# Patient Record
Sex: MALE | Race: BLACK OR AFRICAN AMERICAN | Employment: UNEMPLOYED | URBAN - METROPOLITAN AREA
[De-identification: names, ages, dates, MRNs, and addresses within clinical notes are randomized per-mention and may not be internally consistent; named-entity substitution may affect disease eponyms.]

---

## 2017-04-28 ENCOUNTER — HOSPITAL ENCOUNTER (EMERGENCY)
Age: 57
Discharge: HOME | End: 2017-04-28
Attending: EMERGENCY MEDICINE
Payer: MEDICARE

## 2017-04-28 VITALS
SYSTOLIC BLOOD PRESSURE: 110 MMHG | HEIGHT: 70 IN | WEIGHT: 162 LBS | BODY MASS INDEX: 23.19 KG/M2 | OXYGEN SATURATION: 99 % | HEART RATE: 90 BPM | DIASTOLIC BLOOD PRESSURE: 82 MMHG | RESPIRATION RATE: 16 BRPM | TEMPERATURE: 98.5 F

## 2017-04-28 DIAGNOSIS — Z87.898 HISTORY OF EXTRAPYRAMIDAL SYMPTOMS: Primary | ICD-10-CM

## 2017-04-28 PROCEDURE — 99285 EMERGENCY DEPT VISIT HI MDM: CPT | Performed by: EMERGENCY MEDICINE

## 2017-04-28 PROCEDURE — 96374 THER/PROPH/DIAG INJ IV PUSH: CPT | Performed by: EMERGENCY MEDICINE

## 2017-04-28 PROCEDURE — 74011250636 HC RX REV CODE- 250/636: Performed by: EMERGENCY MEDICINE

## 2017-04-28 RX ORDER — DIPHENHYDRAMINE HYDROCHLORIDE 50 MG/ML
25 INJECTION, SOLUTION INTRAMUSCULAR; INTRAVENOUS
Status: COMPLETED | OUTPATIENT
Start: 2017-04-28 | End: 2017-04-28

## 2017-04-28 RX ADMIN — DIPHENHYDRAMINE HYDROCHLORIDE 25 MG: 50 INJECTION, SOLUTION INTRAMUSCULAR; INTRAVENOUS at 13:47

## 2017-04-28 NOTE — ED TRIAGE NOTES
Pt arrive via EMS [facility] from [facility] (adult ), staff states pt has been \"twitching more. \" Pt has no complaints.

## 2017-04-28 NOTE — ED PROVIDER NOTES
HPI Comments: The sister Nj Isabel reports that the adult  staff were concerned that the patient was having too many muscle twitches today. He has a history of extraparametal side effects from his clozarilthat he takes for schizophrenia. She reports  to her that he is at baseline. He denies fall, injury, loc, pain. Patient is a 49 y.o. male presenting with other event. The history is provided by a relative. Other   This is a chronic problem. Pertinent negatives include no chest pain, no abdominal pain, no headaches and no shortness of breath. Past Medical History:   Diagnosis Date    Diabetes ([facility] [address]     Heart failure ([facility] [address] [facility]     Hypertension     Psychiatric disorder     paranoid schziphrenia    Systolic CHF, acute on chronic ([facility] [address] 2/4/2016       Past Surgical History:   Procedure Laterality Date    HX APPENDECTOMY           History reviewed. No pertinent family history. Social History     Social History    Marital status: SINGLE     Spouse name: N/A    Number of children: N/A    Years of education: N/A     Occupational History    Not on file. Social History Main Topics    Smoking status: Current Every Day Smoker    Smokeless tobacco: Not on file    Alcohol use No    Drug use: No    Sexual activity: No     Other Topics Concern    Not on file     Social History Narrative         ALLERGIES: Review of patient's allergies indicates no known allergies. Review of Systems   Constitutional: Negative for chills and fever. HENT: Negative for congestion, rhinorrhea and sore throat. Eyes: Negative for photophobia and redness. Respiratory: Negative for cough and shortness of breath. Cardiovascular: Negative for chest pain and leg swelling. Gastrointestinal: Negative for abdominal pain, diarrhea, nausea and vomiting. Endocrine: Negative for polydipsia and polyuria. Musculoskeletal: Negative for back pain and myalgias.    Neurological: Negative for seizures, weakness, numbness and headaches. Vitals:    04/28/17 1106   BP: 117/81   Pulse: 100   Resp: 18   Temp: 98.6 °F (37 °C)   SpO2: 98%   Weight: 73.5 kg (162 lb)   Height: 5' 10\" (1.778 m)            Physical Exam   Constitutional: He is oriented to person, place, and time. He appears well-developed and well-nourished. HENT:   Mouth/Throat: Oropharynx is clear and moist. No oropharyngeal exudate. Eyes: Conjunctivae are normal. Pupils are equal, round, and reactive to light. Neck: Neck supple. Cardiovascular: Normal rate, regular rhythm and normal heart sounds. Pulmonary/Chest: Effort normal and breath sounds normal.   Abdominal: Soft. Bowel sounds are normal. He exhibits no distension. There is no tenderness. There is no rebound and no guarding. Musculoskeletal: Normal range of motion. He exhibits no edema or tenderness. Lymphadenopathy:     He has no cervical adenopathy. Neurological: He is alert and oriented to person, place, and time. No cranial nerve deficit. Coordination normal.   Occasional extraparametal myoclonic twitch or jerk is noted. Lower extremities and upper extremities. No seizure activity. Patient awake and alert. Skin: Skin is warm and dry. Nursing note and vitals reviewed. MDM  Number of Diagnoses or Management Options  Diagnosis management comments: Chronic extrapyramidal medication symptoms. On Cogentin 2 mg at night and 1 mg in the morning. Discussed with his sister increasing to 2 mg in the morning or at least checking with the psychiatrist if the  continues to notice increased daytime symptoms. IV Benadryl ordered emergency department. No need for labs or any injury imaging.     ED Course       Procedures

## 2018-11-13 ENCOUNTER — HOSPITAL ENCOUNTER (INPATIENT)
Age: 58
LOS: 17 days | Discharge: NURSING, REHAB OR LONG-TERM CARE | DRG: 226 | End: 2018-11-30
Attending: EMERGENCY MEDICINE | Admitting: HOSPITALIST
Payer: MEDICARE

## 2018-11-13 ENCOUNTER — APPOINTMENT (OUTPATIENT)
Dept: GENERAL RADIOLOGY | Age: 58
DRG: 226 | End: 2018-11-13
Attending: INTERNAL MEDICINE
Payer: MEDICARE

## 2018-11-13 ENCOUNTER — APPOINTMENT (OUTPATIENT)
Dept: GENERAL RADIOLOGY | Age: 58
DRG: 226 | End: 2018-11-13
Attending: EMERGENCY MEDICINE
Payer: MEDICARE

## 2018-11-13 DIAGNOSIS — J96.01 ACUTE RESPIRATORY FAILURE WITH HYPOXIA (HCC): ICD-10-CM

## 2018-11-13 DIAGNOSIS — I42.0 DILATED CARDIOMYOPATHY (HCC): ICD-10-CM

## 2018-11-13 DIAGNOSIS — I50.22 CHRONIC SYSTOLIC CONGESTIVE HEART FAILURE (HCC): ICD-10-CM

## 2018-11-13 DIAGNOSIS — F20.9 SCHIZOPHRENIA, UNSPECIFIED TYPE (HCC): Chronic | ICD-10-CM

## 2018-11-13 DIAGNOSIS — I46.9 CARDIAC ARREST (HCC): Primary | ICD-10-CM

## 2018-11-13 DIAGNOSIS — I95.9 HYPOTENSION, UNSPECIFIED HYPOTENSION TYPE: ICD-10-CM

## 2018-11-13 DIAGNOSIS — N17.9 ACUTE RENAL FAILURE, UNSPECIFIED ACUTE RENAL FAILURE TYPE (HCC): ICD-10-CM

## 2018-11-13 PROBLEM — I50.20 SYSTOLIC CONGESTIVE HEART FAILURE (HCC): Status: ACTIVE | Noted: 2018-11-13

## 2018-11-13 LAB
ALBUMIN SERPL-MCNC: 3.4 G/DL (ref 3.5–5)
ALBUMIN/GLOB SERPL: 0.8 {RATIO} (ref 1.2–3.5)
ALP SERPL-CCNC: 205 U/L (ref 50–136)
ALT SERPL-CCNC: 422 U/L (ref 12–65)
ANION GAP SERPL CALC-SCNC: 12 MMOL/L (ref 7–16)
ARTERIAL PATENCY WRIST A: YES
AST SERPL-CCNC: 484 U/L (ref 15–37)
ATRIAL RATE: 96 BPM
BASE DEFICIT BLD-SCNC: 2 MMOL/L
BASOPHILS # BLD: 0 K/UL (ref 0–0.2)
BASOPHILS NFR BLD: 0 % (ref 0–2)
BDY SITE: ABNORMAL
BILIRUB SERPL-MCNC: 0.4 MG/DL (ref 0.2–1.1)
BODY TEMPERATURE: 98.6
BUN SERPL-MCNC: 23 MG/DL (ref 6–23)
CALCIUM SERPL-MCNC: 8.7 MG/DL (ref 8.3–10.4)
CALCULATED P AXIS, ECG09: 64 DEGREES
CALCULATED R AXIS, ECG10: -72 DEGREES
CALCULATED T AXIS, ECG11: 99 DEGREES
CHLORIDE SERPL-SCNC: 104 MMOL/L (ref 98–107)
CO2 BLD-SCNC: 25 MMOL/L
CO2 SERPL-SCNC: 24 MMOL/L (ref 21–32)
CREAT SERPL-MCNC: 1.78 MG/DL (ref 0.8–1.5)
DIAGNOSIS, 93000: NORMAL
DIFFERENTIAL METHOD BLD: ABNORMAL
EOSINOPHIL # BLD: 0 K/UL (ref 0–0.8)
EOSINOPHIL NFR BLD: 0 % (ref 0.5–7.8)
ERYTHROCYTE [DISTWIDTH] IN BLOOD BY AUTOMATED COUNT: 15.9 %
GAS FLOW.O2 O2 DELIVERY SYS: ABNORMAL L/MIN
GAS FLOW.O2 SETTING OXYMISER: 16 BPM
GLOBULIN SER CALC-MCNC: 4.1 G/DL (ref 2.3–3.5)
GLUCOSE SERPL-MCNC: 92 MG/DL (ref 65–100)
HCO3 BLD-SCNC: 23.8 MMOL/L (ref 22–26)
HCT VFR BLD AUTO: 40.8 % (ref 41.1–50.3)
HGB BLD-MCNC: 12.6 G/DL (ref 13.6–17.2)
IMM GRANULOCYTES # BLD: 0.2 K/UL (ref 0–0.5)
IMM GRANULOCYTES NFR BLD AUTO: 2 % (ref 0–5)
INSPIRATION.DURATION SETTING TIME VENT: 0.9 SEC
LYMPHOCYTES # BLD: 3.3 K/UL (ref 0.5–4.6)
LYMPHOCYTES NFR BLD: 33 % (ref 13–44)
MCH RBC QN AUTO: 23.7 PG (ref 26.1–32.9)
MCHC RBC AUTO-ENTMCNC: 30.9 G/DL (ref 31.4–35)
MCV RBC AUTO: 76.7 FL (ref 79.6–97.8)
MONOCYTES # BLD: 0.7 K/UL (ref 0.1–1.3)
MONOCYTES NFR BLD: 7 % (ref 4–12)
NEUTS SEG # BLD: 5.7 K/UL (ref 1.7–8.2)
NEUTS SEG NFR BLD: 58 % (ref 43–78)
NRBC # BLD: 0 K/UL (ref 0–0.2)
O2/TOTAL GAS SETTING VFR VENT: 100 %
P-R INTERVAL, ECG05: 186 MS
PCO2 BLD: 45.7 MMHG (ref 35–45)
PEEP RESPIRATORY: 8 CMH2O
PH BLD: 7.33 [PH] (ref 7.35–7.45)
PLATELET # BLD AUTO: 191 K/UL (ref 150–450)
PMV BLD AUTO: 11.9 FL (ref 9.4–12.3)
PO2 BLD: 337 MMHG (ref 75–100)
POTASSIUM SERPL-SCNC: 4.4 MMOL/L (ref 3.5–5.1)
PROT SERPL-MCNC: 7.5 G/DL (ref 6.3–8.2)
Q-T INTERVAL, ECG07: 408 MS
QRS DURATION, ECG06: 162 MS
QTC CALCULATION (BEZET), ECG08: 515 MS
RBC # BLD AUTO: 5.32 M/UL (ref 4.23–5.6)
SAO2 % BLD: 100 % (ref 95–98)
SERVICE CMNT-IMP: ABNORMAL
SODIUM SERPL-SCNC: 140 MMOL/L (ref 136–145)
SPECIMEN TYPE: ABNORMAL
TROPONIN I BLD-MCNC: 0 NG/ML (ref 0.02–0.05)
VENTILATION MODE VENT: ABNORMAL
VENTRICULAR RATE, ECG03: 96 BPM
VT SETTING VENT: 500 ML
WBC # BLD AUTO: 9.9 K/UL (ref 4.3–11.1)

## 2018-11-13 PROCEDURE — C1751 CATH, INF, PER/CENT/MIDLINE: HCPCS

## 2018-11-13 PROCEDURE — 74011250636 HC RX REV CODE- 250/636: Performed by: INTERNAL MEDICINE

## 2018-11-13 PROCEDURE — B544ZZA ULTRASONOGRAPHY OF LEFT JUGULAR VEINS, GUIDANCE: ICD-10-PCS | Performed by: INTERNAL MEDICINE

## 2018-11-13 PROCEDURE — 36556 INSERT NON-TUNNEL CV CATH: CPT

## 2018-11-13 PROCEDURE — 02HV33Z INSERTION OF INFUSION DEVICE INTO SUPERIOR VENA CAVA, PERCUTANEOUS APPROACH: ICD-10-PCS | Performed by: INTERNAL MEDICINE

## 2018-11-13 PROCEDURE — 99223 1ST HOSP IP/OBS HIGH 75: CPT | Performed by: INTERNAL MEDICINE

## 2018-11-13 PROCEDURE — 71045 X-RAY EXAM CHEST 1 VIEW: CPT

## 2018-11-13 PROCEDURE — 0D9670Z DRAINAGE OF STOMACH WITH DRAINAGE DEVICE, VIA NATURAL OR ARTIFICIAL OPENING: ICD-10-PCS | Performed by: INTERNAL MEDICINE

## 2018-11-13 PROCEDURE — 36600 WITHDRAWAL OF ARTERIAL BLOOD: CPT

## 2018-11-13 PROCEDURE — 0BH17EZ INSERTION OF ENDOTRACHEAL AIRWAY INTO TRACHEA, VIA NATURAL OR ARTIFICIAL OPENING: ICD-10-PCS | Performed by: INTERNAL MEDICINE

## 2018-11-13 PROCEDURE — 84484 ASSAY OF TROPONIN QUANT: CPT

## 2018-11-13 PROCEDURE — 74011000258 HC RX REV CODE- 258: Performed by: INTERNAL MEDICINE

## 2018-11-13 PROCEDURE — 77030018846 HC SOL IRR STRL H20 ICUM -A

## 2018-11-13 PROCEDURE — 74011000250 HC RX REV CODE- 250: Performed by: INTERNAL MEDICINE

## 2018-11-13 PROCEDURE — 96375 TX/PRO/DX INJ NEW DRUG ADDON: CPT | Performed by: EMERGENCY MEDICINE

## 2018-11-13 PROCEDURE — 99285 EMERGENCY DEPT VISIT HI MDM: CPT | Performed by: EMERGENCY MEDICINE

## 2018-11-13 PROCEDURE — 82803 BLOOD GASES ANY COMBINATION: CPT

## 2018-11-13 PROCEDURE — 77030032490 HC SLV COMPR SCD KNE COVD -B

## 2018-11-13 PROCEDURE — 36556 INSERT NON-TUNNEL CV CATH: CPT | Performed by: INTERNAL MEDICINE

## 2018-11-13 PROCEDURE — 74011000258 HC RX REV CODE- 258: Performed by: EMERGENCY MEDICINE

## 2018-11-13 PROCEDURE — 94002 VENT MGMT INPAT INIT DAY: CPT

## 2018-11-13 PROCEDURE — 96365 THER/PROPH/DIAG IV INF INIT: CPT | Performed by: EMERGENCY MEDICINE

## 2018-11-13 PROCEDURE — 93005 ELECTROCARDIOGRAM TRACING: CPT | Performed by: EMERGENCY MEDICINE

## 2018-11-13 PROCEDURE — 65620000000 HC RM CCU GENERAL

## 2018-11-13 PROCEDURE — 5A1945Z RESPIRATORY VENTILATION, 24-96 CONSECUTIVE HOURS: ICD-10-PCS | Performed by: INTERNAL MEDICINE

## 2018-11-13 PROCEDURE — 74018 RADEX ABDOMEN 1 VIEW: CPT

## 2018-11-13 PROCEDURE — 74011250636 HC RX REV CODE- 250/636: Performed by: EMERGENCY MEDICINE

## 2018-11-13 PROCEDURE — 85025 COMPLETE CBC W/AUTO DIFF WBC: CPT

## 2018-11-13 PROCEDURE — 74011250636 HC RX REV CODE- 250/636

## 2018-11-13 PROCEDURE — 80053 COMPREHEN METABOLIC PANEL: CPT

## 2018-11-13 PROCEDURE — 31500 INSERT EMERGENCY AIRWAY: CPT | Performed by: INTERNAL MEDICINE

## 2018-11-13 RX ORDER — MIDAZOLAM HYDROCHLORIDE 1 MG/ML
2 INJECTION, SOLUTION INTRAMUSCULAR; INTRAVENOUS
Status: DISCONTINUED | OUTPATIENT
Start: 2018-11-13 | End: 2018-11-30 | Stop reason: HOSPADM

## 2018-11-13 RX ORDER — POTASSIUM CHLORIDE 20 MEQ/1
20 TABLET, EXTENDED RELEASE ORAL DAILY
COMMUNITY

## 2018-11-13 RX ORDER — ENOXAPARIN SODIUM 100 MG/ML
40 INJECTION SUBCUTANEOUS EVERY 24 HOURS
Status: DISCONTINUED | OUTPATIENT
Start: 2018-11-13 | End: 2018-11-20

## 2018-11-13 RX ORDER — LISINOPRIL 10 MG/1
10 TABLET ORAL DAILY
COMMUNITY

## 2018-11-13 RX ORDER — POLYETHYLENE GLYCOL 3350 17 G/17G
17 POWDER, FOR SOLUTION ORAL AS NEEDED
COMMUNITY

## 2018-11-13 RX ORDER — POLYVINYL ALCOHOL 14 MG/ML
1 SOLUTION/ DROPS OPHTHALMIC EVERY 4 HOURS
Status: DISCONTINUED | OUTPATIENT
Start: 2018-11-13 | End: 2018-11-15

## 2018-11-13 RX ORDER — MIDAZOLAM HYDROCHLORIDE 1 MG/ML
INJECTION, SOLUTION INTRAMUSCULAR; INTRAVENOUS
Status: ACTIVE
Start: 2018-11-13 | End: 2018-11-14

## 2018-11-13 RX ORDER — SCOLOPAMINE TRANSDERMAL SYSTEM 1 MG/1
1 PATCH, EXTENDED RELEASE TRANSDERMAL
COMMUNITY
End: 2019-03-04

## 2018-11-13 RX ORDER — ENOXAPARIN SODIUM 100 MG/ML
40 INJECTION SUBCUTANEOUS EVERY 24 HOURS
Status: DISCONTINUED | OUTPATIENT
Start: 2018-11-13 | End: 2018-11-13 | Stop reason: SDUPTHER

## 2018-11-13 RX ORDER — SODIUM CHLORIDE 0.9 % (FLUSH) 0.9 %
5-10 SYRINGE (ML) INJECTION AS NEEDED
Status: DISCONTINUED | OUTPATIENT
Start: 2018-11-13 | End: 2018-11-30 | Stop reason: HOSPADM

## 2018-11-13 RX ORDER — GUAIFENESIN 100 MG/5ML
81 LIQUID (ML) ORAL DAILY
Status: DISCONTINUED | OUTPATIENT
Start: 2018-11-14 | End: 2018-11-30 | Stop reason: HOSPADM

## 2018-11-13 RX ORDER — FUROSEMIDE 40 MG/1
40 TABLET ORAL DAILY
COMMUNITY
End: 2018-12-14

## 2018-11-13 RX ORDER — EPINEPHRINE 0.1 MG/ML
INJECTION INTRACARDIAC; INTRAVENOUS
Status: COMPLETED | OUTPATIENT
Start: 2018-11-13 | End: 2018-11-13

## 2018-11-13 RX ORDER — SODIUM CHLORIDE 9 MG/ML
100 INJECTION, SOLUTION INTRAVENOUS CONTINUOUS
Status: DISCONTINUED | OUTPATIENT
Start: 2018-11-13 | End: 2018-11-17

## 2018-11-13 RX ORDER — MIDAZOLAM HYDROCHLORIDE 1 MG/ML
5 INJECTION, SOLUTION INTRAMUSCULAR; INTRAVENOUS ONCE
Status: COMPLETED | OUTPATIENT
Start: 2018-11-13 | End: 2018-11-13

## 2018-11-13 RX ORDER — NALOXONE HYDROCHLORIDE 1 MG/ML
1 INJECTION INTRAMUSCULAR; INTRAVENOUS; SUBCUTANEOUS
Status: COMPLETED | OUTPATIENT
Start: 2018-11-13 | End: 2018-11-13

## 2018-11-13 RX ORDER — ATRACURIUM BESYLATE 10 MG/ML
0.5 INJECTION, SOLUTION INTRAVENOUS ONCE
Status: COMPLETED | OUTPATIENT
Start: 2018-11-13 | End: 2018-11-13

## 2018-11-13 RX ORDER — SODIUM CHLORIDE 9 MG/ML
2000 INJECTION, SOLUTION INTRAVENOUS CONTINUOUS
Status: DISCONTINUED | OUTPATIENT
Start: 2018-11-13 | End: 2018-11-13

## 2018-11-13 RX ORDER — MIDAZOLAM HYDROCHLORIDE 1 MG/ML
2 INJECTION, SOLUTION INTRAMUSCULAR; INTRAVENOUS ONCE
Status: COMPLETED | OUTPATIENT
Start: 2018-11-13 | End: 2018-11-13

## 2018-11-13 RX ORDER — SODIUM CHLORIDE 0.9 % (FLUSH) 0.9 %
5-10 SYRINGE (ML) INJECTION EVERY 8 HOURS
Status: DISCONTINUED | OUTPATIENT
Start: 2018-11-13 | End: 2018-11-30 | Stop reason: HOSPADM

## 2018-11-13 RX ORDER — MIDAZOLAM HYDROCHLORIDE 1 MG/ML
INJECTION, SOLUTION INTRAMUSCULAR; INTRAVENOUS
Status: COMPLETED
Start: 2018-11-13 | End: 2018-11-13

## 2018-11-13 RX ADMIN — Medication 10 ML: at 18:19

## 2018-11-13 RX ADMIN — POLYVINYL ALCOHOL 1 DROP: 14 SOLUTION/ DROPS OPHTHALMIC at 21:06

## 2018-11-13 RX ADMIN — MIDAZOLAM HYDROCHLORIDE 2 MG: 1 INJECTION, SOLUTION INTRAMUSCULAR; INTRAVENOUS at 15:59

## 2018-11-13 RX ADMIN — AMIODARONE HYDROCHLORIDE 1 MG/MIN: 50 INJECTION, SOLUTION INTRAVENOUS at 15:04

## 2018-11-13 RX ADMIN — NALOXONE HYDROCHLORIDE 1 MG: 1 INJECTION PARENTERAL at 14:45

## 2018-11-13 RX ADMIN — Medication 10 ML: at 22:53

## 2018-11-13 RX ADMIN — SODIUM CHLORIDE 5 MCG/KG/MIN: 900 INJECTION, SOLUTION INTRAVENOUS at 16:46

## 2018-11-13 RX ADMIN — ATRACURIUM BESYLATE 36.3 MG: 10 INJECTION, SOLUTION INTRAVENOUS at 16:46

## 2018-11-13 RX ADMIN — SODIUM CHLORIDE 2000 ML: 9 INJECTION, SOLUTION INTRAVENOUS at 14:19

## 2018-11-13 RX ADMIN — MIDAZOLAM 2 MG/HR: 5 INJECTION INTRAMUSCULAR; INTRAVENOUS at 16:52

## 2018-11-13 RX ADMIN — MIDAZOLAM HYDROCHLORIDE 2 MG: 1 INJECTION, SOLUTION INTRAMUSCULAR; INTRAVENOUS at 23:23

## 2018-11-13 RX ADMIN — AMIODARONE HYDROCHLORIDE 1 MG/MIN: 50 INJECTION, SOLUTION INTRAVENOUS at 16:10

## 2018-11-13 RX ADMIN — POLYVINYL ALCOHOL 1 DROP: 14 SOLUTION/ DROPS OPHTHALMIC at 18:19

## 2018-11-13 RX ADMIN — ENOXAPARIN SODIUM 40 MG: 40 INJECTION, SOLUTION INTRAVENOUS; SUBCUTANEOUS at 18:21

## 2018-11-13 RX ADMIN — AMIODARONE HYDROCHLORIDE 0.5 MG/MIN: 50 INJECTION, SOLUTION INTRAVENOUS at 23:30

## 2018-11-13 RX ADMIN — EPINEPHRINE 1 MG: 0.1 INJECTION, SOLUTION ENDOTRACHEAL; INTRACARDIAC; INTRAVENOUS at 14:11

## 2018-11-13 RX ADMIN — SODIUM CHLORIDE 100 ML/HR: 900 INJECTION, SOLUTION INTRAVENOUS at 16:05

## 2018-11-13 RX ADMIN — FENTANYL CITRATE 50 MCG/HR: 50 INJECTION, SOLUTION INTRAMUSCULAR; INTRAVENOUS at 16:52

## 2018-11-13 RX ADMIN — MIDAZOLAM HYDROCHLORIDE 5 MG: 1 INJECTION, SOLUTION INTRAMUSCULAR; INTRAVENOUS at 16:46

## 2018-11-13 RX ADMIN — EPINEPHRINE 0.1 MCG/KG/MIN: 1 INJECTION INTRAMUSCULAR; INTRAVENOUS; SUBCUTANEOUS at 14:37

## 2018-11-13 NOTE — ED TRIAGE NOTES
PT TO ED VIA EMS/STRETCHER FROM [facility]  - WITNESSED ARREST AT FACILITY WITH IMMEDIATE CHEST COMPRESSIONS - PT SHOCKED ONCE WITH AED - EMS ARRIVAL PEA FIRST CHECK THEN SECOND PULSE OBTAINED - ROSC OBTAINED -  - 98/40 - AMIO RUNNING 150MG IN 50ML BAG BY EMS - 7.5 ET TUBE -GOOD LUNG SOUNDS ON ASCULTATION - POSITIVE COLOR CHANGE - IO RIGHT SHOULDER

## 2018-11-13 NOTE — PROCEDURES
Procedure: /emergent intubation    Indication: respiratory insufficiency      After adequate sedation and paralisys emergent intubation was performed. Previous et tube  Had large cuff leak so  #7 tube placed over a tube changer. .    Water vapor was seen within the ET tube, and auscultation of the abdomen revealed no bubbling souds. Auscultation  and inspection of the chest after intubation showed symmetric chest excursion and symmetric air entry bilaterally. Chest X-ray has been ordered and is pending. The patient has been placed on a mechanical ventilator. There were no complications.     Anailsa Lucas MD

## 2018-11-13 NOTE — PROGRESS NOTES
responded to a crisis in the ED. ED  called  to assist with family of a pt that was post-cardiac arrest.   Team continued to work with pt and transferred pt to CCU.  worked with family in providing care and being a liaison between staff and family.  met with family and had prayer.  provided spiritual care through presence, pastoral conversation, and assurance of prayer.

## 2018-11-13 NOTE — ROUTINE PROCESS
TRANSFER - OUT REPORT: 
 
Verbal report given to David(name) on [employer]  being transferred to CCU(unit) for routine progression of care Report consisted of patients Situation, Background, Assessment and  
Recommendations(SBAR). Information from the following report(s) ED Summary was reviewed with the receiving nurse. Lines:  
Peripheral IV 11/13/18 Left Hand (Active) Site Assessment Clean, dry, & intact 11/13/2018  2:06 PM  
Phlebitis Assessment 0 11/13/2018  2:06 PM  
Infiltration Assessment 0 11/13/2018  2:06 PM  
Dressing Status Clean, dry, & intact 11/13/2018  2:06 PM  
   
Peripheral IV 11/13/18 Left Antecubital (Active) Site Assessment Clean, dry, & intact 11/13/2018  2:12 PM  
Phlebitis Assessment 0 11/13/2018  2:12 PM  
Infiltration Assessment 0 11/13/2018  2:12 PM  
Dressing Status Clean, dry, & intact 11/13/2018  2:12 PM  
  
 
Opportunity for questions and clarification was provided. Patient transported with: 
 Registered Nurse ET tube, oxygen, IV infusion, [facility] Respiratory therapist.

## 2018-11-13 NOTE — PROGRESS NOTES
TRANSFER - IN REPORT: 
 
Verbal report received from Zuri, [facility] (name) on Rika Cerda  being received from ED (unit) for ordered procedure Report consisted of patients Situation, Background, Assessment and  
Recommendations(SBAR). Information from the following report(s) ED Summary, Procedure Summary, Intake/Output, MAR and Med Rec Status was reviewed with the receiving nurse. Opportunity for questions and clarification was provided. Assessment completed upon patients arrival to unit and care assumed. Dual skin assessment performed with Esa Miner RN. No deficits noted. Arctic sun pads in place. Pt reintubated after leak formed in ET tube cuff. Pt tolerating vent well. All gtt verified. Dr Nesha Barba at bedside to place central line. Pt bathed, allevyn applied to sacrum.

## 2018-11-13 NOTE — H&P
HISTORY AND PHYSICAL Alonzo Fairchild 11/13/2018 Date of Admission:  11/13/2018 The patient's chart is reviewed and the patient is discussed with the staff. Subjective:  
 
Patient is a 66 y.o.  male presents following a witnessed cardiac arrest. He has paranoid schizophrenia and goes to a day care center where he was today when he collapsed. The staff could not find a pulse so CPR was started and an AED placed. Rhythm was reportedly shockable so this was performed with return of circulation. He was intubated by EMS and transported to the ER. Upon admission here, his pulse was again lost so he underwent another course of CPR and received 1 amp of epi and again ROSC was achieved. He is hypotensive and receiving his second liter of IV fluids. He has been started on Levophed. He has known cardiomyopathy with an echo from 2016 reviewed - EF 20 - 25%. He is followed by Dr. David Lim and his maintained on an ACE, beta blocker, statin, baby asa and daily lasix. Reportedly he had a cardiac cath in the past that revealed no CAD. He also has noninsulin dependent diabetes and he takes daily metformin. Otherwise, he is followed by the [facility] for schizophrenia that was diagnosed when he was a teenager. He lives with his sister who helps with his medical therapy which he has been compliant with. She was interviewed today and she did not notice that he seemed in any way ill before she took him to  today. Review of Systems Review of systems not obtained due to patient factors. Patient Active Problem List  
Diagnosis Code  Type II diabetes mellitus (HCC) E11.9  
 CHF (congestive heart failure) ([facility]) I50.9  
 HTN (hypertension) I10  
 Cardiac arrest ([facility]) I46.9  Acute respiratory failure with hypoxia ([facility]) J96.01  
 Hypotension I95.9  Systolic congestive heart failure (HCC) I50.20  Schizophrenia ([facility] F20.9 Prior to Admission Medications Prescriptions Last Dose Informant Patient Reported? Taking?  
aspirin 81 mg chewable tablet   Yes No  
Sig: Take 81 mg by mouth daily. benztropine (COGENTIN) 2 mg tablet   Yes No  
Sig: Take 2 mg by mouth nightly. Indications: EXTRAPYRAMIDAL DISEASE  
carvedilol (COREG) 6.25 mg tablet   Yes No  
Sig: Take 3.125 mg by mouth two (2) times daily (with meals). cloZAPine (CLOZARIL) 100 mg disintegrating tablet   Yes No  
Sig: Take 200 mg by mouth daily. Indications: SUICIDAL BEHAVIOR IN SCHIZOPHRENIA  
cloZAPine (CLOZARIL) 100 mg tablet   Yes No  
Sig: Take 500 mg by mouth nightly. Indications: SUICIDAL BEHAVIOR IN SCHIZOPHRENIA  
furosemide (LASIX) 40 mg tablet   Yes Yes Sig: Take 40 mg by mouth daily. lisinopril (PRINIVIL, ZESTRIL) 10 mg tablet   Yes Yes Sig: Take 10 mg by mouth daily. loratadine (CLARITIN) 10 mg tablet   Yes No  
Sig: Take 10 mg by mouth daily. metFORMIN (GLUCOPHAGE) 500 mg tablet   Yes No  
Sig: Take 500 mg by mouth nightly. polyethylene glycol (MIRALAX) 17 gram/dose powder   Yes Yes Sig: Take 17 g by mouth as needed. potassium chloride (K-DUR, KLOR-CON) 20 mEq tablet   Yes Yes Sig: Take 20 mEq by mouth daily. scopolamine (TRANSDERM-SCOP) 1 mg over 3 days pt3d   Yes Yes Si Patch by TransDERmal route every seventy-two (72) hours. simvastatin (ZOCOR) 40 mg tablet   Yes No  
Sig: Take 40 mg by mouth nightly. Facility-Administered Medications: None Past Medical History:  
Diagnosis Date  Diabetes ([facility]  Heart failure ([facility]  Hypertension  Psychiatric disorder   
 paranoid schziphrenia  Systolic CHF, acute on chronic ([facility] 2016 Past Surgical History:  
Procedure Laterality Date  HX APPENDECTOMY Social History Socioeconomic History  Marital status: SINGLE Spouse name: Not on file  Number of children: Not on file  Years of education: Not on file  Highest education level: Not on file Social Needs  Financial resource strain: Not on file  Food insecurity - worry: Not on file  Food insecurity - inability: Not on file  Transportation needs - medical: Not on file  Transportation needs - non-medical: Not on file Occupational History  Occupation: disabled Tobacco Use  Smoking status: Former Smoker Packs/day: 1.00 Years: 35.00 Pack years: 35.00  Tobacco comment: quit around 2015 Substance and Sexual Activity  Alcohol use: No  
 Drug use: No  
 Sexual activity: No  
Other Topics Concern  Not on file Social History Narrative Lives with sister Family History Problem Relation Age of Onset  Heart Failure Mother  Heart Attack Father  Diabetes Sister  Hypertension Sister  Heart Disease Sister   
     valvular  No Known Problems Brother  No Known Problems Brother  Diabetes Sister  Diabetes Sister  Diabetes Sister  No Known Problems Sister  No Known Problems Sister  Lung Cancer Brother  Heart Attack Brother No Known Allergies Current Facility-Administered Medications Medication Dose Route Frequency  EPINEPHrine (ADRENALIN) 4 mg in 0.9% sodium chloride 250 mL infusion  0.1 mcg/kg/min IntraVENous TITRATE  amiodarone (CORDARONE) 450 mg in dextrose 5% 250 mL infusion  0.5-1 mg/min IntraVENous CONTINUOUS Current Outpatient Medications Medication Sig  
 lisinopril (PRINIVIL, ZESTRIL) 10 mg tablet Take 10 mg by mouth daily.  potassium chloride (K-DUR, KLOR-CON) 20 mEq tablet Take 20 mEq by mouth daily.  furosemide (LASIX) 40 mg tablet Take 40 mg by mouth daily.  scopolamine (TRANSDERM-SCOP) 1 mg over 3 days pt3d 1 Patch by TransDERmal route every seventy-two (72) hours.  polyethylene glycol (MIRALAX) 17 gram/dose powder Take 17 g by mouth as needed.  metFORMIN (GLUCOPHAGE) 500 mg tablet Take 500 mg by mouth nightly.  simvastatin (ZOCOR) 40 mg tablet Take 40 mg by mouth nightly.  cloZAPine (CLOZARIL) 100 mg tablet Take 500 mg by mouth nightly. Indications: SUICIDAL BEHAVIOR IN SCHIZOPHRENIA  
 benztropine (COGENTIN) 2 mg tablet Take 2 mg by mouth nightly. Indications: EXTRAPYRAMIDAL DISEASE  cloZAPine (CLOZARIL) 100 mg disintegrating tablet Take 200 mg by mouth daily. Indications: SUICIDAL BEHAVIOR IN SCHIZOPHRENIA  
 loratadine (CLARITIN) 10 mg tablet Take 10 mg by mouth daily.  carvedilol (COREG) 6.25 mg tablet Take 3.125 mg by mouth two (2) times daily (with meals).  aspirin 81 mg chewable tablet Take 81 mg by mouth daily. Objective:  
 
Vitals:  
 11/13/18 1513 11/13/18 1516 11/13/18 1517 11/13/18 1519 BP: (!) 89/62 95/62 91/57 91/57 Pulse: 88 88 89 89 Resp: 14 15 15 15 SpO2: 100% 100% 100% 100% Weight: PHYSICAL EXAM  
 
Constitutional:  the patient is well developed and in no acute distress HEENT:  Sclera clear, pupils pinpoint and equal, orally intubated Respiratory: clear and equal breath sounds. Respirations not labored. On mechanical ventilator Cardiovascular:  RRR without M,G,R. Sinus per telemetry Abd/GI: soft; with positive bowel sounds. Ext: cool. There is no lower leg edema. Skin:  no jaundice or rashes, no wounds Neuro: moving head, left arm and both legs some following nail bed pressure. No movement noted in right arm. Musculoskeletal:  No deformities Chest xray Recent Labs 11/13/18 [id number] 68 71 79 WBC 9.9 HGB 12.6* HCT 40.8*  Recent Labs 11/13/18 92975 68 71 79   
K 4.4  
 GLU 92  
CO2 24 BUN 23  
CREA 1.78* CA 8.7 ALB 3.4* SGOT 484* Assessment:  (Medical Decision Making) Hospital Problems  Date Reviewed: 2/9/2016 Codes Class Noted POA Cardiac arrest [facility]) ICD-10-CM: I46.9 ICD-9-CM: 427.5  11/13/2018 Yes  Acute respiratory failure with hypoxia (HCC) ICD-10-CM: J96.01 
 ICD-9-CM: 518.81  11/13/2018 Yes Hypotension ICD-10-CM: I95.9 ICD-9-CM: 458.9  11/13/2018 Yes Systolic congestive heart failure (HCC) ICD-10-CM: I50.20 ICD-9-CM: 428.20, 428.0  11/13/2018 Yes Schizophrenia ([address] (Chronic) ICD-10-CM: F20.9 ICD-9-CM: 295.90  11/13/2018 Yes Type II diabetes mellitus (HCC) (Chronic) ICD-10-CM: E11.9 ICD-9-CM: 250.00  2/4/2016 Yes Plan:  (Medical Decision Making) 1. Transfer to ICU - initiate artic sun protocol 2. Cardiology consult 3. IV fluid resuscitation - levophed added for support 4. Follow up labs in AM - noted renal failure 5. Obtain list of home psych meds from his sister (mail order from [email or web address] so they don't have a copy) 6. SSI for now - metformin on hold Kateryna Serrano NP More than 50% of time documented was spent face-to-face contact with the patient and in the care of the patient on the floor/unit where the patient is located Lungs:  clear Heart:  RRR with no Murmur/Rubs/Gallops Additional Comments:  Admit to icu, [facility] protocol I have spoken with and examined the patient. I agree with the above assessment and plan as documented.  
 
Thiago Perez MD

## 2018-11-13 NOTE — PROCEDURES
The pt was placed in the supine position  The left upper chest and neck were prepped and drapped  Using ultrasound the left ij was ided but was small and we were unable to access  The left subclavian was then accessed with an 18 gauge needle-   A quad lumen catheter was placed using Seldinger technique.   Good blood return

## 2018-11-13 NOTE — ED PROVIDER NOTES
37-year-old [facility], [facility] American male with history of cardiomyopathy and schizophrenia presents after a witnessed cardiac arrest at a  center. CPR was provided and patient was defibrillated with an automatic external defibrillator. Upon EMS arrival they felt like he was in [place] and began CPR. He was intubated. Amiodarone drip was started. No epinephrine was needed. There is second pulse check he had return of spontaneous circulation. Blood pressure in the low 90s prior to arrival.  Blood sugar above 100 prior to arrival. 
 
 
The history is provided by the EMS personnel. Cardiac arrest   
This is a new problem. The current episode started less than 1 hour ago. The problem has been resolved. Past Medical History:  
Diagnosis Date  Diabetes ([facility]  Heart failure ([facility]  Hypertension  Psychiatric disorder   
 paranoid schziphrenia  Systolic CHF, acute on chronic ([facility] 2/4/2016 Past Surgical History:  
Procedure Laterality Date  HX APPENDECTOMY History reviewed. No pertinent family history. Social History Socioeconomic History  Marital status: SINGLE Spouse name: Not on file  Number of children: Not on file  Years of education: Not on file  Highest education level: Not on file Social Needs  Financial resource strain: Not on file  Food insecurity - worry: Not on file  Food insecurity - inability: Not on file  Transportation needs - medical: Not on file  Transportation needs - non-medical: Not on file Occupational History  Not on file Tobacco Use  Smoking status: Current Every Day Smoker Substance and Sexual Activity  Alcohol use: No  
 Drug use: No  
 Sexual activity: No  
Other Topics Concern  Not on file Social History Narrative  Not on file ALLERGIES: Patient has no known allergies. Review of Systems Unable to perform ROS: Intubated Vitals: 11/13/18 1421 11/13/18 1422 11/13/18 1424 11/13/18 1425 BP: (!) 87/58 (!) 72/52 (!) 76/53 (!) 76/53 Pulse: 97 96 92 92 Resp:  16 15 SpO2:  100% 100% Weight:   72.6 kg (160 lb) Physical Exam  
Constitutional: He appears well-developed and well-nourished. HENT:  
Intubated with a 7.5 ET tube. Eyes:  
Pupils are pinpoint and minimally reactive Neck:  
2+ bilateral carotid pulses initially Cardiovascular: Normal rate, regular rhythm and normal heart sounds. Pulmonary/Chest: Breath sounds normal.  
ventilated Abdominal: Soft. He exhibits no distension and no mass. Musculoskeletal: No trauma noted. No edema noted. Neurological: GCS eye subscore is 1. GCS verbal subscore is 1. GCS motor subscore is 1. Nursing note and vitals reviewed. MDM Number of Diagnoses or Management Options Diagnosis management comments: Not long after arrival we were concerned there was a loss of pulse. CPR was initiated. Patient was taken off the ventilator and Ambu bag was applied. 1 mg of epinephrine was given. After 2 minutes allowing this to circulate the patient had return of spontaneous circulation. Blood pressure was improved transiently but then declined. Amiodarone drip was continued. Epinephrine drip was ordered for blood pressure support. Heart rate remained in the 90s. EKG showed a right bundle branch block. No STEMI was noted. Cardiology did not feel patient was appropriate for the catheter lab. Turns out he was a patient of Dr. Doug Malik whom I spoke with him and he had a normal coronaries on a heart catheter one year ago. He has a severe dilated cardiomyopathy with an EF of 15-20%. Patient and patient's family had refused a defibrillator about one year ago. Code chill activated. Admit to pulmonary service. Amount and/or Complexity of Data Reviewed Clinical lab tests: ordered and reviewed (Results for orders placed or performed during the hospital encounter of 11/13/18 
-CBC WITH AUTOMATED DIFF Result                      Value             Ref Range WBC                         9.9               4.3 - 11.1 K* 
     RBC                         5.32              4.23 - 5.6 M* HGB                         12.6 (L)          13.6 - 17.2 * HCT                         40.8 (L)          41.1 - 50.3 % MCV                         76.7 (L)          79.6 - 97.8 * MCH                         23.7 (L)          26.1 - 32.9 * MCHC                        30.9 (L)          31.4 - 35.0 * RDW                         15.9              % PLATELET                    191               150 - 450 K/* MPV                         11.9              9.4 - 12.3 FL ABSOLUTE NRBC               0.00              0.0 - 0.2 K/* DF                          AUTOMATED NEUTROPHILS                 58                43 - 78 % LYMPHOCYTES                 33                13 - 44 % MONOCYTES                   7                 4.0 - 12.0 % EOSINOPHILS                 0 (L)             0.5 - 7.8 % BASOPHILS                   0                 0.0 - 2.0 % IMMATURE GRANULOCYTES       2                 0.0 - 5.0 %   
     ABS. NEUTROPHILS            5.7               1.7 - 8.2 K/* ABS. LYMPHOCYTES            3.3               0.5 - 4.6 K/* ABS. MONOCYTES              0.7               0.1 - 1.3 K/* ABS. EOSINOPHILS            0.0               0.0 - 0.8 K/* ABS. BASOPHILS              0.0               0.0 - 0.2 K/* ABS. IMM. GRANS.            0.2               0.0 - 0.5 K/* 
-POC TROPONIN-I Result                      Value             Ref Range      Troponin-I (POC)            0 (L)             0.02 - 0.05 * 
-POC G3 
 Result                      Value             Ref Range Device:                     VENT                            
     FIO2 (POC)                  100               %             
     pH (POC)                    7.325 (L)         7.35 - 7.45   
     pCO2 (POC)                  45.7 (H)          35 - 45 MMHG  
     pO2 (POC)                   337 (H)           75 - 100 MMHG 
     HCO3 (POC)                  23.8              22 - 26 MMOL* 
     sO2 (POC)                   100 (H)           95 - 98 % Base deficit (POC)          2                 mmol/L Mode Pressure regulated volume control Tidal volume                500               ml            
     Set Rate                    16                bpm           
     PEEP/CPAP (POC)             8                 cmH2O Allens test (POC)           YES Inspiratory Time            0.9               sec Site RIGHT BRACHIAL Patient temp. 98.6 Specimen type (POC)         ARTERIAL Performed by                                                
 Syeda 
     CO2, POC                    25                MMOL/L        
-EKG, 12 LEAD, INITIAL Result                      Value             Ref Range Ventricular Rate            96                BPM           
     Atrial Rate                 96                BPM           
     P-R Interval                186               ms            
     QRS Duration                162               ms Q-T Interval                408               ms            
     QTC Calculation (Bezet)     515               ms            
     Calculated P Axis           64                degrees Calculated R Axis           -72               degrees Calculated T Axis           99                degrees Diagnosis                                                   
 !! AGE AND GENDER SPECIFIC ECG ANALYSIS !! Normal sinus rhythm Left axis deviation Left ventricular hypertrophy with QRS widening and repolarization abnormality Inferior infarct (cited on or before 03-FEB-2016) Anterolateral infarct (cited on or before 03-FEB-2016) Abnormal ECG When compared with ECG of 19-MAY-2016 14:08, No significant change was found ) Tests in the radiology section of CPT®: ordered and reviewed (Xr Chest [facility] Result Date: 11/13/2018 History: Post cardiac arrest Exam: portable chest Comparison: 5/22/2016 Findings: An endotracheal tube is well-positioned. There is no focal alveolar infiltrate or pleural effusion. No change in the appearance of the mediastinal contour or osseous structures. Impressions: Well-positioned endotracheal tube. No focal airspace consolidation. ) Decide to obtain previous medical records or to obtain history from someone other than the patient: yes Obtain history from someone other than the patient: yes Review and summarize past medical records: yes Discuss the patient with other providers: yes Independent visualization of images, tracings, or specimens: yes (EKG and chest x-ray both evaluated by the ED, MD.) Risk of Complications, Morbidity, and/or Mortality Presenting problems: high Critical Care Total time providing critical care: (=================================================================== This patient is critically ill and there is a high probability of of imminent or life threatening deterioration in the patient's condition without immediate management.  
 
The nature of the patient's clinical problem is: post cardiac arrest 
 
I have spent 35 minutes in direct patient care, documentation, review of labs/xrays/old records, discussion with Staff, family, patient's cardiologist . The time involved in the performance of separately reportable procedures was not counted toward critical care time. Jose Florence MD; 11/13/2018 @2:52 PM 
=================================================================== 
 
) Patient Progress Patient progress: improved Procedures

## 2018-11-14 ENCOUNTER — APPOINTMENT (OUTPATIENT)
Dept: GENERAL RADIOLOGY | Age: 58
DRG: 226 | End: 2018-11-14
Attending: INTERNAL MEDICINE
Payer: MEDICARE

## 2018-11-14 LAB
ALBUMIN SERPL-MCNC: 2.7 G/DL (ref 3.5–5)
ALBUMIN/GLOB SERPL: 0.9 {RATIO} (ref 1.2–3.5)
ALP SERPL-CCNC: 162 U/L (ref 50–136)
ALT SERPL-CCNC: 296 U/L (ref 12–65)
ANION GAP SERPL CALC-SCNC: 5 MMOL/L (ref 7–16)
ANION GAP SERPL CALC-SCNC: 7 MMOL/L (ref 7–16)
ANION GAP SERPL CALC-SCNC: 7 MMOL/L (ref 7–16)
APTT PPP: 41.3 SEC (ref 23.2–35.3)
ARTERIAL PATENCY WRIST A: YES
AST SERPL-CCNC: 239 U/L (ref 15–37)
ATRIAL RATE: 62 BPM
ATRIAL RATE: 69 BPM
BASE DEFICIT BLD-SCNC: 2 MMOL/L
BASOPHILS # BLD: 0 K/UL (ref 0–0.2)
BASOPHILS NFR BLD: 0 % (ref 0–2)
BDY SITE: NORMAL
BILIRUB DIRECT SERPL-MCNC: 0.1 MG/DL
BILIRUB SERPL-MCNC: 0.4 MG/DL (ref 0.2–1.1)
BODY TEMPERATURE: 98.6
BUN SERPL-MCNC: 21 MG/DL (ref 6–23)
BUN SERPL-MCNC: 23 MG/DL (ref 6–23)
BUN SERPL-MCNC: 23 MG/DL (ref 6–23)
CALCIUM SERPL-MCNC: 7.3 MG/DL (ref 8.3–10.4)
CALCIUM SERPL-MCNC: 7.5 MG/DL (ref 8.3–10.4)
CALCIUM SERPL-MCNC: 7.6 MG/DL (ref 8.3–10.4)
CALCULATED P AXIS, ECG09: 43 DEGREES
CALCULATED P AXIS, ECG09: 80 DEGREES
CALCULATED R AXIS, ECG10: -60 DEGREES
CALCULATED R AXIS, ECG10: -60 DEGREES
CALCULATED T AXIS, ECG11: -29 DEGREES
CALCULATED T AXIS, ECG11: -44 DEGREES
CHLORIDE SERPL-SCNC: 110 MMOL/L (ref 98–107)
CHLORIDE SERPL-SCNC: 111 MMOL/L (ref 98–107)
CHLORIDE SERPL-SCNC: 113 MMOL/L (ref 98–107)
CK SERPL-CCNC: 1462 U/L (ref 21–215)
CK SERPL-CCNC: 1996 U/L (ref 21–215)
CO2 BLD-SCNC: 23 MMOL/L
CO2 SERPL-SCNC: 24 MMOL/L (ref 21–32)
CO2 SERPL-SCNC: 25 MMOL/L (ref 21–32)
CO2 SERPL-SCNC: 25 MMOL/L (ref 21–32)
CREAT SERPL-MCNC: 0.72 MG/DL (ref 0.8–1.5)
CREAT SERPL-MCNC: 0.8 MG/DL (ref 0.8–1.5)
CREAT SERPL-MCNC: 0.92 MG/DL (ref 0.8–1.5)
DIAGNOSIS, 93000: NORMAL
DIAGNOSIS, 93000: NORMAL
DIFFERENTIAL METHOD BLD: ABNORMAL
EOSINOPHIL # BLD: 0 K/UL (ref 0–0.8)
EOSINOPHIL NFR BLD: 0 % (ref 0.5–7.8)
ERYTHROCYTE [DISTWIDTH] IN BLOOD BY AUTOMATED COUNT: 15.9 %
ERYTHROCYTE [DISTWIDTH] IN BLOOD BY AUTOMATED COUNT: 16 %
ERYTHROCYTE [DISTWIDTH] IN BLOOD BY AUTOMATED COUNT: 16.1 %
GAS FLOW.O2 O2 DELIVERY SYS: NORMAL L/MIN
GAS FLOW.O2 SETTING OXYMISER: 16 BPM
GLOBULIN SER CALC-MCNC: 3.1 G/DL (ref 2.3–3.5)
GLUCOSE BLD STRIP.AUTO-MCNC: 105 MG/DL (ref 65–100)
GLUCOSE BLD STRIP.AUTO-MCNC: 125 MG/DL (ref 65–100)
GLUCOSE BLD STRIP.AUTO-MCNC: 92 MG/DL (ref 65–100)
GLUCOSE BLD STRIP.AUTO-MCNC: 95 MG/DL (ref 65–100)
GLUCOSE SERPL-MCNC: 102 MG/DL (ref 65–100)
GLUCOSE SERPL-MCNC: 123 MG/DL (ref 65–100)
GLUCOSE SERPL-MCNC: 123 MG/DL (ref 65–100)
HCO3 BLD-SCNC: 22.3 MMOL/L (ref 22–26)
HCT VFR BLD AUTO: 37.3 % (ref 41.1–50.3)
HCT VFR BLD AUTO: 37.6 % (ref 41.1–50.3)
HCT VFR BLD AUTO: 38 % (ref 41.1–50.3)
HGB BLD-MCNC: 11.8 G/DL (ref 13.6–17.2)
HGB BLD-MCNC: 11.8 G/DL (ref 13.6–17.2)
HGB BLD-MCNC: 11.9 G/DL (ref 13.6–17.2)
IMM GRANULOCYTES # BLD: 0.1 K/UL (ref 0–0.5)
IMM GRANULOCYTES NFR BLD AUTO: 1 % (ref 0–5)
INR PPP: 1.1
INSPIRATION.DURATION SETTING TIME VENT: 1.25 SEC
LYMPHOCYTES # BLD: 1 K/UL (ref 0.5–4.6)
LYMPHOCYTES NFR BLD: 9 % (ref 13–44)
MAGNESIUM SERPL-MCNC: 1.7 MG/DL (ref 1.8–2.4)
MAGNESIUM SERPL-MCNC: 1.8 MG/DL (ref 1.8–2.4)
MAGNESIUM SERPL-MCNC: 1.9 MG/DL (ref 1.8–2.4)
MAGNESIUM SERPL-MCNC: 1.9 MG/DL (ref 1.8–2.4)
MCH RBC QN AUTO: 23.5 PG (ref 26.1–32.9)
MCH RBC QN AUTO: 23.6 PG (ref 26.1–32.9)
MCH RBC QN AUTO: 23.8 PG (ref 26.1–32.9)
MCHC RBC AUTO-ENTMCNC: 31.3 G/DL (ref 31.4–35)
MCHC RBC AUTO-ENTMCNC: 31.4 G/DL (ref 31.4–35)
MCHC RBC AUTO-ENTMCNC: 31.6 G/DL (ref 31.4–35)
MCV RBC AUTO: 75 FL (ref 79.6–97.8)
MCV RBC AUTO: 75 FL (ref 79.6–97.8)
MCV RBC AUTO: 75.2 FL (ref 79.6–97.8)
MONOCYTES # BLD: 0.5 K/UL (ref 0.1–1.3)
MONOCYTES NFR BLD: 5 % (ref 4–12)
NEUTS SEG # BLD: 9.1 K/UL (ref 1.7–8.2)
NEUTS SEG NFR BLD: 86 % (ref 43–78)
NRBC # BLD: 0 K/UL (ref 0–0.2)
O2/TOTAL GAS SETTING VFR VENT: 30 %
P-R INTERVAL, ECG05: 178 MS
P-R INTERVAL, ECG05: 214 MS
PCO2 BLD: 37.3 MMHG (ref 35–45)
PEEP RESPIRATORY: 8 CMH2O
PH BLD: 7.38 [PH] (ref 7.35–7.45)
PLATELET # BLD AUTO: 119 K/UL (ref 150–450)
PLATELET # BLD AUTO: 133 K/UL (ref 150–450)
PLATELET # BLD AUTO: 154 K/UL (ref 150–450)
PMV BLD AUTO: 10.8 FL (ref 9.4–12.3)
PMV BLD AUTO: 11.6 FL (ref 9.4–12.3)
PMV BLD AUTO: 11.8 FL (ref 9.4–12.3)
PO2 BLD: 78 MMHG (ref 75–100)
POTASSIUM SERPL-SCNC: 3.5 MMOL/L (ref 3.5–5.1)
POTASSIUM SERPL-SCNC: 3.6 MMOL/L (ref 3.5–5.1)
POTASSIUM SERPL-SCNC: 3.6 MMOL/L (ref 3.5–5.1)
POTASSIUM SERPL-SCNC: 3.7 MMOL/L (ref 3.5–5.1)
PROT SERPL-MCNC: 5.8 G/DL (ref 6.3–8.2)
PROTHROMBIN TIME: 13.9 SEC (ref 11.5–14.5)
Q-T INTERVAL, ECG07: 516 MS
Q-T INTERVAL, ECG07: 550 MS
QRS DURATION, ECG06: 152 MS
QRS DURATION, ECG06: 158 MS
QTC CALCULATION (BEZET), ECG08: 552 MS
QTC CALCULATION (BEZET), ECG08: 558 MS
RBC # BLD AUTO: 4.96 M/UL (ref 4.23–5.6)
RBC # BLD AUTO: 5.01 M/UL (ref 4.23–5.6)
RBC # BLD AUTO: 5.07 M/UL (ref 4.23–5.6)
SAO2 % BLD: 95 % (ref 95–98)
SERVICE CMNT-IMP: NORMAL
SERVICE CMNT-IMP: NORMAL
SODIUM SERPL-SCNC: 140 MMOL/L (ref 136–145)
SODIUM SERPL-SCNC: 143 MMOL/L (ref 136–145)
SODIUM SERPL-SCNC: 144 MMOL/L (ref 136–145)
SPECIMEN TYPE: NORMAL
TROPONIN I SERPL-MCNC: 0.05 NG/ML (ref 0.02–0.05)
TROPONIN I SERPL-MCNC: 0.08 NG/ML (ref 0.02–0.05)
VENTILATION MODE VENT: NORMAL
VENTRICULAR RATE, ECG03: 62 BPM
VENTRICULAR RATE, ECG03: 69 BPM
VT SETTING VENT: 500 ML
WBC # BLD AUTO: 10.6 K/UL (ref 4.3–11.1)
WBC # BLD AUTO: 6.5 K/UL (ref 4.3–11.1)
WBC # BLD AUTO: 6.6 K/UL (ref 4.3–11.1)

## 2018-11-14 PROCEDURE — 84132 ASSAY OF SERUM POTASSIUM: CPT

## 2018-11-14 PROCEDURE — 65620000000 HC RM CCU GENERAL

## 2018-11-14 PROCEDURE — 80048 BASIC METABOLIC PNL TOTAL CA: CPT

## 2018-11-14 PROCEDURE — 85027 COMPLETE CBC AUTOMATED: CPT

## 2018-11-14 PROCEDURE — 77030020263 HC SOL INJ SOD CL0.9% LFCR 1000ML

## 2018-11-14 PROCEDURE — 74011000250 HC RX REV CODE- 250: Performed by: INTERNAL MEDICINE

## 2018-11-14 PROCEDURE — 83735 ASSAY OF MAGNESIUM: CPT

## 2018-11-14 PROCEDURE — 85730 THROMBOPLASTIN TIME PARTIAL: CPT

## 2018-11-14 PROCEDURE — 85025 COMPLETE CBC W/AUTO DIFF WBC: CPT

## 2018-11-14 PROCEDURE — 74011250637 HC RX REV CODE- 250/637: Performed by: INTERNAL MEDICINE

## 2018-11-14 PROCEDURE — 82550 ASSAY OF CK (CPK): CPT

## 2018-11-14 PROCEDURE — C9113 INJ PANTOPRAZOLE SODIUM, VIA: HCPCS | Performed by: INTERNAL MEDICINE

## 2018-11-14 PROCEDURE — 74011000258 HC RX REV CODE- 258: Performed by: INTERNAL MEDICINE

## 2018-11-14 PROCEDURE — 74011250636 HC RX REV CODE- 250/636: Performed by: INTERNAL MEDICINE

## 2018-11-14 PROCEDURE — 36600 WITHDRAWAL OF ARTERIAL BLOOD: CPT

## 2018-11-14 PROCEDURE — 84484 ASSAY OF TROPONIN QUANT: CPT

## 2018-11-14 PROCEDURE — 36592 COLLECT BLOOD FROM PICC: CPT

## 2018-11-14 PROCEDURE — 99233 SBSQ HOSP IP/OBS HIGH 50: CPT | Performed by: INTERNAL MEDICINE

## 2018-11-14 PROCEDURE — 77030020186 HC BOOT HL PROTCT SAGE -B

## 2018-11-14 PROCEDURE — 93005 ELECTROCARDIOGRAM TRACING: CPT | Performed by: INTERNAL MEDICINE

## 2018-11-14 PROCEDURE — 85610 PROTHROMBIN TIME: CPT

## 2018-11-14 PROCEDURE — 71045 X-RAY EXAM CHEST 1 VIEW: CPT

## 2018-11-14 PROCEDURE — 82962 GLUCOSE BLOOD TEST: CPT

## 2018-11-14 PROCEDURE — 94003 VENT MGMT INPAT SUBQ DAY: CPT

## 2018-11-14 PROCEDURE — 77030031476 HC EXCH HEAT MOISTW FLTR HALY -A

## 2018-11-14 PROCEDURE — 82803 BLOOD GASES ANY COMBINATION: CPT

## 2018-11-14 PROCEDURE — 80076 HEPATIC FUNCTION PANEL: CPT

## 2018-11-14 RX ORDER — MAGNESIUM SULFATE 1 G/100ML
1 INJECTION INTRAVENOUS ONCE
Status: COMPLETED | OUTPATIENT
Start: 2018-11-14 | End: 2018-11-14

## 2018-11-14 RX ADMIN — SODIUM CHLORIDE 100 ML/HR: 900 INJECTION, SOLUTION INTRAVENOUS at 10:09

## 2018-11-14 RX ADMIN — MIDAZOLAM 6 MG/HR: 5 INJECTION INTRAMUSCULAR; INTRAVENOUS at 12:53

## 2018-11-14 RX ADMIN — POLYVINYL ALCOHOL 1 DROP: 14 SOLUTION/ DROPS OPHTHALMIC at 18:13

## 2018-11-14 RX ADMIN — SODIUM CHLORIDE 100 ML/HR: 900 INJECTION, SOLUTION INTRAVENOUS at 21:15

## 2018-11-14 RX ADMIN — MIDAZOLAM HYDROCHLORIDE 2 MG: 1 INJECTION, SOLUTION INTRAMUSCULAR; INTRAVENOUS at 05:55

## 2018-11-14 RX ADMIN — Medication 10 ML: at 15:26

## 2018-11-14 RX ADMIN — SODIUM CHLORIDE 100 ML/HR: 900 INJECTION, SOLUTION INTRAVENOUS at 01:29

## 2018-11-14 RX ADMIN — AMIODARONE HYDROCHLORIDE 0.5 MG/MIN: 50 INJECTION, SOLUTION INTRAVENOUS at 14:56

## 2018-11-14 RX ADMIN — ENOXAPARIN SODIUM 40 MG: 40 INJECTION, SOLUTION INTRAVENOUS; SUBCUTANEOUS at 15:33

## 2018-11-14 RX ADMIN — POLYVINYL ALCOHOL 1 DROP: 14 SOLUTION/ DROPS OPHTHALMIC at 04:52

## 2018-11-14 RX ADMIN — Medication 10 ML: at 21:08

## 2018-11-14 RX ADMIN — POLYVINYL ALCOHOL 1 DROP: 14 SOLUTION/ DROPS OPHTHALMIC at 21:08

## 2018-11-14 RX ADMIN — ASPIRIN 81 MG 81 MG: 81 TABLET ORAL at 09:37

## 2018-11-14 RX ADMIN — SODIUM CHLORIDE 40 MG: 9 INJECTION, SOLUTION INTRAMUSCULAR; INTRAVENOUS; SUBCUTANEOUS at 15:26

## 2018-11-14 RX ADMIN — POLYVINYL ALCOHOL 1 DROP: 14 SOLUTION/ DROPS OPHTHALMIC at 09:38

## 2018-11-14 RX ADMIN — POLYVINYL ALCOHOL 1 DROP: 14 SOLUTION/ DROPS OPHTHALMIC at 14:20

## 2018-11-14 RX ADMIN — MAGNESIUM SULFATE HEPTAHYDRATE 1 G: 1 INJECTION, SOLUTION INTRAVENOUS at 21:35

## 2018-11-14 RX ADMIN — POLYVINYL ALCOHOL 1 DROP: 14 SOLUTION/ DROPS OPHTHALMIC at 00:08

## 2018-11-14 RX ADMIN — Medication 10 ML: at 05:13

## 2018-11-14 NOTE — PROGRESS NOTES
Pt seen in CCU currently intubated and vent after discussed in IDR and with primary RN. No family at bedside currently. Pt was cardiac [facility] at [facility] yesterday. Pt's sister, Julia Robertson is main caregiver. CM to follow for any assist and d/c POC. Care Management Interventions Transition of Care Consult (CM Consult): Discharge Planning Discharge Location Discharge Placement: Unable to determine at this time

## 2018-11-14 NOTE — PROGRESS NOTES
Ventilator check complete; patient has a #7. 0 ET tube secured at the 24 at the lip. Patient is not able to follow commands. Breath sounds are clear. Trachea is midline, Negative for subcutaneous air, and chest excursion is symmetrical. Patient is also Negative for cyanosis and is Negative for pitting edema. All alarms are set and audible. Resuscitation bag is at the head of the bed. Ventilator Settings Mode FIO2 Rate Tidal Volume Pressure PEEP I:E Ratio PRVC  30 %  16  500 ml     8 cm H20  1:2.0 Peak airway pressure: 20 cm H2O Minute ventilation: 7.9 l/min ABG: No results for input(s): PH, PCO2, PO2, HCO3 in the last 72 hours. [facility]

## 2018-11-14 NOTE — PROGRESS NOTES
Therapy stopped via [employer] due to a sensor error. Unit supervisor and myself placed patient on a new Arctic Sun machine and initiated therapy a second time. Bladder temp 34.4 deg C at this time. Will continue to monitor closely

## 2018-11-14 NOTE — PROGRESS NOTES
Ventilator check complete; patient has a #7. 0 ET tube secured at the 25 at the lip. Patient is not sedated. Patient is not able to follow commands. Breath sounds are diminished. Trachea is midline, Negative for subcutaneous air, and chest excursion is symmetric. Patient is also Negative for cyanosis and is Negative for pitting edema. All alarms are set and audible. Resuscitation bag is at the head of the bed. Ventilator Settings Mode FIO2 Rate Tidal Volume Pressure PEEP I:E Ratio PRVC  30 %    500 ml     8 cm H20  1:2.0 Peak airway pressure: 20 cm H2O Minute ventilation: 8 l/min ABG: No results for input(s): PH, PCO2, PO2, HCO3 in the last 72 hours.  
 
 
[place], RT

## 2018-11-14 NOTE — PROGRESS NOTES
Bedside, Verbal and Written shift change report given to Kellee Smith RN (oncoming nurse) by Kirsten Ly RN (offgoing nurse). Report included the following information SBAR, Kardex, Procedure Summary, Intake/Output, MAR, Recent Results, Cardiac Rhythm SB/NSR and Alarm Parameters .   
 
 
gtts dual verified in [facility]

## 2018-11-14 NOTE — PROGRESS NOTES
Cardiology consult called with information given to [address] [place] [address] who states Dr. Conrado Barros will be seeing pt.

## 2018-11-14 NOTE — PROGRESS NOTES
Patient noted to be spontaneously breathing over the vent. Versed and tracrium gtts increased. Dr. Jued Jeffries updated on patient condition and inability to adequately paralyze by titrating tracrium by TOF. New orders received at this time.

## 2018-11-14 NOTE — PROGRESS NOTES
Care Daily Progress Note: 11/14/2018 Admission Date: 11/13/2018 The patient's chart is reviewed and the patient is discussed with the staff. 
 
66 y.o. CHARITO presents following a witnessed cardiac arrest. He has paranoid schizophrenia and goes to a day care center where he was today when he collapsed. Staff could not find a pulse, CPR was started and an AED placed. Rhythm was reportedly shockable so this was performed with return of circulation. He was intubated by EMS and transported to the ER. Upon admission here, his pulse was again lost so he underwent another course of CPR and received 1 amp of epi and again ROSC was achieved. Was hypotensive, receiving IV fluids and started on Levophed. Has known cardiomyopathy with ECHO 2016 - EF 20 - 25%. He is followed by Dr. Stephen Guy, is maintained on an ACE, beta blocker, statin, baby asa and daily Lasix. Reportedly he had a cardiac cath in the past that revealed no CAD. He is NIDDM and takes daily metformin and is followed by the [facility] for schizophrenia diagnosed as a teenager. He lives with his sister who helps with his medical therapy which he has been compliant with. She was interviewed today and she did not notice that he seemed in any way ill before she took him to  today. Admitted to ICU, continued on Arctic Sun protocol and cardiology consulted. Subjective:  
 
Sedated, receiving paralytic, on vent support and Arctic Sun protocol after witnessed cardiac arrest.   
 
Current Facility-Administered Medications Medication Dose Route Frequency  pantoprazole (PROTONIX) 40 mg in sodium chloride 0.9% 10 mL injection  40 mg IntraVENous DAILY  EPINEPHrine (ADRENALIN) 4 mg in 0.9% sodium chloride 250 mL infusion  0.1 mcg/kg/min IntraVENous TITRATE  aspirin chewable tablet 81 mg  81 mg Oral DAILY  sodium chloride (NS) flush 5-10 mL  5-10 mL IntraVENous Q8H  
  sodium chloride (NS) flush 5-10 mL  5-10 mL IntraVENous PRN  
 0.9% sodium chloride infusion  100 mL/hr IntraVENous CONTINUOUS  
 fentaNYL citrate (PF) 2,500 mcg in 0.9% sodium chloride 100 mL infusion  0-200 mcg/hr IntraVENous TITRATE  enoxaparin (LOVENOX) injection 40 mg  40 mg SubCUTAneous Q24H  
 amiodarone (CORDARONE) 450 mg in dextrose 5% 250 mL infusion  0.5-1 mg/min IntraVENous CONTINUOUS  
 polyvinyl alcohol (LIQUIFILM TEARS) 1.4 % ophthalmic solution 1 Drop  1 Drop Both Eyes Q4H  
 midazolam (VERSED) injection 2 mg  2 mg IntraVENous Q2H PRN  
 midazolam (VERSED) 100 mg in 0.9% sodium chloride 100 mL infusion  0-10 mg/hr IntraVENous TITRATE  atracurium (TRACRIUM) 1,000 mg in 0.9% sodium chloride 250 mL infusion  0-15 mcg/kg/min IntraVENous TITRATE Review of Systems Unobtainable due to patient status. Objective:  
 
Vitals:  
 11/14/18 0934 11/14/18 1057 11/14/18 1200 11/14/18 1202 BP:    110/78 Pulse: 69 67 65 64 Resp: 16 16 16 16 Temp: (!) 90.5 °F (32.5 °C) (!) 91.4 °F (33 °C)  (!) 91.2 °F (32.9 °C) SpO2:   98% 98% Weight:      
 
 
Intake and Output:  
11/12 1901 - 11/14 0700 In: 1828.6 [I.V.:1828.6] Out: 705 [XDZVN:814] 11/14 0701 - 11/14 1900 In: 185 [I.V.:912] Out: 125 [Urine:125] Physical Exam:         
Constitutional:  intubated and mechanically ventilated. EENMT:  Sclera clear, pupils pinpoint, oral mucosa moist 
Respiratory: scattered anterior crackles Cardiovascular:  RRR with no M,G,R; 
Gastrointestinal:  soft with hypoactive bowel sounds, NG to suction Musculoskeletal:  cool with no cyanosis, no lower leg edema Skin:  no jaundice or ecchymosis Neurologic: sedated Psychiatric:  sedated LINES:   
ETT 11/13/18 PIV sites Quad lumen 11/13/18 [place] 11/13/18 NG 11/13/18 DRIPS:   
 ml/hr Amiodarone 0.5 mg/min Tracrium 10 mcg/kg/min Fentanyl 50 mcg/hr Versed 6 mg/hr CXR 11/14/18:   
 
 
Ventilator Settings Mode FIO2 Rate Tidal Volume Pressure PEEP PRVC  30 %    500 ml     8 cm H20 Peak airway pressure: 20 cm H2O Minute ventilation: 7.4 l/min ABG:  
Recent Labs 11/14/18 
0355 11/13/18 [place] PHI 7.385 7.325* PCO2I 37.3 45.7* PO2I 78 337* HCO3I 22.3 23.8 LAB Recent Labs 11/14/18 
1007 GLUCPOC 105* Recent Labs 11/14/18 
2243 11/14/18 
0344 11/13/18 [address] WBC 6.6 10.6 9.9 HGB 11.8* 11.9* 12.6* HCT 37.3* 38.0* 40.8* * 154 191 INR 1.1  --   --   
 
Recent Labs 11/14/18 
4511 11/14/18 
0344 11/13/18 [address]  143 140  
K 3.5 3.6 4.4  
* 111* 104 CO2 25 25 24 * 123* 92 BUN 23 23 23 CREA 0.80 0.92 1.78* MG 1.9 1.9  --   
CA 7.5* 7.6* 8.7 ALB 2.7*  --  3.4* SGOT 239*  --  484* No results for input(s): LCAD, LAC in the last 72 hours. Assessment:  (Medical Decision Making) Hospital Problems  Date Reviewed: 11/14/2018 Codes Class Noted POA * (Principal) Cardiac arrest [facility]) ICD-10-CM: I46.9 ICD-9-CM: 427.5  11/13/2018 Yes [facility] protocol Acute respiratory failure with hypoxia [facility] ICD-10-CM: J96.01 
ICD-9-CM: 518.81  11/13/2018 Yes On vent support Hypotension ICD-10-CM: I95.9 ICD-9-CM: 458.9  11/13/2018 Yes Resolved--not on pressor support Systolic congestive heart failure (HCC) ICD-10-CM: I50.20 ICD-9-CM: 428.20, 428.0  11/13/2018 Yes  
 chronic [facility] ([facility] (Chronic) ICD-10-CM: F20.9 ICD-9-CM: 295.90  11/13/2018 Yes  
 chronic Acute renal failure (HCC) ICD-10-CM: N17.9 ICD-9-CM: 584.9  11/13/2018 Yes Resolved creatinine 0.80 Type II diabetes mellitus (HCC) (Chronic) ICD-10-CM: E11.9 ICD-9-CM: 250.00  2/4/2016 Yes Chronic--ranges  Plan:  (Medical Decision Making) --Continue [employer] with re-warming per protocol to start at 2:30PM today 
--Continue current management 
--Cardiology, Dr. Elif Reid, consulted and reports will come later today. --Spoke with sister at the bedside with up date of current proceedings and questions answered. More than 50% of the time documented was spent in face-to-face contact with the patient and in the care of the patient on the floor/unit where the patient is located. Jack Rushing NP The patient has been seen and examined by me personally, the chart,labs, and radiographic studies have been reviewed. Chest: CTA Extremities: trace edema I agree with the above assessment and plan. Rewarming in progress Discussed with sister.  
 
Micheline Diaz MD.

## 2018-11-14 NOTE — PROGRESS NOTES
Bedside shift report received from Dalila Padron RN with dual gtt verification and neuro/ peripheral vascular assessment.

## 2018-11-14 NOTE — PROGRESS NOTES
Interdisciplinary team rounds were held 11/14/2018 with the following team members:Care Management, Nursing, Nurse Practitioner, Nutrition, Palliative Care, Pastoral Care, Pharmacy, Physical Therapy, Physician, Respiratory Therapy and Clinical Coordinator and the patient and sibling(s). Plan of care discussed. See clinical pathway and/or care plan for interventions and desired outcomes.

## 2018-11-14 NOTE — PROGRESS NOTES
Spoke with unit supervisor regarding TOF continuing to be 0/4. Unit supervisor advised to stop tracrium for 30 minutes and to reassess twitches at that time. If pt begins to breathe over the vent, unit supervisor advised to restart tracrium at that time.

## 2018-11-15 LAB
ANION GAP SERPL CALC-SCNC: 6 MMOL/L (ref 7–16)
ANION GAP SERPL CALC-SCNC: 7 MMOL/L (ref 7–16)
ANION GAP SERPL CALC-SCNC: 7 MMOL/L (ref 7–16)
ANION GAP SERPL CALC-SCNC: 8 MMOL/L (ref 7–16)
APPEARANCE UR: ABNORMAL
ARTERIAL PATENCY WRIST A: YES
BACTERIA URNS QL MICRO: 0 /HPF
BASE DEFICIT BLD-SCNC: 6 MMOL/L
BDY SITE: ABNORMAL
BILIRUB UR QL: NEGATIVE
BODY TEMPERATURE: 36.5
BUN SERPL-MCNC: 18 MG/DL (ref 6–23)
BUN SERPL-MCNC: 19 MG/DL (ref 6–23)
CALCIUM SERPL-MCNC: 7.4 MG/DL (ref 8.3–10.4)
CALCIUM SERPL-MCNC: 7.7 MG/DL (ref 8.3–10.4)
CALCIUM SERPL-MCNC: 7.9 MG/DL (ref 8.3–10.4)
CALCIUM SERPL-MCNC: 8.1 MG/DL (ref 8.3–10.4)
CASTS URNS QL MICRO: ABNORMAL /LPF
CHLORIDE SERPL-SCNC: 112 MMOL/L (ref 98–107)
CHLORIDE SERPL-SCNC: 113 MMOL/L (ref 98–107)
CK SERPL-CCNC: 1060 U/L (ref 21–215)
CO2 BLD-SCNC: 22 MMOL/L
CO2 SERPL-SCNC: 22 MMOL/L (ref 21–32)
CO2 SERPL-SCNC: 23 MMOL/L (ref 21–32)
CO2 SERPL-SCNC: 23 MMOL/L (ref 21–32)
CO2 SERPL-SCNC: 24 MMOL/L (ref 21–32)
COLOR UR: YELLOW
CREAT SERPL-MCNC: 0.75 MG/DL (ref 0.8–1.5)
CREAT SERPL-MCNC: 0.87 MG/DL (ref 0.8–1.5)
CREAT SERPL-MCNC: 1 MG/DL (ref 0.8–1.5)
CREAT SERPL-MCNC: 1.04 MG/DL (ref 0.8–1.5)
EPI CELLS #/AREA URNS HPF: ABNORMAL /HPF
ERYTHROCYTE [DISTWIDTH] IN BLOOD BY AUTOMATED COUNT: 16.4 %
ERYTHROCYTE [DISTWIDTH] IN BLOOD BY AUTOMATED COUNT: 16.6 %
GAS FLOW.O2 O2 DELIVERY SYS: ABNORMAL L/MIN
GAS FLOW.O2 SETTING OXYMISER: 16 BPM
GLUCOSE BLD STRIP.AUTO-MCNC: 104 MG/DL (ref 65–100)
GLUCOSE BLD STRIP.AUTO-MCNC: 84 MG/DL (ref 65–100)
GLUCOSE BLD STRIP.AUTO-MCNC: 84 MG/DL (ref 65–100)
GLUCOSE BLD STRIP.AUTO-MCNC: 92 MG/DL (ref 65–100)
GLUCOSE BLD STRIP.AUTO-MCNC: 93 MG/DL (ref 65–100)
GLUCOSE SERPL-MCNC: 102 MG/DL (ref 65–100)
GLUCOSE SERPL-MCNC: 114 MG/DL (ref 65–100)
GLUCOSE SERPL-MCNC: 85 MG/DL (ref 65–100)
GLUCOSE SERPL-MCNC: 90 MG/DL (ref 65–100)
GLUCOSE UR STRIP.AUTO-MCNC: NEGATIVE MG/DL
HCO3 BLD-SCNC: 21 MMOL/L (ref 22–26)
HCT VFR BLD AUTO: 35.9 % (ref 41.1–50.3)
HCT VFR BLD AUTO: 36.8 % (ref 41.1–50.3)
HGB BLD-MCNC: 11.4 G/DL (ref 13.6–17.2)
HGB BLD-MCNC: 11.7 G/DL (ref 13.6–17.2)
HGB UR QL STRIP: ABNORMAL
KETONES UR QL STRIP.AUTO: NEGATIVE MG/DL
LEUKOCYTE ESTERASE UR QL STRIP.AUTO: NEGATIVE
MAGNESIUM SERPL-MCNC: 2 MG/DL (ref 1.8–2.4)
MAGNESIUM SERPL-MCNC: 2.1 MG/DL (ref 1.8–2.4)
MAGNESIUM SERPL-MCNC: 2.1 MG/DL (ref 1.8–2.4)
MCH RBC QN AUTO: 23.7 PG (ref 26.1–32.9)
MCH RBC QN AUTO: 23.9 PG (ref 26.1–32.9)
MCHC RBC AUTO-ENTMCNC: 31.8 G/DL (ref 31.4–35)
MCHC RBC AUTO-ENTMCNC: 31.8 G/DL (ref 31.4–35)
MCV RBC AUTO: 74.5 FL (ref 79.6–97.8)
MCV RBC AUTO: 75.3 FL (ref 79.6–97.8)
NITRITE UR QL STRIP.AUTO: NEGATIVE
NRBC # BLD: 0 K/UL (ref 0–0.2)
NRBC # BLD: 0 K/UL (ref 0–0.2)
O2/TOTAL GAS SETTING VFR VENT: 30 %
PCO2 BLD: 44.2 MMHG (ref 35–45)
PEEP RESPIRATORY: 8 CMH2O
PH BLD: 7.28 [PH] (ref 7.35–7.45)
PH UR STRIP: 5.5 [PH] (ref 5–9)
PLATELET # BLD AUTO: 129 K/UL (ref 150–450)
PLATELET # BLD AUTO: 137 K/UL (ref 150–450)
PMV BLD AUTO: 11.4 FL (ref 9.4–12.3)
PMV BLD AUTO: 11.8 FL (ref 9.4–12.3)
PO2 BLD: 68 MMHG (ref 75–100)
POTASSIUM SERPL-SCNC: 3.8 MMOL/L (ref 3.5–5.1)
POTASSIUM SERPL-SCNC: 4 MMOL/L (ref 3.5–5.1)
POTASSIUM SERPL-SCNC: 4 MMOL/L (ref 3.5–5.1)
POTASSIUM SERPL-SCNC: 4.1 MMOL/L (ref 3.5–5.1)
PROT UR STRIP-MCNC: 100 MG/DL
RBC # BLD AUTO: 4.77 M/UL (ref 4.23–5.6)
RBC # BLD AUTO: 4.94 M/UL (ref 4.23–5.6)
RBC #/AREA URNS HPF: ABNORMAL /HPF
SAO2 % BLD: 91 % (ref 95–98)
SERVICE CMNT-IMP: 1
SERVICE CMNT-IMP: ABNORMAL
SODIUM SERPL-SCNC: 142 MMOL/L (ref 136–145)
SODIUM SERPL-SCNC: 143 MMOL/L (ref 136–145)
SP GR UR REFRACTOMETRY: 1.03 (ref 1–1.02)
SPECIMEN TYPE: ABNORMAL
TROPONIN I SERPL-MCNC: 0.02 NG/ML (ref 0.02–0.05)
UROBILINOGEN UR QL STRIP.AUTO: 1 EU/DL (ref 0.2–1)
VENTILATION MODE VENT: ABNORMAL
VT SETTING VENT: 500 ML
WBC # BLD AUTO: 11.5 K/UL (ref 4.3–11.1)
WBC # BLD AUTO: 9.3 K/UL (ref 4.3–11.1)
WBC URNS QL MICRO: ABNORMAL /HPF

## 2018-11-15 PROCEDURE — 74011000250 HC RX REV CODE- 250: Performed by: INTERNAL MEDICINE

## 2018-11-15 PROCEDURE — 74011250636 HC RX REV CODE- 250/636: Performed by: INTERNAL MEDICINE

## 2018-11-15 PROCEDURE — 84484 ASSAY OF TROPONIN QUANT: CPT

## 2018-11-15 PROCEDURE — 81001 URINALYSIS AUTO W/SCOPE: CPT

## 2018-11-15 PROCEDURE — 36591 DRAW BLOOD OFF VENOUS DEVICE: CPT

## 2018-11-15 PROCEDURE — 65620000000 HC RM CCU GENERAL

## 2018-11-15 PROCEDURE — 85027 COMPLETE CBC AUTOMATED: CPT

## 2018-11-15 PROCEDURE — 77030020263 HC SOL INJ SOD CL0.9% LFCR 1000ML

## 2018-11-15 PROCEDURE — 82803 BLOOD GASES ANY COMBINATION: CPT

## 2018-11-15 PROCEDURE — 94003 VENT MGMT INPAT SUBQ DAY: CPT

## 2018-11-15 PROCEDURE — 80048 BASIC METABOLIC PNL TOTAL CA: CPT

## 2018-11-15 PROCEDURE — 74011000258 HC RX REV CODE- 258: Performed by: INTERNAL MEDICINE

## 2018-11-15 PROCEDURE — 74011250637 HC RX REV CODE- 250/637: Performed by: INTERNAL MEDICINE

## 2018-11-15 PROCEDURE — 87077 CULTURE AEROBIC IDENTIFY: CPT

## 2018-11-15 PROCEDURE — 87040 BLOOD CULTURE FOR BACTERIA: CPT

## 2018-11-15 PROCEDURE — 99233 SBSQ HOSP IP/OBS HIGH 50: CPT | Performed by: INTERNAL MEDICINE

## 2018-11-15 PROCEDURE — 83735 ASSAY OF MAGNESIUM: CPT

## 2018-11-15 PROCEDURE — 82962 GLUCOSE BLOOD TEST: CPT

## 2018-11-15 PROCEDURE — 36415 COLL VENOUS BLD VENIPUNCTURE: CPT

## 2018-11-15 PROCEDURE — 36592 COLLECT BLOOD FROM PICC: CPT

## 2018-11-15 PROCEDURE — 87186 SC STD MICRODIL/AGAR DIL: CPT

## 2018-11-15 PROCEDURE — 36600 WITHDRAWAL OF ARTERIAL BLOOD: CPT

## 2018-11-15 PROCEDURE — 82550 ASSAY OF CK (CPK): CPT

## 2018-11-15 PROCEDURE — 87070 CULTURE OTHR SPECIMN AEROBIC: CPT

## 2018-11-15 PROCEDURE — C9113 INJ PANTOPRAZOLE SODIUM, VIA: HCPCS | Performed by: INTERNAL MEDICINE

## 2018-11-15 RX ORDER — LEVOFLOXACIN 5 MG/ML
750 INJECTION, SOLUTION INTRAVENOUS EVERY 24 HOURS
Status: DISCONTINUED | OUTPATIENT
Start: 2018-11-15 | End: 2018-11-17

## 2018-11-15 RX ORDER — POLYVINYL ALCOHOL 14 MG/ML
1 SOLUTION/ DROPS OPHTHALMIC AS NEEDED
Status: DISCONTINUED | OUTPATIENT
Start: 2018-11-15 | End: 2018-11-30 | Stop reason: HOSPADM

## 2018-11-15 RX ADMIN — AMIODARONE HYDROCHLORIDE 0.5 MG/MIN: 50 INJECTION, SOLUTION INTRAVENOUS at 09:21

## 2018-11-15 RX ADMIN — FENTANYL CITRATE 75 MCG/HR: 50 INJECTION, SOLUTION INTRAMUSCULAR; INTRAVENOUS at 06:11

## 2018-11-15 RX ADMIN — SODIUM CHLORIDE 11 MCG/KG/MIN: 900 INJECTION, SOLUTION INTRAVENOUS at 01:05

## 2018-11-15 RX ADMIN — MIDAZOLAM HYDROCHLORIDE 2 MG: 1 INJECTION, SOLUTION INTRAMUSCULAR; INTRAVENOUS at 19:40

## 2018-11-15 RX ADMIN — Medication 10 ML: at 05:22

## 2018-11-15 RX ADMIN — POLYVINYL ALCOHOL 1 DROP: 14 SOLUTION/ DROPS OPHTHALMIC at 00:07

## 2018-11-15 RX ADMIN — LEVOFLOXACIN 750 MG: 5 INJECTION, SOLUTION INTRAVENOUS at 12:53

## 2018-11-15 RX ADMIN — SODIUM CHLORIDE 100 ML/HR: 900 INJECTION, SOLUTION INTRAVENOUS at 06:10

## 2018-11-15 RX ADMIN — SODIUM CHLORIDE 40 MG: 9 INJECTION, SOLUTION INTRAMUSCULAR; INTRAVENOUS; SUBCUTANEOUS at 08:39

## 2018-11-15 RX ADMIN — ACETAMINOPHEN 650 MG: 325 SOLUTION ORAL at 19:23

## 2018-11-15 RX ADMIN — SODIUM CHLORIDE 100 ML/HR: 900 INJECTION, SOLUTION INTRAVENOUS at 16:38

## 2018-11-15 RX ADMIN — POLYVINYL ALCOHOL 1 DROP: 14 SOLUTION/ DROPS OPHTHALMIC at 05:10

## 2018-11-15 RX ADMIN — ASPIRIN 81 MG 81 MG: 81 TABLET ORAL at 08:33

## 2018-11-15 RX ADMIN — Medication 10 ML: at 15:41

## 2018-11-15 RX ADMIN — Medication 10 ML: at 21:07

## 2018-11-15 RX ADMIN — ENOXAPARIN SODIUM 40 MG: 40 INJECTION, SOLUTION INTRAVENOUS; SUBCUTANEOUS at 16:21

## 2018-11-15 RX ADMIN — MIDAZOLAM 5 MG/HR: 5 INJECTION INTRAMUSCULAR; INTRAVENOUS at 08:30

## 2018-11-15 RX ADMIN — ACETAMINOPHEN 650 MG: 325 SOLUTION ORAL at 12:57

## 2018-11-15 NOTE — PROGRESS NOTES
Bedside and Verbal shift change report given to [address] Deng (oncoming nurse) by Hilda Parham (offgoing nurse). Report included the following information SBAR, Kardex, Intake/Output, MAR, Recent Results, Med Rec Status, Cardiac Rhythm SR with BBB and Alarm Parameters . Drips dual verified with Clarke Conrad RN.

## 2018-11-15 NOTE — CONSULTS
Consult Date: 11/14/2018    Consults    Subjective pt examined full consult dictated.     Past Medical History:   Diagnosis Date    [facility]     Heart failure ([facility]     Hypertension     Psychiatric disorder     paranoid schziphrenia    Systolic CHF, acute on chronic ([facility] 2/4/2016      Past Surgical History:   Procedure Laterality Date    HX APPENDECTOMY       Family History   Problem Relation Age of Onset    Heart Failure Mother     Heart Attack Father     Diabetes Sister     Hypertension Sister     Heart Disease Sister         valvular    No Known Problems Brother     No Known Problems Brother     Diabetes Sister     Diabetes Sister     Diabetes Sister     No Known Problems Sister     No Known Problems Sister     Lung Cancer Brother     Heart Attack Brother       Social History     Tobacco Use    Smoking status: Former Smoker     Packs/day: 1.00     Years: 35.00     Pack years: 35.00    Tobacco comment: quit around 2015    Substance Use Topics    Alcohol use: No       Current Facility-Administered Medications   Medication Dose Route Frequency Provider Last Rate Last Dose    pantoprazole (PROTONIX) 40 mg in sodium chloride 0.9% 10 mL injection  40 mg IntraVENous DAILY Tanner Polanco MD   40 mg at 11/14/18 1526    EPINEPHrine (ADRENALIN) 4 mg in 0.9% sodium chloride 250 mL infusion  0.1 mcg/kg/min IntraVENous TITRATE Aniceto Arita MD   Stopped at 11/13/18 1837    aspirin chewable tablet 81 mg  81 mg Oral DAILY Tanner Polanco MD   81 mg at 11/14/18 6125    sodium chloride (NS) flush 5-10 mL  5-10 mL IntraVENous Q8H Tanner Polanco MD   10 mL at 11/14/18 1526    sodium chloride (NS) flush 5-10 mL  5-10 mL IntraVENous PRN Tanner Polanco MD        0.9% sodium chloride infusion  100 mL/hr IntraVENous CONTINUOUS Tanner Polanco  mL/hr at 11/14/18 1009 100 mL/hr at 11/14/18 1009    fentaNYL citrate (PF) 2,500 mcg in 0.9% sodium chloride 100 mL infusion  0-200 mcg/hr IntraVENous TITRATE Lupillo Wooten MD 2 mL/hr at 11/14/18 1851 50 mcg/hr at 11/14/18 1851    enoxaparin (LOVENOX) injection 40 mg  40 mg SubCUTAneous Q24H Lupillo Wooten MD   40 mg at 11/14/18 1533    amiodarone (CORDARONE) 450 mg in dextrose 5% 250 mL infusion  0.5-1 mg/min IntraVENous CONTINUOUS Lupillo Wooten MD 16.7 mL/hr at 11/14/18 1852 0.5 mg/min at 11/14/18 1852    polyvinyl alcohol (LIQUIFILM TEARS) 1.4 % ophthalmic solution 1 Drop  1 Drop Both Eyes Q4H Lupillo Wooten MD   1 Drop at 11/14/18 1813    midazolam (VERSED) injection 2 mg  2 mg IntraVENous Q2H PRN Scot Lopez MD   2 mg at 11/14/18 0555    midazolam (VERSED) 100 mg in 0.9% sodium chloride 100 mL infusion  0-10 mg/hr IntraVENous TITRATE Scot Lopez MD 5 mL/hr at 11/14/18 1851 5 mg/hr at 11/14/18 1851    atracurium (TRACRIUM) 1,000 mg in 0.9% sodium chloride 250 mL infusion  0-15 mcg/kg/min IntraVENous TITRATE Scot Lopez MD 10.9 mL/hr at 11/14/18 1851 10 mcg/kg/min at 11/14/18 1851        Review of Systems    Objective     Vital signs for last 24 hours:  Visit Vitals  /70   Pulse 69   Temp (!) 93.4 °F (34.1 °C)   Resp 16   Wt 78.2 kg (172 lb 6.4 oz)   SpO2 96%   BMI 24.74 kg/m²       Intake/Output this shift:  Current Shift: No intake/output data recorded.   Last 3 Shifts: 11/13 0701 - 11/14 1900  In: 3559.9 [I.V.:3539.9]  Out: 5450 [Urine:590]    Data Review:   Recent Results (from the past 24 hour(s))   METABOLIC PANEL, BASIC    Collection Time: 11/14/18  3:44 AM   Result Value Ref Range    Sodium 143 136 - 145 mmol/L    Potassium 3.6 3.5 - 5.1 mmol/L    Chloride 111 (H) 98 - 107 mmol/L    CO2 25 21 - 32 mmol/L    Anion gap 7 7 - 16 mmol/L    Glucose 123 (H) 65 - 100 mg/dL    BUN 23 6 - 23 MG/DL    Creatinine 0.92 0.8 - 1.5 MG/DL    GFR est AA >60 >60 ml/min/1.73m2    GFR est non-AA >60 >60 ml/min/1.73m2    Calcium 7.6 (L) 8.3 - 10.4 MG/DL   MAGNESIUM    Collection Time: 11/14/18  3:44 AM Result Value Ref Range    Magnesium 1.9 1.8 - 2.4 mg/dL   CBC WITH AUTOMATED DIFF    Collection Time: 11/14/18  3:44 AM   Result Value Ref Range    WBC 10.6 4.3 - 11.1 K/uL    RBC 5.07 4.23 - 5.6 M/uL    HGB 11.9 (L) 13.6 - 17.2 g/dL    HCT 38.0 (L) 41.1 - 50.3 %    MCV 75.0 (L) 79.6 - 97.8 FL    MCH 23.5 (L) 26.1 - 32.9 PG    MCHC 31.3 (L) 31.4 - 35.0 g/dL    RDW 15.9 %    PLATELET 485 655 - 848 K/uL    MPV 11.6 9.4 - 12.3 FL    ABSOLUTE NRBC 0.00 0.0 - 0.2 K/uL    DF AUTOMATED      NEUTROPHILS 86 (H) 43 - 78 %    LYMPHOCYTES 9 (L) 13 - 44 %    MONOCYTES 5 4.0 - 12.0 %    EOSINOPHILS 0 (L) 0.5 - 7.8 %    BASOPHILS 0 0.0 - 2.0 %    IMMATURE GRANULOCYTES 1 0.0 - 5.0 %    ABS. NEUTROPHILS 9.1 (H) 1.7 - 8.2 K/UL    ABS. LYMPHOCYTES 1.0 0.5 - 4.6 K/UL    ABS. MONOCYTES 0.5 0.1 - 1.3 K/UL    ABS. EOSINOPHILS 0.0 0.0 - 0.8 K/UL    ABS. BASOPHILS 0.0 0.0 - 0.2 K/UL    ABS. IMM. GRANS. 0.1 0.0 - 0.5 K/UL   POC G3    Collection Time: 11/14/18  3:55 AM   Result Value Ref Range    Device: VENT      FIO2 (POC) 30 %    pH (POC) 7.385 7.35 - 7.45      pCO2 (POC) 37.3 35 - 45 MMHG    pO2 (POC) 78 75 - 100 MMHG    HCO3 (POC) 22.3 22 - 26 MMOL/L    sO2 (POC) 95 95 - 98 %    Base deficit (POC) 2 mmol/L    Mode Pressure regulated volume control      Tidal volume 500 ml    Set Rate 16 bpm    PEEP/CPAP (POC) 8 cmH2O    Allens test (POC) YES      Inspiratory Time 1.25 sec    Site RIGHT RADIAL      Patient temp.  98.6      Specimen type (POC) ARTERIAL      Performed by Betty     CO2, POC 23 MMOL/L    Respiratory comment: NurseNotified    EKG, 12 LEAD, SUBSEQUENT    Collection Time: 11/14/18  6:49 AM   Result Value Ref Range    Ventricular Rate 62 BPM    Atrial Rate 62 BPM    P-R Interval 178 ms    QRS Duration 158 ms    Q-T Interval 550 ms    QTC Calculation (Bezet) 558 ms    Calculated P Axis 43 degrees    Calculated R Axis -60 degrees    Calculated T Axis -44 degrees    Diagnosis       Normal sinus rhythm  Left axis deviation  Right bundle branch block  Inferior infarct (cited on or before 14-NOV-2018)  Abnormal ECG  When compared with ECG of 13-NOV-2018 14:04,  Vent. rate has decreased BY  34 BPM  Criteria for Anterior infarct are no longer Present  Criteria for Anterolateral infarct are no longer Present  Serial changes of Inferior infarct Present  Confirmed by [facility]  MD ()KWAN ([id number]) on 11/14/2018 7:37:25 AM     TROPONIN I    Collection Time: 11/14/18  8:42 AM   Result Value Ref Range    Troponin-I, Qt. 0.08 (H) 0.02 - 0.05 NG/ML   PROTHROMBIN TIME + INR    Collection Time: 11/14/18  8:42 AM   Result Value Ref Range    Prothrombin time 13.9 11.5 - 14.5 sec    INR 1.1     METABOLIC PANEL, BASIC    Collection Time: 11/14/18  8:42 AM   Result Value Ref Range    Sodium 140 136 - 145 mmol/L    Potassium 3.5 3.5 - 5.1 mmol/L    Chloride 110 (H) 98 - 107 mmol/L    CO2 25 21 - 32 mmol/L    Anion gap 5 (L) 7 - 16 mmol/L    Glucose 123 (H) 65 - 100 mg/dL    BUN 23 6 - 23 MG/DL    Creatinine 0.80 0.8 - 1.5 MG/DL    GFR est AA >60 >60 ml/min/1.73m2    GFR est non-AA >60 >60 ml/min/1.73m2    Calcium 7.5 (L) 8.3 - 10.4 MG/DL   HEPATIC FUNCTION PANEL    Collection Time: 11/14/18  8:42 AM   Result Value Ref Range    Protein, total 5.8 (L) 6.3 - 8.2 g/dL    Albumin 2.7 (L) 3.5 - 5.0 g/dL    Globulin 3.1 2.3 - 3.5 g/dL    A-G Ratio 0.9 (L) 1.2 - 3.5      Bilirubin, total 0.4 0.2 - 1.1 MG/DL    Bilirubin, direct 0.1 <0.4 MG/DL    Alk.  phosphatase 162 (H) 50 - 136 U/L    AST (SGOT) 239 (H) 15 - 37 U/L    ALT (SGPT) 296 (H) 12 - 65 U/L   MAGNESIUM    Collection Time: 11/14/18  8:42 AM   Result Value Ref Range    Magnesium 1.9 1.8 - 2.4 mg/dL   CBC W/O DIFF    Collection Time: 11/14/18  8:42 AM   Result Value Ref Range    WBC 6.6 4.3 - 11.1 K/uL    RBC 4.96 4.23 - 5.6 M/uL    HGB 11.8 (L) 13.6 - 17.2 g/dL    HCT 37.3 (L) 41.1 - 50.3 %    MCV 75.2 (L) 79.6 - 97.8 FL    MCH 23.8 (L) 26.1 - 32.9 PG    MCHC 31.6 31.4 - 35.0 g/dL RDW 16.0 %    PLATELET 266 (L) 482 - 450 K/uL    MPV 10.8 9.4 - 12.3 FL    ABSOLUTE NRBC 0.00 0.0 - 0.2 K/uL   CK    Collection Time: 11/14/18  8:42 AM   Result Value Ref Range    CK 1,996 (H) 21 - 215 U/L   PTT    Collection Time: 11/14/18  8:42 AM   Result Value Ref Range    aPTT 41.3 (H) 23.2 - 35.3 SEC   EKG, 12 LEAD, INITIAL    Collection Time: 11/14/18  9:26 AM   Result Value Ref Range    Ventricular Rate 69 BPM    Atrial Rate 69 BPM    P-R Interval 214 ms    QRS Duration 152 ms    Q-T Interval 516 ms    QTC Calculation (Bezet) 552 ms    Calculated P Axis 80 degrees    Calculated R Axis -60 degrees    Calculated T Axis -29 degrees    Diagnosis       Sinus rhythm with 1st degree A-V block  Left axis deviation  Right bundle branch block  Inferior infarct (cited on or before 14-NOV-2018)  Abnormal ECG  When compared with ECG of 14-NOV-2018 06:49,  CT interval has increased  Confirmed by ST NISHANT LYON MD (), BETHEL DE LA ROSA ([id number]) on 11/14/2018 12:09:52 PM     GLUCOSE, POC    Collection Time: 11/14/18 10:07 AM   Result Value Ref Range    Glucose (POC) 105 (H) 65 - 100 mg/dL   GLUCOSE, POC    Collection Time: 11/14/18  2:53 PM   Result Value Ref Range    Glucose (POC) 125 (H) 65 - 638 mg/dL   METABOLIC PANEL, BASIC    Collection Time: 11/14/18  3:36 PM   Result Value Ref Range    Sodium 144 136 - 145 mmol/L    Potassium 3.7 3.5 - 5.1 mmol/L    Chloride 113 (H) 98 - 107 mmol/L    CO2 24 21 - 32 mmol/L    Anion gap 7 7 - 16 mmol/L    Glucose 102 (H) 65 - 100 mg/dL    BUN 21 6 - 23 MG/DL    Creatinine 0.72 (L) 0.8 - 1.5 MG/DL    GFR est AA >60 >60 ml/min/1.73m2    GFR est non-AA >60 >60 ml/min/1.73m2    Calcium 7.3 (L) 8.3 - 10.4 MG/DL   MAGNESIUM    Collection Time: 11/14/18  3:36 PM   Result Value Ref Range    Magnesium 1.8 1.8 - 2.4 mg/dL   CK    Collection Time: 11/14/18  3:36 PM   Result Value Ref Range    CK 1,462 (H) 21 - 215 U/L   TROPONIN I    Collection Time: 11/14/18  3:36 PM   Result Value Ref Range Troponin-I, Qt. 0.05 0.02 - 0.05 NG/ML   GLUCOSE, POC    Collection Time: 11/14/18  5:59 PM   Result Value Ref Range    Glucose (POC) 92 65 - 100 mg/dL       Physical Exam

## 2018-11-15 NOTE — PROGRESS NOTES
Pt not responding to voice or stimuli. Stopped versed and fentanyl gtts. Pt now opens eyes to command but does not make eye contact, falls back to sleep. Hr 98 bbb, sats 100%, bp 102/72. Will continue to keep versed and fentanyl on hold, amio continues at 0.5.

## 2018-11-15 NOTE — PROGRESS NOTES
Rewarming therapy paused due to temperature sensor error in arctic sun machine, machine had to be turned off and turned back on. Rewarming therapy resumed.

## 2018-11-15 NOTE — CONSULTS
[address]    Duane Sines  MR#: [id number]  : 1960  ACCOUNT #: [de-identified]   DATE OF SERVICE: 2018    HISTORY OF PRESENT ILLNESS:  The patient is a 57-year-old -American male who has known history of dilated cardiomyopathy with ejection fraction between 15-20%. He underwent cardiac catheterization, which showed no evidence of any obstructive coronary artery disease. His diagnosis is dilated cardiomyopathy. He was being treated with lisinopril, Coreg for the last 2 years. At that time, family did not want to have any AICD placement, was discussed and he has been doing okay until he had cardiac arrest yesterday at home and he was brought in in full arrest and is intubated. He is on amiodarone drip. He is medically paralyzed and being ventilated. He is currently on aspirin, enoxaparin 40 mg subcutaneous q. 24h, pantoprazole and also amiodarone drip. He is paralyzed. His mental status is difficult to assess at this point, we do not know what his mental status at this point. So patient is being  since 2:00 this afternoon and once his medication effect of paralysis weaned off then we will assess his mental status and then make a decision, what will be the future course of action. If his mental status clears up everything then he should undergo AICD placement in view of cardiac arrest.  I have reviewed all the labs and the chart and the medications. ASSESSMENT:  Status post cardiac arrest, dilated cardiomyopathy, ejection fraction 50%. Patient is on supportive therapy on ventilator and he is also getting his amiodarone drip. Prognosis is poor. It depends on his mental status  how much his brain recovery he has after he is weaned off all the medications for medical paralysis.     PLAN:  I will agree with the current line of management once his mental status is clear when he is weaned off the ventilator and all that he will be a candidate for AICD placement. The family agrees. In the past patient's family was reluctant go for AICD. I am certain they will agree at this point since already had a cardiac arrest.  I would recommend IV magnesium a gram if he gets frequent PVCs. I will continue to follow with you all. Thank you for consultation. If any questions, you can call me on my cell [phone].       MD MATTHEW King / [place]  D: 11/14/2018 20:05     T: 11/14/2018 20:55  JOB #: [id number]

## 2018-11-15 NOTE — PROGRESS NOTES
Ventilator check complete; patient has a #7. 0 ET tube secured at the 24 at the lip. Patient is not able to follow commands. Breath sounds are coarse. Trachea is midline, Negative for subcutaneous air, and chest excursion is symmetric. Patient is also Negative for cyanosis and is Negative for pitting edema. All alarms are set and audible. Resuscitation bag is at the head of the bed. Ventilator Settings Mode FIO2 Rate Tidal Volume Pressure PEEP I:E Ratio PRVC  30 %   20 500 ml     8 cm H20  1:2   
 
Peak airway pressure: 20 cm H2O Minute ventilation: 9.8 l/min ABG: No results for input(s): PH, PCO2, PO2, HCO3 in the last 72 hours.  
 
 
Heidi Huynh, RT

## 2018-11-15 NOTE — PROGRESS NOTES
Neuro assessment completed, pain only responds to sternal rub with a facial grimace. Pt does have a cough reflex when suctioning.

## 2018-11-15 NOTE — INTERDISCIPLINARY ROUNDS
Interdisciplinary team rounds were held 11/15/2018 with the following team members:Care Management, Nursing, Nurse Practitioner, Nutrition, Pharmacy, Physician, Respiratory Therapy and Clinical Coordinator. Plan of care discussed. See clinical pathway and/or care plan for interventions and desired outcomes.

## 2018-11-15 NOTE — PROGRESS NOTES
Pt off paralytics, status post cardiac arrest, dilated cardiomyopathy EF15%, if neurological status improves, will proceed with AICD.

## 2018-11-15 NOTE — PROGRESS NOTES
Ventilator check complete; patient has a #7. 0 ET tube secured at the 24 at the teeth. Patient is sedated. Patient is not able to follow commands. Breath sounds are coarse. Trachea is midline, Negative for subcutaneous air, and chest excursion is symmetrical. Patient is also Negative for cyanosis and is Negative for pitting edema. All alarms are set and audible. Resuscitation bag is at the head of the bed. Ventilator Settings Mode FIO2 Rate Tidal Volume Pressure PEEP I:E Ratio PRVC  30 %    500 ml     8 cm H20  !:2.0 Peak airway pressure: 20 cm H2O Minute ventilation: 7.9 l/min ABG: No results for input(s): PH, PCO2, PO2, HCO3 in the last 72 hours.  
 
 
Sanya Lockwood, RT

## 2018-11-15 NOTE — PROGRESS NOTES
Care Daily Progress Note: 11/15/2018 Admission Date: 11/13/2018 The patient's chart is reviewed and the patient is discussed with the staff. 
 
66 y.o. CHARITO presents following a witnessed cardiac arrest. He has paranoid schizophrenia and goes to a day care center where he was today when he collapsed. Staff could not find a pulse, CPR was started and an AED placed. Rhythm was reportedly shockable so this was performed with return of circulation. He was intubated by EMS and transported to the ER. Upon admission here, his pulse was again lost so he underwent another course of CPR and received 1 amp of epi and again ROSC was achieved. Was hypotensive, receiving IV fluids and started on Levophed. Has known cardiomyopathy with ECHO 2016 - EF 20 - 25%. He is followed by Dr. Rosy Law, is maintained on an ACE, beta blocker, statin, baby asa and daily Lasix. Reportedly he had a cardiac cath in the past that revealed no CAD. He is NIDDM and takes daily metformin and is followed by the [facility] for schizophrenia diagnosed as a teenager. He lives with his sister who helps with his medical therapy which he has been compliant with. She was interviewed today and she did not notice that he seemed in any way ill before she took him to  today. Admitted to ICU, continued on [place] protocol and cardiology consulted. Subjective:  
 
Sedated, receiving paralytic, on vent support and Arctic Sun protocol after witnessed cardiac arrest.   
 
Current Facility-Administered Medications Medication Dose Route Frequency  polyvinyl alcohol (LIQUIFILM TEARS) 1.4 % ophthalmic solution 1 Drop  1 Drop Both Eyes PRN  pantoprazole (PROTONIX) 40 mg in sodium chloride 0.9% 10 mL injection  40 mg IntraVENous DAILY  EPINEPHrine (ADRENALIN) 4 mg in 0.9% sodium chloride 250 mL infusion  0.1 mcg/kg/min IntraVENous TITRATE  aspirin chewable tablet 81 mg  81 mg Oral DAILY  sodium chloride (NS) flush 5-10 mL  5-10 mL IntraVENous Q8H  
 sodium chloride (NS) flush 5-10 mL  5-10 mL IntraVENous PRN  
 0.9% sodium chloride infusion  100 mL/hr IntraVENous CONTINUOUS  
 fentaNYL citrate (PF) 2,500 mcg in 0.9% sodium chloride 100 mL infusion  0-200 mcg/hr IntraVENous TITRATE  enoxaparin (LOVENOX) injection 40 mg  40 mg SubCUTAneous Q24H  
 amiodarone (CORDARONE) 450 mg in dextrose 5% 250 mL infusion  0.5-1 mg/min IntraVENous CONTINUOUS  
 midazolam (VERSED) injection 2 mg  2 mg IntraVENous Q2H PRN  
 midazolam (VERSED) 100 mg in 0.9% sodium chloride 100 mL infusion  0-10 mg/hr IntraVENous TITRATE  atracurium (TRACRIUM) 1,000 mg in 0.9% sodium chloride 250 mL infusion  0-15 mcg/kg/min IntraVENous TITRATE Review of Systems Unobtainable due to patient status. Objective:  
 
Vitals:  
 11/15/18 0931 11/15/18 0947 11/15/18 1001 11/15/18 1016 BP: 102/70 102/70 103/74 102/72 Pulse: 93 94 94 94 Resp: 20 16 20 20 Temp:      
SpO2: 100% 100% 100% 100% Weight:      
 
 
Intake and Output:  
11/13 1901 - 11/15 0700 In: 5084.3 [I.V.:5004.3] Out: 1700 [Urine:900] 11/15 0701 - 11/15 1900 In: 21 Out: 180 [Urine:180] Physical Exam:         
Constitutional:  intubated and mechanically ventilated. EENMT:  Sclera clear, pupils pinpoint, oral mucosa moist 
Respiratory: scattered anterior crackles Cardiovascular:  RRR with no M,G,R; 
Gastrointestinal:  soft with hypoactive bowel sounds, NG to suction Musculoskeletal:  cool with no cyanosis, no lower leg edema Skin:  no jaundice or ecchymosis Neurologic: sedated Psychiatric:  sedated LINES:   
ETT 11/13/18 PIV sites Quad lumen 11/13/18 [place] 11/13/18 NG 11/13/18 DRIPS:   
 ml/hr Amiodarone 0.5 mg/min Tracrium 10 mcg/kg/min Fentanyl 50 mcg/hr Versed 6 mg/hr CXR 11/14/18:   
 
 
Ventilator Settings Mode FIO2 Rate Tidal Volume Pressure PEEP PRVC  30 %    500 ml     8 cm H20   
 
 Peak airway pressure: 20 cm H2O Minute ventilation: 9.8 l/min ABG:  
Recent Labs 11/15/18 
0348 11/14/18 
0355 11/13/18 [place] PHI 7.283* 7.385 7.325* PCO2I 44.2 37.3 45.7* PO2I 68* 78 337* HCO3I 21.0* 22.3 23.8 LAB Recent Labs 11/15/18 
0425 11/15/18 
0213 11/15/18 
0020 11/14/18 2014 11/14/18 
1759 GLUCPOC 93 92 104* 95 92 Recent Labs 11/15/18 
0617 11/14/18 2011 11/14/18 
7365 WBC 9.3 6.5 6.6 HGB 11.7* 11.8* 11.8* HCT 36.8* 37.6* 37.3*  
* 133* 119* INR  --   --  1.1 Recent Labs 11/15/18 
0421 11/15/18 
0016 11/14/18 2011 11/14/18 
1536 11/14/18 
0842  11/13/18 [address]  142  --  144 140   < > 140  
K 4.0 3.8 3.6 3.7 3.5   < > 4.4 * 112*  --  113* 110*   < > 104 CO2 23 24  --  24 25   < > 24 * 114*  --  102* 123*   < > 92 BUN 19 18  --  21 23   < > 23 CREA 0.87 0.75*  --  0.72* 0.80   < > 1.78* MG  --  2.1 1.7* 1.8 1.9   < >  --   
CA 7.7* 7.4*  --  7.3* 7.5*   < > 8.7 ALB  --   --   --   --  2.7*  --  3.4* SGOT  --   --   --   --  239*  --  484*  
 < > = values in this interval not displayed. No results for input(s): LCAD, LAC in the last 72 hours. Assessment:  (Medical Decision Making) Patient Active Problem List  
Diagnosis Code  Type II diabetes mellitus (HCC) E11.9  
 CHF (congestive heart failure) (HCC) I50.9  
 HTN (hypertension) I10  
 Cardiac arrest (HCC) I46.9  Acute respiratory failure with hypoxia ([facility]) J96.01  
 Hypotension I95.9  Systolic congestive heart failure (HCC) I50.20  Schizophrenia ([facility] F20.9  Acute renal failure (HCC) N17.9 Plan:  (Medical Decision Making) Hospital Problems  Date Reviewed: 11/14/2018 Codes Class Noted POA * (Principal) Cardiac arrest [facility]) ICD-10-CM: I46.9 ICD-9-CM: 427.5  11/13/2018 Yes S/P [employer] protocol- now rewarmed- opens eyes to tactile and verbal stimulus but non purposful at this point. Acute respiratory failure with hypoxia ([facility] ICD-10-CM: J96.01 
ICD-9-CM: 518.81  11/13/2018 Yes On vent support Hypotension ICD-10-CM: I95.9 ICD-9-CM: 458.9  11/13/2018 Yes Resolved--not on pressor support Systolic congestive heart failure (HCC) ICD-10-CM: I50.20 ICD-9-CM: 428.20, 428.0  11/13/2018 Yes  
 chronic Schizophrenia ([facility] (Chronic) ICD-10-CM: F20.9 ICD-9-CM: 295.90  11/13/2018 Yes  
 chronic Acute renal failure (HCC) ICD-10-CM: N17.9 ICD-9-CM: 584.9  11/13/2018 Yes Resolved creatinine 0.80 Type II diabetes mellitus (HCC) (Chronic) ICD-10-CM: E11.9 ICD-9-CM: 250.00  2/4/2016 Yes Chronic--ranges  Seen by Dr Kayla Delaney- will need AICD if he improves mentally. Repeat CXR Sylwia Parekh MD. 
 
Patient spiked a fever, cultures obtained and patient was started on levaquin IV

## 2018-11-15 NOTE — PROGRESS NOTES
Temp 100.5 bladder with artic sun in place. Dr. Lizbeth Adan notified with orders received for urine, sputum and blood cx. Start on levaquin and tylenol prn.

## 2018-11-15 NOTE — PROGRESS NOTES
Bedside report with Dr. Keiry Vasquez, updated on current gtts, vss. Dr. Keiry Vasquez states if pt improves neurologically he will plan repeat echo and AICD. No verbal orders received.

## 2018-11-16 ENCOUNTER — APPOINTMENT (OUTPATIENT)
Dept: GENERAL RADIOLOGY | Age: 58
DRG: 226 | End: 2018-11-16
Attending: INTERNAL MEDICINE
Payer: MEDICARE

## 2018-11-16 LAB
ANION GAP SERPL CALC-SCNC: 7 MMOL/L (ref 7–16)
ARTERIAL PATENCY WRIST A: YES
ARTERIAL PATENCY WRIST A: YES
BASE DEFICIT BLD-SCNC: 5 MMOL/L
BASE DEFICIT BLD-SCNC: 6 MMOL/L
BDY SITE: ABNORMAL
BDY SITE: ABNORMAL
BODY TEMPERATURE: 98.6
BODY TEMPERATURE: 99.4
BUN SERPL-MCNC: 19 MG/DL (ref 6–23)
CALCIUM SERPL-MCNC: 8 MG/DL (ref 8.3–10.4)
CHLORIDE SERPL-SCNC: 113 MMOL/L (ref 98–107)
CO2 BLD-SCNC: 19 MMOL/L
CO2 BLD-SCNC: 20 MMOL/L
CO2 SERPL-SCNC: 22 MMOL/L (ref 21–32)
CREAT SERPL-MCNC: 0.98 MG/DL (ref 0.8–1.5)
ERYTHROCYTE [DISTWIDTH] IN BLOOD BY AUTOMATED COUNT: 16.5 %
GAS FLOW.O2 O2 DELIVERY SYS: ABNORMAL L/MIN
GAS FLOW.O2 O2 DELIVERY SYS: ABNORMAL L/MIN
GAS FLOW.O2 SETTING OXYMISER: 16 BPM
GLUCOSE BLD STRIP.AUTO-MCNC: 81 MG/DL (ref 65–100)
GLUCOSE BLD STRIP.AUTO-MCNC: 84 MG/DL (ref 65–100)
GLUCOSE SERPL-MCNC: 87 MG/DL (ref 65–100)
HCO3 BLD-SCNC: 18.2 MMOL/L (ref 22–26)
HCO3 BLD-SCNC: 18.8 MMOL/L (ref 22–26)
HCT VFR BLD AUTO: 32.6 % (ref 41.1–50.3)
HGB BLD-MCNC: 10.3 G/DL (ref 13.6–17.2)
INSPIRATION.DURATION SETTING TIME VENT: 0.9 SEC
MCH RBC QN AUTO: 23.4 PG (ref 26.1–32.9)
MCHC RBC AUTO-ENTMCNC: 31.6 G/DL (ref 31.4–35)
MCV RBC AUTO: 73.9 FL (ref 79.6–97.8)
NRBC # BLD: 0 K/UL (ref 0–0.2)
O2/TOTAL GAS SETTING VFR VENT: 30 %
O2/TOTAL GAS SETTING VFR VENT: 30 %
PCO2 BLD: 29.4 MMHG (ref 35–45)
PCO2 BLD: 31.2 MMHG (ref 35–45)
PEEP RESPIRATORY: 8 CMH2O
PEEP RESPIRATORY: 8 CMH2O
PH BLD: 7.39 [PH] (ref 7.35–7.45)
PH BLD: 7.4 [PH] (ref 7.35–7.45)
PLATELET # BLD AUTO: 127 K/UL (ref 150–450)
PMV BLD AUTO: 12.2 FL (ref 9.4–12.3)
PO2 BLD: 72 MMHG (ref 75–100)
PO2 BLD: 83 MMHG (ref 75–100)
POTASSIUM SERPL-SCNC: 3.8 MMOL/L (ref 3.5–5.1)
PRESSURE SUPPORT SETTING VENT: 5 CMH2O
RBC # BLD AUTO: 4.41 M/UL (ref 4.23–5.6)
SAO2 % BLD: 94 % (ref 95–98)
SAO2 % BLD: 96 % (ref 95–98)
SERVICE CMNT-IMP: ABNORMAL
SODIUM SERPL-SCNC: 142 MMOL/L (ref 136–145)
SPECIMEN TYPE: ABNORMAL
SPECIMEN TYPE: ABNORMAL
TOTAL RESP. RATE, ITRR: 15
VENTILATION MODE VENT: ABNORMAL
VENTILATION MODE VENT: ABNORMAL
VT SETTING VENT: 500 ML
WBC # BLD AUTO: 9.3 K/UL (ref 4.3–11.1)

## 2018-11-16 PROCEDURE — 74011000250 HC RX REV CODE- 250: Performed by: INTERNAL MEDICINE

## 2018-11-16 PROCEDURE — 82803 BLOOD GASES ANY COMBINATION: CPT

## 2018-11-16 PROCEDURE — 99233 SBSQ HOSP IP/OBS HIGH 50: CPT | Performed by: INTERNAL MEDICINE

## 2018-11-16 PROCEDURE — C9113 INJ PANTOPRAZOLE SODIUM, VIA: HCPCS | Performed by: INTERNAL MEDICINE

## 2018-11-16 PROCEDURE — 71045 X-RAY EXAM CHEST 1 VIEW: CPT

## 2018-11-16 PROCEDURE — 65620000000 HC RM CCU GENERAL

## 2018-11-16 PROCEDURE — 80048 BASIC METABOLIC PNL TOTAL CA: CPT

## 2018-11-16 PROCEDURE — 74011250637 HC RX REV CODE- 250/637: Performed by: INTERNAL MEDICINE

## 2018-11-16 PROCEDURE — 74011250636 HC RX REV CODE- 250/636: Performed by: INTERNAL MEDICINE

## 2018-11-16 PROCEDURE — 94003 VENT MGMT INPAT SUBQ DAY: CPT

## 2018-11-16 PROCEDURE — C8929 TTE W OR WO FOL WCON,DOPPLER: HCPCS

## 2018-11-16 PROCEDURE — 36600 WITHDRAWAL OF ARTERIAL BLOOD: CPT

## 2018-11-16 PROCEDURE — 85027 COMPLETE CBC AUTOMATED: CPT

## 2018-11-16 PROCEDURE — 74011000258 HC RX REV CODE- 258: Performed by: INTERNAL MEDICINE

## 2018-11-16 PROCEDURE — 82962 GLUCOSE BLOOD TEST: CPT

## 2018-11-16 PROCEDURE — 77030020263 HC SOL INJ SOD CL0.9% LFCR 1000ML

## 2018-11-16 RX ADMIN — ACETAMINOPHEN 650 MG: 325 SOLUTION ORAL at 01:09

## 2018-11-16 RX ADMIN — LEVOFLOXACIN 750 MG: 5 INJECTION, SOLUTION INTRAVENOUS at 11:46

## 2018-11-16 RX ADMIN — SODIUM CHLORIDE 100 ML/HR: 900 INJECTION, SOLUTION INTRAVENOUS at 23:00

## 2018-11-16 RX ADMIN — AMIODARONE HYDROCHLORIDE 0.5 MG/MIN: 50 INJECTION, SOLUTION INTRAVENOUS at 00:11

## 2018-11-16 RX ADMIN — MIDAZOLAM HYDROCHLORIDE 2 MG: 1 INJECTION, SOLUTION INTRAMUSCULAR; INTRAVENOUS at 11:04

## 2018-11-16 RX ADMIN — Medication 10 ML: at 05:17

## 2018-11-16 RX ADMIN — Medication 10 ML: at 23:40

## 2018-11-16 RX ADMIN — Medication 5 ML: at 14:00

## 2018-11-16 RX ADMIN — SODIUM CHLORIDE 40 MG: 9 INJECTION, SOLUTION INTRAMUSCULAR; INTRAVENOUS; SUBCUTANEOUS at 09:05

## 2018-11-16 RX ADMIN — ACETAMINOPHEN 650 MG: 325 SOLUTION ORAL at 11:09

## 2018-11-16 RX ADMIN — ASPIRIN 81 MG 81 MG: 81 TABLET ORAL at 09:05

## 2018-11-16 RX ADMIN — SODIUM CHLORIDE 100 ML/HR: 900 INJECTION, SOLUTION INTRAVENOUS at 01:07

## 2018-11-16 RX ADMIN — PERFLUTREN 1 ML: 6.52 INJECTION, SUSPENSION INTRAVENOUS at 11:00

## 2018-11-16 RX ADMIN — ENOXAPARIN SODIUM 40 MG: 40 INJECTION, SOLUTION INTRAVENOUS; SUBCUTANEOUS at 16:33

## 2018-11-16 RX ADMIN — AMIODARONE HYDROCHLORIDE 0.5 MG/MIN: 50 INJECTION, SOLUTION INTRAVENOUS at 16:53

## 2018-11-16 RX ADMIN — MIDAZOLAM HYDROCHLORIDE 2 MG: 1 INJECTION, SOLUTION INTRAMUSCULAR; INTRAVENOUS at 01:37

## 2018-11-16 RX ADMIN — SODIUM CHLORIDE 100 ML/HR: 900 INJECTION, SOLUTION INTRAVENOUS at 11:09

## 2018-11-16 NOTE — PROGRESS NOTES
Bedside and Verbal shift change report given to Andres Mckeon (oncoming nurse) by Stella Ibanez (offgoing nurse). Report included the following information SBAR, Kardex, Procedure Summary, Intake/Output, MAR, Recent Results, Med Rec Status and Cardiac Rhythm SR with BBB.

## 2018-11-16 NOTE — PROGRESS NOTES
Problem: Falls - Risk of 
Goal: *Absence of Falls Document [facility] Fall Risk and appropriate interventions in the flowsheet. Outcome: Progressing Towards Goal 
Fall Risk Interventions: 
  
 
Mentation Interventions: Adequate sleep, hydration, pain control, Bed/chair exit alarm, Door open when patient unattended, Evaluate medications/consider consulting pharmacy, Increase mobility, More frequent rounding, Reorient patient, Toileting rounds, Update white board Medication Interventions: Assess postural VS orthostatic hypotension, Bed/chair exit alarm, Evaluate medications/consider consulting pharmacy Elimination Interventions: Bed/chair exit alarm, Patient to call for help with toileting needs, Toileting schedule/hourly rounds History of Falls Interventions: Bed/chair exit alarm, Utilize gait belt for transfer/ambulation, Evaluate medications/consider consulting pharmacy, Door open when patient unattended Problem: Pressure Injury - Risk of 
Goal: *Prevention of pressure injury Document Laurent Scale and appropriate interventions in the flowsheet. Outcome: Progressing Towards Goal 
Pressure Injury Interventions: 
Sensory Interventions: Assess changes in LOC, Assess need for specialty bed, Avoid rigorous massage over bony prominences, Float heels, Check visual cues for pain, Monitor skin under medical devices, Minimize linen layers, Keep linens dry and wrinkle-free, Maintain/enhance activity level, Turn and reposition approx. every two hours (pillows and wedges if needed) Activity Interventions: Assess need for specialty bed, Increase time out of bed, Pressure redistribution bed/mattress(bed type) Mobility Interventions: Turn and reposition approx. every two hours(pillow and wedges), Pressure redistribution bed/mattress (bed type), HOB 30 degrees or less, Float heels Nutrition Interventions: Document food/fluid/supplement intake, Discuss nutritional consult with provider Friction and Shear Interventions: Foam dressings/transparent film/skin sealants, Lift team/patient mobility team, Minimize layers, HOB 30 degrees or less, Transferring/repositioning devices

## 2018-11-16 NOTE — PROGRESS NOTES
Problem: Nutrition Deficit Goal: *Optimize nutritional status Nutrition: 
Assessment based on BPA - NPO x 3 days. Assessment: Anthropometrics: Ht - 5'10\", wgt - 82.2 kg (CCU bed 11/16/18), BMI - 26 c/w overweight, edema - none. Macronutrient Needs: 
Estimated calorie needs - 8333-1249 bonny/day (20-25 bonny/kg/day) Estimated protein needs - 60-90 gm pro/day (0.8-1.2 gm pro/kgIBW/day) (GFR >60 ml/min) Max CHO/day - 256 gm CHO/day (50% bonny/day) Fluid/day - 1.6-2 liters/day (1 ml/bonny/day) Intake/Comparative Standards: This patient has been NPO since admission with minimal caloric support via IV drips. Pertinent Labs: BMP unremarkable Pertinent Medications: 
 Cordarone drip. Diet: 
 NPO. Food/Nutrition History: 
 66year old gentleman with a h/o diabetes and schizphrenia admitted with a witnessed arrest. He is s/p hypothermic protocol, remains intubated and is now alert and following commands. He is being evaluated for an AICD. Diagnosis (Nutrition): Inadequate oral intake related to decreased ability to consume sufficient oral intake as evidenced by remaining NPO on mechanical ventilation. Intervention: 
Meals and Snacks: NPO. Enteral Nutrition: If the patient remains intubated, his needs could be met with 60 ml/hr Glucerna 1.2 with a 25 ml/hr water flush - 1728 calories/day (100% calorie goal), 86 grams protein/day (100% protein goal), 166 grams CHO/day (does not exceed max CHO limit) and 1766 ml water/day (100% fluid goal). Coordination of Nutrition Care by a Nutrition Professional: AM CCU rounds. Nutrition Discharge Plan: Too soon to determine. Nidhi Gore. Criselda Rajan 
[phone]

## 2018-11-16 NOTE — CONSULTS
[facility] Cardiology Consult                Date of  Admission: 11/13/2018  2:01 PM     Primary Care Physician: IAN  Primary Cardiologist: Dr. Zach Garrison  Referring Physician: Maxim Velasquez Physician: Dr. Andrew Bartholomew    CC/Reason for consult:consideration for ICD      Iram Mireles is a 66 y.o. male with prior h/o LV dysfunction/dilated CM with EF 20-25%, prior LHC showed no obstructive CAD per Dr. Pedro Combs note, DM, HTN, and schizophrenia. Patient presented to ED at [facility] s/p cardiac arrest at local adult day care. He collapsed at day care and staff unable to find pulse. CPR was started and AED was placed. Rhythm was reportedly shockable so this was performed with return of circulation. He was intubated by EMS and transported to the ER. Upon admission here, his pulse was again lost so he underwent another course of CPR and received 1 amp of epi and again ROSC was achieved. He is hypotensive and receiving his second liter of IV fluids. He has been started on Levophed. Patient was admitted by critical service/[facility] to ICU and Dr. Zach Garrison was consulted (his primary cardiologist) for S/P arrest. Today, EP was consulted for consideration for ICD.  Currently intubated and following commands         Diagnosis    Type II diabetes mellitus ([address]    CHF (congestive heart failure) (HCC)    HTN (hypertension)    Cardiac arrest ([address]    Acute respiratory failure with hypoxia (HCC)    Hypotension    Systolic congestive heart failure (HCC)    Schizophrenia (HCC)    Acute renal failure (HCC)       Past Medical History:   Diagnosis Date    Diabetes ([address]     Heart failure ([address]     Hypertension     Psychiatric disorder     paranoid schziphrenia    Systolic CHF, acute on chronic ([address] 2/4/2016      Past Surgical History:   Procedure Laterality Date    HX APPENDECTOMY       No Known Allergies   Family History   Problem Relation Age of Onset    Heart Failure Mother     Heart Attack Father     Diabetes Sister    [facility] Hypertension Sister     Heart Disease Sister         valvular    No Known Problems Brother     No Known Problems Brother     Diabetes Sister     Diabetes Sister     Diabetes Sister     No Known Problems Sister     No Known Problems Sister     Lung Cancer Brother     Heart Attack Brother         Current Facility-Administered Medications   Medication Dose Route Frequency    perflutren lipid microspheres (DEFINITY) in NS bolus IV  1 mL IntraVENous PRN    polyvinyl alcohol (LIQUIFILM TEARS) 1.4 % ophthalmic solution 1 Drop  1 Drop Both Eyes PRN    levoFLOXacin (LEVAQUIN) 750 mg in D5W IVPB  750 mg IntraVENous Q24H    acetaminophen (TYLENOL) solution 650 mg  650 mg Per NG tube Q4H PRN    pantoprazole (PROTONIX) 40 mg in sodium chloride 0.9% 10 mL injection  40 mg IntraVENous DAILY    aspirin chewable tablet 81 mg  81 mg Oral DAILY    sodium chloride (NS) flush 5-10 mL  5-10 mL IntraVENous Q8H    sodium chloride (NS) flush 5-10 mL  5-10 mL IntraVENous PRN    0.9% sodium chloride infusion  100 mL/hr IntraVENous CONTINUOUS    fentaNYL citrate (PF) 2,500 mcg in 0.9% sodium chloride 100 mL infusion  0-200 mcg/hr IntraVENous TITRATE    enoxaparin (LOVENOX) injection 40 mg  40 mg SubCUTAneous Q24H    amiodarone (CORDARONE) 450 mg in dextrose 5% 250 mL infusion  0.5-1 mg/min IntraVENous CONTINUOUS    midazolam (VERSED) injection 2 mg  2 mg IntraVENous Q2H PRN    midazolam (VERSED) 100 mg in 0.9% sodium chloride 100 mL infusion  0-10 mg/hr IntraVENous TITRATE       ROS  Unable to obtain.  Intubated but will follow simple commands   Physical Exam  Vitals:    11/16/18 1231 11/16/18 1302 11/16/18 1331 11/16/18 1402   BP: 117/75 112/76 113/73 116/78   Pulse: 95 89 92 86   Resp: 15 13 16 (!) 37   Temp:       SpO2: 100% 100% 100% 100%   Weight:           Physical Exam:  General: Well Developed, Well Nourished, No Acute Distress  HEENT: pupils equal and round, no abnormalities noted  Neck: supple, no JVD, no carotid bruits  Heart: S1S2 with RRR without murmurs or gallops  Lungs: Clear throughout auscultation bilaterally without adventitious sounds  Abd: soft, nontender, nondistended, with good bowel sounds  Ext: warm, no edema, calves supple/nontender, pulses 2+ bilaterally  Skin: warm and dry  Neurologic: Alert and opens eyes to name, intubated but follows simple commands    Cardiographics    Telemetry: SR  ECG: SR  Echocardiogram: ordered    Labs:   Recent Labs     11/16/18  0609 11/16/18  0413 11/15/18  1922 11/15/18  1614 11/15/18  1144  11/14/18  0842   NA  --  142  --  143 142   < > 140   K  --  3.8  --  4.0 4.1   < > 3.5   MG  --   --   --  2.1 2.0   < > 1.9   BUN  --  19  --  19 19   < > 23   CREA  --  0.98  --  1.00 1.04   < > 0.80   GLU  --  87  --  85 90   < > 123*   WBC 9.3  --  11.5*  --   --    < > 6.6   HGB 10.3*  --  11.4*  --   --    < > 11.8*   HCT 32.6*  --  35.9*  --   --    < > 37.3*   *  --  137*  --   --    < > 119*   INR  --   --   --   --   --   --  1.1    < > = values in this interval not displayed. Assessment/Plan:     Assessment:      Principal Problem:    Cardiac arrest ([facility] (11/13/2018)- pulmonary weaning from vent. Will assess neuro status post extubation and consider if patient is candidate for ICD prior to d/c. Request [facility] report from Dr. Mendez office. Echo pending today. Active Problems:    Type II diabetes mellitus ([facility] (2/4/2016)      Acute respiratory failure with hypoxia (HCC) (11/13/2018)      Hypotension (05/62/5231)      Systolic congestive heart failure ([facility] (11/13/2018)- see above. Schizophrenia ([facility] (11/13/2018)- per primary team       Acute renal failure [facility]) (11/13/2018)      Thank you very much for this referral. We appreciate the opportunity to participate in this patient's care. We will follow along with above stated plan.     Fausto Alvarez NP  Consulting MD: Dr. Gavin Basurto

## 2018-11-16 NOTE — PROGRESS NOTES
Touched base with staff about patient Mews score 5 Patient is ok Elkin Elam, staff [address]ira [address], [phone] [facility] Roger  /   [email or web address]"

## 2018-11-16 NOTE — PROGRESS NOTES
Ventilator check complete; patient has a #7. 0 ET tube secured at the 24 at the teeth. Patient is not sedated. Patient is able to follow commands. Breath sounds are coarse. Trachea is midline, Negative for subcutaneous air, and chest excursion is symmetric. Patient is also Negative for cyanosis and is Negative for pitting edema. All alarms are set and audible. Resuscitation bag is at the head of the bed. Pt is following commands. Pressure support trial initiated. Ventilator Settings Mode FIO2 Rate Tidal Volume Pressure PEEP I:E Ratio Pressure support  30 %    500 ml  5 cm H2O  8 cm H20  1:3.3 Peak airway pressure: 14 cm H2O Minute ventilation: 12.8 l/min ABG: No results for input(s): PH, PCO2, PO2, HCO3 in the last 72 hours.  
 
 
Zuri Prado, RT

## 2018-11-16 NOTE — PROGRESS NOTES
Ventilator check complete; patient has a #7. 0 ET tube secured at the 24 at the teeth. Patient is sedated. Patient is able to follow some commands. Breath sounds are coarse. Trachea is midline, Negative for subcutaneous air, and chest excursion is symmetrical. Patient is also Negative for cyanosis. All alarms are set and audible. Resuscitation bag is at the head of the bed. Ventilator Settings Mode FIO2 Rate Tidal Volume Pressure PEEP I:E Ratio PRVC  30 %  16  500 ml     8 cm H20  1:3.3 Peak airway pressure: 24 cm H2O Minute ventilation: 8.6 l/min ABG: No results for input(s): PH, PCO2, PO2, HCO3 in the last 72 hours. [facility]

## 2018-11-16 NOTE — PROGRESS NOTES
Respiratory Mechanics completed and are as follows: 
Weaning Parameters Spontaneous Breathing Trial Complete: Yes Resp Rate Observed: 16 Ve: 8.4 VT: 682 RSBI: 24 NIF: -35 VC: 1101 Alberts Agitation Sedation Scale (RASS): Alert and calm Patient extubated to a 3L NC. Patient is able to communicate and is negative for stridor. Breath sounds are clear and diminished. No complications with extubation.   
 
Sophia Campos, RT

## 2018-11-16 NOTE — PROGRESS NOTES
Care Daily Progress Note: 11/16/2018 Admission Date: 11/13/2018 The patient's chart is reviewed and the patient is discussed with the staff. 
 
66 y.o. CHARITO presents following a witnessed cardiac arrest. He has paranoid schizophrenia and goes to a day care center where he was today when he collapsed. Staff could not find a pulse, CPR was started and an AED placed. Rhythm was reportedly shockable so this was performed with return of circulation. He was intubated by EMS and transported to the ER. Upon admission here, his pulse was again lost so he underwent another course of CPR and received 1 amp of epi and again ROSC was achieved. Was hypotensive, receiving IV fluids and started on Levophed. Has known cardiomyopathy with ECHO 2016 - EF 20 - 25%. He is followed by Dr. Kayla Delaney, is maintained on an ACE, beta blocker, statin, baby asa and daily Lasix. Reportedly he had a cardiac cath in the past that revealed no CAD. He is NIDDM and takes daily metformin and is followed by the [facility] for schizophrenia diagnosed as a teenager. He lives with his sister who helps with his medical therapy which he has been compliant with. She was interviewed today and she did not notice that he seemed in any way ill before she took him to  today. Admitted to ICU, continued on [place] protocol and cardiology consulted. Rewarmed and following commands. Subjective:  
 
Lying in bed, remains on vent support but weaned to PS. Arouses, following commands and per staff writing notes. Current Facility-Administered Medications Medication Dose Route Frequency  polyvinyl alcohol (LIQUIFILM TEARS) 1.4 % ophthalmic solution 1 Drop  1 Drop Both Eyes PRN  
 levoFLOXacin (LEVAQUIN) 750 mg in D5W IVPB  750 mg IntraVENous Q24H  
 acetaminophen (TYLENOL) solution 650 mg  650 mg Per NG tube Q4H PRN  pantoprazole (PROTONIX) 40 mg in sodium chloride 0.9% 10 mL injection 40 mg IntraVENous DAILY  EPINEPHrine (ADRENALIN) 4 mg in 0.9% sodium chloride 250 mL infusion  0.1 mcg/kg/min IntraVENous TITRATE  aspirin chewable tablet 81 mg  81 mg Oral DAILY  sodium chloride (NS) flush 5-10 mL  5-10 mL IntraVENous Q8H  
 sodium chloride (NS) flush 5-10 mL  5-10 mL IntraVENous PRN  
 0.9% sodium chloride infusion  100 mL/hr IntraVENous CONTINUOUS  
 fentaNYL citrate (PF) 2,500 mcg in 0.9% sodium chloride 100 mL infusion  0-200 mcg/hr IntraVENous TITRATE  enoxaparin (LOVENOX) injection 40 mg  40 mg SubCUTAneous Q24H  
 amiodarone (CORDARONE) 450 mg in dextrose 5% 250 mL infusion  0.5-1 mg/min IntraVENous CONTINUOUS  
 midazolam (VERSED) injection 2 mg  2 mg IntraVENous Q2H PRN  
 midazolam (VERSED) 100 mg in 0.9% sodium chloride 100 mL infusion  0-10 mg/hr IntraVENous TITRATE Review of Systems Constitutional: negative for fever, chills, sweats Cardiovascular: negative for chest pain, palpitations, syncope, edema Gastrointestinal: NPO, intubated, NG to LIS, negative for vomiting, diarrhea, abdominal pain, or melena Neurologic: negative for focal weakness, numbness, headache Objective:  
 
Vitals:  
 11/16/18 0902 11/16/18 8065 11/16/18 0931 11/16/18 1003 BP: 116/74  126/69 117/66 Pulse: 94  (!) 101 (!) 101 Resp: 15  27 23 Temp:  99.7 °F (37.6 °C) SpO2: 100%  100% 100% Weight:      
 
 
Intake and Output:  
11/14 1901 - 11/16 0700 In: 4570.8 [I.V.:4490.8] Out: 1950 [RSZTO:[id number]] 11/16 0701 - 11/16 1900 In: -  
Out: 150 Physical Exam:         
Constitutional:  intubated and mechanically ventilated. EENMT:  Sclera clear, pupils pinpoint, oral mucosa moist 
Respiratory:few anterior crackles Cardiovascular:  RRR with no M,G,R; 
Gastrointestinal:  soft with active bowel sounds, NG to LIS with gold colored gastric contents Musculoskeletal:  warm with no cyanosis, no lower leg edema, SCDs Skin:  no jaundice or ecchymosis Neurologic: no gross neuro deficits Psychiatric:  Arouses, following commands and nodding responses LINES:   
ETT 11/13/18 PIV sites Quad lumen 11/13/18 [place] 11/13/18 NG 11/13/18 DRIPS:   
 ml/hr Amiodarone 0.5 mg/min CXR 11/16/18: Unchanged left lung base atelectasis or infiltrate. CXR 11/14/18:   
 
 
Ventilator Settings Mode FIO2 Rate Tidal Volume Pressure PEEP Pressure support  30 %    500 ml  5 cm H2O  8 cm H20 Peak airway pressure: 14 cm H2O Minute ventilation: 12.8 l/min ABG:  
Recent Labs 11/16/18 
2652 11/15/18 
0348 11/14/18 
0355 PHI 7.388 7.283* 7.385 PCO2I 31.2* 44.2 37.3 PO2I 72* 68* 78 HCO3I 18.8* 21.0* 22.3 LAB Recent Labs 11/16/18 
0415 11/16/18 
0009 11/15/18 
1927 11/15/18 
1149 11/15/18 
0425 GLUCPOC 84 81 84 84 93 Recent Labs 11/16/18 
2577 11/15/18 
1922 11/15/18 
0617  11/14/18 
4418 WBC 9.3 11.5* 9.3   < > 6.6 HGB 10.3* 11.4* 11.7*   < > 11.8* HCT 32.6* 35.9* 36.8*   < > 37.3*  
* 137* 129*   < > 119* INR  --   --   --   --  1.1  
 < > = values in this interval not displayed. Recent Labs 11/16/18 
0413 11/15/18 
1614 11/15/18 
1144  11/15/18 
0016  11/14/18 
6146  11/13/18 [address]  143 142   < > 142   < > 140   < > 140  
K 3.8 4.0 4.1   < > 3.8   < > 3.5   < > 4.4 * 113* 112*   < > 112*   < > 110*   < > 104 CO2 22 23 22   < > 24   < > 25   < > 24 GLU 87 85 90   < > 114*   < > 123*   < > 92 BUN 19 19 19   < > 18   < > 23   < > 23 CREA 0.98 1.00 1.04   < > 0.75*   < > 0.80   < > 1.78* MG  --  2.1 2.0  --  2.1   < > 1.9   < >  --   
CA 8.0* 7.9* 8.1*   < > 7.4*   < > 7.5*   < > 8.7 ALB  --   --   --   --   --   --  2.7*  --  3.4* SGOT  --   --   --   --   --   --  239*  --  484*  
 < > = values in this interval not displayed. No results for input(s): LCAD, LAC in the last 72 hours. Assessment:  (Medical Decision Making) Patient Active Problem List  
 Diagnosis Code  Type II diabetes mellitus (HCC) E11.9  
 CHF (congestive heart failure) (HCC) I50.9  
 HTN (hypertension) I10  
 Cardiac arrest (HCC) I46.9  Acute respiratory failure with hypoxia (HCC) J96.01  
 Hypotension I95.9  Systolic congestive heart failure (HCC) I50.20  Schizophrenia ([facility] F20.9  Acute renal failure (HCC) N17.9 Plan:  (Medical Decision Making) Hospital Problems  Date Reviewed: 11/16/2018 Codes Class Noted POA * (Principal) Cardiac arrest [facility]) ICD-10-CM: I46.9 ICD-9-CM: 427.5  11/13/2018 Yes S/P [employer] protocol- now rewarmed- awakens, nodding and following commands. Acute respiratory failure with hypoxia ([facility] ICD-10-CM: J96.01 
ICD-9-CM: 518.81  11/13/2018 Yes On vent support--tolerating PS Hypotension ICD-10-CM: I95.9 ICD-9-CM: 458.9  11/13/2018 Yes Resolved--not on pressor support Systolic congestive heart failure (HCC) ICD-10-CM: I50.20 ICD-9-CM: 428.20, 428.0  11/13/2018 Yes Chronic--ECHO pending Schizophrenia ([facility] (Chronic) ICD-10-CM: F20.9 ICD-9-CM: 295.90  11/13/2018 Yes Chronic--resuming chronic meds Acute renal failure (HCC) ICD-10-CM: N17.9 ICD-9-CM: 584.9  11/13/2018 Yes Resolved creatinine 0.98 Type II diabetes mellitus (HCC) (Chronic) ICD-10-CM: E11.9 ICD-9-CM: 250.00  2/4/2016 Yes Chronic--ranges 81-84  
  
 
--ECHO pending today --Blood cultures:  Pending --Respiratory culture:  Pending  
--Cardiology consulted for possible ICD placement 
--Tolerating PS--may be able to extubate today if not going for ICD today. More than 50% of the time documented was spent in face-to-face contact with the patient and in the care of the patient on the floor/unit where the patient is located. Jaime Prather NP The patient has been seen and examined by me personally, the chart,labs, and radiographic studies have been reviewed. Chest: CTA Extremities: 1+ edema I agree with the above assessment and plan. Would like to leave him intubated for deep sedation if AICD can be laced in a timely manner- otherwise he seems ready for extubation- await EP assessment.  
 
Apolinar Mirza MD.

## 2018-11-16 NOTE — INTERDISCIPLINARY ROUNDS
Interdisciplinary team rounds were held 11/16/2018 with the following team members:Care Management, Nursing, Nurse Practitioner, Nutrition, Palliative Care, Pastoral Care, Pharmacy, Physical Therapy, Physician, Respiratory Therapy and Clinical Coordinator and the patient. Plan of care discussed. See clinical pathway and/or care plan for interventions and desired outcomes.

## 2018-11-16 NOTE — PROGRESS NOTES
Bedside and Verbal shift change report given to Myesha Junior RN (oncoming nurse) by Mery Gray (offgoing nurse). Report included the following information SBAR, Kardex, Intake/Output, MAR, Recent Results, Med Rec Status, Cardiac Rhythm sinus rhythm with BBB and Alarm Parameters .

## 2018-11-17 ENCOUNTER — APPOINTMENT (OUTPATIENT)
Dept: GENERAL RADIOLOGY | Age: 58
DRG: 226 | End: 2018-11-17
Attending: INTERNAL MEDICINE
Payer: MEDICARE

## 2018-11-17 LAB
ERYTHROCYTE [DISTWIDTH] IN BLOOD BY AUTOMATED COUNT: 16.7 %
HCT VFR BLD AUTO: 35.9 % (ref 41.1–50.3)
HGB BLD-MCNC: 11.8 G/DL (ref 13.6–17.2)
MCH RBC QN AUTO: 23.4 PG (ref 26.1–32.9)
MCHC RBC AUTO-ENTMCNC: 32.9 G/DL (ref 31.4–35)
MCV RBC AUTO: 71.1 FL (ref 79.6–97.8)
NRBC # BLD: 0.03 K/UL (ref 0–0.2)
PLATELET # BLD AUTO: 136 K/UL (ref 150–450)
PMV BLD AUTO: 12.2 FL (ref 9.4–12.3)
RBC # BLD AUTO: 5.05 M/UL (ref 4.23–5.6)
TROPONIN I SERPL-MCNC: 0.04 NG/ML (ref 0.02–0.05)
WBC # BLD AUTO: 11 K/UL (ref 4.3–11.1)

## 2018-11-17 PROCEDURE — 71045 X-RAY EXAM CHEST 1 VIEW: CPT

## 2018-11-17 PROCEDURE — 93005 ELECTROCARDIOGRAM TRACING: CPT | Performed by: INTERNAL MEDICINE

## 2018-11-17 PROCEDURE — 92610 EVALUATE SWALLOWING FUNCTION: CPT

## 2018-11-17 PROCEDURE — 77030020263 HC SOL INJ SOD CL0.9% LFCR 1000ML

## 2018-11-17 PROCEDURE — 65660000000 HC RM CCU STEPDOWN

## 2018-11-17 PROCEDURE — 74011250636 HC RX REV CODE- 250/636: Performed by: INTERNAL MEDICINE

## 2018-11-17 PROCEDURE — 77010033678 HC OXYGEN DAILY

## 2018-11-17 PROCEDURE — C9113 INJ PANTOPRAZOLE SODIUM, VIA: HCPCS | Performed by: INTERNAL MEDICINE

## 2018-11-17 PROCEDURE — 74011000250 HC RX REV CODE- 250: Performed by: INTERNAL MEDICINE

## 2018-11-17 PROCEDURE — 94660 CPAP INITIATION&MGMT: CPT

## 2018-11-17 PROCEDURE — 84484 ASSAY OF TROPONIN QUANT: CPT

## 2018-11-17 PROCEDURE — 99233 SBSQ HOSP IP/OBS HIGH 50: CPT | Performed by: INTERNAL MEDICINE

## 2018-11-17 PROCEDURE — 74011250637 HC RX REV CODE- 250/637: Performed by: INTERNAL MEDICINE

## 2018-11-17 PROCEDURE — 74011000258 HC RX REV CODE- 258: Performed by: INTERNAL MEDICINE

## 2018-11-17 RX ORDER — FUROSEMIDE 40 MG/1
40 TABLET ORAL DAILY
Status: DISCONTINUED | OUTPATIENT
Start: 2018-11-17 | End: 2018-11-18

## 2018-11-17 RX ORDER — CLOZAPINE 100 MG/1
500 TABLET ORAL EVERY EVENING
Status: DISCONTINUED | OUTPATIENT
Start: 2018-11-17 | End: 2018-11-24

## 2018-11-17 RX ORDER — CLOZAPINE 100 MG/1
200 TABLET ORAL DAILY
Status: DISCONTINUED | OUTPATIENT
Start: 2018-11-18 | End: 2018-11-30 | Stop reason: HOSPADM

## 2018-11-17 RX ORDER — AMIODARONE HYDROCHLORIDE 200 MG/1
400 TABLET ORAL 2 TIMES DAILY
Status: DISCONTINUED | OUTPATIENT
Start: 2018-11-17 | End: 2018-11-30

## 2018-11-17 RX ORDER — FUROSEMIDE 10 MG/ML
40 INJECTION INTRAMUSCULAR; INTRAVENOUS ONCE
Status: COMPLETED | OUTPATIENT
Start: 2018-11-17 | End: 2018-11-17

## 2018-11-17 RX ORDER — LISINOPRIL 5 MG/1
2.5 TABLET ORAL DAILY
Status: DISCONTINUED | OUTPATIENT
Start: 2018-11-17 | End: 2018-11-25

## 2018-11-17 RX ORDER — CARVEDILOL 3.12 MG/1
3.12 TABLET ORAL 2 TIMES DAILY WITH MEALS
Status: DISCONTINUED | OUTPATIENT
Start: 2018-11-17 | End: 2018-11-18

## 2018-11-17 RX ADMIN — ENOXAPARIN SODIUM 40 MG: 40 INJECTION, SOLUTION INTRAVENOUS; SUBCUTANEOUS at 16:59

## 2018-11-17 RX ADMIN — FUROSEMIDE 40 MG: 40 TABLET ORAL at 14:13

## 2018-11-17 RX ADMIN — CLOZAPINE 500 MG: 100 TABLET ORAL at 21:11

## 2018-11-17 RX ADMIN — LISINOPRIL 2.5 MG: 5 TABLET ORAL at 11:00

## 2018-11-17 RX ADMIN — Medication 10 ML: at 05:05

## 2018-11-17 RX ADMIN — SODIUM CHLORIDE 40 MG: 9 INJECTION, SOLUTION INTRAMUSCULAR; INTRAVENOUS; SUBCUTANEOUS at 09:20

## 2018-11-17 RX ADMIN — CLOZAPINE 200 MG: 100 TABLET ORAL at 14:16

## 2018-11-17 RX ADMIN — LEVOFLOXACIN 750 MG: 5 INJECTION, SOLUTION INTRAVENOUS at 13:19

## 2018-11-17 RX ADMIN — Medication 10 ML: at 14:00

## 2018-11-17 RX ADMIN — Medication 10 ML: at 21:14

## 2018-11-17 RX ADMIN — FUROSEMIDE 40 MG: 10 INJECTION, SOLUTION INTRAMUSCULAR; INTRAVENOUS at 17:28

## 2018-11-17 RX ADMIN — SODIUM CHLORIDE 100 ML/HR: 900 INJECTION, SOLUTION INTRAVENOUS at 07:20

## 2018-11-17 RX ADMIN — AMIODARONE HYDROCHLORIDE 400 MG: 200 TABLET ORAL at 21:11

## 2018-11-17 RX ADMIN — AMIODARONE HYDROCHLORIDE 0.5 MG/MIN: 50 INJECTION, SOLUTION INTRAVENOUS at 09:21

## 2018-11-17 RX ADMIN — AMIODARONE HYDROCHLORIDE 400 MG: 200 TABLET ORAL at 14:13

## 2018-11-17 NOTE — PROGRESS NOTES
TRANSFER - OUT REPORT: 
 
Verbal report given to Kacey Miles RN (name) on Shar Other  being transferred to [facility] (unit) for routine progression of care Report consisted of patients Situation, Background, Assessment and  
Recommendations(SBAR). Information from the following report(s) SBAR, Kardex, ED Summary, Procedure Summary, Intake/Output, Med Rec Status and Cardiac Rhythm RBBB was reviewed with the receiving nurse. Lines:  
Quad Lumen 11/13/18 Left Subclavian (Active) Central Line Being Utilized Yes 11/17/2018  7:32 AM  
Criteria for Appropriate Use Hemodynamically unstable, requiring monitoring lines, vasopressors, or volume resuscitation 11/17/2018  7:32 AM  
Site Assessment Clean, dry, & intact 11/17/2018  7:32 AM  
Infiltration Assessment 0 11/17/2018  7:32 AM  
Affected Extremity/Extremities Color distal to insertion site pink (or appropriate for race); Pulses palpable;Range of motion performed 11/17/2018  7:32 AM  
Date of Last Dressing Change 11/13/18 11/17/2018  7:32 AM  
Dressing Status Clean, dry, & intact 11/17/2018  7:32 AM  
Dressing Type Disk with Chlorhexadine gluconate (CHG); Transparent;Tape 11/17/2018  7:32 AM  
Proximal Hub Color/Line Status Flushed;Patent 11/17/2018  7:32 AM  
Positive Blood Return (Medial Site) Yes 11/17/2018  7:32 AM  
Medial 1 Hub Color/Line Status Flushed;Patent;Gray 11/17/2018  7:32 AM  
Positive Blood Return (Lateral Site) Yes 11/17/2018  7:32 AM  
Medial 2 Hub Color/Line Status Blue;Flushed;Patent 11/17/2018  7:32 AM  
Positive Blood Return (Site #3) Yes 11/17/2018  7:32 AM  
Distal Hub Color/Line Status Infusing;Patent;Brown 11/17/2018  7:32 AM  
Positive Blood Return (Site #4) Yes 11/17/2018  7:32 AM  
Alcohol Cap Used No 11/15/2018  7:02 AM  
  
 
Opportunity for questions and clarification was provided. Patient transported with: 
 Monitor Registered Nurse Tech

## 2018-11-17 NOTE — PROGRESS NOTES
Bedside and Verbal shift change report given to Helen Fonseca RN (oncoming nurse) by Katie Swain RN (offgoing nurse). Report included the following information SBAR, Kardex, Procedure Summary, Intake/Output and MAR. Duel skin assessment performed and patient turned. Sacrum blanchable and in tact- allyvn applied. Skin WDL.

## 2018-11-17 NOTE — PROGRESS NOTES
Assisting patient with urinating, Patient restless and agitated stating he \"needs to pee\". Patient began to urinate in bed soaking 2 bed pads. Patient began to complain he could not breathe, Patient repositioned sitting up in bed. Patient O2 sat checked, 70 on 3LNC. O2 titrated to 8L NC and patient instructed to take slow deep breaths through nose. Patient placed on a non reberather at 15L. CC Barbar Nissen called to bedside. RT called to bedside for suspected pulmonary edema. IVF stopped. Patient upper and middle lobes coarse, breath sounds very diminished in bases. ICU RN Jordan Wooten and ICU charge nurse Rosa Dockery called to assess patient. ICU charge Rosa Dockery spoke with MD Britton Saenz pulmonary on call MD narayanan. Received orders to get portable stat Chest x ray. Patient placed on Bipap by RT. Pt sats 97 on Bipap.  
 
1705 MD Cortez at bedside, will see what chest x ray shows. No other orders received, will continue to monitor. 1718 Portable chest xray performed, continuous o2 monitoring started on eagle, vitals signs cycling q15min.

## 2018-11-17 NOTE — PROGRESS NOTES
Called to pt room for respiratory distress. Found pt on NRB with sats of 88%. Crackles heard on ausculation.  Placed on BiPap of 14/10 at 50%, sats 100%, rr still elevated >35bpm

## 2018-11-17 NOTE — PROGRESS NOTES
[facility] OUTREACH NURSE PROGRESS REPORT SUBJECTIVE: Called to assess patient secondary to transfer from CCU. MEWS Score: 1 (11/17/18 1514) Vitals:  
 11/17/18 1425 11/17/18 1514 11/17/18 1615 11/17/18 1658 BP: 106/71 117/79 Pulse: (!) 101 92 Resp: 21 20 Temp: 98.7 °F (37.1 °C) 97.6 °F (36.4 °C) SpO2: 97% 99% 96% 100% Weight:      
Height:      
  
EKG: unchanged from previous tracings, RBBB. LAB DATA: 
 
Recent Labs 11/16/18 
0413 11/15/18 
1614 11/15/18 
1144  11/15/18 
0016  143 142   < > 142 K 3.8 4.0 4.1   < > 3.8 * 113* 112*   < > 112* CO2 22 23 22   < > 24 AGAP 7 7 8   < > 6*  
GLU 87 85 90   < > 114* BUN 19 19 19   < > 18 CREA 0.98 1.00 1.04   < > 0.75* GFRAA >60 >60 >60   < > >60 GFRNA >60 >60 >60   < > >60  
CA 8.0* 7.9* 8.1*   < > 7.4* MG  --  2.1 2.0  --  2.1  
 < > = values in this interval not displayed. Recent Labs 11/16/18 
9786 11/15/18 
1922 11/15/18 
0617 WBC 9.3 11.5* 9.3 HGB 10.3* 11.4* 11.7* HCT 32.6* 35.9* 36.8*  
* 137* 129* OBJECTIVE: On arrival to room, I found patient to be tachypnic. Pt with bilateral crackles, diminished in lower lobes. Pt alert, in slight distress. Pt tolerating Bipap well. PLAN:  Spoke with Dr. Felix Wright. Chest Xray ordered. Will keep pt on Bipap for a few hours then reevaluate. Will continue to follow per outreach protocol.

## 2018-11-17 NOTE — PROGRESS NOTES
Pt alert, responds to command and oriented to place and person. Pt currently on arctic sun-normothermia setting. VSS, pt denies pain. Lung sounds course bilaterally. Pedal and radial pulses palpable bilaterally. Skin is clear-sacrum in tact with no redness noted- allyvn applied. Pt in bed watching tv.

## 2018-11-17 NOTE — PROGRESS NOTES
[facility] CARDIOLOGY PROGRESS NOTE 
      
 
11/17/2018 10:19 AM 
 
Admit Date: 11/13/2018 Admit Diagnosis: Cardiac arrest ([facility] [address] Assessment:  
Principal Problem: 
  Cardiac arrest ([address] (11/13/2018) Active Problems: 
  Type II diabetes mellitus ([address] (2/4/2016) Acute respiratory failure with hypoxia ([address] (11/13/2018) Hypotension (11/13/2018) Systolic congestive heart failure ([address] (11/13/2018) Schizophrenia ([address] (11/13/2018) Acute renal failure ([facility] [address] (11/13/2018) Plan:  
66year old with cardiac arrest. There does appear to be any notable reversible causes to his VF arrest. As such, he will be an ICD candidate if it is perceived he has at least 1 year of lifespan. He does have a RBBB with a QRS > 150 msec which is a class IIb indication for a CRT. His EF is 10-20%. -Add back low dose GDMT. -Baseline diuretics. -Transition to oral amiodarone.  
-Consider ICD implant next week as patient improves. ICD therapy is not indicated in patients with significant psychiatric 
illnesses that may be aggravated by device implantation or 
that may preclude systematic follow-up. (Level of Evidence: C) Thank you for allowing me to participate in the electrophysiologic care of this most pleasant patient. Please feel free to contact me if there are any questions or concerns. Jordan Sandy. Geovany Segura MD, MS Clinical Cardiac Electrophysiology [facility] Cardiology Subjective: No complaints this AM, no chest pain or shortness of breath. Echo, EF 10-20%. Extubated 11/16. Interval History: (History of pertinent interval events obtained from nursing staff) ROS: 
GEN:  No fever or chills Cardiovascular:  As noted above Pulmonary:  As noted above Neuro:  No new focal motor or sensory loss Objective:  
 
Vitals:  
 11/17/18 4156 11/17/18 0732 11/17/18 0801 11/17/18 0901 BP:  111/75 108/74 117/73 Pulse:  99 (!) 101 100 Resp:    26 Temp:      
 SpO2: 93% 93% 91% 93% Weight:      
Height:      
 
 
Physical Exam: 
Renny Nails, Well Nourished, No Acute Distress, Alert & Oriented x 3, appropriate mood. Neck- supple, no JVD 
CV- regular rate and rhythm no MRG Lung- clear bilaterally Abd- soft, nontender, nondistended Ext- no edema bilaterally. Skin- warm and dry Current Facility-Administered Medications Medication Dose Route Frequency  carvedilol (COREG) tablet 3.125 mg  3.125 mg Oral BID WITH MEALS  lisinopril (PRINIVIL, ZESTRIL) tablet 2.5 mg  2.5 mg Oral DAILY  furosemide (LASIX) tablet 40 mg  40 mg Oral DAILY  amiodarone (CORDARONE) tablet 400 mg  400 mg Oral BID  polyvinyl alcohol (LIQUIFILM TEARS) 1.4 % ophthalmic solution 1 Drop  1 Drop Both Eyes PRN  
 levoFLOXacin (LEVAQUIN) 750 mg in D5W IVPB  750 mg IntraVENous Q24H  
 acetaminophen (TYLENOL) solution 650 mg  650 mg Per NG tube Q4H PRN  pantoprazole (PROTONIX) 40 mg in sodium chloride 0.9% 10 mL injection  40 mg IntraVENous DAILY  aspirin chewable tablet 81 mg  81 mg Oral DAILY  sodium chloride (NS) flush 5-10 mL  5-10 mL IntraVENous Q8H  
 sodium chloride (NS) flush 5-10 mL  5-10 mL IntraVENous PRN  
 0.9% sodium chloride infusion  100 mL/hr IntraVENous CONTINUOUS  
 fentaNYL citrate (PF) 2,500 mcg in 0.9% sodium chloride 100 mL infusion  0-200 mcg/hr IntraVENous TITRATE  enoxaparin (LOVENOX) injection 40 mg  40 mg SubCUTAneous Q24H  
 amiodarone (CORDARONE) 450 mg in dextrose 5% 250 mL infusion  0.5-1 mg/min IntraVENous CONTINUOUS  
 midazolam (VERSED) injection 2 mg  2 mg IntraVENous Q2H PRN  
 midazolam (VERSED) 100 mg in 0.9% sodium chloride 100 mL infusion  0-10 mg/hr IntraVENous TITRATE Data Review:  
Recent Results (from the past 24 hour(s)) POC G3 Collection Time: 11/16/18  3:13 PM  
Result Value Ref Range Device: VENT    
 FIO2 (POC) 30 % pH (POC) 7.399 7.35 - 7.45    
 pCO2 (POC) 29.4 (L) 35 - 45 MMHG pO2 (POC) 83 75 - 100 MMHG  
 HCO3 (POC) 18.2 (L) 22 - 26 MMOL/L  
 sO2 (POC) 96 95 - 98 % Base deficit (POC) 6 mmol/L Mode VENTILATOR/SPONTANEOUS/PRESSURE SUPPORT    
 PEEP/CPAP (POC) 8 cmH2O Pressure support 5 cmH2O Allens test (POC) YES Total resp. rate 15 Site RIGHT RADIAL Patient temp. 98.6 Specimen type (POC) ARTERIAL Performed by Glenys   
 CO2, POC 19 MMOL/L  
 
 
EKG:  (EKG has been independently visualized by me with interpretation below): NSR with RBBB.

## 2018-11-17 NOTE — PROGRESS NOTES
Melisa Cortez Admission Date: 11/13/2018 Daily Progress Note: 11/17/2018 The patient's chart is reviewed and the patient is discussed with the staff. Subjective:  
 
Called to reassess patient who moved to 3rd floor. Per nursing report desat to 60's. Placed on NRB and then on bipap. Currently on bipap, 40% FiO2 with sats %. Minimal crackles on exam. Pt without complaints. No spike in BP, no vomiting or other changes. Current Facility-Administered Medications Medication Dose Route Frequency  carvedilol (COREG) tablet 3.125 mg  3.125 mg Oral BID WITH MEALS  lisinopril (PRINIVIL, ZESTRIL) tablet 2.5 mg  2.5 mg Oral DAILY  furosemide (LASIX) tablet 40 mg  40 mg Oral DAILY  amiodarone (CORDARONE) tablet 400 mg  400 mg Oral BID  [START ON 11/18/2018] cloZAPine (CLOZARIL) tablet 200 mg (Patient Supplied)  200 mg Oral DAILY  cloZAPine (CLOZARIL) tablet 500 mg (Patient Supplied)  500 mg Oral QPM  
 polyvinyl alcohol (LIQUIFILM TEARS) 1.4 % ophthalmic solution 1 Drop  1 Drop Both Eyes PRN  
 acetaminophen (TYLENOL) solution 650 mg  650 mg Per NG tube Q4H PRN  pantoprazole (PROTONIX) 40 mg in sodium chloride 0.9% 10 mL injection  40 mg IntraVENous DAILY  aspirin chewable tablet 81 mg  81 mg Oral DAILY  sodium chloride (NS) flush 5-10 mL  5-10 mL IntraVENous Q8H  
 sodium chloride (NS) flush 5-10 mL  5-10 mL IntraVENous PRN  
 0.9% sodium chloride infusion  100 mL/hr IntraVENous CONTINUOUS  
 enoxaparin (LOVENOX) injection 40 mg  40 mg SubCUTAneous Q24H  
 amiodarone (CORDARONE) 450 mg in dextrose 5% 250 mL infusion  0.5-1 mg/min IntraVENous CONTINUOUS  
 midazolam (VERSED) injection 2 mg  2 mg IntraVENous Q2H PRN Review of Systems Constitutional: negative for fever, chills, sweats Cardiovascular: negative for chest pain, palpitations, syncope, edema Gastrointestinal: negative for dysphagia, reflux, vomiting, diarrhea, abdominal pain, or melena Neurologic: negative for focal weakness, numbness, headache Objective:  
 
Vitals:  
 11/17/18 1425 11/17/18 1514 11/17/18 1615 11/17/18 1658 BP: 106/71 117/79 Pulse: (!) 101 92 Resp: 21 20 Temp: 98.7 °F (37.1 °C) 97.6 °F (36.4 °C) SpO2: 97% 99% 96% 100% Weight:      
Height:      
 
Intake and Output:  
11/15 1901 - 11/17 0700 In: 4416.7 [I.V.:4416.7] Out: 2210 [[id number]] No intake/output data recorded. Physical Exam:  
Constitution:  the patient is well developed and in no acute distress EENMT:  Sclera clear, pupils equal, oral mucosa moist 
Respiratory: Minimal bibasilar crackles Cardiovascular:  RRR without M,G,R 
Gastrointestinal: soft and non-tender; with positive bowel sounds. Musculoskeletal: warm without cyanosis. There is no lower leg edema. Skin:  no jaundice or rashes, no wounds Neurologic: no gross neuro deficits Psychiatric:  alert and oriented x person CHEST XRAY:  
11/17/18 LAB No lab exists for component: [place] Recent Labs 11/16/18 
1249 11/15/18 
1922 11/15/18 
0617 11/14/18 2011 WBC 9.3 11.5* 9.3 6.5 HGB 10.3* 11.4* 11.7* 11.8* HCT 32.6* 35.9* 36.8* 37.6*  
* 137* 129* 133* Recent Labs 11/16/18 
0413 11/15/18 
1614 11/15/18 
1144  11/15/18 
0016  143 142   < > 142 K 3.8 4.0 4.1   < > 3.8 * 113* 112*   < > 112* CO2 22 23 22   < > 24 GLU 87 85 90   < > 114* BUN 19 19 19   < > 18 CREA 0.98 1.00 1.04   < > 0.75* MG  --  2.1 2.0  --  2.1 CA 8.0* 7.9* 8.1*   < > 7.4*  
 < > = values in this interval not displayed. No results for input(s): PH, PCO2, PO2, HCO3 in the last 72 hours. Assessment:  (Medical Decision Making) Hospital Problems  Date Reviewed: 11/16/2018 Codes Class Noted POA * (Principal) Cardiac arrest [facility]) ICD-10-CM: I46.9 ICD-9-CM: 427.5  11/13/2018 Yes  Acute respiratory failure with hypoxia (HCC) ICD-10-CM: J96.01 
 ICD-9-CM: 518.81  11/13/2018 Yes Hypotension ICD-10-CM: I95.9 ICD-9-CM: 458.9  11/13/2018 Yes Systolic congestive heart failure (HCC) ICD-10-CM: I50.20 ICD-9-CM: 428.20, 428.0  11/13/2018 Yes Schizophrenia ([facility] [address] (Chronic) ICD-10-CM: F20.9 ICD-9-CM: 295.90  11/13/2018 Yes Acute renal failure (HCC) ICD-10-CM: N17.9 ICD-9-CM: 584.9  11/13/2018 Yes Type II diabetes mellitus (HCC) (Chronic) ICD-10-CM: E11.9 ICD-9-CM: 250.00  2/4/2016 Yes Patient with reported sudden hypoxia. Had been satting well before on 3L. Satting well now on 40% bipap. Minimal crackles on exam. Possibility of pulmonary edema raised. With his EF of 20-86% certainly possible but would have expected a spike in BP to precede this or something to have caused it. Plan:  (Medical Decision Making)  
-awaiting stat CXR. If worsening pulmonary edema will give dose of IV lasix.  
-after CXR will take back off bipap and assess sats.   
 
 
Cherise Mar MD

## 2018-11-17 NOTE — PROGRESS NOTES
Problem: Dysphagia (Adult) Goal: *Speech Goal: (INSERT TEXT) STG: Patient will swallow regular textures and thin liquids using cued slow rate/single sips without overt signs or symptoms of respiratory compromise 100% of the time. LTG: Patient will consume least restrictive diet consistency without respiratory compromise by discharge. Speech language pathology: bedside swallow note: Initial Assessment NAME/AGE/GENDER: Vicenta Landry is a 66 y.o. male DATE: 11/17/2018 PRIMARY DIAGNOSIS: Cardiac arrest ([facility] [address] ICD-10: Treatment Diagnosis: Oropharyngeal dysphagia (R13.12) INTERDISCIPLINARY COLLABORATION: Registered Nurse PRECAUTIONS/ALLERGIES: Patient has no known allergies. ASSESSMENT:Based on the objective data described below, Mr. Eddi Randhawa presents with no overt signs or symptoms of aspiration with single cup or straw sips of thin liquids, puree, mixed consistency or cracker. Intermittently when patient takes serial swallows of thin liquids, his O2 sats drop into the 80s with occasional delayed cough. Patient requires mod cues to slow rate of intake. Recommend initiate regular diet and thin liquids by single bite/sip. Patient will require 1:1 supervision with all PO to ensure adherence to slow rate of intake. Give meds whole in puree. Patient should be fully upright for all PO and be fully awake/alert. Patient may benefit from further objective assessment of swallow via Modified Barium Swallow study. ???? ? ? This section established at most recent assessment???????? 
PROBLEM LIST (Impairments causing functional limitations): 1. dysphagia REHABILITATION POTENTIAL FOR STATED GOALS: Good PLAN OF CARE:  
Patient will benefit from skilled intervention to address the following impairments. RECOMMENDATIONS AND PLANNED INTERVENTIONS (Benefits and precautions of therapy have been discussed with the patient.): 
· PO:  Regular · Liquids:  regular thin MEDICATIONS: 
· Whole in puree COMPENSATORY STRATEGIES/MODIFICATIONS INCLUDING: 
· Small sips and bites · Upright for all PO 
· 1:1 supervision for all PO 
OTHER RECOMMENDATIONS (including follow up treatment recommendations): · Family training/education · Patient education RECOMMENDED DIET MODIFICATIONS DISCUSSED WITH: 
· Nursing · Family · Patient FREQUENCY/DURATION: Continue to follow patient 1 time for diet tolerance unless MBS warranted. RECOMMENDED REHABILITATION/EQUIPMENT: (at time of discharge pending progress): Due to the probability of continued deficits (see above) this patient will not likely need continued skilled speech therapy after discharge. SUBJECTIVE:  
Patient pleasant and but impulsive. Multiple family members in room. History of Present Injury/Illness: Mr. Hill Dickerson  has a past medical history of Diabetes ([facility] [address] Heart failure ([facility] [address] Hypertension, Psychiatric disorder, and Systolic CHF, acute on chronic ([facility] [address] . He also  has a past surgical history that includes hx appendectomy. Present Symptoms: recent extubation Pain Intensity 1: 0 Current Medications: No current facility-administered medications on file prior to encounter. Current Outpatient Medications on File Prior to Encounter Medication Sig Dispense Refill  lisinopril (PRINIVIL, ZESTRIL) 10 mg tablet Take 10 mg by mouth daily.  potassium chloride (K-DUR, KLOR-CON) 20 mEq tablet Take 20 mEq by mouth daily.  furosemide (LASIX) 40 mg tablet Take 40 mg by mouth daily.  scopolamine (TRANSDERM-SCOP) 1 mg over 3 days pt3d 1 Patch by TransDERmal route every seventy-two (72) hours.  polyethylene glycol (MIRALAX) 17 gram/dose powder Take 17 g by mouth as needed.  simvastatin (ZOCOR) 40 mg tablet Take 40 mg by mouth nightly.  cloZAPine (CLOZARIL) 100 mg tablet Take 500 mg by mouth nightly. Indications: SUICIDAL BEHAVIOR IN SCHIZOPHRENIA  cloZAPine (CLOZARIL) 100 mg disintegrating tablet Take 200 mg by mouth daily. Indications: SUICIDAL BEHAVIOR IN SCHIZOPHRENIA  carvedilol (COREG) 6.25 mg tablet Take 3.125 mg by mouth two (2) times daily (with meals).  aspirin 81 mg chewable tablet Take 81 mg by mouth daily.  metFORMIN (GLUCOPHAGE) 500 mg tablet Take 500 mg by mouth nightly.  benztropine (COGENTIN) 2 mg tablet Take 2 mg by mouth nightly. Indications: EXTRAPYRAMIDAL DISEASE  loratadine (CLARITIN) 10 mg tablet Take 10 mg by mouth daily. Current Dietary Status:  NPO Social History/Home Situation:  
Home Environment: Private residence One/Two Story Residence: One story Living Alone: No 
Support Systems: /, Family member(s) Patient Expects to be Discharged to[de-identified] Private residence Current DME Used/Available at Home: None OBJECTIVE:  
Respiratory Status:  Nasal cannula  3 l/min CXR Results: IMPRESSION: 
1. Extubation. 2. Bilateral perihilar densities likely due to vascular congestion. Findings similar to prior exam. CT Results: none MRI Results: none Cognitive and Communication Status: 
Neurologic State: Alert Orientation Level: Oriented to person;Oriented to place; Disoriented to situation Cognition: Follows commands; Impulsive Perception: Appears intact Perseveration: No perseveration noted Safety/Judgement: Awareness of environment BEDSIDE SWALLOW EVALUATIONOral Assessment: 
Oral Assessment Labial: No impairment Dentition: Natural;Poor Lingual: No impairment P.O. Trials: 
Patient Position: upright in bed The patient was given the following:  
Consistency Presented: Thin liquid; Solid;Puree;Mechanical soft How Presented: Self-fed/presented;SLP-fed/presented;Cup/sip;Spoon;Straw;Successive swallows ORAL PHASE: 
Bolus Acceptance: No impairment Bolus Formation/Control: No impairment Propulsion: No impairment Oral Residue: None PHARYNGEAL PHASE: 
Initiation of Swallow: No impairment Laryngeal Elevation: Functional 
 Aspiration Signs/Symptoms: Decrease in O2 saturations;Delayed cough/throat clear Vocal Quality: No impairment Pharyngeal Phase Characteristics: No impairment, issues, or problems OTHER OBSERVATIONS: 
Rate/bite size: Impaired - impulsive Endurance: WNL Tool Used: Dysphagia Outcome and Severity Scale (PRETTY) Score Comments Normal Diet  [] 7 With no strategies or extra time needed Functional Swallow  [x] 6 May have mild oral or pharyngeal delay Mild Dysphagia 
  [] 5 Which may require one diet consistency restricted (those who demonstrate penetration which is entirely cleared on MBS would be included) Mild-Moderate Dysphagia  [] 4 With 1-2 diet consistencies restricted Moderate Dysphagia  [] 3 With 2 or more diet consistencies restricted Moderately Severe Dysphagia  [] 2 With partial PO strategies (trials with ST only) Severe Dysphagia  [] 1 With inability to tolerate any PO safely Score:  Initial: 6 Most Recent: X (Date: -- ) Interpretation of Tool: The Dysphagia Outcome and Severity Scale (PRETTY) is a simple, easy-to-use, 7-point scale developed to systematically rate the functional severity of dysphagia based on objective assessment and make recommendations for diet level, independence level, and type of nutrition. Score 7 6 5 4 3 2 1 Modifier CH CI CJ CK CL CM CN ? Swallowing:  
  - CURRENT STATUS: CI - 1%-19% impaired, limited or restricted  - GOAL STATUS:  CI - 1%-19% impaired, limited or restricted  - D/C STATUS:  ---------------To be determined--------------- Payor: CARE IMPROVEMENT PLUS / Plan[place] [employer] / Product Type: [employer] Medicare /  
 
TREATMENT:  
 (In addition to Assessment/Re-Assessment sessions the following treatments were rendered) Assessment/Reassessment only, no treatment provided today MODALITIES:  
  
  
  
  
  
  
  
  
  
  
    
  
  
  
  
  
  
  
  
   
 
 ORAL MOTOR  EXERCISES: 
  
  
  
  
  
  
  
  
  
  
  
  
  
  
  
  
  
  
  
  
  
  
  
  
  
  
    
  
  
  
  
  
  
  
  
  
  
  
  
  
  
  
  
  
  
  
  
  
  
  
  
  
   
 
LARYNGEAL / PHARYNGEAL EXERCISES: 
  
  
  
  
  
  
  
  
  
  
  
  
  
  
  
  
  
  
  
  
  
    
  
  
  
  
  
  
  
  
  
  
  
  
  
  
  
  
  
  
  
   
 
Safety: After treatment position/precautions: 
· RN notified · Family at bedside · Upright in Bed Treatment Assessment:  See initial assessment above. Patients response to todays treatment session was tolerated well with no medical complications. Progression/Medical Necessity:  
· Patient is expected to demonstrate progress in swallow strength, swallow timeliness, swallow function, diet tolerance and swallow safety to improve swallow safety and decrease aspiration risk. Compliance with Program/Exercises: Will assess as treatment progresses. Reason for Continuation of Services/Other Comments: 
· Patient continues to require skilled intervention due to medical complications. Recommendations/Intent for next treatment session: \"Treatment next visit will focus on diet tolerance\". Total Treatment Duration: 
Time In: 1300 Time Out: 1325 Deb Duron MA, CCC-SLP

## 2018-11-17 NOTE — PROGRESS NOTES
Carly Shown Admission Date: 11/13/2018 Daily Progress Note: 11/17/2018 The patient's chart is reviewed and the patient is discussed with the staff. 
 
66 y.o. CHARITO presents following a witnessed cardiac arrest. He has paranoid schizophrenia and goes to a day care center where he was today when he collapsed. Staff could not find a pulse, CPR was started and an AED placed. Rhythm was reportedly shockable so this was performed with return of circulation.  He was intubated by EMS and transported to the ER. Upon admission here, his pulse was again lost so he underwent another course of CPR and received 1 amp of epi and again ROSC was achieved. Was hypotensive, receiving IV fluids and started on Levophed.   
   Has known cardiomyopathy with ECHO 2016 - EF 20 - 25%.  He is followed by Dr. Maria Guadalupe Valderrama, is maintained on an ACE, beta blocker, statin, baby asa and daily Lasix. Reportedly he had a cardiac cath in the past that revealed no CAD. He is NIDDM and takes daily metformin and is followed by the [facility] for schizophrenia diagnosed as a teenager. He lives with his sister who helps with his medical therapy which he has been compliant with. She was interviewed today and she did not notice that he seemed in any way ill before she took him to  today. Admitted to ICU, continued on [place] protocol and cardiology consulted. Rewarmed and following commands. Subjective:  
 
Patient extubated yesterday. On NC O2. Laying in bed. Seems a bit anxious. Does not verbalize any complaints. Current Facility-Administered Medications Medication Dose Route Frequency  carvedilol (COREG) tablet 3.125 mg  3.125 mg Oral BID WITH MEALS  lisinopril (PRINIVIL, ZESTRIL) tablet 2.5 mg  2.5 mg Oral DAILY  furosemide (LASIX) tablet 40 mg  40 mg Oral DAILY  amiodarone (CORDARONE) tablet 400 mg  400 mg Oral BID  
  polyvinyl alcohol (LIQUIFILM TEARS) 1.4 % ophthalmic solution 1 Drop  1 Drop Both Eyes PRN  
 levoFLOXacin (LEVAQUIN) 750 mg in D5W IVPB  750 mg IntraVENous Q24H  
 acetaminophen (TYLENOL) solution 650 mg  650 mg Per NG tube Q4H PRN  pantoprazole (PROTONIX) 40 mg in sodium chloride 0.9% 10 mL injection  40 mg IntraVENous DAILY  aspirin chewable tablet 81 mg  81 mg Oral DAILY  sodium chloride (NS) flush 5-10 mL  5-10 mL IntraVENous Q8H  
 sodium chloride (NS) flush 5-10 mL  5-10 mL IntraVENous PRN  
 0.9% sodium chloride infusion  100 mL/hr IntraVENous CONTINUOUS  
 fentaNYL citrate (PF) 2,500 mcg in 0.9% sodium chloride 100 mL infusion  0-200 mcg/hr IntraVENous TITRATE  enoxaparin (LOVENOX) injection 40 mg  40 mg SubCUTAneous Q24H  
 amiodarone (CORDARONE) 450 mg in dextrose 5% 250 mL infusion  0.5-1 mg/min IntraVENous CONTINUOUS  
 midazolam (VERSED) injection 2 mg  2 mg IntraVENous Q2H PRN  
 midazolam (VERSED) 100 mg in 0.9% sodium chloride 100 mL infusion  0-10 mg/hr IntraVENous TITRATE Review of Systems Constitutional: negative for fever, chills, sweats Cardiovascular: negative for chest pain, palpitations, syncope, edema Gastrointestinal: negative for dysphagia, reflux, vomiting, diarrhea, abdominal pain, or melena Neurologic: negative for focal weakness, numbness, headache Objective:  
 
Vitals:  
 11/17/18 1201 11/17/18 1232 11/17/18 1302 11/17/18 1342 BP: 114/73 113/76 111/79 113/62 Pulse: 100 99 100 97 Resp: 25 Temp:      
SpO2: 92% (!) 85% 96% 95% Weight:      
Height:      
 
Intake and Output:  
11/15 1901 - 11/17 0700 In: 4416.7 [I.V.:4416.7] Out: 2210 [PVLWK:0722] No intake/output data recorded. Physical Exam:  
Constitution:  the patient is well developed and in no acute distress EENMT:  Sclera clear, pupils equal, oral mucosa moist 
Respiratory: CTA B, no w/r/r 
Cardiovascular:  RRR without M,G,R 
 Gastrointestinal: soft and non-tender; with positive bowel sounds. Musculoskeletal: warm without cyanosis. There is no lower leg edema. Skin:  no jaundice or rashes, no wounds Neurologic: no gross neuro deficits Psychiatric:  alert and oriented x person CHEST XRAY:  
11/17/18 1. Extubation. 
  
2. Bilateral perihilar densities likely due to vascular congestion. Findings 
similar to prior exam. 
 
 
LAB No lab exists for component: [place] Recent Labs 11/16/18 
0119 11/15/18 
1922 11/15/18 
0617 11/14/18 2011 WBC 9.3 11.5* 9.3 6.5 HGB 10.3* 11.4* 11.7* 11.8* HCT 32.6* 35.9* 36.8* 37.6*  
* 137* 129* 133* Recent Labs 11/16/18 
0413 11/15/18 
1614 11/15/18 
1144  11/15/18 
0016  143 142   < > 142 K 3.8 4.0 4.1   < > 3.8 * 113* 112*   < > 112* CO2 22 23 22   < > 24 GLU 87 85 90   < > 114* BUN 19 19 19   < > 18 CREA 0.98 1.00 1.04   < > 0.75* MG  --  2.1 2.0  --  2.1 CA 8.0* 7.9* 8.1*   < > 7.4*  
 < > = values in this interval not displayed. No results for input(s): PH, PCO2, PO2, HCO3 in the last 72 hours. Assessment:  (Medical Decision Making) Hospital Problems  Date Reviewed: 11/16/2018 Codes Class Noted POA * (Principal) Cardiac arrest [facility]) ICD-10-CM: I46.9 ICD-9-CM: 427.5  11/13/2018 Yes Acute respiratory failure with hypoxia ([facility] [address] ICD-10-CM: J96.01 
ICD-9-CM: 518.81  11/13/2018 Yes Hypotension ICD-10-CM: I95.9 ICD-9-CM: 458.9  11/13/2018 Yes Systolic congestive heart failure (HCC) ICD-10-CM: I50.20 ICD-9-CM: 428.20, 428.0  11/13/2018 Yes Schizophrenia ([facility] [address] (Chronic) ICD-10-CM: F20.9 ICD-9-CM: 295.90  11/13/2018 Yes Acute renal failure (HCC) ICD-10-CM: N17.9 ICD-9-CM: 584.9  11/13/2018 Yes Type II diabetes mellitus (HCC) (Chronic) ICD-10-CM: E11.9 ICD-9-CM: 250.00  2/4/2016 Yes Patient with cardiac arrest. Seen by cardiology and EP. Plan is for ICD. Schizophrenic, appears anxious. Plan:  (Medical Decision Making)  
-will cautious start back home clozapine. From literature review has a low risk of prolonging Qt. Will monitor on tele. -ok to transfer to tele. -on levaquin presumably for possible PNA coverage. WBC normal. Afebrile. Will d/c to minimize QTc prolongation.  
-consulting hospitalists to assume primary tomorrow.   
 
 
Gregg Valentino MD

## 2018-11-18 ENCOUNTER — APPOINTMENT (OUTPATIENT)
Dept: GENERAL RADIOLOGY | Age: 58
DRG: 226 | End: 2018-11-18
Attending: INTERNAL MEDICINE
Payer: MEDICARE

## 2018-11-18 LAB
ANION GAP SERPL CALC-SCNC: 10 MMOL/L (ref 7–16)
ATRIAL RATE: 101 BPM
ATRIAL RATE: 104 BPM
BNP SERPL-MCNC: 350 PG/ML
BUN SERPL-MCNC: 20 MG/DL (ref 6–23)
CALCIUM SERPL-MCNC: 8 MG/DL (ref 8.3–10.4)
CALCULATED P AXIS, ECG09: 16 DEGREES
CALCULATED P AXIS, ECG09: 19 DEGREES
CALCULATED R AXIS, ECG10: -59 DEGREES
CALCULATED R AXIS, ECG10: -67 DEGREES
CALCULATED T AXIS, ECG11: 100 DEGREES
CALCULATED T AXIS, ECG11: 102 DEGREES
CHLORIDE SERPL-SCNC: 110 MMOL/L (ref 98–107)
CO2 SERPL-SCNC: 24 MMOL/L (ref 21–32)
CREAT SERPL-MCNC: 1.17 MG/DL (ref 0.8–1.5)
DIAGNOSIS, 93000: NORMAL
DIAGNOSIS, 93000: NORMAL
ERYTHROCYTE [DISTWIDTH] IN BLOOD BY AUTOMATED COUNT: 15.8 %
GLUCOSE SERPL-MCNC: 104 MG/DL (ref 65–100)
HCT VFR BLD AUTO: 32.8 % (ref 41.1–50.3)
HGB BLD-MCNC: 11.2 G/DL (ref 13.6–17.2)
MAGNESIUM SERPL-MCNC: 1.8 MG/DL (ref 1.8–2.4)
MCH RBC QN AUTO: 24 PG (ref 26.1–32.9)
MCHC RBC AUTO-ENTMCNC: 34.1 G/DL (ref 31.4–35)
MCV RBC AUTO: 70.4 FL (ref 79.6–97.8)
NRBC # BLD: 0.04 K/UL (ref 0–0.2)
P-R INTERVAL, ECG05: 174 MS
P-R INTERVAL, ECG05: 190 MS
PLATELET # BLD AUTO: 153 K/UL (ref 150–450)
PMV BLD AUTO: 11.3 FL (ref 9.4–12.3)
POTASSIUM SERPL-SCNC: 2.8 MMOL/L (ref 3.5–5.1)
Q-T INTERVAL, ECG07: 398 MS
Q-T INTERVAL, ECG07: 426 MS
QRS DURATION, ECG06: 160 MS
QRS DURATION, ECG06: 178 MS
QTC CALCULATION (BEZET), ECG08: 516 MS
QTC CALCULATION (BEZET), ECG08: 560 MS
RBC # BLD AUTO: 4.66 M/UL (ref 4.23–5.6)
SODIUM SERPL-SCNC: 144 MMOL/L (ref 136–145)
VENTRICULAR RATE, ECG03: 101 BPM
VENTRICULAR RATE, ECG03: 104 BPM
WBC # BLD AUTO: 8.9 K/UL (ref 4.3–11.1)

## 2018-11-18 PROCEDURE — 74011250637 HC RX REV CODE- 250/637: Performed by: HOSPITALIST

## 2018-11-18 PROCEDURE — 83880 ASSAY OF NATRIURETIC PEPTIDE: CPT

## 2018-11-18 PROCEDURE — 74011250636 HC RX REV CODE- 250/636: Performed by: INTERNAL MEDICINE

## 2018-11-18 PROCEDURE — 74011250637 HC RX REV CODE- 250/637: Performed by: INTERNAL MEDICINE

## 2018-11-18 PROCEDURE — C9113 INJ PANTOPRAZOLE SODIUM, VIA: HCPCS | Performed by: INTERNAL MEDICINE

## 2018-11-18 PROCEDURE — 65660000000 HC RM CCU STEPDOWN

## 2018-11-18 PROCEDURE — 77010033678 HC OXYGEN DAILY

## 2018-11-18 PROCEDURE — 74011000302 HC RX REV CODE- 302: Performed by: NURSE PRACTITIONER

## 2018-11-18 PROCEDURE — 80048 BASIC METABOLIC PNL TOTAL CA: CPT

## 2018-11-18 PROCEDURE — 74011000250 HC RX REV CODE- 250: Performed by: INTERNAL MEDICINE

## 2018-11-18 PROCEDURE — 93005 ELECTROCARDIOGRAM TRACING: CPT | Performed by: INTERNAL MEDICINE

## 2018-11-18 PROCEDURE — 85027 COMPLETE CBC AUTOMATED: CPT

## 2018-11-18 PROCEDURE — 71045 X-RAY EXAM CHEST 1 VIEW: CPT

## 2018-11-18 PROCEDURE — 36592 COLLECT BLOOD FROM PICC: CPT

## 2018-11-18 PROCEDURE — 83735 ASSAY OF MAGNESIUM: CPT

## 2018-11-18 PROCEDURE — 86580 TB INTRADERMAL TEST: CPT | Performed by: NURSE PRACTITIONER

## 2018-11-18 PROCEDURE — 99232 SBSQ HOSP IP/OBS MODERATE 35: CPT | Performed by: INTERNAL MEDICINE

## 2018-11-18 RX ORDER — PANTOPRAZOLE SODIUM 40 MG/1
40 TABLET, DELAYED RELEASE ORAL
Status: DISCONTINUED | OUTPATIENT
Start: 2018-11-19 | End: 2018-11-30 | Stop reason: HOSPADM

## 2018-11-18 RX ORDER — POTASSIUM CHLORIDE 20 MEQ/1
40 TABLET, EXTENDED RELEASE ORAL
Status: COMPLETED | OUTPATIENT
Start: 2018-11-18 | End: 2018-11-18

## 2018-11-18 RX ORDER — POTASSIUM CHLORIDE 20 MEQ/1
40 TABLET, EXTENDED RELEASE ORAL DAILY
Status: DISCONTINUED | OUTPATIENT
Start: 2018-11-19 | End: 2018-11-30 | Stop reason: HOSPADM

## 2018-11-18 RX ORDER — POTASSIUM CHLORIDE 20 MEQ/1
40 TABLET, EXTENDED RELEASE ORAL
Status: DISCONTINUED | OUTPATIENT
Start: 2018-11-18 | End: 2018-11-18

## 2018-11-18 RX ORDER — POTASSIUM CHLORIDE 20 MEQ/1
40 TABLET, EXTENDED RELEASE ORAL EVERY 4 HOURS
Status: COMPLETED | OUTPATIENT
Start: 2018-11-18 | End: 2018-11-18

## 2018-11-18 RX ORDER — CARVEDILOL 6.25 MG/1
6.25 TABLET ORAL 2 TIMES DAILY WITH MEALS
Status: DISCONTINUED | OUTPATIENT
Start: 2018-11-18 | End: 2018-11-23

## 2018-11-18 RX ORDER — FUROSEMIDE 40 MG/1
40 TABLET ORAL 2 TIMES DAILY
Status: DISCONTINUED | OUTPATIENT
Start: 2018-11-18 | End: 2018-11-24

## 2018-11-18 RX ADMIN — Medication 10 ML: at 16:03

## 2018-11-18 RX ADMIN — CARVEDILOL 6.25 MG: 6.25 TABLET, FILM COATED ORAL at 17:34

## 2018-11-18 RX ADMIN — ENOXAPARIN SODIUM 40 MG: 40 INJECTION, SOLUTION INTRAVENOUS; SUBCUTANEOUS at 16:03

## 2018-11-18 RX ADMIN — LISINOPRIL 2.5 MG: 5 TABLET ORAL at 08:15

## 2018-11-18 RX ADMIN — POTASSIUM CHLORIDE 40 MEQ: 20 TABLET, EXTENDED RELEASE ORAL at 11:34

## 2018-11-18 RX ADMIN — ACETAMINOPHEN 650 MG: 325 SOLUTION ORAL at 17:34

## 2018-11-18 RX ADMIN — FUROSEMIDE 40 MG: 40 TABLET ORAL at 15:58

## 2018-11-18 RX ADMIN — CARVEDILOL 3.12 MG: 3.12 TABLET, FILM COATED ORAL at 08:15

## 2018-11-18 RX ADMIN — FUROSEMIDE 40 MG: 40 TABLET ORAL at 08:15

## 2018-11-18 RX ADMIN — CLOZAPINE 500 MG: 100 TABLET ORAL at 17:38

## 2018-11-18 RX ADMIN — SODIUM CHLORIDE 40 MG: 9 INJECTION, SOLUTION INTRAMUSCULAR; INTRAVENOUS; SUBCUTANEOUS at 08:14

## 2018-11-18 RX ADMIN — CLOZAPINE 200 MG: 100 TABLET ORAL at 08:15

## 2018-11-18 RX ADMIN — TUBERCULIN PURIFIED PROTEIN DERIVATIVE 5 UNITS: 5 INJECTION, SOLUTION INTRADERMAL at 22:05

## 2018-11-18 RX ADMIN — AMIODARONE HYDROCHLORIDE 400 MG: 200 TABLET ORAL at 22:01

## 2018-11-18 RX ADMIN — AMIODARONE HYDROCHLORIDE 400 MG: 200 TABLET ORAL at 08:15

## 2018-11-18 RX ADMIN — ASPIRIN 81 MG 81 MG: 81 TABLET ORAL at 08:14

## 2018-11-18 RX ADMIN — Medication 10 ML: at 22:12

## 2018-11-18 RX ADMIN — POTASSIUM CHLORIDE 40 MEQ: 20 TABLET, EXTENDED RELEASE ORAL at 08:14

## 2018-11-18 RX ADMIN — POTASSIUM CHLORIDE 40 MEQ: 20 TABLET, EXTENDED RELEASE ORAL at 15:58

## 2018-11-18 NOTE — PROGRESS NOTES
PM labs drawn from patient central line and sent to lab, Lab called 2 times to verify they had orders to run a CBC and troponin,  verified blood received and results pending.

## 2018-11-18 NOTE — PROGRESS NOTES
Bedside and Verbal shift change report given to Jhoana Silva. Report included the following information SBAR, Kardex, MAR, Accordion and Recent Results.

## 2018-11-18 NOTE — CONSULTS
Hospitalist Consult Note      Patient: Melisa Cortez               Sex: male             MRN: [id number]      YOB: 1960      Age:  66 y.o. Reason for Consult: Medical Management and Care transfer to Hospitalist service    HPI     This is a 56-year-old male with a past medical history of CHF with EF of 20-25%, non-insulin diabetes mellitus, schizophrenia admitted to the ICU on 11/13/2018 after he had a cardiac arrest at the [facility]. Patient had a witnessed collapse in the staff initiated CPR, right ear was placed which showed shockable rhythm which was performed and there was a spontaneous return of circulation, patient had another cardiac arrest in the ER with ROS C. Patient was admitted to the ICU he was placed on hypothermia protocol, patient is currently awake and following commands. An echocardiogram showed EF of 10-20%, patient was briefly sent over to cardiac telemetry floor where he went into acute flash pulmonary edema and he was placed on a BiPAP and currently weaned back to nasal cannula. Hospitalist service was consulted for medical management and taking over the care from ICU service. Patient was seen this morning, he is awake, not in any acute distress, he is off amiodarone drip on 3 L of oxygen    Review of Systems  Comprehensive 10 point ROS is done, and pertinent positives & negatives per HPI, rest of them are negative.     Past Medical History:   Diagnosis Date    Diabetes ([address]     Heart failure ([address]     Hypertension     Psychiatric disorder     paranoid schziphrenia    Systolic CHF, acute on chronic ([address] 2/4/2016       Past Surgical History:   Procedure Laterality Date    HX APPENDECTOMY         Family History   Problem Relation Age of Onset    Heart Failure Mother     Heart Attack Father     Diabetes Sister     Hypertension Sister     Heart Disease Sister         valvular    No Known Problems Brother     No Known Problems Brother     Diabetes Sister     Diabetes Sister     Diabetes Sister     No Known Problems Sister     No Known Problems Sister     Lung Cancer Brother     Heart Attack Brother        Social History     Socioeconomic History    Marital status: SINGLE     Spouse name: Not on file    Number of children: Not on file    Years of education: Not on file    Highest education level: Not on file   Social Needs    Financial resource strain: Not on file    Food insecurity - worry: Not on file    Food insecurity - inability: Not on file   [employer] needs - medical: Not on file   [employer] needs - non-medical: Not on file   Occupational History    Occupation: disabled   Tobacco Use    Smoking status: Former Smoker     Packs/day: 1.00     Years: 35.00     Pack years: 35.00    Tobacco comment: quit around 2015    Substance and Sexual Activity    Alcohol use: No    Drug use: No    Sexual activity: No   Other Topics Concern    Not on file   Social History Narrative    Lives with sister       No Known Allergies    Prior to Admission medications    Medication Sig Start Date End Date Taking? Authorizing Provider   lisinopril (PRINIVIL, ZESTRIL) 10 mg tablet Take 10 mg by mouth daily. Yes Provider, Historical   potassium chloride (K-DUR, KLOR-CON) 20 mEq tablet Take 20 mEq by mouth daily. Yes Provider, Historical   furosemide (LASIX) 40 mg tablet Take 40 mg by mouth daily. Yes Provider, Historical   scopolamine (TRANSDERM-SCOP) 1 mg over 3 days pt3d 1 Patch by TransDERmal route every seventy-two (72) hours. Yes Provider, Historical   polyethylene glycol (MIRALAX) 17 gram/dose powder Take 17 g by mouth as needed. Yes Provider, Historical   simvastatin (ZOCOR) 40 mg tablet Take 40 mg by mouth nightly. Yes Provider, Historical   cloZAPine (CLOZARIL) 100 mg tablet Take 500 mg by mouth nightly.  Indications: SUICIDAL BEHAVIOR IN SCHIZOPHRENIA   Yes Provider, Historical   cloZAPine (CLOZARIL) 100 mg disintegrating tablet Take 200 mg by mouth daily. Indications: SUICIDAL BEHAVIOR IN SCHIZOPHRENIA   Yes Leroy Aparicio MD   carvedilol (COREG) 6.25 mg tablet Take 3.125 mg by mouth two (2) times daily (with meals). Yes Leroy Aparicio MD   aspirin 81 mg chewable tablet Take 81 mg by mouth daily. Yes Leroy Aparicio MD   metFORMIN (GLUCOPHAGE) 500 mg tablet Take 500 mg by mouth nightly. Provider, Kev   benztropine (COGENTIN) 2 mg tablet Take 2 mg by mouth nightly. Indications: EXTRAPYRAMIDAL DISEASE    Leroy Aparicio MD   loratadine (CLARITIN) 10 mg tablet Take 10 mg by mouth daily. Leroy Aparicio MD         Physical Exam     Visit Vitals  /60 (BP 1 Location: Right arm, BP Patient Position: At rest)   Pulse 96   Temp 98.3 °F (36.8 °C)   Resp 20   Ht 5' 10\" (1.778 m)   Wt 82.7 kg (182 lb 4.8 oz)   SpO2 100%   BMI 26.16 kg/m²      Temp (24hrs), Av.2 °F (36.8 °C), Min:97.3 °F (36.3 °C), Max:99.4 °F (37.4 °C)    Oxygen Therapy  O2 Sat (%): 100 % (18 1341)  Pulse via Oximetry: 99 beats per minute (18 0731)  O2 Device: Nasal cannula (18 1140)  O2 Flow Rate (L/min): 3 l/min (18 1140)  FIO2 (%): 30 % (18)    Intake/Output Summary (Last 24 hours) at 2018 1507  Last data filed at 2018 1357  Gross per 24 hour   Intake 750 ml   Output 2425 ml   Net -1675 ml          General:- Conscious, No acute distress   Eyes:- No pallor/icterus    HENT- Oral Mucosa is Moist,   Neck:- Supple, No JVD  Lungs- CTA Bilaterally, No significant wheezing  Heart:- S1 S2 regular  Abdomen:- Soft, Positive bowel sounds, NTND, No guarding/rigidity/rebound tend.   Extremities:-No pedal edema  Neurologic: - AOX3, No acute FND,  Skin: - No acute rashes  Musculoskeletal: No Acute findings  Psych: - Appropriate mood    LAB  Recent Results (from the past 24 hour(s))   TROPONIN I    Collection Time: 18  5:45 PM   Result Value Ref Range    Troponin-I, Qt. 0.04 0.02 - 0.05 NG/ML   CBC W/O DIFF Collection Time: 11/18/18  4:57 AM   Result Value Ref Range    WBC 8.9 4.3 - 11.1 K/uL    RBC 4.66 4.23 - 5.6 M/uL    HGB 11.2 (L) 13.6 - 17.2 g/dL    HCT 32.8 (L) 41.1 - 50.3 %    MCV 70.4 (L) 79.6 - 97.8 FL    MCH 24.0 (L) 26.1 - 32.9 PG    MCHC 34.1 31.4 - 35.0 g/dL    RDW 15.8 %    PLATELET 714 666 - 737 K/uL    MPV 11.3 9.4 - 12.3 FL    ABSOLUTE NRBC 0.04 0.0 - 0.2 K/uL   METABOLIC PANEL, BASIC    Collection Time: 11/18/18  4:57 AM   Result Value Ref Range    Sodium 144 136 - 145 mmol/L    Potassium 2.8 (LL) 3.5 - 5.1 mmol/L    Chloride 110 (H) 98 - 107 mmol/L    CO2 24 21 - 32 mmol/L    Anion gap 10 7 - 16 mmol/L    Glucose 104 (H) 65 - 100 mg/dL    BUN 20 6 - 23 MG/DL    Creatinine 1.17 0.8 - 1.5 MG/DL    GFR est AA >60 >60 ml/min/1.73m2    GFR est non-AA >60 >60 ml/min/1.73m2    Calcium 8.0 (L) 8.3 - 10.4 MG/DL   MAGNESIUM    Collection Time: 11/18/18  4:57 AM   Result Value Ref Range    Magnesium 1.8 1.8 - 2.4 mg/dL   BNP    Collection Time: 11/18/18  4:57 AM   Result Value Ref Range     pg/mL   EKG, 12 LEAD, SUBSEQUENT    Collection Time: 11/18/18  7:43 AM   Result Value Ref Range    Ventricular Rate 101 BPM    Atrial Rate 101 BPM    P-R Interval 174 ms    QRS Duration 178 ms    Q-T Interval 398 ms    QTC Calculation (Bezet) 516 ms    Calculated P Axis 19 degrees    Calculated R Axis -67 degrees    Calculated T Axis 100 degrees    Diagnosis       Sinus tachycardia  Left atrial enlargement  Left axis deviation  Non-specific intra-ventricular conduction block  Inferior infarct (cited on or before 14-NOV-2018)  Cannot rule out Anterior infarct , age undetermined  T wave abnormality, consider lateral ischemia  Abnormal ECG  When compared with ECG of 17-NOV-2018 10:26,  Minimal criteria for Anterior infarct are now Present  Serial changes of Inferior infarct Present  Confirmed by Orysia Lesch MD (), KAITLIN SANTOS ([id number]) on 11/18/2018 9:44:52 AM         IMAGING:     Xr Chest Sngl V    Result Date: 11/17/2018  IMPRESSION: Flash pulmonary edema suspected. Xr Chest Sngl V    Result Date: 11/13/2018  IMPRESSION: Endotracheal tube advancement with tip overlying the tracheal air column at the level of the clavicular heads. Unchanged positioning of support lines and tubes. Unchanged bibasilar atelectasis and pulmonary venous vascular congestion. VOICE DICTATED BY: Dr. Waylon Boyer    Xr Chest Sngl V    Result Date: 11/13/2018  IMPRESSION: ET tube tip well above the clavicles. It could be advanced 5 to 6 cm. NG tube tip could also be advanced 5 to 10 cm. There is only mild central pulmonary vascular prominence. No pneumothorax status post left-sided subclavian central venous catheter placement. Xr Abd (kub)    Result Date: 11/13/2018  IMPRESSION: Enteric tube tip overlies the gastric body, advancing from the prior abdominal x-ray. VOICE DICTATED BY: Dr. Waylon Boyer    Xr Abd (kub)    Result Date: 11/13/2018  IMPRESSION: Injury tube tip is in proximal stomach. The sidehole is near the GE junction. Advancing 5 to 10 cm recommended. Moderate amount of intestinal gas. Xr Chest Port    Result Date: 11/18/2018  IMPRESSION: Vascular congestion. No significant change. Xr Chest Port    Result Date: 11/17/2018  IMPRESSION: 1. Extubation. 2. Bilateral perihilar densities likely due to vascular congestion. Findings similar to prior exam.    Xr Chest Port    Result Date: 11/16/2018  IMPRESSION:  Unchanged left lung base atelectasis or infiltrate.        All Micro Results     Procedure Component Value Units Date/Time    CULTURE, BLOOD [[id number]] Collected:  11/15/18 1238    Order Status:  Completed Specimen:  Blood Updated:  11/18/18 0656     Special Requests: --        RIGHT  ARM       Culture result: NO GROWTH 3 DAYS       CULTURE, BLOOD [[id number]] Collected:  11/15/18 1247    Order Status:  Completed Specimen:  Blood Updated:  11/18/18 0656     Special Requests: --        LEFT  HAND       Culture result: NO GROWTH 3 DAYS       CULTURE, RESPIRATORY/SPUTUM/BRONCH Versie Safe [[id number]] Collected:  11/15/18 1207    Order Status:  Completed Specimen:  Sputum,ET Suction Updated:  11/17/18 0908     Special Requests: NO SPECIAL REQUESTS        GRAM STAIN MANY GRAM POSITIVE COCCI         FEW GRAM NEGATIVE RODS         50 TO 79 WBC'S SEEN PER OIF      NO EPITHELIAL CELLS SEEN         3+ MUCUS PRESENT        Culture result:       HEAVY NORMAL RESPIRATORY MARYELLEN                  CULTURE IN PROGRESS,FURTHER UPDATES TO FOLLOW                    Assessment/Plan     Principal Problem:    Cardiac arrest ([facility] [address] (11/13/2018)    Active Problems:    Type II diabetes mellitus ([facility] [address] (2/4/2016)      Acute respiratory failure with hypoxia ([facility]) (11/13/2018)      Hypotension (39/00/4962)      Systolic congestive heart failure ([facility] [address] (11/13/2018)      Schizophrenia ([facility] [address] (11/13/2018)      Acute renal failure ([facility] [address] (11/13/2018)    #Cardiac arrest with V. fib arrest  #Acute hypoxic respiratory failure secondary to flash pulmonary edema  #History of chronic systolic congestive heart failure  #Hypotension  #Acute renal failure  #Schizophrenia  #Type 2 diabetes mellitus    Plan  Patient oxygenation is significantly improved now, on 3 L of oxygen,   currently on Lasix 40 mg once daily, volume status seems to be stable  Continue with the oral amiodarone, on Coreg and lisinopril, dosage is currently adjusted  Hypokalemia-repleted aggressively we will follow-up repeat labs in a.m.   Patient's fingerstick glucose has been stable on metformin at home which is a currently on hold  As per cardiology patient needs an ICD which is planned for next week  Continue with close for his schizophrenia which is his home medication  DVT prophylaxis with Lovenox  GI prophylaxis with the Protonix        High risk patient     Monisha Askew MD  November 18, 2018

## 2018-11-18 NOTE — PROGRESS NOTES
Called and spoke with ICU Charge Nurse, Raysa Nash about pt situation. RT Melvin Galicia at bedside currently.

## 2018-11-18 NOTE — PROGRESS NOTES
Bedside and Verbal shift change report given to Many Major RN (oncoming nurse) by self (offgoing nurse). Report included the following information SBAR, Kardex and MAR. Patient resting in bed, Bipap mask in  Place, continuous O2 being monitored, vital signs cycling q15 minutes on eagle. Bed alarm on. Patient linen changed and repositioned with oncoming RN. No distress. Amio gtt visualized with oncoming RN, filter in place. Oncoming RN assuming care.

## 2018-11-18 NOTE — PROGRESS NOTES
Verbal bedside report given to Rafa Dougherty, oncoming RN. Patient's situation, background, assessment and recommendations provided. Opportunity for questions provided. Oncoming RN assumed care of patient.

## 2018-11-18 NOTE — PROGRESS NOTES
Bedside report received from [facility]. Pt resting in bed. Denies complaints. WIll monitor.

## 2018-11-18 NOTE — PROGRESS NOTES
Elsy Blunt Admission Date: 11/13/2018 Daily Progress Note: 11/18/2018 The patient's chart is reviewed and the patient is discussed with the staff. 
 
66 y.o. CHARITO presents following a witnessed cardiac arrest. He has paranoid schizophrenia and goes to a day care center where he was when he collapsed. Staff could not find a pulse, CPR was started and an AED placed. Rhythm was reportedly shockable so this was performed with return of circulation.  He was intubated by EMS and transported to the ER. Upon admission here, his pulse was again lost so he underwent another course of CPR and received 1 amp of epi and again ROSC was achieved. Was hypotensive, receiving IV fluids and started on Levophed.   
   Has known cardiomyopathy with ECHO 2016 - EF 20 - 25%.  He is followed by Dr. Arnu Bro, is maintained on an ACE, beta blocker, statin, baby asa and daily Lasix. Reportedly he had a cardiac cath in the past that revealed no CAD. He is NIDDM and takes daily metformin and is followed by the [facility] for schizophrenia diagnosed as a teenager. He lives with his sister who helps with his medical therapy which he has been compliant with. She was interviewed today and she did not notice that he seemed in any way ill before she took him to  today. Admitted to ICU, continued on [facility] protocol and cardiology consulted.  Rewarmed and following commands. Follow up TTE showing EF of 10-20%. Transferred to [facility] and developed sudden hypoxia with sats in the 62s. CXR consistent with flash pulmonary edema - no spike in BP to precipitate - was placed on BIPAP. Now weaned back to [place]. Subjective:  
 
Currently on O2 at 3 lpm with o2 sat 100%. minimal cough. Current Facility-Administered Medications Medication Dose Route Frequency  carvedilol (COREG) tablet 3.125 mg  3.125 mg Oral BID WITH MEALS  
  lisinopril (PRINIVIL, ZESTRIL) tablet 2.5 mg  2.5 mg Oral DAILY  furosemide (LASIX) tablet 40 mg  40 mg Oral DAILY  amiodarone (CORDARONE) tablet 400 mg  400 mg Oral BID  cloZAPine (CLOZARIL) tablet 200 mg (Patient Supplied)  200 mg Oral DAILY  cloZAPine (CLOZARIL) tablet 500 mg (Patient Supplied)  500 mg Oral QPM  
 polyvinyl alcohol (LIQUIFILM TEARS) 1.4 % ophthalmic solution 1 Drop  1 Drop Both Eyes PRN  
 acetaminophen (TYLENOL) solution 650 mg  650 mg Per NG tube Q4H PRN  pantoprazole (PROTONIX) 40 mg in sodium chloride 0.9% 10 mL injection  40 mg IntraVENous DAILY  aspirin chewable tablet 81 mg  81 mg Oral DAILY  sodium chloride (NS) flush 5-10 mL  5-10 mL IntraVENous Q8H  
 sodium chloride (NS) flush 5-10 mL  5-10 mL IntraVENous PRN  
 enoxaparin (LOVENOX) injection 40 mg  40 mg SubCUTAneous Q24H  
 amiodarone (CORDARONE) 450 mg in dextrose 5% 250 mL infusion  0.5-1 mg/min IntraVENous CONTINUOUS  
 midazolam (VERSED) injection 2 mg  2 mg IntraVENous Q2H PRN Review of Systems Constitutional: negative for fever, chills, sweats Cardiovascular: negative for chest pain, palpitations, syncope, edema Gastrointestinal:  negative for dysphagia, reflux, vomiting, diarrhea, abdominal pain, or melena Neurologic:  negative for focal weakness, numbness, headache Objective:  
 
Vitals:  
 11/17/18 2218 11/18/18 0100 11/18/18 0426 11/18/18 9502 BP: (!) 89/62 97/65 96/63 Pulse: (!) 106 (!) 105 (!) 102 Resp:  22 20 Temp:  98.3 °F (36.8 °C) 98.3 °F (36.8 °C) SpO2:  100% 100% Weight:    182 lb 4.8 oz (82.7 kg) Height:      
 
Intake and Output:  
11/16 0701 - 11/17 1900 In: 3108.4 [I.V.:3108.4] Out: 2400 [Urine:2050] 11/17 1901 - 11/18 0700 In: -  
Out: [place] Physical Exam:  
Constitution:  the patient is well developed and in no acute distress EENMT:  Sclera clear, pupils equal, oral mucosa moist 
 Respiratory: diminished but CTA bilaterally, o2 at 3 lpm 
Cardiovascular:  RRR without M,G,R 
Gastrointestinal: soft and non-tender; with positive bowel sounds. Musculoskeletal: warm without cyanosis. There is no lower leg edema. Skin:  no jaundice or rashes, no open wounds Neurologic: no gross neuro deficits Psychiatric:  alert and oriented x 3 CXR:  
11/18 LAB Recent Labs 11/16/18 
0415 11/16/18 
0009 11/15/18 
1927 11/15/18 
1149 GLUCPOC 84 81 84 84 Recent Labs 11/18/18 
0457 11/16/18 
1746 11/16/18 
0609 11/15/18 
1922 WBC 8.9 11.0 9.3 11.5* HGB 11.2* 11.8* 10.3* 11.4* HCT 32.8* 35.9* 32.6* 35.9*  
 136* 127* 137* Recent Labs 11/18/18 
0457 11/17/18 
1745 11/16/18 
0413 11/15/18 
1614 11/15/18 
1144   --  142 143 142  
K 2.8*  --  3.8 4.0 4.1 *  --  113* 113* 112* CO2 24  --  22 23 22 *  --  87 85 90 BUN 20  --  19 19 19 CREA 1.17  --  0.98 1.00 1.04  
MG 1.8  --   --  2.1 2.0  
CA 8.0*  --  8.0* 7.9* 8.1*  
TROIQ  --  0.04  --   --   --   
 
Recent Labs 11/16/18 
1513 11/16/18 
8362 PHI 7.399 7.388 PCO2I 29.4* 31.2*  
PO2I 83 72* HCO3I 18.2* 18.8* No results for input(s): LCAD, LAC in the last 72 hours. Assessment:  (Medical Decision Making) Hospital Problems  Date Reviewed: 11/18/2018 Codes Class Noted POA * (Principal) Cardiac arrest [facility]) ICD-10-CM: I46.9 ICD-9-CM: 427.5  11/13/2018 Yes EF 10-20% Acute respiratory failure with hypoxia ([facility] [address] ICD-10-CM: J96.01 
ICD-9-CM: 518.81  11/13/2018 Yes Currently on o2 at 3 lpm with O2 sats 100% Flash pulmonary edema yesterday requiring brief period of BIPAP Hypotension ICD-10-CM: I95.9 ICD-9-CM: 458.9  11/13/2018 Yes Systolic congestive heart failure (HCC) ICD-10-CM: I50.20 ICD-9-CM: 428.20, 428.0  11/13/2018 Yes Schizophrenia [facility] (Chronic) ICD-10-CM: F20.9 ICD-9-CM: 295.90  11/13/2018 Yes Acute renal failure (HCC) ICD-10-CM: N17.9 ICD-9-CM: 584.9  11/13/2018 Yes Creat 1.17 Type II diabetes mellitus (HCC) (Chronic) ICD-10-CM: E11.9 ICD-9-CM: 250.00  2/4/2016 Yes Plan:  (Medical Decision Making) --continue to wean O2 as tolerated 
--lasix 40 mg po daily 1.7 liter diuresis yesterday  
--K+ being supplemented 
--amio / BB for HR 
--hospitalists to assume care today More than 50% of the time documented was spent in face-to-face contact with the patient and in the care of the patient on the floor/unit where the patient is located. Felton Cortes NP Lungs:  Mild rales, otherwise clear on [facility] Heart:  RRR with no Murmur/Rubs/Gallops Additional Comments:  Continue to wean O2, off BIPAP. Will continue diuresis. If continues to diurese, improve O2 requirement and does not require BIPAP will likely sign off tomorrow. I have spoken with and examined the patient. I agree with the above assessment and plan as documented.  
 
Jennifer Velasquez MD

## 2018-11-18 NOTE — PROGRESS NOTES
Dual skin assessment completed with oncoming RN Carlota Montenegro. Sacrum and heels intact. Small scabs noted on patient's neck. No other impairment noted. Prevalon boots in place, allevin intact.

## 2018-11-18 NOTE — PROGRESS NOTES
[facility] CARDIOLOGY PROGRESS NOTE 
      
 
11/18/2018 10:19 AM 
 
Admit Date: 11/13/2018 Admit Diagnosis: Cardiac arrest ([facility] Assessment:  
Principal Problem: 
  Cardiac arrest ([address] (11/13/2018) Active Problems: 
  Type II diabetes mellitus ([address] (2/4/2016) Acute respiratory failure with hypoxia ([facility] [address] (11/13/2018) Hypotension (11/13/2018) Systolic congestive heart failure ([facility] [address] (11/13/2018) Schizophrenia ([address] (11/13/2018) Acute renal failure ([facility] [address] (11/13/2018) Plan:  
66year old with cardiac arrest. There does appear to be any notable reversible causes to his VF arrest. As such, he will be an ICD candidate if it is perceived he has at least 1 year of lifespan. He does have a RBBB with a QRS > 150 msec which is a class IIb indication for a CRT. His EF is 10-20%. -Continue GDMT with coreg and lisinopril. Given mild tachycardia, will titrate coreg to 6.25 mg po BID 
-Baseline diuretics with goal net negative of 1-1.5 L daily.  
-HypoK - replete today. -Transition to oral amiodarone.  
-Consider ICD implant next week as patient improves. ICD therapy is not indicated in patients with significant psychiatric 
illnesses that may be aggravated by device implantation or 
that may preclude systematic follow-up. (Level of Evidence: C) Thank you for allowing me to participate in the electrophysiologic care of this most pleasant patient. Please feel free to contact me if there are any questions or concerns. Abi Del Real. Gisselle Diaz MD, MS Clinical Cardiac Electrophysiology [facility] Cardiology Subjective: No complaints this AM, no chest pain or shortness of breath. Echo, EF 10-20%. Extubated 11/16. Flash pulmonary edema last night s/p BiPAP and diuresis. Improved this AM. Interval History: (History of pertinent interval events obtained from nursing staff) ROS: 
GEN:  No fever or chills Cardiovascular:  As noted above Pulmonary:  As noted above Neuro:  No new focal motor or sensory loss Objective:  
 
Vitals:  
 11/18/18 1807 11/18/18 0731 11/18/18 5587 11/18/18 0920 BP:   105/60 105/60 Pulse:   100 100 Resp:    22 Temp:    97.3 °F (36.3 °C) SpO2: 100% 100%  100% Weight:      
Height:      
 
 
Physical Exam: 
[place], Well Nourished, No Acute Distress, Alert & Oriented x 3, appropriate mood. Neck- supple, JVP to 8-10 cm H20.  
CV- regular rate and rhythm no MRG, mild tachycardia. Lung- Rales bilaterally Abd- soft, nontender, nondistended Ext- trace edema Skin- warm and dry Current Facility-Administered Medications Medication Dose Route Frequency  potassium chloride (K-DUR, KLOR-CON) SR tablet 40 mEq  40 mEq Oral Q4H  
 [START ON 11/19/2018] potassium chloride (K-DUR, KLOR-CON) SR tablet 40 mEq  40 mEq Oral DAILY  furosemide (LASIX) tablet 40 mg  40 mg Oral BID  carvedilol (COREG) tablet 6.25 mg  6.25 mg Oral BID WITH MEALS  lisinopril (PRINIVIL, ZESTRIL) tablet 2.5 mg  2.5 mg Oral DAILY  amiodarone (CORDARONE) tablet 400 mg  400 mg Oral BID  cloZAPine (CLOZARIL) tablet 200 mg (Patient Supplied)  200 mg Oral DAILY  cloZAPine (CLOZARIL) tablet 500 mg (Patient Supplied)  500 mg Oral QPM  
 polyvinyl alcohol (LIQUIFILM TEARS) 1.4 % ophthalmic solution 1 Drop  1 Drop Both Eyes PRN  
 acetaminophen (TYLENOL) solution 650 mg  650 mg Per NG tube Q4H PRN  pantoprazole (PROTONIX) 40 mg in sodium chloride 0.9% 10 mL injection  40 mg IntraVENous DAILY  aspirin chewable tablet 81 mg  81 mg Oral DAILY  sodium chloride (NS) flush 5-10 mL  5-10 mL IntraVENous Q8H  
 sodium chloride (NS) flush 5-10 mL  5-10 mL IntraVENous PRN  
 enoxaparin (LOVENOX) injection 40 mg  40 mg SubCUTAneous Q24H  
 amiodarone (CORDARONE) 450 mg in dextrose 5% 250 mL infusion  0.5-1 mg/min IntraVENous CONTINUOUS  
 midazolam (VERSED) injection 2 mg  2 mg IntraVENous Q2H PRN  
 
 
 Data Review:  
Recent Results (from the past 24 hour(s)) TROPONIN I Collection Time: 11/17/18  5:45 PM  
Result Value Ref Range Troponin-I, Qt. 0.04 0.02 - 0.05 NG/ML  
CBC W/O DIFF Collection Time: 11/18/18  4:57 AM  
Result Value Ref Range WBC 8.9 4.3 - 11.1 K/uL  
 RBC 4.66 4.23 - 5.6 M/uL  
 HGB 11.2 (L) 13.6 - 17.2 g/dL HCT 32.8 (L) 41.1 - 50.3 % MCV 70.4 (L) 79.6 - 97.8 FL  
 MCH 24.0 (L) 26.1 - 32.9 PG  
 MCHC 34.1 31.4 - 35.0 g/dL  
 RDW 15.8 % PLATELET 644 841 - 018 K/uL MPV 11.3 9.4 - 12.3 FL ABSOLUTE NRBC 0.04 0.0 - 0.2 K/uL METABOLIC PANEL, BASIC Collection Time: 11/18/18  4:57 AM  
Result Value Ref Range Sodium 144 136 - 145 mmol/L Potassium 2.8 (LL) 3.5 - 5.1 mmol/L Chloride 110 (H) 98 - 107 mmol/L  
 CO2 24 21 - 32 mmol/L Anion gap 10 7 - 16 mmol/L Glucose 104 (H) 65 - 100 mg/dL BUN 20 6 - 23 MG/DL Creatinine 1.17 0.8 - 1.5 MG/DL  
 GFR est AA >60 >60 ml/min/1.73m2 GFR est non-AA >60 >60 ml/min/1.73m2 Calcium 8.0 (L) 8.3 - 10.4 MG/DL MAGNESIUM Collection Time: 11/18/18  4:57 AM  
Result Value Ref Range Magnesium 1.8 1.8 - 2.4 mg/dL BNP Collection Time: 11/18/18  4:57 AM  
Result Value Ref Range  pg/mL EKG, 12 LEAD, SUBSEQUENT Collection Time: 11/18/18  7:43 AM  
Result Value Ref Range Ventricular Rate 101 BPM  
 Atrial Rate 101 BPM  
 P-R Interval 174 ms QRS Duration 178 ms Q-T Interval 398 ms QTC Calculation (Bezet) 516 ms Calculated P Axis 19 degrees Calculated R Axis -67 degrees Calculated T Axis 100 degrees Diagnosis Sinus tachycardia Left atrial enlargement Left axis deviation Non-specific intra-ventricular conduction block Inferior infarct (cited on or before 14-NOV-2018) Cannot rule out Anterior infarct , age undetermined T wave abnormality, consider lateral ischemia Abnormal ECG When compared with ECG of 17-NOV-2018 10:26, 
 Minimal criteria for Anterior infarct are now Present Serial changes of Inferior infarct Present Confirmed by Jesus Posadas MD (), Priti Uriostegui ([id number]) on 11/18/2018 9:44:52 AM 
  
 
 
EKG:  (EKG has been independently visualized by me with interpretation below): NSR with RBBB.

## 2018-11-19 LAB
ANION GAP SERPL CALC-SCNC: 6 MMOL/L (ref 7–16)
ATRIAL RATE: 96 BPM
BACTERIA SPEC CULT: ABNORMAL
BACTERIA SPEC CULT: ABNORMAL
BASOPHILS # BLD: 0 K/UL (ref 0–0.2)
BASOPHILS NFR BLD: 0 % (ref 0–2)
BUN SERPL-MCNC: 17 MG/DL (ref 6–23)
CALCIUM SERPL-MCNC: 8 MG/DL (ref 8.3–10.4)
CALCULATED P AXIS, ECG09: 26 DEGREES
CALCULATED R AXIS, ECG10: -66 DEGREES
CALCULATED T AXIS, ECG11: 98 DEGREES
CHLORIDE SERPL-SCNC: 109 MMOL/L (ref 98–107)
CO2 SERPL-SCNC: 29 MMOL/L (ref 21–32)
CREAT SERPL-MCNC: 1.14 MG/DL (ref 0.8–1.5)
DIAGNOSIS, 93000: NORMAL
DIFFERENTIAL METHOD BLD: ABNORMAL
EOSINOPHIL # BLD: 0 K/UL (ref 0–0.8)
EOSINOPHIL NFR BLD: 0 % (ref 0.5–7.8)
ERYTHROCYTE [DISTWIDTH] IN BLOOD BY AUTOMATED COUNT: 15.9 %
EST. AVERAGE GLUCOSE BLD GHB EST-MCNC: 117 MG/DL
GLUCOSE BLD STRIP.AUTO-MCNC: 128 MG/DL (ref 65–100)
GLUCOSE SERPL-MCNC: 103 MG/DL (ref 65–100)
GRAM STN SPEC: ABNORMAL
HBA1C MFR BLD: 5.7 % (ref 4.8–6)
HCT VFR BLD AUTO: 29 % (ref 41.1–50.3)
HGB BLD-MCNC: 9.5 G/DL (ref 13.6–17.2)
IMM GRANULOCYTES # BLD: 0.1 K/UL (ref 0–0.5)
IMM GRANULOCYTES NFR BLD AUTO: 1 % (ref 0–5)
LYMPHOCYTES # BLD: 1.9 K/UL (ref 0.5–4.6)
LYMPHOCYTES NFR BLD: 20 % (ref 13–44)
MAGNESIUM SERPL-MCNC: 1.8 MG/DL (ref 1.8–2.4)
MCH RBC QN AUTO: 23.4 PG (ref 26.1–32.9)
MCHC RBC AUTO-ENTMCNC: 32.8 G/DL (ref 31.4–35)
MCV RBC AUTO: 71.4 FL (ref 79.6–97.8)
MM INDURATION POC: 0 MM (ref 0–5)
MONOCYTES # BLD: 1.3 K/UL (ref 0.1–1.3)
MONOCYTES NFR BLD: 14 % (ref 4–12)
NEUTS SEG # BLD: 6.1 K/UL (ref 1.7–8.2)
NEUTS SEG NFR BLD: 65 % (ref 43–78)
NRBC # BLD: 0.02 K/UL (ref 0–0.2)
P-R INTERVAL, ECG05: 240 MS
PLATELET # BLD AUTO: 151 K/UL (ref 150–450)
PMV BLD AUTO: 12 FL (ref 9.4–12.3)
POTASSIUM SERPL-SCNC: 3.2 MMOL/L (ref 3.5–5.1)
PPD POC: NEGATIVE NEGATIVE
Q-T INTERVAL, ECG07: 426 MS
QRS DURATION, ECG06: 152 MS
QTC CALCULATION (BEZET), ECG08: 538 MS
RBC # BLD AUTO: 4.06 M/UL (ref 4.23–5.6)
SERVICE CMNT-IMP: ABNORMAL
SODIUM SERPL-SCNC: 144 MMOL/L (ref 136–145)
VENTRICULAR RATE, ECG03: 96 BPM
WBC # BLD AUTO: 9.5 K/UL (ref 4.3–11.1)

## 2018-11-19 PROCEDURE — 99232 SBSQ HOSP IP/OBS MODERATE 35: CPT | Performed by: INTERNAL MEDICINE

## 2018-11-19 PROCEDURE — 97110 THERAPEUTIC EXERCISES: CPT

## 2018-11-19 PROCEDURE — 74011250637 HC RX REV CODE- 250/637: Performed by: HOSPITALIST

## 2018-11-19 PROCEDURE — 74011250637 HC RX REV CODE- 250/637: Performed by: INTERNAL MEDICINE

## 2018-11-19 PROCEDURE — 85025 COMPLETE CBC W/AUTO DIFF WBC: CPT

## 2018-11-19 PROCEDURE — 36592 COLLECT BLOOD FROM PICC: CPT

## 2018-11-19 PROCEDURE — 82962 GLUCOSE BLOOD TEST: CPT

## 2018-11-19 PROCEDURE — 94760 N-INVAS EAR/PLS OXIMETRY 1: CPT

## 2018-11-19 PROCEDURE — 80048 BASIC METABOLIC PNL TOTAL CA: CPT

## 2018-11-19 PROCEDURE — 83735 ASSAY OF MAGNESIUM: CPT

## 2018-11-19 PROCEDURE — 93005 ELECTROCARDIOGRAM TRACING: CPT | Performed by: INTERNAL MEDICINE

## 2018-11-19 PROCEDURE — 83036 HEMOGLOBIN GLYCOSYLATED A1C: CPT

## 2018-11-19 PROCEDURE — 97166 OT EVAL MOD COMPLEX 45 MIN: CPT

## 2018-11-19 PROCEDURE — 74011250636 HC RX REV CODE- 250/636: Performed by: INTERNAL MEDICINE

## 2018-11-19 PROCEDURE — 97162 PT EVAL MOD COMPLEX 30 MIN: CPT

## 2018-11-19 PROCEDURE — 77010033678 HC OXYGEN DAILY

## 2018-11-19 PROCEDURE — 65660000000 HC RM CCU STEPDOWN

## 2018-11-19 RX ORDER — DEXTROSE 50 % IN WATER (D50W) INTRAVENOUS SYRINGE
25-50 AS NEEDED
Status: DISCONTINUED | OUTPATIENT
Start: 2018-11-19 | End: 2018-11-30 | Stop reason: HOSPADM

## 2018-11-19 RX ORDER — POTASSIUM CHLORIDE 20 MEQ/1
40 TABLET, EXTENDED RELEASE ORAL
Status: COMPLETED | OUTPATIENT
Start: 2018-11-19 | End: 2018-11-19

## 2018-11-19 RX ORDER — INSULIN LISPRO 100 [IU]/ML
INJECTION, SOLUTION INTRAVENOUS; SUBCUTANEOUS
Status: DISCONTINUED | OUTPATIENT
Start: 2018-11-19 | End: 2018-11-30 | Stop reason: HOSPADM

## 2018-11-19 RX ORDER — DEXTROSE 40 %
15 GEL (GRAM) ORAL AS NEEDED
Status: DISCONTINUED | OUTPATIENT
Start: 2018-11-19 | End: 2018-11-30 | Stop reason: HOSPADM

## 2018-11-19 RX ADMIN — ENOXAPARIN SODIUM 40 MG: 40 INJECTION, SOLUTION INTRAVENOUS; SUBCUTANEOUS at 15:42

## 2018-11-19 RX ADMIN — CARVEDILOL 6.25 MG: 6.25 TABLET, FILM COATED ORAL at 08:27

## 2018-11-19 RX ADMIN — LISINOPRIL 2.5 MG: 5 TABLET ORAL at 08:27

## 2018-11-19 RX ADMIN — POTASSIUM CHLORIDE 40 MEQ: 20 TABLET, EXTENDED RELEASE ORAL at 08:27

## 2018-11-19 RX ADMIN — Medication 10 ML: at 05:43

## 2018-11-19 RX ADMIN — AMIODARONE HYDROCHLORIDE 400 MG: 200 TABLET ORAL at 08:27

## 2018-11-19 RX ADMIN — POTASSIUM CHLORIDE 40 MEQ: 20 TABLET, EXTENDED RELEASE ORAL at 09:13

## 2018-11-19 RX ADMIN — AMIODARONE HYDROCHLORIDE 400 MG: 200 TABLET ORAL at 22:25

## 2018-11-19 RX ADMIN — PANTOPRAZOLE SODIUM 40 MG: 40 TABLET, DELAYED RELEASE ORAL at 08:27

## 2018-11-19 RX ADMIN — Medication 5 ML: at 15:43

## 2018-11-19 RX ADMIN — Medication 10 ML: at 22:36

## 2018-11-19 RX ADMIN — CLOZAPINE 500 MG: 100 TABLET ORAL at 17:12

## 2018-11-19 RX ADMIN — CLOZAPINE 200 MG: 100 TABLET ORAL at 08:28

## 2018-11-19 RX ADMIN — ASPIRIN 81 MG 81 MG: 81 TABLET ORAL at 08:26

## 2018-11-19 RX ADMIN — FUROSEMIDE 40 MG: 40 TABLET ORAL at 08:27

## 2018-11-19 NOTE — PROGRESS NOTES
Notified Dr. Kristine Taylor that quad lumen IV access- all lines flush and give blood return except white line. No need for cathflo at this time.

## 2018-11-19 NOTE — PROGRESS NOTES
Progress Note Patient: Roselle Galeazzi               Sex: male          MRN: [id number] YOB: 1960      Age:  66 y.o. PCP:  Leroy Aparicio MD 
Treatment Team: Attending Provider: Deacon Gutierrez MD; Utilization Review: Neville Cameron RN; Consulting Provider: Joycelyn Cummins MD; Consulting Provider: Leila Quiñones MD; Consulting Provider: Ana Mcdonald DO; Consulting Provider: Courtney Ramos MD; Speech Language Pathologist: Otf Diamond, SLP; Care Manager: Prince Kenya RN Admission HPI: 
This is a 53-year-old male with a past medical history of CHF with EF of 20-25%, non-insulin diabetes mellitus, schizophrenia admitted to the ICU on 11/13/2018 after he had a cardiac arrest at the [facility]. Patient had a witnessed collapse in the staff initiated CPR, right ear was placed which showed shockable rhythm which was performed and there was a spontaneous return of circulation, patient had another cardiac arrest in the ER with ROS C. Patient was admitted to the ICU he was placed on hypothermia protocol, patient is currently awake and following commands. An echocardiogram showed EF of 10-20%, patient was briefly sent over to cardiac telemetry floor where he went into acute flash pulmonary edema and he was placed on a BiPAP and currently weaned back to nasal cannula. Hospitalist service was consulted for medical management and taking over the care from ICU service. Patient was seen this morning, he is awake, not in any acute distress, he is off amiodarone drip on 3 L of oxygen Subjective:  
 
Patient was seen this morning, doing well, is on 3 L of oxygen, denies any shortness of breath he does have some cough but no chest pain, no nausea or vomiting, he did have a fever yesterday of 101.3 no blood cultures were done, currently fevers are resolved. Objective:  
Physical Exam:  
Visit Vitals BP (!) 86/56 Pulse 94 Temp 99.2 °F (37.3 °C) Resp 19 Ht 5' 10\" (1.778 m) Wt 81.6 kg (179 lb 14.4 oz) SpO2 98% BMI 25.81 kg/m² Temp (24hrs), Av.7 °F (37.1 °C), Min:97.9 °F (36.6 °C), Max:101.3 °F (38.5 °C) Oxygen Therapy O2 Sat (%): 98 % (18 1018) Pulse via Oximetry: 99 beats per minute (18 0731) O2 Device: Room air (18 1200) O2 Flow Rate (L/min): 3 l/min (18 1018) FIO2 (%): 30 % (18) Intake/Output Summary (Last 24 hours) at 2018 1637 Last data filed at 2018 1537 Gross per 24 hour Intake 120 ml Output 200 ml Net -80 ml  
  
  
General:- Conscious, No acute distress Eyes:-  No pallor/icterus HENT- Oral Mucosa is Moist, Neck:- Supple, No JVD Lungs- CTA Bilaterally, No significant wheezing Heart:- S1 S2 regular Abdomen:- Soft, Positive bowel sounds, NTND, No guarding/rigidity/rebound tend. Extremities:-No pedal edema Neurologic: - AOX3, No acute FND, Skin: - No acute rashes Musculoskeletal: No Acute findings Psych: - Appropriate mood LAB Recent Results (from the past 24 hour(s)) MAGNESIUM Collection Time: 18  5:38 AM  
Result Value Ref Range Magnesium 1.8 1.8 - 2.4 mg/dL METABOLIC PANEL, BASIC Collection Time: 18  5:38 AM  
Result Value Ref Range Sodium 144 136 - 145 mmol/L Potassium 3.2 (L) 3.5 - 5.1 mmol/L Chloride 109 (H) 98 - 107 mmol/L  
 CO2 29 21 - 32 mmol/L Anion gap 6 (L) 7 - 16 mmol/L Glucose 103 (H) 65 - 100 mg/dL BUN 17 6 - 23 MG/DL Creatinine 1.14 0.8 - 1.5 MG/DL  
 GFR est AA >60 >60 ml/min/1.73m2 GFR est non-AA >60 >60 ml/min/1.73m2 Calcium 8.0 (L) 8.3 - 10.4 MG/DL  
CBC WITH AUTOMATED DIFF Collection Time: 18  5:38 AM  
Result Value Ref Range WBC 9.5 4.3 - 11.1 K/uL  
 RBC 4.06 (L) 4.23 - 5.6 M/uL HGB 9.5 (L) 13.6 - 17.2 g/dL HCT 29.0 (L) 41.1 - 50.3 %  MCV 71.4 (L) 79.6 - 97.8 FL  
 MCH 23.4 (L) 26.1 - 32.9 PG  
 MCHC 32.8 31.4 - 35.0 g/dL  
 RDW 15.9 % PLATELET 412 071 - 309 K/uL MPV 12.0 9.4 - 12.3 FL ABSOLUTE NRBC 0.02 0.0 - 0.2 K/uL  
 DF AUTOMATED NEUTROPHILS 65 43 - 78 % LYMPHOCYTES 20 13 - 44 % MONOCYTES 14 (H) 4.0 - 12.0 % EOSINOPHILS 0 (L) 0.5 - 7.8 % BASOPHILS 0 0.0 - 2.0 % IMMATURE GRANULOCYTES 1 0.0 - 5.0 %  
 ABS. NEUTROPHILS 6.1 1.7 - 8.2 K/UL  
 ABS. LYMPHOCYTES 1.9 0.5 - 4.6 K/UL  
 ABS. MONOCYTES 1.3 0.1 - 1.3 K/UL  
 ABS. EOSINOPHILS 0.0 0.0 - 0.8 K/UL  
 ABS. BASOPHILS 0.0 0.0 - 0.2 K/UL  
 ABS. IMM. GRANS. 0.1 0.0 - 0.5 K/UL EKG, 12 LEAD, INITIAL Collection Time: 11/19/18  7:00 AM  
Result Value Ref Range Ventricular Rate 96 BPM  
 Atrial Rate 96 BPM  
 P-R Interval 240 ms QRS Duration 152 ms Q-T Interval 426 ms  
 QTC Calculation (Bezet) 538 ms Calculated P Axis 26 degrees Calculated R Axis -66 degrees Calculated T Axis 98 degrees Diagnosis Sinus rhythm with 1st degree A-V block Possible Left atrial enlargement Left axis deviation Left bundle branch block Inferior infarct (cited on or before 14-NOV-2018) T wave abnormality, consider anterolateral ischemia Abnormal ECG When compared with ECG of 18-NOV-2018 07:43, 
SD interval has increased Minimal criteria for Anterior infarct are no longer Present Confirmed by KAREEM WAGONER (), [place] ([id number]) on 11/19/2018 11:11:22 AM 
  
 
 
No results found. No results found. All Micro Results Procedure Component Value Units Date/Time CULTURE, RESPIRATORY/SPUTUM/BRONCH Lynne Hoit STAIN [[id number]]  (Abnormal)  (Susceptibility) Collected:  11/15/18 1207 Order Status:  Completed Specimen:  Sputum,ET Suction Updated:  11/19/18 5716 Special Requests: NO SPECIAL REQUESTS     
  GRAM STAIN MANY GRAM POSITIVE COCCI     
   FEW GRAM NEGATIVE RODS     
   50 TO 79 WBC'S SEEN PER OIF  
   NO EPITHELIAL CELLS SEEN     
   3+ MUCUS PRESENT Culture result: HEAVY STREPTOCOCCUS PNEUMONIAE  
     
      
  LIGHT NORMAL RESPIRATORY MARYELLEN  
     
 CULTURE, BLOOD [[id number]] Collected:  11/15/18 1238 Order Status:  Completed Specimen:  Blood Updated:  11/19/18 4316 Special Requests: --     
  RIGHT 
ARM Culture result: NO GROWTH 4 DAYS     
 CULTURE, BLOOD [[id number]] Collected:  11/15/18 1247 Order Status:  Completed Specimen:  Blood Updated:  11/19/18 4172 Special Requests: --     
  LEFT 
HAND Culture result: NO GROWTH 4 DAYS Current Medications Reviewed Current Facility-Administered Medications:  
  potassium chloride (K-DUR, KLOR-CON) SR tablet 40 mEq, 40 mEq, Oral, DAILY, Michel Guerin MD, 40 mEq at 11/19/18 1883   furosemide (LASIX) tablet 40 mg, 40 mg, Oral, BID, Zach Wren MD, 40 mg at 11/19/18 6907   carvedilol (COREG) tablet 6.25 mg, 6.25 mg, Oral, BID WITH MEALS, Sonny Flores MD, 6.25 mg at 11/19/18 0827 
  pantoprazole (PROTONIX) tablet 40 mg, 40 mg, Oral, ACB, Neel Bedolla MD, 40 mg at 11/19/18 0827   acetaminophen (TYLENOL) solution 650 mg, 650 mg, Oral, Q4H PRN, Neel Bedolla MD, 650 mg at 11/18/18 1734   tuberculin injection 5 Units, 5 Units, IntraDERMal, ONCE, Michelle Herrera NP, 5 Units at 11/18/18 2205   lisinopril (PRINIVIL, ZESTRIL) tablet 2.5 mg, 2.5 mg, Oral, DAILY, Sonny Flores MD, 2.5 mg at 11/19/18 0827 
  amiodarone (CORDARONE) tablet 400 mg, 400 mg, Oral, BID, Zach Wren MD, 400 mg at 11/19/18 1455   cloZAPine (CLOZARIL) tablet 200 mg (Patient Supplied), 200 mg, Oral, DAILY, Virgie Bull MD, 200 mg at 11/19/18 9604   cloZAPine (CLOZARIL) tablet 500 mg (Patient Supplied), 500 mg, Oral, QPM, Virgie Bull MD, 500 mg at 11/18/18 1738   polyvinyl alcohol (LIQUIFILM TEARS) 1.4 % ophthalmic solution 1 Drop, 1 Drop, Both Eyes, PRN, Bradley Mosqueda MD 
  aspirin chewable tablet 81 mg, 81 mg, Oral, DAILY, Azam Gallagher, MD, 81 mg at 11/19/18 0826 
  sodium chloride (NS) flush 5-10 mL, 5-10 mL, IntraVENous, Q8H, Ran Rockwell MD, 5 mL at 11/19/18 1543   sodium chloride (NS) flush 5-10 mL, 5-10 mL, IntraVENous, PRN, Ran Rockwell MD 
  enoxaparin (LOVENOX) injection 40 mg, 40 mg, SubCUTAneous, Q24H, Ran Rockwell MD, 40 mg at 11/19/18 1542 
  midazolam (VERSED) injection 2 mg, 2 mg, IntraVENous, Q2H PRN, Rian Mendoza MD, 2 mg at 11/16/18 1104 Assessment/Plan Principal Problem: 
  Cardiac arrest ([facility] [address] (11/13/2018) Active Problems: 
  Type II diabetes mellitus ([address] (2/4/2016) Acute respiratory failure with hypoxia ([address] (11/13/2018) Hypotension (11/13/2018) Systolic congestive heart failure ([address] (11/13/2018) Schizophrenia ([address] (11/13/2018) Acute renal failure ([address] (11/13/2018) #Cardiac arrest with V. fib arrest 
#Acute hypoxic respiratory failure secondary to flash pulmonary edema #History of chronic systolic congestive heart failure #Hypotension #Acute renal failure #Schizophrenia #Type 2 diabetes mellitus Plan Patient oxygenation is significantly improved now, on 3 L of oxygen,  
currently on Lasix 40 mg once daily, volume status seems to be stable Continue with the oral amiodarone, on Coreg and lisinopril, Hypokalemia-repleted Patient's fingerstick glucose has been stable on metformin at home which is a currently on hold, Order for low dose sliding scale As per cardiology patient needs an ICD, patient was refusing with the cardiology today but I feel that the he does not have any medical decision making capacity, discussed with the sister who is the power of  and she mostly makes all the decisions for him and she was agreeable for ICD placement Patient had a fever last night, if there is any persistence will repeat blood cultures, central line was removed today Continue with Clozaril for his schizophrenia which is his home medication DVT prophylaxis with Lovenox GI prophylaxis with the Protonix High risk patient Gustavo Alcocer MD 
November 19, 2018

## 2018-11-19 NOTE — PROGRESS NOTES
Bright Sanchez Admission Date: 11/13/2018 Daily Progress Note: 11/19/2018 The patient's chart is reviewed and the patient is discussed with the staff. 
 
 
66 y.o. CHARITO presents following a witnessed cardiac arrest. He has paranoid schizophrenia and goes to a day care center where he was when he collapsed. Staff could not find a pulse, CPR was started and an AED placed. Rhythm was reportedly shockable so this was performed with return of circulation.  He was intubated by EMS and transported to the ER. Upon admission here, his pulse was again lost so he underwent another course of CPR and received 1 amp of epi and again ROSC was achieved. Was hypotensive, receiving IV fluids and started on Levophed.   
   Has known cardiomyopathy with ECHO 2016 - EF 20 - 25%.  He is followed by Dr. Panda Jefferson, is maintained on an ACE, beta blocker, statin, baby asa and daily Lasix. Reportedly he had a cardiac cath in the past that revealed no CAD. He is NIDDM and takes daily metformin and is followed by the [facility] for schizophrenia diagnosed as a teenager. He lives with his sister who helps with his medical therapy which he has been compliant with. She was interviewed today and she did not notice that he seemed in any way ill before she took him to  today. Admitted to ICU, continued on [facility] protocol and cardiology consulted.  Rewarmed and following commands. Follow up TTE showing EF of 10-20%. Transferred to [facility] and developed sudden hypoxia with sats in the 62s. CXR consistent with flash pulmonary edema - no spike in BP to precipitate - was placed on BIPAP. Now weaned back to [place]. Subjective:  
 
Now doing ok on 3 L nc-no c/os Current Facility-Administered Medications Medication Dose Route Frequency  potassium chloride (K-DUR, KLOR-CON) SR tablet 40 mEq  40 mEq Oral DAILY  furosemide (LASIX) tablet 40 mg  40 mg Oral BID  
  carvedilol (COREG) tablet 6.25 mg  6.25 mg Oral BID WITH MEALS  pantoprazole (PROTONIX) tablet 40 mg  40 mg Oral ACB  acetaminophen (TYLENOL) solution 650 mg  650 mg Oral Q4H PRN  
 tuberculin injection 5 Units  5 Units IntraDERMal ONCE  
 lisinopril (PRINIVIL, ZESTRIL) tablet 2.5 mg  2.5 mg Oral DAILY  amiodarone (CORDARONE) tablet 400 mg  400 mg Oral BID  cloZAPine (CLOZARIL) tablet 200 mg (Patient Supplied)  200 mg Oral DAILY  cloZAPine (CLOZARIL) tablet 500 mg (Patient Supplied)  500 mg Oral QPM  
 polyvinyl alcohol (LIQUIFILM TEARS) 1.4 % ophthalmic solution 1 Drop  1 Drop Both Eyes PRN  
 aspirin chewable tablet 81 mg  81 mg Oral DAILY  sodium chloride (NS) flush 5-10 mL  5-10 mL IntraVENous Q8H  
 sodium chloride (NS) flush 5-10 mL  5-10 mL IntraVENous PRN  
 enoxaparin (LOVENOX) injection 40 mg  40 mg SubCUTAneous Q24H  
 midazolam (VERSED) injection 2 mg  2 mg IntraVENous Q2H PRN Review of Systems Constitutional: negative for fever, chills, sweats Cardiovascular: negative for chest pain, palpitations, syncope, edema Gastrointestinal:  negative for dysphagia, reflux, vomiting, diarrhea, abdominal pain, or melena Neurologic:  negative for focal weakness, numbness, headache Objective:  
 
Vitals:  
 11/19/18 0113 11/19/18 0257 11/19/18 0441 11/19/18 6811 BP: (!) 88/59 94/64 99/64 107/66 Pulse: 93 94 94 96 Resp: 18 18 Temp: 98.2 °F (36.8 °C)  98 °F (36.7 °C) SpO2: 99% 100% 98% Weight:   179 lb 14.4 oz (81.6 kg) Height:      
 
Intake and Output:  
11/17 1901 - 11/19 0700 In: 1270 [P.O.:1270] Out: 1725 [RUDBP:6002] No intake/output data recorded. Physical Exam:  
Constitution:  the patient is well developed and in no acute distress EENMT:  Sclera clear, pupils equal, oral mucosa moist 
Respiratory:  clear Cardiovascular:  RRR without M,G,R 
Gastrointestinal: soft and non-tender; with positive bowel sounds. Musculoskeletal: warm without cyanosis. There is no lower leg edema. Skin:  no jaundice or rashes, no wounds Neurologic: no gross neuro deficits Psychiatric:  alert and oriented x 3 CXR:  
None today LAB No results for input(s): GLUCPOC in the last 72 hours. No lab exists for component: [place] Recent Labs 11/19/18 
4330 11/18/18 
0457 11/16/18 
1746 WBC 9.5 8.9 11.0 HGB 9.5* 11.2* 11.8* HCT 29.0* 32.8* 35.9*  
 153 136* Recent Labs 11/19/18 
1323 11/18/18 
0457 11/17/18 
1745  144  --   
K 3.2* 2.8*  --   
* 110*  --   
CO2 29 24  --   
* 104*  --   
BUN 17 20  --   
CREA 1.14 1.17  --   
MG 1.8 1.8  --   
CA 8.0* 8.0*  --   
TROIQ  --   --  0.04 Recent Labs 11/16/18 
1513 PHI 7.399 PCO2I 29.4*  
PO2I 83 HCO3I 18.2* No results for input(s): LCAD, LAC in the last 72 hours. Assessment:  (Medical Decision Making) Hospital Problems  Date Reviewed: 11/18/2018 Codes Class Noted POA * (Principal) Cardiac arrest [facility]) ICD-10-CM: I46.9 ICD-9-CM: 427.5  11/13/2018 Yes Acute respiratory failure with hypoxia ([facility] ICD-10-CM: J96.01 
ICD-9-CM: 518.81  11/13/2018 Yes On nc Hypotension ICD-10-CM: I95.9 ICD-9-CM: 458.9  11/13/2018 Yes Systolic congestive heart failure (HCC) ICD-10-CM: I50.20 ICD-9-CM: 428.20, 428.0  11/13/2018 Yes Schizophrenia ([facility] (Chronic) ICD-10-CM: F20.9 ICD-9-CM: 295.90  11/13/2018 Yes Acute renal failure (HCC) ICD-10-CM: N17.9 ICD-9-CM: 584.9  11/13/2018 Yes  
 stable Type II diabetes mellitus (HCC) (Chronic) ICD-10-CM: E11.9 ICD-9-CM: 250.00  2/4/2016 Yes Plan:  (Medical Decision Making) 1   Currently on hospitalists service and followed by cardiology 2   Will probably need home O2 Will sign off- 
-- 
 
More than 50% of the time documented was spent in face-to-face contact with the patient and in the care of the patient on the floor/unit where the patient is located.  
 
Sina Leone MD

## 2018-11-19 NOTE — PROGRESS NOTES
SPEECH PATHOLOGY NOTE: 
 
Patient currently NPO for possible ICD placement. Will follow up for diet tolerance once cleared to resume po intake.   
 
[address] CCC-SLP

## 2018-11-19 NOTE — PROGRESS NOTES
Date of Outreach Update: 
Fara Savage was seen and assessed. Patient found to be resting quietly in bed. No distress noted. O2 Sat 100%. Lungs sounds coarse. Spoke with primary RN who has no concerns at this time. MEWS Score: 2 (11/19/18 0113) Vitals:  
 11/18/18 2201 11/18/18 2347 11/19/18 0113 11/19/18 3997 BP: (!) 81/54 (!) 87/61 (!) 88/59 94/64 Pulse: 92 91 93 94 Resp:   18 Temp:   98.2 °F (36.8 °C) SpO2:   99% 100% Weight:      
Height:      
  
 
 Pain Assessment Pain Intensity 1: 0 (11/18/18 2040) Patient Stated Pain Goal: 0 Previous Outreach assessment has been reviewed. There have been no significant clinical changes since the completion of the last dated Outreach assessment. Will continue to follow up per outreach protocol. Signed By:   Chip Wells RN   November 19, 2018 2:57 AM

## 2018-11-19 NOTE — PROGRESS NOTES
Verbal bedside report given to Mariel Paredes oncoming RN. Patient's situation, background, assessment and recommendations provided. Opportunity for questions provided. Oncoming RN assumed care of patient.

## 2018-11-19 NOTE — PROGRESS NOTES
Bedside and Verbal shift change report given to Darien Irwin. Report included the following information SBAR, Kardex, MAR, Accordion and Recent Results.

## 2018-11-19 NOTE — PROGRESS NOTES
Problem: Falls - Risk of 
Goal: *Absence of Falls Document Jonathan Simmons Fall Risk and appropriate interventions in the flowsheet. Outcome: Progressing Towards Goal 
Fall Risk Interventions: 
Mobility Interventions: Bed/chair exit alarm, Communicate number of staff needed for ambulation/transfer, OT consult for ADLs, Patient to call before getting OOB, PT Consult for mobility concerns, PT Consult for assist device competence Mentation Interventions: Bed/chair exit alarm, Door open when patient unattended, More frequent rounding, Reorient patient, Room close to nurse's station, Toileting rounds Medication Interventions: Bed/chair exit alarm, Evaluate medications/consider consulting pharmacy, Patient to call before getting OOB, Teach patient to arise slowly Elimination Interventions: Bed/chair exit alarm, Call light in reach, Patient to call for help with toileting needs, Toilet paper/wipes in reach, Toileting schedule/hourly rounds, Urinal in reach History of Falls Interventions: Bed/chair exit alarm, Consult care management for discharge planning, Door open when patient unattended, Investigate reason for fall, Room close to nurse's station Problem: Pressure Injury - Risk of 
Goal: *Prevention of pressure injury Document Laurent Scale and appropriate interventions in the flowsheet. Pressure Injury Interventions: 
Sensory Interventions: Assess need for specialty bed, Avoid rigorous massage over bony prominences, Float heels, Maintain/enhance activity level, Minimize linen layers, Pressure redistribution bed/mattress (bed type), Turn and reposition approx. every two hours (pillows and wedges if needed) Moisture Interventions: Apply protective barrier, creams and emollients, Absorbent underpads, Check for incontinence Q2 hours and as needed, Maintain skin hydration (lotion/cream) Activity Interventions: PT/OT evaluation, Pressure redistribution bed/mattress(bed type), Increase time out of bed Mobility Interventions: PT/OT evaluation, Pressure redistribution bed/mattress (bed type) Nutrition Interventions: Document food/fluid/supplement intake Friction and Shear Interventions: Apply protective barrier, creams and emollients, Minimize layers

## 2018-11-19 NOTE — PROGRESS NOTES
Problem: Self Care Deficits Care Plan (Adult) Goal: *Acute Goals and Plan of Care (Insert Text) 1. Pt will toilet with SBA 2. Pt will complete functional mobility for ADLs with SBA 3. Pt will complete lower body dressing with SBA using AE as needed 4. Pt will complete grooming and hygiene at sink with SBA 5. Pt will demonstrate independence with HEP to promote increased BUE strength and functional use for ADLs 6. Pt will tolerate 23 minutes functional activity with one or fewer rest breaks to promote increased endurance for ADLs 7. Pt will demonstrate improved cognition to complete ADLs with min or fewer cues to promote increased safety and independence Timeframe: 7 days OCCUPATIONAL THERAPY: Initial Assessment 11/19/2018INPATIENT: Hospital Day: 7 Payor: CARE IMPROVEMENT PLUS [place] / Product Type: Generous Deals Care Medicare /  
  
NAME/AGE/GENDER: Juarez Gomez is a 66 y.o. male PRIMARY DIAGNOSIS:  Cardiac arrest [facility]) Cardiac arrest [facility]) Cardiac arrest ([facility] ICD-10: Treatment Diagnosis:  
 · Generalized Muscle Weakness (M62.81) Precautions/Allergies: 
  falls Patient has no known allergies. ASSESSMENT:  
Mr. Shantel Abbasi was admitted with witnessed cardiac arrest and collapse at adult day care. Pt lives with his sister and was independent with ADLs, uses a cane for mobility, sister completes all IADLs. Pt has a hx of paranoid schizophrenia and goes to adult day care. This session, pt presented with deficits in cognition, balance, strength, and mobility impacting ADLs. Pt required min assistance to stand and for xfers for ADLs using RW. Pt was somewhat agitated and very impulsive, pushed RW off to the side and resisted therapist's attempts to assist him. Pt demonstrated decreased safety awareness, problem solving, insight, and command following. UE assessment revealed BUE strength grossly 4- to 4/5.  Pt is below his functional baseline and would benefit from skilled OT services to address deficits. This section established at most recent assessment PROBLEM LIST (Impairments causing functional limitations): 1. Decreased Strength 2. Decreased ADL/Functional Activities 3. Decreased Transfer Abilities 4. Decreased Balance 5. Decreased Activity Tolerance 6. Decreased Cognition INTERVENTIONS PLANNED: (Benefits and precautions of occupational therapy have been discussed with the patient.) 1. Activities of daily living training 2. Adaptive equipment training 3. Balance training 4. Therapeutic activity 5. Therapeutic exercise TREATMENT PLAN: Frequency/Duration: Follow patient 3 times/ week to address above goals. Rehabilitation Potential For Stated Goals: Good RECOMMENDED REHABILITATION/EQUIPMENT: (at time of discharge pending progress): Due to the probability of continued deficits (see above) this patient will likely need continued skilled occupational therapy after discharge. Equipment:  
? None at this time OCCUPATIONAL PROFILE AND HISTORY:  
History of Present Injury/Illness (Reason for Referral): 
See H&P Past Medical History/Comorbidities:  
Mr. Caren Sinha  has a past medical history of Diabetes ([facility] Heart failure ([facility] Hypertension, Psychiatric disorder, and Systolic CHF, acute on chronic ([facility] Mr. Caren Sinha  has a past surgical history that includes hx appendectomy. Social History/Living Environment:  
Home Environment: Private residence # Steps to Enter: 1 One/Two Story Residence: One story Living Alone: No 
Support Systems: Family member(s)(sister) Patient Expects to be Discharged to[de-identified] Unknown Current DME Used/Available at Home: Cane, straight, Shower chair Tub or Shower Type: Tub/Shower combination Prior Level of Function/Work/Activity: 
Independent w/ ADLs, cane for mobility, lives w/ sister, goes to adult day care Number of Personal Factors/Comorbidities that affect the Plan of Care: Expanded review of therapy/medical records (1-2):  MODERATE COMPLEXITY ASSESSMENT OF OCCUPATIONAL PERFORMANCE[de-identified]  
Activities of Daily Living:  
Basic ADLs (From Assessment) Complex ADLs (From Assessment) Feeding: Independent Oral Facial Hygiene/Grooming: Contact guard assistance Bathing: Minimum assistance Upper Body Dressing: Minimum assistance Lower Body Dressing: Minimum assistance Toileting: Minimum assistance Instrumental ADL Meal Preparation: Maximum assistance Homemaking: Maximum assistance Medication Management: Maximum assistance Financial Management: Maximum assistance Grooming/Bathing/Dressing Activities of Daily Living Cognitive Retraining Safety/Judgement: Decreased insight into deficits; Decreased awareness of need for safety Bed/Mat Mobility Supine to Sit: Stand-by assistance Sit to Stand: Minimum assistance Bed to Chair: Minimum assistance Most Recent Physical Functioning:  
Gross Assessment: 
AROM: Within functional limits Strength: Generally decreased, functional 
Coordination: Generally decreased, functional 
Tone: Normal 
         
  
Posture: 
  
Balance: 
Sitting: Intact Standing: Impaired Standing - Static: Fair Standing - Dynamic : Fair Bed Mobility: 
Supine to Sit: Stand-by assistance Wheelchair Mobility: 
  
Transfers: 
Sit to Stand: Minimum assistance Stand to Sit: Contact guard assistance Bed to Chair: Minimum assistance Patient Vitals for the past 6 hrs: 
 BP SpO2 O2 Flow Rate (L/min) Pulse 11/19/18 1018 101/65 98 % 3 l/min 100  
11/19/18 1418 108/68   94  
11/19/18 1542 (!) 86/56    Mental Status Neurologic State: Alert Orientation Level: Oriented to place, Oriented to person Cognition: Decreased attention/concentration, Decreased command following, Impaired decision making, Impulsive Perception: Appears intact Perseveration: No perseveration noted Safety/Judgement: Decreased insight into deficits, Decreased awareness of need for safety Physical Skills Involved: 
1. Balance 2. Strength 3. Activity Tolerance Cognitive Skills Affected (resulting in the inability to perform in a timely and safe manner): 1. Executive Function 2. Short Term Recall 3. Long Term Memory 4. Sustained Attention 5. Divided Attention 6. Comprehension Psychosocial Skills Affected: 1. Habits/Routines 2. Environmental Adaptation 3. Self-Awareness 4. Awareness of Others Number of elements that affect the Plan of Care: 5+:  HIGH COMPLEXITY CLINICAL DECISION MAKIN[address] [place] [address] AM-PAC 6 Clicks Daily Activity Inpatient Short Form How much help from another person does the patient currently need. .. Total A Lot A Little None 1. Putting on and taking off regular lower body clothing? [] 1   [] 2   [x] 3   [] 4  
2. Bathing (including washing, rinsing, drying)? [] 1   [] 2   [x] 3   [] 4  
3. Toileting, which includes using toilet, bedpan or urinal?   [] 1   [] 2   [x] 3   [] 4  
4. Putting on and taking off regular upper body clothing? [] 1   [] 2   [] 3   [x] 4  
5. Taking care of personal grooming such as brushing teeth? [] 1   [] 2   [] 3   [x] 4  
6. Eating meals? [] 1   [] 2   [] 3   [x] 4  
© , Trustees of [address] [place] [address], under license to [employer]. All rights reserved Score:  Initial: 21 Most Recent: X (Date: -- ) Interpretation of Tool:  Represents activities that are increasingly more difficult (i.e. Bed mobility, Transfers, Gait). Score 24 23 22-20 19-15 14-10 9-7 6 Modifier CH CI CJ CK CL CM CN   
 
? Self Care:  
  - CURRENT STATUS: CJ - 20%-39% impaired, limited or restricted  - GOAL STATUS: CI - 1%-19% impaired, limited or restricted   - D/C STATUS:  ---------------To be determined--------------- 
 Payor: CARE IMPROVEMENT PLUS [place]ARE IMPR[place] / Product Type: Managed Care Medicare /   
 
Medical Necessity:    
· Patient is expected to demonstrate progress in strength, balance and functional technique to increase independence with ADLs and improve safety during ADLs. Reason for Services/Other Comments: 
· Patient continues to require skilled intervention due to decreased ADLs, safety, and functional performance from baseline. Use of outcome tool(s) and clinical judgement create a POC that gives a: MODERATE COMPLEXITY  
 
 
 
TREATMENT:  
(In addition to Assessment/Re-Assessment sessions the following treatments were rendered) Pre-treatment Symptoms/Complaints:   
Pain: Initial:  
Pain Intensity 1: 0  Post Session:  0 Assessment/Reassessment only, no treatment provided today Braces/Orthotics/Lines/Etc:  
· IV 
· O2 Device: Room air Treatment/Session Assessment:   
· Response to Treatment:  No adverse reaction · Interdisciplinary Collaboration:  
o Occupational Therapist 
o Registered Nurse · After treatment position/precautions:  
o Up in chair 
o Bed alarm/tab alert on 
o Bed/Chair-wheels locked 
o Call light within reach 
o RN notified · Compliance with Program/Exercises: Will assess as treatment progresses. · Recommendations/Intent for next treatment session: \"Next visit will focus on advancements to more challenging activities and reduction in assistance provided\". Total Treatment Duration: OT Patient Time In/Time Out Time In: 3843 Time Out: 2565 Lulú Kessler OT

## 2018-11-19 NOTE — PROGRESS NOTES
Care Management Interventions Palliative Care Criteria Met (RRAT>21 & CHF Dx)?: No(RRAT 16 Dx Cardiac arrest) Transition of Care Consult (CM Consult): Discharge Planning Physical Therapy Consult: Yes Occupational Therapy Consult: Yes Speech Therapy Consult: Yes Current Support Network: Relative's Home(lives with his sister Angela Gunter [phone]) Confirm Follow Up Transport: Family Plan discussed with Pt/Family/Caregiver: Yes Freedom of Choice Offered: Yes Discharge Location Discharge Placement: Skilled nursing facility Met with patient and his 2 nieces for d/c planning Patient alert and oriented to person. Noted history of schizophrenia. His sister Angela Gunter is his POA and caregiver. He lives with his sister who assist with the ADL's at home and is primary care giver. He will need rehab at d/c and list given to family and they have CM number to decide on which facility they would like to go to for rehab. CM following.

## 2018-11-19 NOTE — PROGRESS NOTES
Problem: Mobility Impaired (Adult and Pediatric) Goal: *Acute Goals and Plan of Care (Insert Text) LTG: 
(1.)Mr. Bird will move from supine to sit and sit to supine , scoot up and down and roll side to side in bed with MODIFIED INDEPENDENCE within 7 treatment day(s). (2.)Mr. Dov Gutierrez will transfer from bed to chair and chair to bed with STAND BY ASSIST using the least restrictive device within 7 treatment day(s). (3.)Mr. Bird will ambulate with CONTACT GUARD ASSIST for 75 feet with the least restrictive device within 7 treatment day(s). (4.)Mr. Dov Gutierrez will participate in therapeutic activity/exercises x 23 minutes for increased strength within 7 days. ________________________________________________________________________________________________ PHYSICAL THERAPY: Initial Assessment, Treatment Day: Day of Assessment, AM 11/19/2018INPATIENT: Hospital Day: 7 Payor: CARE IMPROVEMENT PLUS [place] / Product Type: [employer] Medicare /  
  
NAME/AGE/GENDER: Rika Cerda is a 66 y.o. male PRIMARY DIAGNOSIS: Cardiac arrest [facility]) Cardiac arrest [facility]) Cardiac arrest ([facility] [address] ICD-10: Treatment Diagnosis:  
 · Generalized Muscle Weakness (M62.81) · Difficulty in walking, Not elsewhere classified (R26.2) · History of falling (Z91.81) Precaution/Allergies: 
Patient has no known allergies. ASSESSMENT:  
Mr. Dov Gutierrez is a 66 y.o. male in the hospital for the above who was supine in bed upon arrival.  Pt reports that he lives in a one story house with his sister that has 1 steps to enter. Pt also reported that PTA he was independent with ADLs and ambulated with SPC. Pt admitted to a recent fall in the past year. Mr. Dov Gutierrez presents to PT with [facility] and decreased strength in B LEs. During evaluation pt performed bed mobility with SBA demonstrating some impulsive behavior.   Pt stood with Betzaida and poor standing balance. Pt was able to stand pivot to recliner cahir with RW and min-modA. He appears to have some difficulty with more complex command following as well as decreased attention. Mr. Eddi Randhawa could benefit from skilled PT as he is currently functioning below his baseline. In addition to evaluation pt received treatment consisting of therapeutic exercise. Pt required mod-max verbal/visual cues given decreased attention. He also appeared to fatigue quickly and slight tremor noted in L LE. Pt benefited from treatment given decreased strength and activity tolerance. This section established at most recent assessment PROBLEM LIST (Impairments causing functional limitations): 1. Decreased Strength 2. Decreased ADL/Functional Activities 3. Decreased Transfer Abilities 4. Decreased Ambulation Ability/Technique 5. Decreased Balance 6. Decreased Activity Tolerance INTERVENTIONS PLANNED: (Benefits and precautions of physical therapy have been discussed with the patient.) 1. Balance Exercise 2. Bed Mobility 3. Family Education 4. Gait Training 5. Therapeutic Activites 6. Therapeutic Exercise/Strengthening 7. Transfer Training TREATMENT PLAN: Frequency/Duration: 3 times a week for duration of hospital stay Rehabilitation Potential For Stated Goals: Good RECOMMENDED REHABILITATION/EQUIPMENT: (at time of discharge pending progress): Due to the probability of continued deficits (see above) this patient will likely need continued skilled physical therapy after discharge. Equipment:  
? None at this time HISTORY:  
History of Present Injury/Illness (Reason for Referral): 
See H&P Past Medical History/Comorbidities:  
Mr. Eddi Randhawa  has a past medical history of Diabetes ([address] Heart failure ([address] Hypertension, Psychiatric disorder, and Systolic CHF, acute on chronic ([address] Mr. Eddi Randhawa  has a past surgical history that includes hx appendectomy. Social History/Living Environment:  
Home Environment: Private residence # Steps to Enter: 1 One/Two Story Residence: One story Living Alone: No 
Support Systems: Family member(s) Patient Expects to be Discharged to[de-identified] Unknown Current DME Used/Available at Home: Commode, bedside, Shower chair Tub or Shower Type: Tub/Shower combination Prior Level of Function/Work/Activity: 
Pt reported that he lives with his sister in a one story home and was independent with ADLs PTA. Uses SPC for ambulation with recent fall. \  
Number of Personal Factors/Comorbidities that affect the Plan of Care: 3+: HIGH COMPLEXITY EXAMINATION:  
Most Recent Physical Functioning:  
Gross Assessment: 
AROM: Within functional limits Strength: Generally decreased, functional(B hip flexion and dorsiflexion 4/5) Posture: 
  
Balance: 
Sitting: Intact Standing: Impaired Standing - Static: Poor Standing - Dynamic : Poor Bed Mobility: 
Supine to Sit: Stand-by assistance Wheelchair Mobility: 
  
Transfers: 
Sit to Stand: Minimum assistance Stand to Sit: Minimum assistance Bed to Chair: Minimum assistance; Moderate assistance Gait: 
  
   
  
Body Structures Involved: 1. Heart 2. Muscles Body Functions Affected: 1. Cardio 2. Neuromusculoskeletal 
3. Movement Related Activities and Participation Affected: 1. General Tasks and Demands 2. Mobility 3. Domestic Life 4. Community, Social and [place] Number of elements that affect the Plan of Care: 4+: HIGH COMPLEXITY CLINICAL PRESENTATION:  
Presentation: Evolving clinical presentation with changing clinical characteristics: MODERATE COMPLEXITY CLINICAL DECISION MAKIN [facility] [address] [id number] AM-PAC 6 Clicks Basic Mobility Inpatient Short Form How much difficulty does the patient currently have. .. Unable A Lot A Little None 1. Turning over in bed (including adjusting bedclothes, sheets and blankets)? [] 1   [] 2   [] 3   [x] 4 2.  Sitting down on and standing up from a chair with arms ( e.g., wheelchair, bedside commode, etc.)   [] 1   [] 2   [x] 3   [] 4  
3. Moving from lying on back to sitting on the side of the bed? [] 1   [] 2   [] 3   [x] 4 How much help from another person does the patient currently need. .. Total A Lot A Little None 4. Moving to and from a bed to a chair (including a wheelchair)? [] 1   [] 2   [x] 3   [] 4  
5. Need to walk in hospital room? [] 1   [x] 2   [] 3   [] 4  
6. Climbing 3-5 steps with a railing? [] 1   [x] 2   [] 3   [] 4  
© 2007, Trustees of [facility], under license to [employer]. All rights reserved Score:  Initial: 18 Most Recent: X (Date: -- ) Interpretation of Tool:  Represents activities that are increasingly more difficult (i.e. Bed mobility, Transfers, Gait). Score 24 23 22-20 19-15 14-10 9-7 6 Modifier CH CI CJ CK CL CM CN   
 
? Mobility - Walking and Moving Around:  
  - CURRENT STATUS: CK - 40%-59% impaired, limited or restricted  - GOAL STATUS: CJ - 20%-39% impaired, limited or restricted  - D/C STATUS:  ---------------To be determined--------------- Payor: CARE IMPROVEMENT PLUS [place] / Product Type: Managed Care Medicare /   
 
Medical Necessity:    
· Patient demonstrates good rehab potential due to higher previous functional level. Reason for Services/Other Comments: 
· Patient continues to require skilled intervention due to decreased balance and functional mobility. Use of outcome tool(s) and clinical judgement create a POC that gives a: Questionable prediction of patient's progress: MODERATE COMPLEXITY  
  
 
 
 
TREATMENT:  
(In addition to Assessment/Re-Assessment sessions the following treatments were rendered) Pre-treatment Symptoms/Complaints:  None Pain: Initial:  
Pain Intensity 1: 0  Post Session:  0 Therapeutic Exercise: (8 Minutes):  Exercises per grid below to improve strength and activity tolerance. Required minimal visual and verbal cues to promote proper body alignment and promote proper body mechanics. Progressed repetitions and complexity of movement as indicated. Date: 
11/19/18 Date: 
 Date: 
  
ACTIVITY/EXERCISE AM PM AM PM AM PM  
Seated LAQ 2 x 20 B Seated marching 2 x 15 B Seated heel taps 2 x 20 B Seated hip abd/add 1 x 20 B Seated toe taps 2 x 20 B       
        
        
B = bilateral; AA = active assistive; A = active; P = passive Braces/Orthotics/Lines/Etc:  
· O2 Device: Nasal cannula Treatment/Session Assessment:   
· Response to Treatment:  Fairly given decreased attention. · Interdisciplinary Collaboration:  
o Physical Therapist 
o Registered Nurse · After treatment position/precautions:  
o Up in chair 
o Bed alarm/tab alert on 
o Bed/Chair-wheels locked 
o Call light within reach · Compliance with Program/Exercises: Will assess as treatment progresses · Recommendations/Intent for next treatment session: \"Next visit will focus on advancements to more challenging activities and reduction in assistance provided\". Total Treatment Duration: PT Patient Time In/Time Out Time In: 6512 Time Out: 1026 Patrizia Multani, PT, DPT

## 2018-11-19 NOTE — PROGRESS NOTES
[facility] CARDIOLOGY PROGRESS NOTE 
      
 
11/19/2018 6:42 AM 
 
Admit Date: 11/13/2018 Admit Diagnosis: Cardiac arrest ([facility] [address] Subjective: No complaints this AM, no chest pain or shortness of breath, reports he does not think anything is wrong with him Interval History: (History of pertinent interval events obtained from nursing staff) ROS: 
GEN:  No fever or chills Cardiovascular:  As noted above Pulmonary:  As noted above Neuro:  No new focal motor or sensory loss Objective:  
 
Vitals:  
 11/18/18 7200 11/19/18 0113 11/19/18 0257 11/19/18 7872 BP: (!) 87/61 (!) 88/59 94/64 99/64 Pulse: 91 93 94 94 Resp:  18  18 Temp:  98.2 °F (36.8 °C)  98 °F (36.7 °C) SpO2:  99% 100% 98% Weight:    81.6 kg (179 lb 14.4 oz) Height:      
 
 
Physical Exam: 
Cristobal Cancer, Well Nourished, No Acute Distress, Alert & Oriented x 3, appropriate mood. Neck- supple, no JVD 
CV- regular rate and rhythm, distant S1, S2, no MRG Lung- clear bilaterally Abd- soft, nontender, nondistended Ext- no edema bilaterally. Skin- warm and dry Current Facility-Administered Medications Medication Dose Route Frequency  potassium chloride (K-DUR, KLOR-CON) SR tablet 40 mEq  40 mEq Oral DAILY  furosemide (LASIX) tablet 40 mg  40 mg Oral BID  carvedilol (COREG) tablet 6.25 mg  6.25 mg Oral BID WITH MEALS  pantoprazole (PROTONIX) tablet 40 mg  40 mg Oral ACB  acetaminophen (TYLENOL) solution 650 mg  650 mg Oral Q4H PRN  
 tuberculin injection 5 Units  5 Units IntraDERMal ONCE  
 lisinopril (PRINIVIL, ZESTRIL) tablet 2.5 mg  2.5 mg Oral DAILY  amiodarone (CORDARONE) tablet 400 mg  400 mg Oral BID  cloZAPine (CLOZARIL) tablet 200 mg (Patient Supplied)  200 mg Oral DAILY  cloZAPine (CLOZARIL) tablet 500 mg (Patient Supplied)  500 mg Oral QPM  
 polyvinyl alcohol (LIQUIFILM TEARS) 1.4 % ophthalmic solution 1 Drop  1 Drop Both Eyes PRN  
  aspirin chewable tablet 81 mg  81 mg Oral DAILY  sodium chloride (NS) flush 5-10 mL  5-10 mL IntraVENous Q8H  
 sodium chloride (NS) flush 5-10 mL  5-10 mL IntraVENous PRN  
 enoxaparin (LOVENOX) injection 40 mg  40 mg SubCUTAneous Q24H  
 midazolam (VERSED) injection 2 mg  2 mg IntraVENous Q2H PRN Data Review:  
Recent Results (from the past 24 hour(s)) EKG, 12 LEAD, SUBSEQUENT Collection Time: 11/18/18  7:43 AM  
Result Value Ref Range Ventricular Rate 101 BPM  
 Atrial Rate 101 BPM  
 P-R Interval 174 ms QRS Duration 178 ms Q-T Interval 398 ms QTC Calculation (Bezet) 516 ms Calculated P Axis 19 degrees Calculated R Axis -67 degrees Calculated T Axis 100 degrees Diagnosis Sinus tachycardia Left atrial enlargement Left axis deviation Non-specific intra-ventricular conduction block Inferior infarct (cited on or before 14-NOV-2018) Cannot rule out Anterior infarct , age undetermined T wave abnormality, consider lateral ischemia Abnormal ECG When compared with ECG of 17-NOV-2018 10:26, 
Minimal criteria for Anterior infarct are now Present Serial changes of Inferior infarct Present Confirmed by Hilda Zamora MD (), Clayton Munoz ([id number]) on 11/18/2018 9:44:52 AM 
  
MAGNESIUM Collection Time: 11/19/18  5:38 AM  
Result Value Ref Range Magnesium 1.8 1.8 - 2.4 mg/dL METABOLIC PANEL, BASIC Collection Time: 11/19/18  5:38 AM  
Result Value Ref Range Sodium 144 136 - 145 mmol/L Potassium 3.2 (L) 3.5 - 5.1 mmol/L Chloride 109 (H) 98 - 107 mmol/L  
 CO2 29 21 - 32 mmol/L Anion gap 6 (L) 7 - 16 mmol/L Glucose 103 (H) 65 - 100 mg/dL BUN 17 6 - 23 MG/DL Creatinine 1.14 0.8 - 1.5 MG/DL  
 GFR est AA >60 >60 ml/min/1.73m2 GFR est non-AA >60 >60 ml/min/1.73m2 Calcium 8.0 (L) 8.3 - 10.4 MG/DL  
CBC WITH AUTOMATED DIFF Collection Time: 11/19/18  5:38 AM  
Result Value Ref Range WBC 9.5 4.3 - 11.1 K/uL  
 RBC 4.06 (L) 4.23 - 5.6 M/uL HGB 9.5 (L) 13.6 - 17.2 g/dL HCT 29.0 (L) 41.1 - 50.3 % MCV 71.4 (L) 79.6 - 97.8 FL  
 MCH 23.4 (L) 26.1 - 32.9 PG  
 MCHC 32.8 31.4 - 35.0 g/dL  
 RDW 15.9 % PLATELET 298 299 - 015 K/uL MPV 12.0 9.4 - 12.3 FL ABSOLUTE NRBC 0.02 0.0 - 0.2 K/uL  
 DF AUTOMATED NEUTROPHILS 65 43 - 78 % LYMPHOCYTES 20 13 - 44 % MONOCYTES 14 (H) 4.0 - 12.0 % EOSINOPHILS 0 (L) 0.5 - 7.8 % BASOPHILS 0 0.0 - 2.0 % IMMATURE GRANULOCYTES 1 0.0 - 5.0 %  
 ABS. NEUTROPHILS 6.1 1.7 - 8.2 K/UL  
 ABS. LYMPHOCYTES 1.9 0.5 - 4.6 K/UL  
 ABS. MONOCYTES 1.3 0.1 - 1.3 K/UL  
 ABS. EOSINOPHILS 0.0 0.0 - 0.8 K/UL  
 ABS. BASOPHILS 0.0 0.0 - 0.2 K/UL  
 ABS. IMM. GRANS. 0.1 0.0 - 0.5 K/UL EKG:  (EKG has been independently visualized by me with interpretation below) Assessment:  
 
Principal Problem: 
  Cardiac arrest ([facility] (11/13/2018) Active Problems: 
  Type II diabetes mellitus ([facility] (2/4/2016) Acute respiratory failure with hypoxia ([facility] (11/13/2018) Hypotension (11/13/2018) Systolic congestive heart failure ([facility] (11/13/2018) Schizophrenia ([facility] (11/13/2018) Acute renal failure ([facility] (11/13/2018) 66year old with a history of schizophrenia, NICM, admitted after cardiac arrest/VF arrest. As such, he will be an ICD candidate He does have a RBBB with a QRS > 150 msec which is a class IIb indication for a CRT. His EF is 10-20%. Plan: 1. NICM, EF 10%, RBBB with QRS duration 178 msec, NYHA class III: 57yo with NICM as outlined now s/p VF cardiac arrest and meets criteria for ICD implantation. I had a discussion today with the patient regarding the risks, benefits, and alternatives of an implantable cardiac rhythm device. I discussed in detail the potential benefits of ICD therapy, including improved mortality and prevention of SCD.  Pt reports he does not want an ICD, does not think he needs it as he does not think anything is wrong with him. No family at bedside, difficult to get a sense of his mental status this AM. Will discuss with the patient again and will discuss with hospitalist who is also following. 2. NICM: minimal O2 this AM, volume status improved, cont OMT with coreg and lisinopril 3. PPx: lovenox Shameka Melo MD 
Cardiology/Electrophysiology Addendum: 
Fever overnight and left subclavian central line in place, will need central line removed and follow up cultures prior to device implant

## 2018-11-20 ENCOUNTER — APPOINTMENT (OUTPATIENT)
Dept: GENERAL RADIOLOGY | Age: 58
DRG: 226 | End: 2018-11-20
Attending: INTERNAL MEDICINE
Payer: MEDICARE

## 2018-11-20 LAB
ANION GAP SERPL CALC-SCNC: 6 MMOL/L (ref 7–16)
APPEARANCE UR: CLEAR
ATRIAL RATE: 96 BPM
BACTERIA SPEC CULT: NORMAL
BACTERIA SPEC CULT: NORMAL
BASOPHILS # BLD: 0 K/UL (ref 0–0.2)
BASOPHILS # BLD: 0 K/UL (ref 0–0.2)
BASOPHILS NFR BLD: 0 % (ref 0–2)
BASOPHILS NFR BLD: 0 % (ref 0–2)
BILIRUB UR QL: NEGATIVE
BUN SERPL-MCNC: 17 MG/DL (ref 6–23)
CALCIUM SERPL-MCNC: 8.3 MG/DL (ref 8.3–10.4)
CALCULATED P AXIS, ECG09: 62 DEGREES
CALCULATED R AXIS, ECG10: -59 DEGREES
CALCULATED T AXIS, ECG11: 111 DEGREES
CHLORIDE SERPL-SCNC: 106 MMOL/L (ref 98–107)
CO2 SERPL-SCNC: 30 MMOL/L (ref 21–32)
COLOR UR: YELLOW
CREAT SERPL-MCNC: 1.09 MG/DL (ref 0.8–1.5)
DIAGNOSIS, 93000: NORMAL
DIFFERENTIAL METHOD BLD: ABNORMAL
DIFFERENTIAL METHOD BLD: ABNORMAL
EOSINOPHIL # BLD: 0 K/UL (ref 0–0.8)
EOSINOPHIL # BLD: 0 K/UL (ref 0–0.8)
EOSINOPHIL NFR BLD: 0 % (ref 0.5–7.8)
EOSINOPHIL NFR BLD: 0 % (ref 0.5–7.8)
ERYTHROCYTE [DISTWIDTH] IN BLOOD BY AUTOMATED COUNT: 16 %
ERYTHROCYTE [DISTWIDTH] IN BLOOD BY AUTOMATED COUNT: 16.3 %
GLUCOSE BLD STRIP.AUTO-MCNC: 104 MG/DL (ref 65–100)
GLUCOSE BLD STRIP.AUTO-MCNC: 117 MG/DL (ref 65–100)
GLUCOSE BLD STRIP.AUTO-MCNC: 128 MG/DL (ref 65–100)
GLUCOSE BLD STRIP.AUTO-MCNC: 97 MG/DL (ref 65–100)
GLUCOSE SERPL-MCNC: 100 MG/DL (ref 65–100)
GLUCOSE UR STRIP.AUTO-MCNC: NEGATIVE MG/DL
HCT VFR BLD AUTO: 30 % (ref 41.1–50.3)
HCT VFR BLD AUTO: 31.7 % (ref 41.1–50.3)
HGB BLD-MCNC: 10.2 G/DL (ref 13.6–17.2)
HGB BLD-MCNC: 9.8 G/DL (ref 13.6–17.2)
HGB UR QL STRIP: NEGATIVE
IMM GRANULOCYTES # BLD: 0.1 K/UL (ref 0–0.5)
IMM GRANULOCYTES # BLD: 0.1 K/UL (ref 0–0.5)
IMM GRANULOCYTES NFR BLD AUTO: 1 % (ref 0–5)
IMM GRANULOCYTES NFR BLD AUTO: 1 % (ref 0–5)
KETONES UR QL STRIP.AUTO: NEGATIVE MG/DL
LEUKOCYTE ESTERASE UR QL STRIP.AUTO: NEGATIVE
LYMPHOCYTES # BLD: 2 K/UL (ref 0.5–4.6)
LYMPHOCYTES # BLD: 2 K/UL (ref 0.5–4.6)
LYMPHOCYTES NFR BLD: 18 % (ref 13–44)
LYMPHOCYTES NFR BLD: 19 % (ref 13–44)
MAGNESIUM SERPL-MCNC: 1.9 MG/DL (ref 1.8–2.4)
MCH RBC QN AUTO: 23.5 PG (ref 26.1–32.9)
MCH RBC QN AUTO: 23.6 PG (ref 26.1–32.9)
MCHC RBC AUTO-ENTMCNC: 32.2 G/DL (ref 31.4–35)
MCHC RBC AUTO-ENTMCNC: 32.7 G/DL (ref 31.4–35)
MCV RBC AUTO: 72.3 FL (ref 79.6–97.8)
MCV RBC AUTO: 73 FL (ref 79.6–97.8)
MM INDURATION POC: 0 MM (ref 0–5)
MONOCYTES # BLD: 1.1 K/UL (ref 0.1–1.3)
MONOCYTES # BLD: 1.5 K/UL (ref 0.1–1.3)
MONOCYTES NFR BLD: 10 % (ref 4–12)
MONOCYTES NFR BLD: 14 % (ref 4–12)
NEUTS SEG # BLD: 7.3 K/UL (ref 1.7–8.2)
NEUTS SEG # BLD: 7.5 K/UL (ref 1.7–8.2)
NEUTS SEG NFR BLD: 67 % (ref 43–78)
NEUTS SEG NFR BLD: 71 % (ref 43–78)
NITRITE UR QL STRIP.AUTO: NEGATIVE
NRBC # BLD: 0 K/UL (ref 0–0.2)
NRBC # BLD: 0 K/UL (ref 0–0.2)
P-R INTERVAL, ECG05: 192 MS
PH UR STRIP: 5.5 [PH] (ref 5–9)
PLATELET # BLD AUTO: 141 K/UL (ref 150–450)
PLATELET # BLD AUTO: 177 K/UL (ref 150–450)
PMV BLD AUTO: 12 FL (ref 9.4–12.3)
PMV BLD AUTO: 12.3 FL (ref 9.4–12.3)
POTASSIUM SERPL-SCNC: 3.5 MMOL/L (ref 3.5–5.1)
PPD POC: NEGATIVE NEGATIVE
PROT UR STRIP-MCNC: NEGATIVE MG/DL
Q-T INTERVAL, ECG07: 426 MS
QRS DURATION, ECG06: 170 MS
QTC CALCULATION (BEZET), ECG08: 538 MS
RBC # BLD AUTO: 4.15 M/UL (ref 4.23–5.6)
RBC # BLD AUTO: 4.34 M/UL (ref 4.23–5.6)
SERVICE CMNT-IMP: NORMAL
SERVICE CMNT-IMP: NORMAL
SODIUM SERPL-SCNC: 142 MMOL/L (ref 136–145)
SP GR UR REFRACTOMETRY: 1.01 (ref 1–1.02)
UROBILINOGEN UR QL STRIP.AUTO: 1 EU/DL (ref 0.2–1)
VENTRICULAR RATE, ECG03: 96 BPM
WBC # BLD AUTO: 10.7 K/UL (ref 4.3–11.1)
WBC # BLD AUTO: 10.9 K/UL (ref 4.3–11.1)

## 2018-11-20 PROCEDURE — 74011250637 HC RX REV CODE- 250/637: Performed by: INTERNAL MEDICINE

## 2018-11-20 PROCEDURE — 74011250637 HC RX REV CODE- 250/637: Performed by: HOSPITALIST

## 2018-11-20 PROCEDURE — 85025 COMPLETE CBC W/AUTO DIFF WBC: CPT

## 2018-11-20 PROCEDURE — 71046 X-RAY EXAM CHEST 2 VIEWS: CPT

## 2018-11-20 PROCEDURE — 93005 ELECTROCARDIOGRAM TRACING: CPT | Performed by: INTERNAL MEDICINE

## 2018-11-20 PROCEDURE — 81003 URINALYSIS AUTO W/O SCOPE: CPT

## 2018-11-20 PROCEDURE — 82962 GLUCOSE BLOOD TEST: CPT

## 2018-11-20 PROCEDURE — 80048 BASIC METABOLIC PNL TOTAL CA: CPT

## 2018-11-20 PROCEDURE — 83735 ASSAY OF MAGNESIUM: CPT

## 2018-11-20 PROCEDURE — 36415 COLL VENOUS BLD VENIPUNCTURE: CPT

## 2018-11-20 PROCEDURE — 65660000000 HC RM CCU STEPDOWN

## 2018-11-20 PROCEDURE — 87040 BLOOD CULTURE FOR BACTERIA: CPT

## 2018-11-20 RX ORDER — AMOXICILLIN AND CLAVULANATE POTASSIUM 875; 125 MG/1; MG/1
1 TABLET, FILM COATED ORAL 2 TIMES DAILY WITH MEALS
Status: DISPENSED | OUTPATIENT
Start: 2018-11-20 | End: 2018-11-29

## 2018-11-20 RX ORDER — HEPARIN SODIUM 5000 [USP'U]/ML
5000 INJECTION, SOLUTION INTRAVENOUS; SUBCUTANEOUS EVERY 12 HOURS
Status: DISCONTINUED | OUTPATIENT
Start: 2018-11-21 | End: 2018-11-27

## 2018-11-20 RX ADMIN — CLOZAPINE 500 MG: 100 TABLET ORAL at 17:24

## 2018-11-20 RX ADMIN — FUROSEMIDE 40 MG: 40 TABLET ORAL at 09:04

## 2018-11-20 RX ADMIN — CARVEDILOL 6.25 MG: 6.25 TABLET, FILM COATED ORAL at 10:50

## 2018-11-20 RX ADMIN — AMIODARONE HYDROCHLORIDE 400 MG: 200 TABLET ORAL at 20:43

## 2018-11-20 RX ADMIN — Medication 10 ML: at 22:00

## 2018-11-20 RX ADMIN — AMOXICILLIN AND CLAVULANATE POTASSIUM 1 TABLET: 875; 125 TABLET, FILM COATED ORAL at 20:43

## 2018-11-20 RX ADMIN — LISINOPRIL 2.5 MG: 5 TABLET ORAL at 12:00

## 2018-11-20 RX ADMIN — AMIODARONE HYDROCHLORIDE 400 MG: 200 TABLET ORAL at 08:48

## 2018-11-20 RX ADMIN — PANTOPRAZOLE SODIUM 40 MG: 40 TABLET, DELAYED RELEASE ORAL at 08:50

## 2018-11-20 RX ADMIN — POTASSIUM CHLORIDE 40 MEQ: 20 TABLET, EXTENDED RELEASE ORAL at 08:50

## 2018-11-20 RX ADMIN — CLOZAPINE 200 MG: 100 TABLET ORAL at 09:03

## 2018-11-20 RX ADMIN — ASPIRIN 81 MG 81 MG: 81 TABLET ORAL at 08:50

## 2018-11-20 RX ADMIN — Medication 10 ML: at 12:01

## 2018-11-20 RX ADMIN — CARVEDILOL 6.25 MG: 6.25 TABLET, FILM COATED ORAL at 17:23

## 2018-11-20 RX ADMIN — ACETAMINOPHEN 650 MG: 325 SOLUTION ORAL at 17:23

## 2018-11-20 RX ADMIN — Medication 5 ML: at 05:45

## 2018-11-20 RX ADMIN — FUROSEMIDE 40 MG: 40 TABLET ORAL at 17:23

## 2018-11-20 NOTE — PROGRESS NOTES
SPEECH PATHOLOGY NOTE: 
 
Speech attempt this AM. Patient remains NPO. Will follow up at later time/date once cleared for po intake.   
 
[address] CCC-SLP

## 2018-11-20 NOTE — PROGRESS NOTES
Verbal bedside report received from Sruthi Proctor, [facility]. Assumed care of patient.

## 2018-11-20 NOTE — PROGRESS NOTES
Bedside and Verbal shift change report given to self (oncoming nurse) by Panda Canela RN (offgoing nurse). Report included the following information SBAR, Kardex, MAR and Recent Results.

## 2018-11-20 NOTE — PROGRESS NOTES
PROGRESS NOTE I was paged in regards to the patient having a fever. He was asymptomatic. Since this is his second fever in 3 days and it is part of what he was admitted with, I will work it up. Check blood cultures Check CXR. Check UA. Previous work up totally negative except sputum culture. Got Levaquin for 3 days. Sputum culture on 11/15 while he was intubated is growing heavy Strep Pneumoniae. I will start empiric Augmentin based on these results after blood cultures are taken. Will attempt to contact EP so they are aware.  
 
Stephanie Jacobson, DO

## 2018-11-20 NOTE — PROGRESS NOTES
Problem: Nutrition Deficit Goal: *Optimize nutritional status Nutrition - F/U Assessment: 
Diet: 11/13 NPO, 11/18 regular then NPO, 11/19 regular then NPO, 11/20 cardiac Pt does not voice any barriers to oral intake (n/v/d, difficulty chewing/swallowing). Per PCT, pt ate 100% of lunch. Note decision regarding ICD placement ongoing. Anthropometrics: Ht - 5'10\", wgt - 82.5 kg (3rd floor bed 11/20/18) Macronutrient Needs: 
Estimated calorie needs - 4305-5051 bonny/day (20-25 bonny/kg/day) Estimated protein needs - 60-90 gm pro/day (0.8-1.2 gm pro/kgIBW/day) (GFR >60 ml/min) Intake/Comparative Standards: Two recorded meal intakes while pt has not been NPO, averaging 88%, potentially meeting 100% of EEN and est. PRO needs. Intervention: 
Meals and Snacks: Cardiac Coordination of Care: Carley, PCT Nutrition Discharge Plan: Too soon to determine. Daniele Manuel [address], [address] [place] [address], [address] [place] [address], [phone]

## 2018-11-20 NOTE — PROGRESS NOTES
Hospitalist Progress Note Patient: Gisele Santana MRN: [id number]  SSN: [id number] YOB: 1960  Age: 66 y.o. Sex: male Admit Date: 11/13/2018 LOS: 7 days Subjective:  
 
From previous notes: \"66year-old male with a past medical history of CHF with EF of 20-25%, non-insulin diabetes mellitus, schizophrenia admitted to the ICU on 11/13/2018 after he had a cardiac arrest at the [facility]. Vane Clinton had a witnessed collapse in the staff initiated CPR, right ear was placed which showed shockable rhythm which was performed and there was a spontaneous return of circulation, patient had another cardiac arrest in the ER with DAYANNA Crowley was admitted to the ICU he was placed on hypothermia protocol, patient is currently awake and following commands.  An echocardiogram showed EF of 10-20%, patient was briefly sent over to cardiac telemetry floor where he went into acute flash pulmonary edema and he was placed on a BiPAP and currently weaned back to nasal cannula.  Hospitalist service was consulted for medical management and taking over the care from ICU service. Vane Clinton was seen this morning, he is awake, not in any acute distress, he is off amiodarone drip on 3 L of oxygen. \" He had a fever of 101.3F on 11/18 so ICD placement was postponed. 11/20 - The patient is pleasant. No acute complaints. Denies CP/SOB. Denies F/C. Review of systems negative except stated above. Objective:  
 
Visit Vitals BP 92/65 Pulse 91 Temp 98 °F (36.7 °C) Resp 18 Ht 5' 10\" (1.778 m) Comment: per EMR Wt 82.5 kg (181 lb 12.8 oz) SpO2 98% BMI 26.09 kg/m² Oxygen Therapy O2 Sat (%): 98 % (11/20/18 1340) Pulse via Oximetry: 99 beats per minute (11/18/18 0731) O2 Device: Room air (11/20/18 1340) O2 Flow Rate (L/min): 3 l/min (11/20/18 0805) FIO2 (%): 30 % (11/17/18 2028) Intake and Output:  
 
Intake/Output Summary (Last 24 hours) at 11/20/2018 1500 Last data filed at 11/20/2018 1432 Gross per 24 hour Intake 1200 ml Output 1200 ml Net 0 ml Physical Exam:  
GENERAL: alert, cooperative, no distress, appears stated age EYE: conjunctivae/corneas clear. PERRL. THROAT & NECK: normal and no erythema or exudates noted. LUNG: clear to auscultation bilaterally HEART: regular rate and rhythm, S1S2, no murmur, no JVD ABDOMEN: soft, non-tender, non-distended. Bowel sounds normal.  
EXTREMITIES:  No edema, 2+ pedal/radial pulses bilaterally SKIN: no rash or abnormalities NEUROLOGIC: Alert. Cranial nerves 2-12 grossly intact. Lab/Data Review: 
Recent Results (from the past 24 hour(s)) GLUCOSE, POC Collection Time: 11/19/18  9:45 PM  
Result Value Ref Range Glucose (POC) 128 (H) 65 - 100 mg/dL PLEASE READ & DOCUMENT PPD TEST IN 24 HRS Collection Time: 11/19/18 10:05 PM  
Result Value Ref Range PPD Negative Negative  
 mm Induration 0 mm METABOLIC PANEL, BASIC Collection Time: 11/20/18  3:35 AM  
Result Value Ref Range Sodium 142 136 - 145 mmol/L Potassium 3.5 3.5 - 5.1 mmol/L Chloride 106 98 - 107 mmol/L  
 CO2 30 21 - 32 mmol/L Anion gap 6 (L) 7 - 16 mmol/L Glucose 100 65 - 100 mg/dL BUN 17 6 - 23 MG/DL Creatinine 1.09 0.8 - 1.5 MG/DL  
 GFR est AA >60 >60 ml/min/1.73m2 GFR est non-AA >60 >60 ml/min/1.73m2 Calcium 8.3 8.3 - 10.4 MG/DL MAGNESIUM Collection Time: 11/20/18  3:35 AM  
Result Value Ref Range Magnesium 1.9 1.8 - 2.4 mg/dL CBC WITH AUTOMATED DIFF Collection Time: 11/20/18  3:35 AM  
Result Value Ref Range WBC 10.9 4.3 - 11.1 K/uL  
 RBC 4.15 (L) 4.23 - 5.6 M/uL HGB 9.8 (L) 13.6 - 17.2 g/dL HCT 30.0 (L) 41.1 - 50.3 % MCV 72.3 (L) 79.6 - 97.8 FL  
 MCH 23.6 (L) 26.1 - 32.9 PG  
 MCHC 32.7 31.4 - 35.0 g/dL  
 RDW 16.0 % PLATELET 409 (L) 633 - 450 K/uL MPV 12.0 9.4 - 12.3 FL ABSOLUTE NRBC 0.00 0.0 - 0.2 K/uL  
 DF AUTOMATED NEUTROPHILS 67 43 - 78 % LYMPHOCYTES 18 13 - 44 % MONOCYTES 14 (H) 4.0 - 12.0 % EOSINOPHILS 0 (L) 0.5 - 7.8 % BASOPHILS 0 0.0 - 2.0 % IMMATURE GRANULOCYTES 1 0.0 - 5.0 %  
 ABS. NEUTROPHILS 7.3 1.7 - 8.2 K/UL  
 ABS. LYMPHOCYTES 2.0 0.5 - 4.6 K/UL  
 ABS. MONOCYTES 1.5 (H) 0.1 - 1.3 K/UL  
 ABS. EOSINOPHILS 0.0 0.0 - 0.8 K/UL  
 ABS. BASOPHILS 0.0 0.0 - 0.2 K/UL  
 ABS. IMM. GRANS. 0.1 0.0 - 0.5 K/UL GLUCOSE, POC Collection Time: 11/20/18  6:32 AM  
Result Value Ref Range Glucose (POC) 104 (H) 65 - 100 mg/dL EKG, 12 LEAD, SUBSEQUENT Collection Time: 11/20/18  7:11 AM  
Result Value Ref Range Ventricular Rate 96 BPM  
 Atrial Rate 96 BPM  
 P-R Interval 192 ms QRS Duration 170 ms Q-T Interval 426 ms  
 QTC Calculation (Bezet) 538 ms Calculated P Axis 62 degrees Calculated R Axis -59 degrees Calculated T Axis 111 degrees Diagnosis Normal sinus rhythm Left atrial enlargement Left axis deviation Non-specific intra-ventricular conduction delay Inferior infarct (cited on or before 14-NOV-2018) Anterolateral infarct , age undetermined Abnormal ECG When compared with ECG of 19-NOV-2018 07:00, No significant change was found Confirmed by [facility]  MD ()KWAN ([id number]) on 11/20/2018 10:57:23 AM 
  
GLUCOSE, POC Collection Time: 11/20/18 10:34 AM  
Result Value Ref Range Glucose (POC) 97 65 - 100 mg/dL Imaging: Xr Chest Sngl V Result Date: 11/17/2018 IMPRESSION: Flash pulmonary edema suspected. Xr Chest Sngl V Result Date: 11/13/2018 IMPRESSION: Endotracheal tube advancement with tip overlying the tracheal air column at the level of the clavicular heads. Unchanged positioning of support lines and tubes. Unchanged bibasilar atelectasis and pulmonary venous vascular congestion. VOICE DICTATED BY: Dr. Geoff García Xr Chest Sngl V Result Date: 11/13/2018 IMPRESSION: ET tube tip well above the clavicles.  It could be advanced 5 to 6 cm. NG tube tip could also be advanced 5 to 10 cm. There is only mild central pulmonary vascular prominence. No pneumothorax status post left-sided subclavian central venous catheter placement. Xr Abd (kub) Result Date: 2018 IMPRESSION: Enteric tube tip overlies the gastric body, advancing from the prior abdominal x-ray. VOICE DICTATED BY: Dr. Ira Espinoza Xr Abd (kub) Result Date: 2018 IMPRESSION: Injury tube tip is in proximal stomach. The sidehole is near the GE junction. Advancing 5 to 10 cm recommended. Moderate amount of intestinal gas. Xr Chest [facility] Result Date: 2018 IMPRESSION: Vascular congestion. No significant change. Xr Chest [facility] Result Date: 2018 IMPRESSION: 1. Extubation. 2. Bilateral perihilar densities likely due to vascular congestion. Findings similar to prior exam. 
 
Xr Chest [facility] Result Date: 2018 IMPRESSION:  Unchanged left lung base atelectasis or infiltrate. Results for orders placed or performed during the hospital encounter of 18  
2D ECHO COMPLETE ADULT (TTE) W OR WO CONTR Narrative [address] [place] [address] Dr Verma, [address] [facility] 
[phone] Transthoracic Echocardiogram 
2D, M-mode, Doppler, and Color Doppler Patient: Pam Wray 
MR #: [id number] : 1960 Age: 66 years Gender: Male Study date: 2018 Account #: [de-identified] Height: 69 in 
Weight: 180.6 lb 
BSA: 1.98 mï¾² Status:Routine Location: 3305 BP: 105/ 71 Allergies: NO KNOWN ALLERGENS Sonographer:  [facility] Group:  [facility] Referring Physician:  Sophie Canales MD 
Reading Physician:  Carolyn Alejandra MD 
 
INDICATIONS: Heart Failure *Imaging done with pt supine, pt is on vent, imaging was respiratory 
dependent. * PROCEDURE: This was a routine study. A transthoracic echocardiogram was performed. The study included complete 2D imaging, M-mode, complete spectral 
Doppler, and color Doppler. Intravenous contrast (Definity) was administered. Image quality was adequate. LEFT VENTRICLE: The ventricle was markedly dilated. Systolic function was 
markedly reduced. Ejection fraction was estimated in the range of 10 % to 20  
%. [place] was akinetic. Hyperechoic structures noted in apex in 2D images,  
shadowing 
noted in Definity images, can not rule out the presence of thrombus. There  
was 
severe diffuse hypokinesis with regional variation. Wall thickness was at the 
upper limits of normal. The study was not technically sufficient to allow 
evaluation of LV diastolic function. Average E/e'~ 13. RIGHT VENTRICLE: The size was normal. Systolic function was normal. The 
tricuspid jet envelope definition was inadequate for estimation of RV  
systolic 
pressure. LEFT ATRIUM: The atrium was moderately dilated. RIGHT ATRIUM: Size was normal. 
 
SYSTEMIC VEINS: IVC: The inferior vena cava was dilated. The respirophasic 
change in diameter was less than 50%. AORTIC VALVE: The valve was not well visualized. There was no evidence for 
stenosis. There was trivial regurgitation. MITRAL VALVE: Valve structure was normal. There was no evidence for stenosis. There was moderate regurgitation. TRICUSPID VALVE: The valve structure was normal. There was no evidence for 
stenosis. There was trivial regurgitation. PULMONIC VALVE: Not well visualized. There was no evidence for stenosis. There 
was no insufficiency. PERICARDIUM: There was no pericardial effusion. AORTA: The root exhibited normal size. SUMMARY: 
 
-  Left ventricle: The ventricle was markedly dilated. Systolic function was 
markedly reduced. Ejection fraction was estimated in the range of 10 % to 20  
%. [place] was akinetic. Hyperechoic structures noted in apex in 2D images,  
shadowing noted in Definity images, can not rule out the presence of thrombus. There  
was 
severe diffuse hypokinesis with regional variation. Wall thickness was at the 
upper limits of normal. 
 
-  Left atrium: The atrium was moderately dilated. -  Inferior vena cava, hepatic veins: The inferior vena cava was dilated. The 
respirophasic change in diameter was less than 50%. -  Mitral valve: There was moderate regurgitation. SYSTEM MEASUREMENT TABLES 
 
2D mode AoR Diam (2D): 2.8 cm 
LA Dimension (2D): 4 cm IVS/LVPW (2D): 1 IVSd (2D): 1.1 cm 
LVIDd (2D): 6.5 cm LVIDs (2D): 5.7 cm 
LVOT Area (2D): 3.1 cm2 LVPWd (2D): 1.2 cm Tissue Doppler Imaging LV Peak Early Zendejas Tissue Zack; Mean; Lateral MA (TDI): 14 cm/s LV Peak Early Zendejas Tissue Zack; Medial MA (TDI): 8.8 cm/s Unspecified Scan Mode Peak Grad; Mean; Antegrade Flow: 8 mm[Hg] Vmax; Antegrade Flow: 140 cm/s LVOT Diam: 2 cm 
MV Peak Zack/LV Peak Tissue Zack E-Wave; Lateral MA: 9.6 MV Peak Zack/LV Peak Tissue Zack E-Wave; Medial MA: 15.2 Peak Grad; Mean; Antegrade Flow: 12 mm[Hg] Vmax; Antegrade Flow: 171 cm/s Prepared and signed by 
 
Celi Reyes MD 
Signed [id number] 17:47:43 Cultures: All Micro Results Procedure Component Value Units Date/Time CULTURE, BLOOD [[id number]] Collected:  11/15/18 1247 Order Status:  Completed Specimen:  Blood Updated:  11/20/18 6728 Special Requests: --     
  LEFT 
HAND Culture result: NO GROWTH 5 DAYS     
 CULTURE, BLOOD [[id number]] Collected:  11/15/18 1238 Order Status:  Completed Specimen:  Blood Updated:  11/20/18 4818 Special Requests: --     
  RIGHT 
ARM Culture result: NO GROWTH 5 DAYS     
 CULTURE, RESPIRATORY/SPUTUM/BRONCH Kaley Flatten STAIN [[id number]]  (Abnormal)  (Susceptibility) Collected:  11/15/18 1207 Order Status:  Completed Specimen:  Sputum,ET Suction Updated:  11/19/18 7751 Special Requests: NO SPECIAL REQUESTS GRAM STAIN MANY GRAM POSITIVE COCCI     
   FEW GRAM NEGATIVE RODS     
   50 TO 79 WBC'S SEEN PER OIF  
   NO EPITHELIAL CELLS SEEN     
   3+ MUCUS PRESENT Culture result:    
  HEAVY STREPTOCOCCUS PNEUMONIAE  
     
      
  LIGHT NORMAL RESPIRATORY MARYELLEN Assessment/Plan:  
 
Principal Problem: 
  Cardiac arrest ([facility] (11/13/2018) - EF 10% 
- Continue Amiodarone - To get ICD tomorrow Active Problems: 
  Acute respiratory failure with hypoxia ([address] (11/13/2018) - Due to fluid overload - Continue Furosemide - Wean oxygen as appropriate Systolic congestive heart failure ([address] (11/13/2018) - EF 10% 
- Continue Furosemide - Continue Coreg 
- Continue Lisinopril Acute renal failure ([facility] (11/13/2018) - Resolved Hypotension (11/13/2018) - Resolved Type II diabetes mellitus ([facility] (2/4/2016) - No acute issues - Continue Humalog SSI Schizophrenia ([facility] (11/13/2018) - Continue home Clozaril Dispo - To get ICD tomorrow. Wean oxygen. Hopefully discharge in next 2-3 days. DIET CARDIAC Regular DVT Prophylaxis: Lovenox Signed By: Karen Rodriguez DO November 20, 2018

## 2018-11-20 NOTE — PROGRESS NOTES
Verbal bedside report received from [facility], [facility], [address] [facility]. Assumed care of patient.

## 2018-11-20 NOTE — PROGRESS NOTES
Bedside and Verbal shift change report given to [address] [place] (oncoming nurse) by self (offgoing nurse). Report included the following information SBAR, Kardex, Intake/Output, MAR, Recent Results and Med Rec Status.

## 2018-11-20 NOTE — PROGRESS NOTES
PT Note: 
Treatment attempted this AM but pt reported he did not want to get out of bed. Will re-attempt pending schedule and pt status. Thanks, Sakshi Dunbar, PT, DPT

## 2018-11-20 NOTE — PROGRESS NOTES
Spoke to Cam Ards, NP regarding pts BP 88/58, okay to give po lasix, hold coreg till 1000 & lisinopril to 1200. Reevaluate BP then.

## 2018-11-20 NOTE — PROGRESS NOTES
[facility] CARDIOLOGY PROGRESS NOTE 
      
 
11/20/2018 6:37 AM 
 
Admit Date: 11/13/2018 Admit Diagnosis: Cardiac arrest ([facility] Subjective: No complaints this AM, no chest pain or shortness of breath Interval History: (History of pertinent interval events obtained from nursing staff) ROS: 
GEN:  No fever or chills Cardiovascular:  As noted above Pulmonary:  As noted above Neuro:  No new focal motor or sensory loss Objective:  
 
Vitals:  
 11/19/18 2121 11/19/18 2225 11/20/18 0052 11/20/18 0457 BP: 91/55 96/63 92/60 98/62 Pulse: 95 95 92 96 Resp: 18 18 18 Temp: 98.6 °F (37 °C)  99.5 °F (37.5 °C) 99.2 °F (37.3 °C) SpO2: 96%  98% 96% Weight:    82.5 kg (181 lb 12.8 oz) Height:      
 
 
Physical Exam: 
Ceola Castellano, Well Nourished, No Acute Distress, Alert & Oriented x 3, appropriate mood. Neck- supple, no JVD 
CV- regular rate and rhythm no MRG Lung- clear bilaterally Abd- soft, nontender, nondistended Ext- no edema bilaterally. Skin- warm and dry Current Facility-Administered Medications Medication Dose Route Frequency  insulin lispro (HUMALOG) injection   SubCUTAneous AC&HS  
 dextrose 40% (GLUTOSE) oral gel 1 Tube  15 g Oral PRN  
 glucagon (GLUCAGEN) injection 1 mg  1 mg IntraMUSCular PRN  
 dextrose (D50W) injection syrg 12.5-25 g  25-50 mL IntraVENous PRN  potassium chloride (K-DUR, KLOR-CON) SR tablet 40 mEq  40 mEq Oral DAILY  furosemide (LASIX) tablet 40 mg  40 mg Oral BID  carvedilol (COREG) tablet 6.25 mg  6.25 mg Oral BID WITH MEALS  pantoprazole (PROTONIX) tablet 40 mg  40 mg Oral ACB  acetaminophen (TYLENOL) solution 650 mg  650 mg Oral Q4H PRN  
 lisinopril (PRINIVIL, ZESTRIL) tablet 2.5 mg  2.5 mg Oral DAILY  amiodarone (CORDARONE) tablet 400 mg  400 mg Oral BID  cloZAPine (CLOZARIL) tablet 200 mg (Patient Supplied)  200 mg Oral DAILY  cloZAPine (CLOZARIL) tablet 500 mg (Patient Supplied)  500 mg Oral QPM  
 polyvinyl alcohol (LIQUIFILM TEARS) 1.4 % ophthalmic solution 1 Drop  1 Drop Both Eyes PRN  
 aspirin chewable tablet 81 mg  81 mg Oral DAILY  sodium chloride (NS) flush 5-10 mL  5-10 mL IntraVENous Q8H  
 sodium chloride (NS) flush 5-10 mL  5-10 mL IntraVENous PRN  
 enoxaparin (LOVENOX) injection 40 mg  40 mg SubCUTAneous Q24H  
 midazolam (VERSED) injection 2 mg  2 mg IntraVENous Q2H PRN Data Review:  
Recent Results (from the past 24 hour(s)) EKG, 12 LEAD, INITIAL Collection Time: 11/19/18  7:00 AM  
Result Value Ref Range Ventricular Rate 96 BPM  
 Atrial Rate 96 BPM  
 P-R Interval 240 ms QRS Duration 152 ms Q-T Interval 426 ms  
 QTC Calculation (Bezet) 538 ms Calculated P Axis 26 degrees Calculated R Axis -66 degrees Calculated T Axis 98 degrees Diagnosis Sinus rhythm with 1st degree A-V block Possible Left atrial enlargement Left axis deviation Left bundle branch block Inferior infarct (cited on or before 14-NOV-2018) T wave abnormality, consider anterolateral ischemia Abnormal ECG When compared with ECG of 18-NOV-2018 07:43, 
WV interval has increased Minimal criteria for Anterior infarct are no longer Present Confirmed by KAREEM WAGONER (), Dov Pereyra ([id number]) on 11/19/2018 11:11:22 AM 
  
GLUCOSE, POC Collection Time: 11/19/18  9:45 PM  
Result Value Ref Range Glucose (POC) 128 (H) 65 - 100 mg/dL PLEASE READ & DOCUMENT PPD TEST IN 24 HRS Collection Time: 11/19/18 10:05 PM  
Result Value Ref Range PPD Negative Negative  
 mm Induration 0 mm METABOLIC PANEL, BASIC Collection Time: 11/20/18  3:35 AM  
Result Value Ref Range Sodium 142 136 - 145 mmol/L Potassium 3.5 3.5 - 5.1 mmol/L Chloride 106 98 - 107 mmol/L  
 CO2 30 21 - 32 mmol/L Anion gap 6 (L) 7 - 16 mmol/L Glucose 100 65 - 100 mg/dL BUN 17 6 - 23 MG/DL  Creatinine 1.09 0.8 - 1.5 MG/DL  
 GFR est AA >60 >60 ml/min/1.73m2 GFR est non-AA >60 >60 ml/min/1.73m2 Calcium 8.3 8.3 - 10.4 MG/DL MAGNESIUM Collection Time: 11/20/18  3:35 AM  
Result Value Ref Range Magnesium 1.9 1.8 - 2.4 mg/dL CBC WITH AUTOMATED DIFF Collection Time: 11/20/18  3:35 AM  
Result Value Ref Range WBC 10.9 4.3 - 11.1 K/uL  
 RBC 4.15 (L) 4.23 - 5.6 M/uL HGB 9.8 (L) 13.6 - 17.2 g/dL HCT 30.0 (L) 41.1 - 50.3 % MCV 72.3 (L) 79.6 - 97.8 FL  
 MCH 23.6 (L) 26.1 - 32.9 PG  
 MCHC 32.7 31.4 - 35.0 g/dL  
 RDW 16.0 % PLATELET 840 (L) 811 - 450 K/uL MPV 12.0 9.4 - 12.3 FL ABSOLUTE NRBC 0.00 0.0 - 0.2 K/uL  
 DF AUTOMATED NEUTROPHILS 67 43 - 78 % LYMPHOCYTES 18 13 - 44 % MONOCYTES 14 (H) 4.0 - 12.0 % EOSINOPHILS 0 (L) 0.5 - 7.8 % BASOPHILS 0 0.0 - 2.0 % IMMATURE GRANULOCYTES 1 0.0 - 5.0 %  
 ABS. NEUTROPHILS 7.3 1.7 - 8.2 K/UL  
 ABS. LYMPHOCYTES 2.0 0.5 - 4.6 K/UL  
 ABS. MONOCYTES 1.5 (H) 0.1 - 1.3 K/UL  
 ABS. EOSINOPHILS 0.0 0.0 - 0.8 K/UL  
 ABS. BASOPHILS 0.0 0.0 - 0.2 K/UL  
 ABS. IMM. GRANS. 0.1 0.0 - 0.5 K/UL EKG:  (EKG has been independently visualized by me with interpretation below) Assessment:  
 
Principal Problem: 
  Cardiac arrest ([facility] (11/13/2018) Active Problems: 
  Type II diabetes mellitus ([facility] (2/4/2016) Acute respiratory failure with hypoxia ([facility] (11/13/2018) Hypotension (11/13/2018) Systolic congestive heart failure ([facility] (11/13/2018) Schizophrenia ([facility] (11/13/2018) Acute renal failure ([facility] (11/13/2018) 66year old with a history of schizophrenia, NICM, admitted after cardiac arrest/VF arrest. As such, he will be an ICD candidate He does have a RBBB with a QRS > 150 msec which is a class IIb indication for a CRT. His EF is 10-20%. Plan: 1.   NICM, EF 10%, RBBB with QRS duration 178 msec, NYHA class III: 57yo with NICM as outlined now s/p VF cardiac arrest and meets criteria for ICD implantation. I had a discussion today with the patient regarding the risks, benefits, and alternatives of an implantable cardiac rhythm device. I discussed in detail the potential benefits of ICD therapy, including improved mortality and prevention of SCD. Pt reports he does not want an ICD, does not think he needs it as he does not think anything is wrong with him. Discussed with POA yesterday and Pt and he is agreeable to undergo ICD placement. 2. NICM: minimal O2 this AM, volume status improved, cont OMT with coreg and lisinopril 3. Fever: unfortunately, blood cultures were not obtained with febrile episode, just had left subclavian central line removed yesterday, will likely wait an additional 24 hours prior to device implantation. 4. PPx: merrill Melo MD 
Cardiology/Electrophysiology

## 2018-11-20 NOTE — PROGRESS NOTES
Verbal bedside report given to [address] [facility], oncoming RN. Patient's situation, background, assessment and recommendations provided. Opportunity for questions provided. Oncoming RN assumed care of patient.

## 2018-11-20 NOTE — PROGRESS NOTES
Spoke with patient sister Celine Yates about d/c planning. She would like patient to go to [address] [place] [address]. Referrals sent through [facility]. CM following.

## 2018-11-21 PROBLEM — J13 PNEUMONIA DUE TO STREPTOCOCCUS PNEUMONIAE (HCC): Status: ACTIVE | Noted: 2018-11-21

## 2018-11-21 LAB
AMPHET UR QL SCN: NEGATIVE
ARTERIAL PATENCY WRIST A: YES
ATRIAL RATE: 93 BPM
BARBITURATES UR QL SCN: NEGATIVE
BASE EXCESS BLD CALC-SCNC: 8 MMOL/L
BASOPHILS # BLD: 0 K/UL (ref 0–0.2)
BASOPHILS NFR BLD: 0 % (ref 0–2)
BDY SITE: ABNORMAL
BENZODIAZ UR QL: NEGATIVE
BODY TEMPERATURE: 98.6
CALCULATED P AXIS, ECG09: 54 DEGREES
CALCULATED R AXIS, ECG10: -64 DEGREES
CALCULATED T AXIS, ECG11: 102 DEGREES
CANNABINOIDS UR QL SCN: NEGATIVE
CO2 BLD-SCNC: 32 MMOL/L
COCAINE UR QL SCN: NEGATIVE
COLLECT TIME,HTIME: 2330
DIAGNOSIS, 93000: NORMAL
DIFFERENTIAL METHOD BLD: ABNORMAL
EOSINOPHIL # BLD: 0 K/UL (ref 0–0.8)
EOSINOPHIL NFR BLD: 0 % (ref 0.5–7.8)
ERYTHROCYTE [DISTWIDTH] IN BLOOD BY AUTOMATED COUNT: 15.5 %
FLOW RATE ISTAT,IFRATE: 2 L/MIN
GAS FLOW.O2 O2 DELIVERY SYS: ABNORMAL L/MIN
GLUCOSE BLD STRIP.AUTO-MCNC: 126 MG/DL (ref 65–100)
GLUCOSE BLD STRIP.AUTO-MCNC: 139 MG/DL (ref 65–100)
GLUCOSE BLD STRIP.AUTO-MCNC: 85 MG/DL (ref 65–100)
GLUCOSE BLD STRIP.AUTO-MCNC: 98 MG/DL (ref 65–100)
HCO3 BLD-SCNC: 30.8 MMOL/L (ref 22–26)
HCT VFR BLD AUTO: 31.2 % (ref 41.1–50.3)
HGB BLD-MCNC: 10.4 G/DL (ref 13.6–17.2)
IMM GRANULOCYTES # BLD: 0.1 K/UL (ref 0–0.5)
IMM GRANULOCYTES NFR BLD AUTO: 1 % (ref 0–5)
LYMPHOCYTES # BLD: 1.7 K/UL (ref 0.5–4.6)
LYMPHOCYTES NFR BLD: 15 % (ref 13–44)
MAGNESIUM SERPL-MCNC: 2 MG/DL (ref 1.8–2.4)
MCH RBC QN AUTO: 23.9 PG (ref 26.1–32.9)
MCHC RBC AUTO-ENTMCNC: 33.3 G/DL (ref 31.4–35)
MCV RBC AUTO: 71.6 FL (ref 79.6–97.8)
METHADONE UR QL: NEGATIVE
MM INDURATION POC: 0 MM (ref 0–5)
MONOCYTES # BLD: 1.3 K/UL (ref 0.1–1.3)
MONOCYTES NFR BLD: 12 % (ref 4–12)
NEUTS SEG # BLD: 8 K/UL (ref 1.7–8.2)
NEUTS SEG NFR BLD: 72 % (ref 43–78)
NRBC # BLD: 0 K/UL (ref 0–0.2)
O2/TOTAL GAS SETTING VFR VENT: 28 %
OPIATES UR QL: NEGATIVE
P-R INTERVAL, ECG05: 160 MS
PCO2 BLD: 36.7 MMHG (ref 35–45)
PCP UR QL: NEGATIVE
PH BLD: 7.53 [PH] (ref 7.35–7.45)
PLATELET # BLD AUTO: 201 K/UL (ref 150–450)
PMV BLD AUTO: 11.6 FL (ref 9.4–12.3)
PO2 BLD: 65 MMHG (ref 75–100)
PPD POC: NEGATIVE NEGATIVE
Q-T INTERVAL, ECG07: 442 MS
QRS DURATION, ECG06: 174 MS
QTC CALCULATION (BEZET), ECG08: 549 MS
RBC # BLD AUTO: 4.36 M/UL (ref 4.23–5.6)
SAO2 % BLD: 95 % (ref 95–98)
SERVICE CMNT-IMP: ABNORMAL
SERVICE CMNT-IMP: ABNORMAL
SPECIMEN TYPE: ABNORMAL
VENTRICULAR RATE, ECG03: 93 BPM
WBC # BLD AUTO: 11.1 K/UL (ref 4.3–11.1)

## 2018-11-21 PROCEDURE — 82962 GLUCOSE BLOOD TEST: CPT

## 2018-11-21 PROCEDURE — 82803 BLOOD GASES ANY COMBINATION: CPT

## 2018-11-21 PROCEDURE — 74011250637 HC RX REV CODE- 250/637: Performed by: INTERNAL MEDICINE

## 2018-11-21 PROCEDURE — 74011250636 HC RX REV CODE- 250/636: Performed by: INTERNAL MEDICINE

## 2018-11-21 PROCEDURE — 74011250637 HC RX REV CODE- 250/637: Performed by: HOSPITALIST

## 2018-11-21 PROCEDURE — 85025 COMPLETE CBC W/AUTO DIFF WBC: CPT

## 2018-11-21 PROCEDURE — 92526 ORAL FUNCTION THERAPY: CPT

## 2018-11-21 PROCEDURE — 36415 COLL VENOUS BLD VENIPUNCTURE: CPT

## 2018-11-21 PROCEDURE — 65660000000 HC RM CCU STEPDOWN

## 2018-11-21 PROCEDURE — 80307 DRUG TEST PRSMV CHEM ANLYZR: CPT

## 2018-11-21 PROCEDURE — 93005 ELECTROCARDIOGRAM TRACING: CPT | Performed by: INTERNAL MEDICINE

## 2018-11-21 PROCEDURE — 83735 ASSAY OF MAGNESIUM: CPT

## 2018-11-21 PROCEDURE — 36600 WITHDRAWAL OF ARTERIAL BLOOD: CPT

## 2018-11-21 RX ORDER — GUAIFENESIN 600 MG/1
1200 TABLET, EXTENDED RELEASE ORAL EVERY 12 HOURS
Status: DISCONTINUED | OUTPATIENT
Start: 2018-11-21 | End: 2018-11-30 | Stop reason: HOSPADM

## 2018-11-21 RX ADMIN — AMOXICILLIN AND CLAVULANATE POTASSIUM 1 TABLET: 875; 125 TABLET, FILM COATED ORAL at 17:02

## 2018-11-21 RX ADMIN — FUROSEMIDE 40 MG: 40 TABLET ORAL at 17:02

## 2018-11-21 RX ADMIN — Medication 10 ML: at 22:00

## 2018-11-21 RX ADMIN — HEPARIN SODIUM 5000 UNITS: 5000 INJECTION INTRAVENOUS; SUBCUTANEOUS at 21:49

## 2018-11-21 RX ADMIN — AMIODARONE HYDROCHLORIDE 400 MG: 200 TABLET ORAL at 21:49

## 2018-11-21 RX ADMIN — CARVEDILOL 6.25 MG: 6.25 TABLET, FILM COATED ORAL at 17:02

## 2018-11-21 RX ADMIN — Medication 10 ML: at 06:27

## 2018-11-21 RX ADMIN — CLOZAPINE 500 MG: 100 TABLET ORAL at 17:03

## 2018-11-21 RX ADMIN — PANTOPRAZOLE SODIUM 40 MG: 40 TABLET, DELAYED RELEASE ORAL at 06:27

## 2018-11-21 RX ADMIN — GUAIFENESIN 1200 MG: 600 TABLET, EXTENDED RELEASE ORAL at 09:51

## 2018-11-21 RX ADMIN — AMIODARONE HYDROCHLORIDE 400 MG: 200 TABLET ORAL at 08:13

## 2018-11-21 RX ADMIN — CLOZAPINE 200 MG: 100 TABLET ORAL at 08:15

## 2018-11-21 RX ADMIN — ASPIRIN 81 MG 81 MG: 81 TABLET ORAL at 08:13

## 2018-11-21 RX ADMIN — AMOXICILLIN AND CLAVULANATE POTASSIUM 1 TABLET: 875; 125 TABLET, FILM COATED ORAL at 08:10

## 2018-11-21 RX ADMIN — CARVEDILOL 6.25 MG: 6.25 TABLET, FILM COATED ORAL at 08:13

## 2018-11-21 RX ADMIN — HEPARIN SODIUM 5000 UNITS: 5000 INJECTION INTRAVENOUS; SUBCUTANEOUS at 09:48

## 2018-11-21 RX ADMIN — POTASSIUM CHLORIDE 40 MEQ: 20 TABLET, EXTENDED RELEASE ORAL at 08:10

## 2018-11-21 RX ADMIN — FUROSEMIDE 40 MG: 40 TABLET ORAL at 08:10

## 2018-11-21 NOTE — PROGRESS NOTES
[facility] and [facility] unable to offer bed for patient. Called patients sister Tali Junior and discussed rehab bed options. She would like to stay close to [facility] so she request referral to [facility] and if they have no bed then [facility]. Referral sent through [facility]. CM following for bed offers then can start authorization.

## 2018-11-21 NOTE — PROGRESS NOTES
Problem: Dysphagia (Adult) Goal: *Speech Goal: (INSERT TEXT) STG: Patient will swallow regular textures and thin liquids using cued slow rate/single sips without overt signs or symptoms of respiratory compromise 100% of the time. LTG: Patient will consume least restrictive diet consistency without respiratory compromise by discharge. Speech language pathology: bedside swallow note: Daily Note and Discharge NAME/AGE/GENDER: Jennifer Burton is a 66 y.o. male DATE: 11/21/2018 PRIMARY DIAGNOSIS: Cardiac arrest ([facility] [address] ICD-10: Treatment Diagnosis: Oropharyngeal dysphagia (R13.12) INTERDISCIPLINARY COLLABORATION: Registered Nurse PRECAUTIONS/ALLERGIES: Patient has no known allergies. ASSESSMENT:Pt seen for diet tolerance. Pt initially declined po trials but was agreeable to them with encouragement. Pt given trials thin liquids and solids. He declined other trials. Mastication WFL. Pt consumed 2 ounces of liquids consecutively via cup with no signs/sx aspiration observed. No signs/sx with straw trial either or solids. Recommend continuing with regular diet with no further ST indicated. ???? ? ? This section established at most recent assessment???????? 
PROBLEM LIST (Impairments causing functional limitations): 1. dysphagia REHABILITATION POTENTIAL FOR STATED GOALS: Good PLAN OF CARE:  
Patient will benefit from skilled intervention to address the following impairments. RECOMMENDATIONS AND PLANNED INTERVENTIONS (Benefits and precautions of therapy have been discussed with the patient.): 
· PO:  Regular · Liquids:  regular thin MEDICATIONS: 
· Whole in puree · Whole with liquids COMPENSATORY STRATEGIES/MODIFICATIONS INCLUDING: 
· NA 
RECOMMENDED DIET MODIFICATIONS DISCUSSED WITH: 
· Nursing · Family · Patient RECOMMENDED REHABILITATION/EQUIPMENT: (at time of discharge pending progress): Due to the probability of continued deficits (see above) this patient will not likely need continued skilled speech therapy after discharge. SUBJECTIVE:  
Patient required cues for participation. History of Present Injury/Illness: Mr. Nellie Calixto  has a past medical history of Diabetes ([facility] [address] Heart failure ([facility] [address] Hypertension, Psychiatric disorder, and Systolic CHF, acute on chronic ([facility] [address] . He also  has a past surgical history that includes hx appendectomy. Present Symptoms: recent extubation Pain Intensity 1: 0 Current Medications: No current facility-administered medications on file prior to encounter. Current Outpatient Medications on File Prior to Encounter Medication Sig Dispense Refill  lisinopril (PRINIVIL, ZESTRIL) 10 mg tablet Take 10 mg by mouth daily.  potassium chloride (K-DUR, KLOR-CON) 20 mEq tablet Take 20 mEq by mouth daily.  furosemide (LASIX) 40 mg tablet Take 40 mg by mouth daily.  scopolamine (TRANSDERM-SCOP) 1 mg over 3 days pt3d 1 Patch by TransDERmal route every seventy-two (72) hours.  polyethylene glycol (MIRALAX) 17 gram/dose powder Take 17 g by mouth as needed.  simvastatin (ZOCOR) 40 mg tablet Take 40 mg by mouth nightly.  cloZAPine (CLOZARIL) 100 mg tablet Take 500 mg by mouth nightly. Indications: SUICIDAL BEHAVIOR IN SCHIZOPHRENIA  cloZAPine (CLOZARIL) 100 mg disintegrating tablet Take 200 mg by mouth daily. Indications: SUICIDAL BEHAVIOR IN SCHIZOPHRENIA  carvedilol (COREG) 6.25 mg tablet Take 3.125 mg by mouth two (2) times daily (with meals).  aspirin 81 mg chewable tablet Take 81 mg by mouth daily.  metFORMIN (GLUCOPHAGE) 500 mg tablet Take 500 mg by mouth nightly.  benztropine (COGENTIN) 2 mg tablet Take 2 mg by mouth nightly. Indications: EXTRAPYRAMIDAL DISEASE  loratadine (CLARITIN) 10 mg tablet Take 10 mg by mouth daily. Current Dietary Status:  NPO Social History/Home Situation:  
Home Environment: Private residence # Steps to Enter: 1 One/Two Story Residence: One story Living Alone: No 
Support Systems: Family member(s)(sister) Patient Expects to be Discharged to[de-identified] Unknown Current DME Used/Available at Home: Cane, straight, Shower chair Tub or Shower Type: Tub/Shower combination OBJECTIVE:  
Respiratory Status:  Nasal cannula  3 l/min CXR Results: IMPRESSION: 
1. Extubation. 2. Bilateral perihilar densities likely due to vascular congestion. Findings similar to prior exam. CT Results: none MRI Results: none Cognitive and Communication Status: 
Neurologic State: Alert BEDSIDE SWALLOW EVALUATIONOral Assessment: P.O. Trials: 
Patient Position: upright in chair The patient was given the following:  
Consistency Presented: Mixed consistency; Solid How Presented: Self-fed/presented;Cup/sip;Spoon;Straw;Successive swallows ORAL PHASE: 
Bolus Acceptance: No impairment Bolus Formation/Control: No impairment Propulsion: No impairment Oral Residue: None PHARYNGEAL PHASE: 
Initiation of Swallow: No impairment Laryngeal Elevation: Functional 
Aspiration Signs/Symptoms: None Vocal Quality: Low volume OTHER OBSERVATIONS: 
Rate/bite size: WNL Endurance: WNL Tool Used: Dysphagia Outcome and Severity Scale (PRETTY) Score Comments Normal Diet  [] 7 With no strategies or extra time needed Functional Swallow  [x] 6 May have mild oral or pharyngeal delay Mild Dysphagia 
  [] 5 Which may require one diet consistency restricted (those who demonstrate penetration which is entirely cleared on MBS would be included) Mild-Moderate Dysphagia  [] 4 With 1-2 diet consistencies restricted Moderate Dysphagia  [] 3 With 2 or more diet consistencies restricted Moderately Severe Dysphagia  [] 2 With partial PO strategies (trials with ST only) Severe Dysphagia  [] 1 With inability to tolerate any PO safely Score:  Initial: 6 Most Recent: 6 (Date: 11/21/18 ) Interpretation of Tool: The Dysphagia Outcome and Severity Scale (PRETTY) is a simple, easy-to-use, 7-point scale developed to systematically rate the functional severity of dysphagia based on objective assessment and make recommendations for diet level, independence level, and type of nutrition. Score 7 6 5 4 3 2 1 Modifier CH CI CJ CK CL CM CN ? Swallowing:  
  - CURRENT STATUS: CI - 1%-19% impaired, limited or restricted  - GOAL STATUS:  CI - 1%-19% impaired, limited or restricted  - D/C STATUS:  CI - 1%-19% impaired, limited or restricted Payor: CARE IMPROVEMENT PLUS / [place]ARE [place]PR[place] / Product Type: [employer] Medicare /  
 
TREATMENT:  
 (In addition to Assessment/Re-Assessment sessions the following treatments were rendered) Dysphagia Activities: Activities/Procedures listed utilized to improve progress in diet tolerance. Required no cueing to improve swallow safety Safety: After treatment position/precautions: 
· RN notified · Family at bedside · Upright in Bed Treatment Assessment:  
 
Total Treatment Duration: 
Time In: 2379 Time Out: 5016 [address] CCC-SLP

## 2018-11-21 NOTE — PROGRESS NOTES
PT Note: 
Treatment attempted this AM but pt refused. Tried to emphasize the purpose of PT but pt continued to decline with difficult to understand speech. RN alerted to this and will re-attempt pending schedule and pt status. Thanks, Paramjit Vann, PT, DPT

## 2018-11-21 NOTE — PROGRESS NOTES
Bedside and verbal shift report received from [facility]. Assumed care of the patient.

## 2018-11-21 NOTE — PROGRESS NOTES
Bedside and Verbal shift change report given to Radha Murrieta RN (oncoming nurse) by self (offgoing nurse). Report included the following information SBAR, Kardex, MAR and Recent Results.

## 2018-11-21 NOTE — PROGRESS NOTES
Problem: Falls - Risk of 
Goal: *Absence of Falls Document Kaushal Joy Fall Risk and appropriate interventions in the flowsheet. Outcome: Progressing Towards Goal 
Fall Risk Interventions: 
Mobility Interventions: Bed/chair exit alarm, Patient to call before getting OOB Mentation Interventions: Bed/chair exit alarm, Adequate sleep, hydration, pain control, Door open when patient unattended, Evaluate medications/consider consulting pharmacy, Reorient patient Medication Interventions: Bed/chair exit alarm, Evaluate medications/consider consulting pharmacy, Patient to call before getting OOB, Teach patient to arise slowly Elimination Interventions: Call light in reach, Bed/chair exit alarm, Patient to call for help with toileting needs, Toileting schedule/hourly rounds History of Falls Interventions: Bed/chair exit alarm, Door open when patient unattended, Investigate reason for fall, Evaluate medications/consider consulting pharmacy Patient progressing towards goal with no falls on current admission. Patient without agitation, or sensory perception deficits. Personal belongings are within reach. Bed is in the low and locked position with side rails up x2. Yellow gripper socks to bilateral feet. Call light within reach and patient verbalizes understanding of use.

## 2018-11-21 NOTE — PROGRESS NOTES
Hospitalist Progress Note Patient: Fuad Sun MRN: [id number]  SSN: [id number] YOB: 1960  Age: 66 y.o. Sex: male Admit Date: 11/13/2018 LOS: 8 days Subjective:  
 
From previous notes: \"66year-old male with a past medical history of CHF with EF of 20-25%, non-insulin diabetes mellitus, schizophrenia admitted to the ICU on 11/13/2018 after he had a cardiac arrest at the [facility]. Yumiko Salcido had a witnessed collapse in the staff initiated CPR, right ear was placed which showed shockable rhythm which was performed and there was a spontaneous return of circulation, patient had another cardiac arrest in the ER with DAYANNA Cheek was admitted to the ICU he was placed on hypothermia protocol, patient is currently awake and following commands.  An echocardiogram showed EF of 10-20%, patient was briefly sent over to cardiac telemetry floor where he went into acute flash pulmonary edema and he was placed on a BiPAP and currently weaned back to nasal cannula.  Hospitalist service was consulted for medical management and taking over the care from ICU service. Yumiko Salcido was seen this morning, he is awake, not in any acute distress, he is off amiodarone drip on 3 L of oxygen. \" He had a fever of 101.3F on 11/18 so ICD placement was postponed. 11/20 - The patient is pleasant. No acute complaints. Denies CP/SOB. Denies F/C. 
11/21 - He states that he feels fine. Flat affect. Denies CP/SOB/cough. He had fever last evening. Review of systems negative except stated above. Objective:  
 
Visit Vitals /64 Pulse 60 Temp 99.1 °F (37.3 °C) Comment: rn [place] notified Resp 18 Ht 5' 10\" (1.778 m) Comment: per EMR Wt 78.9 kg (174 lb) SpO2 97% BMI 24.97 kg/m² Oxygen Therapy O2 Sat (%): 97 % (11/21/18 0430) Pulse via Oximetry: 99 beats per minute (11/18/18 0731) O2 Device: Nasal cannula (11/21/18 8281) O2 Flow Rate (L/min): 3 l/min (11/21/18 5948) FIO2 (%): 30 % (11/17/18 2028) Intake and Output:  
 
Intake/Output Summary (Last 24 hours) at 11/21/2018 0940 Last data filed at 11/21/2018 6499 Gross per 24 hour Intake 1440 ml Output 2125 ml Net -685 ml Physical Exam:  
GENERAL: alert, cooperative, no distress, appears stated age EYE: conjunctivae/corneas clear. PERRL. THROAT & NECK: normal and no erythema or exudates noted. LUNG: clear to auscultation bilaterally HEART: regular rate and rhythm, S1S2, no murmur, no JVD ABDOMEN: soft, non-tender, non-distended. Bowel sounds normal.  
EXTREMITIES:  No edema, 2+ pedal/radial pulses bilaterally SKIN: no rash or abnormalities NEUROLOGIC: Alert. Cranial nerves 2-12 grossly intact. Lab/Data Review: 
Recent Results (from the past 24 hour(s)) GLUCOSE, POC Collection Time: 11/20/18 10:34 AM  
Result Value Ref Range Glucose (POC) 97 65 - 100 mg/dL GLUCOSE, POC Collection Time: 11/20/18  3:37 PM  
Result Value Ref Range Glucose (POC) 128 (H) 65 - 100 mg/dL CULTURE, BLOOD Collection Time: 11/20/18  6:02 PM  
Result Value Ref Range Special Requests: LEFT Antecubital 
    
 Culture result: NO GROWTH AFTER 13 HOURS    
CBC WITH AUTOMATED DIFF Collection Time: 11/20/18  6:03 PM  
Result Value Ref Range WBC 10.7 4.3 - 11.1 K/uL  
 RBC 4.34 4.23 - 5.6 M/uL  
 HGB 10.2 (L) 13.6 - 17.2 g/dL HCT 31.7 (L) 41.1 - 50.3 % MCV 73.0 (L) 79.6 - 97.8 FL  
 MCH 23.5 (L) 26.1 - 32.9 PG  
 MCHC 32.2 31.4 - 35.0 g/dL  
 RDW 16.3 % PLATELET 775 736 - 433 K/uL MPV 12.3 9.4 - 12.3 FL ABSOLUTE NRBC 0.00 0.0 - 0.2 K/uL  
 DF AUTOMATED NEUTROPHILS 71 43 - 78 % LYMPHOCYTES 19 13 - 44 % MONOCYTES 10 4.0 - 12.0 % EOSINOPHILS 0 (L) 0.5 - 7.8 % BASOPHILS 0 0.0 - 2.0 % IMMATURE GRANULOCYTES 1 0.0 - 5.0 %  
 ABS. NEUTROPHILS 7.5 1.7 - 8.2 K/UL  
 ABS. LYMPHOCYTES 2.0 0.5 - 4.6 K/UL  
 ABS. MONOCYTES 1.1 0.1 - 1.3 K/UL  
 ABS. EOSINOPHILS 0.0 0.0 - 0.8 K/UL ABS. BASOPHILS 0.0 0.0 - 0.2 K/UL  
 ABS. IMM. GRANS. 0.1 0.0 - 0.5 K/UL  
CULTURE, BLOOD Collection Time: 11/20/18  6:14 PM  
Result Value Ref Range Special Requests: RIGHT 
HAND Culture result: NO GROWTH AFTER 13 HOURS    
URINALYSIS W/ RFLX MICROSCOPIC Collection Time: 11/20/18  8:56 PM  
Result Value Ref Range Color YELLOW Appearance CLEAR Specific gravity 1.013 1.001 - 1.023    
 pH (UA) 5.5 5.0 - 9.0 Protein NEGATIVE  NEG mg/dL Glucose NEGATIVE  mg/dL Ketone NEGATIVE  NEG mg/dL Bilirubin NEGATIVE  NEG Blood NEGATIVE  NEG Urobilinogen 1.0 0.2 - 1.0 EU/dL Nitrites NEGATIVE  NEG Leukocyte Esterase NEGATIVE  NEG    
PLEASE READ & DOCUMENT PPD TEST IN 48 HRS Collection Time: 11/20/18 10:05 PM  
Result Value Ref Range PPD negative Negative  
 mm Induration 0 mm GLUCOSE, POC Collection Time: 11/20/18 10:08 PM  
Result Value Ref Range Glucose (POC) 117 (H) 65 - 100 mg/dL MAGNESIUM Collection Time: 11/21/18  3:55 AM  
Result Value Ref Range Magnesium 2.0 1.8 - 2.4 mg/dL CBC WITH AUTOMATED DIFF Collection Time: 11/21/18  3:55 AM  
Result Value Ref Range WBC 11.1 4.3 - 11.1 K/uL  
 RBC 4.36 4.23 - 5.6 M/uL  
 HGB 10.4 (L) 13.6 - 17.2 g/dL HCT 31.2 (L) 41.1 - 50.3 % MCV 71.6 (L) 79.6 - 97.8 FL  
 MCH 23.9 (L) 26.1 - 32.9 PG  
 MCHC 33.3 31.4 - 35.0 g/dL  
 RDW 15.5 % PLATELET 236 342 - 226 K/uL MPV 11.6 9.4 - 12.3 FL ABSOLUTE NRBC 0.00 0.0 - 0.2 K/uL  
 DF AUTOMATED NEUTROPHILS 72 43 - 78 % LYMPHOCYTES 15 13 - 44 % MONOCYTES 12 4.0 - 12.0 % EOSINOPHILS 0 (L) 0.5 - 7.8 % BASOPHILS 0 0.0 - 2.0 % IMMATURE GRANULOCYTES 1 0.0 - 5.0 %  
 ABS. NEUTROPHILS 8.0 1.7 - 8.2 K/UL  
 ABS. LYMPHOCYTES 1.7 0.5 - 4.6 K/UL  
 ABS. MONOCYTES 1.3 0.1 - 1.3 K/UL  
 ABS. EOSINOPHILS 0.0 0.0 - 0.8 K/UL  
 ABS. BASOPHILS 0.0 0.0 - 0.2 K/UL  
 ABS. IMM. GRANS. 0.1 0.0 - 0.5 K/UL GLUCOSE, POC  
 Collection Time: 11/21/18  6:17 AM  
Result Value Ref Range Glucose (POC) 98 65 - 100 mg/dL EKG, 12 LEAD, INITIAL Collection Time: 11/21/18  6:49 AM  
Result Value Ref Range Ventricular Rate 93 BPM  
 Atrial Rate 93 BPM  
 P-R Interval 160 ms QRS Duration 174 ms Q-T Interval 442 ms QTC Calculation (Bezet) 549 ms Calculated P Axis 54 degrees Calculated R Axis -64 degrees Calculated T Axis 102 degrees Diagnosis Normal sinus rhythm Left axis deviation Left atrial enlargement Left bundle branch block Inferior infarct (cited on or before 14-NOV-2018) Cannot rule out Anterior infarct (cited on or before 18-NOV-2018) T wave abnormality, consider lateral ischemia Abnormal ECG When compared with ECG of 20-NOV-2018 07:11, 
Questionable change in initial forces of Lateral leads Confirmed by KAREEM WAGONER (), Oral Shaffer ([id number]) on 11/21/2018 8:03:19 AM 
  
 
 
Imaging: Xr Chest Sngl V Result Date: 11/17/2018 IMPRESSION: Flash pulmonary edema suspected. Xr Chest Sngl V Result Date: 11/13/2018 IMPRESSION: Endotracheal tube advancement with tip overlying the tracheal air column at the level of the clavicular heads. Unchanged positioning of support lines and tubes. Unchanged bibasilar atelectasis and pulmonary venous vascular congestion. VOICE DICTATED BY: Dr. Geoff García Xr Chest Sngl V Result Date: 11/13/2018 IMPRESSION: ET tube tip well above the clavicles. It could be advanced 5 to 6 cm. NG tube tip could also be advanced 5 to 10 cm. There is only mild central pulmonary vascular prominence. No pneumothorax status post left-sided subclavian central venous catheter placement. Xr Chest Pa Lat Result Date: 11/20/2018 IMPRESSION: Bilateral patchy infiltrates, possibly pneumonia. Xr Abd (kub) Result Date: 11/13/2018 IMPRESSION: Enteric tube tip overlies the gastric body, advancing from the prior abdominal x-ray. VOICE DICTATED BY: Dr. Geoff García Xr Abd (kub) Result Date: 2018 IMPRESSION: Injury tube tip is in proximal stomach. The sidehole is near the GE junction. Advancing 5 to 10 cm recommended. Moderate amount of intestinal gas. Xr Chest Saul Chill Result Date: 2018 IMPRESSION: Vascular congestion. No significant change. Xr Chest Saul Chill Result Date: 2018 IMPRESSION: 1. Extubation. 2. Bilateral perihilar densities likely due to vascular congestion. Findings similar to prior exam. 
 
Xr Chest Saul Chill Result Date: 2018 IMPRESSION:  Unchanged left lung base atelectasis or infiltrate. Results for orders placed or performed during the hospital encounter of 18  
2D ECHO COMPLETE ADULT (TTE) W OR WO CONTR Narrative Aliciasandra One [address] [facility], [address] [facility] 
[phone] Transthoracic Echocardiogram 
2D, M-mode, Doppler, and Color Doppler Patient: Mayra Harmon 
MR #: [id number] : 1960 Age: 66 years Gender: Male Study date: 2018 Account #: [de-identified] Height: 69 in 
Weight: 180.6 lb 
BSA: 1.98 mï¾² Status:Routine Location: 3305 BP: 105/ 71 Allergies: NO KNOWN ALLERGENS Sonographer:  Tari Christopher RD Group:  [facility] Referring Physician:  Candance Eaton Tresia Dryer., MD 
Reading Physician:  Lina Buitrago MD 
 
INDICATIONS: Heart Failure *Imaging done with pt supine, pt is on vent, imaging was respiratory 
dependent. * PROCEDURE: This was a routine study. A transthoracic echocardiogram was 
performed. The study included complete 2D imaging, M-mode, complete spectral 
Doppler, and color Doppler. Intravenous contrast (Definity) was administered. Image quality was adequate. LEFT VENTRICLE: The ventricle was markedly dilated. Systolic function was 
markedly reduced. Ejection fraction was estimated in the range of 10 % to 20  
%. [place] was akinetic. Hyperechoic structures noted in apex in 2D images,  
shadowing noted in Definity images, can not rule out the presence of thrombus. There  
was 
severe diffuse hypokinesis with regional variation. Wall thickness was at the 
upper limits of normal. The study was not technically sufficient to allow 
evaluation of LV diastolic function. Average E/e'~ 13. RIGHT VENTRICLE: The size was normal. Systolic function was normal. The 
tricuspid jet envelope definition was inadequate for estimation of RV  
systolic 
pressure. LEFT ATRIUM: The atrium was moderately dilated. RIGHT ATRIUM: Size was normal. 
 
SYSTEMIC VEINS: IVC: The inferior vena cava was dilated. The respirophasic 
change in diameter was less than 50%. AORTIC VALVE: The valve was not well visualized. There was no evidence for 
stenosis. There was trivial regurgitation. MITRAL VALVE: Valve structure was normal. There was no evidence for stenosis. There was moderate regurgitation. TRICUSPID VALVE: The valve structure was normal. There was no evidence for 
stenosis. There was trivial regurgitation. PULMONIC VALVE: Not well visualized. There was no evidence for stenosis. There 
was no insufficiency. PERICARDIUM: There was no pericardial effusion. AORTA: The root exhibited normal size. SUMMARY: 
 
-  Left ventricle: The ventricle was markedly dilated. Systolic function was 
markedly reduced. Ejection fraction was estimated in the range of 10 % to 20  
%. [place] was akinetic. Hyperechoic structures noted in apex in 2D images,  
shadowing 
noted in Definity images, can not rule out the presence of thrombus. There  
was 
severe diffuse hypokinesis with regional variation. Wall thickness was at the 
upper limits of normal. 
 
-  Left atrium: The atrium was moderately dilated. -  Inferior vena cava, hepatic veins: The inferior vena cava was dilated. The 
respirophasic change in diameter was less than 50%. -  Mitral valve: There was moderate regurgitation. SYSTEM MEASUREMENT TABLES 
 
2D mode AoR Diam (2D): 2.8 cm 
LA Dimension (2D): 4 cm IVS/LVPW (2D): 1 IVSd (2D): 1.1 cm 
LVIDd (2D): 6.5 cm LVIDs (2D): 5.7 cm 
LVOT Area (2D): 3.1 cm2 LVPWd (2D): 1.2 cm Tissue Doppler Imaging LV Peak Early Zendejas Tissue Zack; Mean; Lateral MA (TDI): 14 cm/s LV Peak Early Zendejas Tissue Zack; Medial MA (TDI): 8.8 cm/s Unspecified Scan Mode Peak Grad; Mean; Antegrade Flow: 8 mm[Hg] Vmax; Antegrade Flow: 140 cm/s LVOT Diam: 2 cm 
MV Peak Zack/LV Peak Tissue Zack E-Wave; Lateral MA: 9.6 MV Peak Zack/LV Peak Tissue Zack E-Wave; Medial MA: 15.2 Peak Grad; Mean; Antegrade Flow: 12 mm[Hg] Vmax; Antegrade Flow: 171 cm/s Prepared and signed by 
 
Keyon Cote MD 
Signed [id number] 17:47:43 Cultures: All Micro Results Procedure Component Value Units Date/Time CULTURE, BLOOD [[id number]] Collected:  11/20/18 1802 Order Status:  Completed Specimen:  Blood Updated:  11/21/18 0301 Special Requests: --     
  LEFT Antecubital 
  
  Culture result: NO GROWTH AFTER 13 HOURS     
 CULTURE, BLOOD [[id number]] Collected:  11/20/18 1814 Order Status:  Completed Specimen:  Blood Updated:  11/21/18 1921 Special Requests: --     
  RIGHT 
HAND Culture result: NO GROWTH AFTER 13 HOURS     
 CULTURE, BLOOD [[id number]] Collected:  11/15/18 1247 Order Status:  Completed Specimen:  Blood Updated:  11/20/18 2801 Special Requests: --     
  LEFT 
HAND Culture result: NO GROWTH 5 DAYS     
 CULTURE, BLOOD [[id number]] Collected:  11/15/18 1238 Order Status:  Completed Specimen:  Blood Updated:  11/20/18 2271 Special Requests: --     
  RIGHT 
ARM Culture result: NO GROWTH 5 DAYS     
 CULTURE, RESPIRATORY/SPUTUM/BRONCH Lydia Dayhoff STAIN [[id number]]  (Abnormal)  (Susceptibility) Collected:  11/15/18 1207 Order Status:  Completed Specimen:  Sputum,ET Suction Updated:  11/19/18 3465   Special Requests: NO SPECIAL REQUESTS     
  GRAM STAIN MANY GRAM POSITIVE COCCI     
 FEW GRAM NEGATIVE RODS     
   50 TO 79 WBC'S SEEN PER OIF  
   NO EPITHELIAL CELLS SEEN     
   3+ MUCUS PRESENT Culture result:    
  HEAVY STREPTOCOCCUS PNEUMONIAE  
     
      
  LIGHT NORMAL RESPIRATORY MARYELLEN Assessment/Plan:  
 
Principal Problem: 
  Cardiac arrest ([facility] [address] (11/13/2018) - EF 10% 
- Continue Amiodarone - To get ICD when EP feels appropriate Active Problems: 
  Systolic congestive heart failure ([address] (11/13/2018) - EF 10% 
- Continue Furosemide - Continue Coreg 
- Continue Lisinopril Acute respiratory failure with hypoxia ([address] (11/13/2018) - Likely persistent due to pneumonia 
- Continue Augmentin (Day 1/10) - Continue Furosemide - Wean oxygen as appropriate Pneumonia due to Streptococcus pneumoniae (11/21/2018) - CXR with bilateral infiltrates - Sputum culture from 11/15 while intubated growing heavy Strep pneumo 
- Started Augmentin (based on cultures) on evening of 11/20 
- Start Mucinex to help with sputum clearance - Wean oxygen as appropriate Fever (11/20/018) - Intermittent fever since admission - Likely now due to pneumonia --> on Augmentin - Repeat blood cultures pending - UA unremarkable Acute renal failure ([facility] [address] (11/13/2018) - Resolved Hypotension (11/13/2018) - Resolved Type II diabetes mellitus ([address] (2/4/2016) - No acute issues - Continue Humalog SSI Schizophrenia ([facility] [address] (11/13/2018) - Continue home Clozaril Dispo - Continue Augmentin based on sputum culture. To get ICD when EP decides it's safe. DIET CARDIAC Regular DVT Prophylaxis: Heparin Signed By: Venu Chavez DO November 21, 2018

## 2018-11-22 PROBLEM — D50.9 MICROCYTIC ANEMIA: Status: ACTIVE | Noted: 2018-11-22

## 2018-11-22 LAB
ALBUMIN SERPL-MCNC: 2.2 G/DL (ref 3.5–5)
ALBUMIN/GLOB SERPL: 0.5 {RATIO} (ref 1.2–3.5)
ALP SERPL-CCNC: 101 U/L (ref 50–136)
ALT SERPL-CCNC: 50 U/L (ref 12–65)
ANION GAP SERPL CALC-SCNC: 13 MMOL/L (ref 7–16)
AST SERPL-CCNC: 36 U/L (ref 15–37)
ATRIAL RATE: 95 BPM
BASOPHILS # BLD: 0 K/UL (ref 0–0.2)
BASOPHILS NFR BLD: 0 % (ref 0–2)
BILIRUB SERPL-MCNC: 0.6 MG/DL (ref 0.2–1.1)
BUN SERPL-MCNC: 15 MG/DL (ref 6–23)
CALCIUM SERPL-MCNC: 8.7 MG/DL (ref 8.3–10.4)
CALCULATED P AXIS, ECG09: 42 DEGREES
CALCULATED R AXIS, ECG10: -65 DEGREES
CALCULATED T AXIS, ECG11: 99 DEGREES
CHLORIDE SERPL-SCNC: 100 MMOL/L (ref 98–107)
CO2 SERPL-SCNC: 29 MMOL/L (ref 21–32)
CREAT SERPL-MCNC: 1.15 MG/DL (ref 0.8–1.5)
DIAGNOSIS, 93000: NORMAL
DIFFERENTIAL METHOD BLD: ABNORMAL
EOSINOPHIL # BLD: 0 K/UL (ref 0–0.8)
EOSINOPHIL NFR BLD: 0 % (ref 0.5–7.8)
ERYTHROCYTE [DISTWIDTH] IN BLOOD BY AUTOMATED COUNT: 15.5 %
FERRITIN SERPL-MCNC: 397 NG/ML (ref 8–388)
GLOBULIN SER CALC-MCNC: 4.5 G/DL (ref 2.3–3.5)
GLUCOSE BLD STRIP.AUTO-MCNC: 104 MG/DL (ref 65–100)
GLUCOSE BLD STRIP.AUTO-MCNC: 116 MG/DL (ref 65–100)
GLUCOSE BLD STRIP.AUTO-MCNC: 125 MG/DL (ref 65–100)
GLUCOSE BLD STRIP.AUTO-MCNC: 93 MG/DL (ref 65–100)
GLUCOSE SERPL-MCNC: 83 MG/DL (ref 65–100)
HCT VFR BLD AUTO: 32.3 % (ref 41.1–50.3)
HGB BLD-MCNC: 10.7 G/DL (ref 13.6–17.2)
IMM GRANULOCYTES # BLD: 0.1 K/UL (ref 0–0.5)
IMM GRANULOCYTES NFR BLD AUTO: 1 % (ref 0–5)
IRON SATN MFR SERPL: 14 %
IRON SERPL-MCNC: 27 UG/DL (ref 35–150)
LYMPHOCYTES # BLD: 1.9 K/UL (ref 0.5–4.6)
LYMPHOCYTES NFR BLD: 18 % (ref 13–44)
MAGNESIUM SERPL-MCNC: 2 MG/DL (ref 1.8–2.4)
MCH RBC QN AUTO: 23.6 PG (ref 26.1–32.9)
MCHC RBC AUTO-ENTMCNC: 33.1 G/DL (ref 31.4–35)
MCV RBC AUTO: 71.3 FL (ref 79.6–97.8)
MONOCYTES # BLD: 0.9 K/UL (ref 0.1–1.3)
MONOCYTES NFR BLD: 9 % (ref 4–12)
NEUTS SEG # BLD: 7.4 K/UL (ref 1.7–8.2)
NEUTS SEG NFR BLD: 72 % (ref 43–78)
NRBC # BLD: 0 K/UL (ref 0–0.2)
P-R INTERVAL, ECG05: 160 MS
PLATELET # BLD AUTO: 227 K/UL (ref 150–450)
PMV BLD AUTO: 11.3 FL (ref 9.4–12.3)
POTASSIUM SERPL-SCNC: 3.4 MMOL/L (ref 3.5–5.1)
PROT SERPL-MCNC: 6.7 G/DL (ref 6.3–8.2)
Q-T INTERVAL, ECG07: 438 MS
QRS DURATION, ECG06: 174 MS
QTC CALCULATION (BEZET), ECG08: 550 MS
RBC # BLD AUTO: 4.53 M/UL (ref 4.23–5.6)
SODIUM SERPL-SCNC: 142 MMOL/L (ref 136–145)
TIBC SERPL-MCNC: 191 UG/DL (ref 250–450)
VENTRICULAR RATE, ECG03: 95 BPM
WBC # BLD AUTO: 10.3 K/UL (ref 4.3–11.1)

## 2018-11-22 PROCEDURE — 94760 N-INVAS EAR/PLS OXIMETRY 1: CPT

## 2018-11-22 PROCEDURE — 85025 COMPLETE CBC W/AUTO DIFF WBC: CPT

## 2018-11-22 PROCEDURE — 82728 ASSAY OF FERRITIN: CPT

## 2018-11-22 PROCEDURE — 74011250637 HC RX REV CODE- 250/637: Performed by: INTERNAL MEDICINE

## 2018-11-22 PROCEDURE — 80053 COMPREHEN METABOLIC PANEL: CPT

## 2018-11-22 PROCEDURE — 83540 ASSAY OF IRON: CPT

## 2018-11-22 PROCEDURE — 82962 GLUCOSE BLOOD TEST: CPT

## 2018-11-22 PROCEDURE — 74011250637 HC RX REV CODE- 250/637: Performed by: HOSPITALIST

## 2018-11-22 PROCEDURE — 74011250636 HC RX REV CODE- 250/636: Performed by: INTERNAL MEDICINE

## 2018-11-22 PROCEDURE — 36415 COLL VENOUS BLD VENIPUNCTURE: CPT

## 2018-11-22 PROCEDURE — 83735 ASSAY OF MAGNESIUM: CPT

## 2018-11-22 PROCEDURE — 65660000000 HC RM CCU STEPDOWN

## 2018-11-22 PROCEDURE — 77010033678 HC OXYGEN DAILY

## 2018-11-22 PROCEDURE — 93005 ELECTROCARDIOGRAM TRACING: CPT | Performed by: INTERNAL MEDICINE

## 2018-11-22 RX ADMIN — Medication 10 ML: at 22:06

## 2018-11-22 RX ADMIN — GUAIFENESIN 1200 MG: 600 TABLET, EXTENDED RELEASE ORAL at 09:41

## 2018-11-22 RX ADMIN — AMIODARONE HYDROCHLORIDE 400 MG: 200 TABLET ORAL at 22:04

## 2018-11-22 RX ADMIN — HEPARIN SODIUM 5000 UNITS: 5000 INJECTION INTRAVENOUS; SUBCUTANEOUS at 22:04

## 2018-11-22 RX ADMIN — GUAIFENESIN 1200 MG: 600 TABLET, EXTENDED RELEASE ORAL at 22:04

## 2018-11-22 RX ADMIN — POTASSIUM CHLORIDE 40 MEQ: 20 TABLET, EXTENDED RELEASE ORAL at 09:40

## 2018-11-22 RX ADMIN — HEPARIN SODIUM 5000 UNITS: 5000 INJECTION INTRAVENOUS; SUBCUTANEOUS at 09:41

## 2018-11-22 RX ADMIN — AMOXICILLIN AND CLAVULANATE POTASSIUM 1 TABLET: 875; 125 TABLET, FILM COATED ORAL at 09:40

## 2018-11-22 RX ADMIN — CLOZAPINE 500 MG: 100 TABLET ORAL at 17:37

## 2018-11-22 RX ADMIN — CARVEDILOL 6.25 MG: 6.25 TABLET, FILM COATED ORAL at 09:41

## 2018-11-22 RX ADMIN — FUROSEMIDE 40 MG: 40 TABLET ORAL at 06:15

## 2018-11-22 RX ADMIN — CARVEDILOL 6.25 MG: 6.25 TABLET, FILM COATED ORAL at 17:37

## 2018-11-22 RX ADMIN — PANTOPRAZOLE SODIUM 40 MG: 40 TABLET, DELAYED RELEASE ORAL at 09:41

## 2018-11-22 RX ADMIN — AMOXICILLIN AND CLAVULANATE POTASSIUM 1 TABLET: 875; 125 TABLET, FILM COATED ORAL at 16:44

## 2018-11-22 RX ADMIN — ASPIRIN 81 MG 81 MG: 81 TABLET ORAL at 09:41

## 2018-11-22 RX ADMIN — LISINOPRIL 2.5 MG: 5 TABLET ORAL at 09:41

## 2018-11-22 RX ADMIN — Medication 10 ML: at 06:15

## 2018-11-22 RX ADMIN — AMIODARONE HYDROCHLORIDE 400 MG: 200 TABLET ORAL at 09:41

## 2018-11-22 NOTE — PROGRESS NOTES
Problem: Falls - Risk of 
Goal: *Absence of Falls Document Debria Check Fall Risk and appropriate interventions in the flowsheet. Outcome: Progressing Towards Goal 
Fall Risk Interventions: 
Mobility Interventions: Bed/chair exit alarm, Communicate number of staff needed for ambulation/transfer, OT consult for ADLs, Patient to call before getting OOB, PT Consult for mobility concerns Mentation Interventions: Bed/chair exit alarm, Door open when patient unattended, Increase mobility, More frequent rounding, Reorient patient Medication Interventions: Bed/chair exit alarm, Evaluate medications/consider consulting pharmacy, Patient to call before getting OOB, Teach patient to arise slowly Elimination Interventions: Bed/chair exit alarm, Call light in reach, Patient to call for help with toileting needs, Toileting schedule/hourly rounds History of Falls Interventions: Bed/chair exit alarm, Consult care management for discharge planning, Door open when patient unattended, Evaluate medications/consider consulting pharmacy Problem: Pressure Injury - Risk of 
Goal: *Prevention of pressure injury Document Laurent Scale and appropriate interventions in the flowsheet. Outcome: Progressing Towards Goal 
Pressure Injury Interventions: 
Sensory Interventions: Assess changes in LOC, Discuss PT/OT consult with provider, Keep linens dry and wrinkle-free, Maintain/enhance activity level Moisture Interventions: Check for incontinence Q2 hours and as needed, Maintain skin hydration (lotion/cream) Activity Interventions: Increase time out of bed, Pressure redistribution bed/mattress(bed type) Mobility Interventions: HOB 30 degrees or less, PT/OT evaluation, Pressure redistribution bed/mattress (bed type) Nutrition Interventions: Document food/fluid/supplement intake Friction and Shear Interventions: Foam dressings/transparent film/skin sealants, HOB 30 degrees or less

## 2018-11-22 NOTE — PROGRESS NOTES
Paged Dr. Wing Law at [phone] regarding pts BP 84/55 with coreg & lasix scheduled to be given. Pt asymptomatic resting in bed. 
1727- no return of page yet.

## 2018-11-22 NOTE — PROGRESS NOTES
Discussed with Dr. Danielle Pineda the 0900 clozaril dose, pt is extremely lethargic this am, wakes up to voice, but doses off in mid-sentence. Orders received to hold 0900 clozaril dose.

## 2018-11-22 NOTE — PROGRESS NOTES
Called due to change in mentation. He has become more groggy and according to RN is different than last night. Had visitors earlier. No sedating meds given other than his scheduled clozapine. He is able to state full name and follow commands. Denies chest pain, headache, shortness of breath. On exam, he is in no distress. Groggy and falls asleep after answering questions. No focal deficit and arms/legs 5/5 in strength. Will check UDS. Obtain ABG to ensure he is not hypercapnic. Continue to monitor.  
 
Marilyn Lorenzo MD

## 2018-11-22 NOTE — PROGRESS NOTES
Bedside and Verbal shift change report given to Gisela Priest (oncoming nurse) by self Anton napier). Report included the following information SBAR, Kardex, MAR and Recent Results.

## 2018-11-22 NOTE — PROGRESS NOTES
Bedside and Verbal shift change report given to self (oncoming nurse) by Ronaldo Chavarria RN (offgoing nurse). Report included the following information SBAR, Kardex, MAR and Recent Results.

## 2018-11-22 NOTE — PROGRESS NOTES
Hospitalist Progress Note Patient: Rossy Lozada MRN: [id number]  SSN: [id number] YOB: 1960  Age: 66 y.o. Sex: male Admit Date: 11/13/2018 LOS: 9 days Subjective:  
 
From previous notes: \"66year-old male with a past medical history of CHF with EF of 20-25%, non-insulin diabetes mellitus, schizophrenia admitted to the ICU on 11/13/2018 after he had a cardiac arrest at the [facility]. Fuad Ronquillo had a witnessed collapse in the staff initiated CPR, right ear was placed which showed shockable rhythm which was performed and there was a spontaneous return of circulation, patient had another cardiac arrest in the ER with DAYANNA [place] was admitted to the ICU he was placed on hypothermia protocol, patient is currently awake and following commands.  An echocardiogram showed EF of 10-20%, patient was briefly sent over to cardiac telemetry floor where he went into acute flash pulmonary edema and he was placed on a BiPAP and currently weaned back to nasal cannula.  Hospitalist service was consulted for medical management and taking over the care from ICU service. Fuad Ronquillo was seen this morning, he is awake, not in any acute distress, he is off amiodarone drip on 3 L of oxygen. \" He had a fever of 101.3F on 11/18 so ICD placement was postponed. 11/21 - He states that he feels fine. Flat affect. Denies CP/SOB/cough. He had fever last evening. 11/22 - He is drowsy this morning, but easily awakens and answers questions appropriately. Denies CP/SOB. Denies abdominal pain. Review of systems negative except stated above. Objective:  
 
Visit Vitals /69 (BP 1 Location: Left arm, BP Patient Position: At rest) Pulse 91 Temp 98.1 °F (36.7 °C) Resp 19 Ht 5' 10\" (1.778 m) Comment: per EMR Wt 77.1 kg (170 lb) SpO2 95% BMI 24.39 kg/m² Oxygen Therapy O2 Sat (%): 95 % (11/22/18 0850) Pulse via Oximetry: 94 beats per minute (11/22/18 0729) O2 Device: Nasal cannula (11/22/18 0729) O2 Flow Rate (L/min): 3 l/min (11/22/18 0729) FIO2 (%): 30 % (11/17/18 2028) Intake and Output:  
 
Intake/Output Summary (Last 24 hours) at 11/22/2018 1032 Last data filed at 11/21/2018 0616 Gross per 24 hour Intake 480 ml Output 575 ml Net -95 ml Physical Exam:  
GENERAL: Drowsy, cooperative, no distress, appears stated age EYE: conjunctivae/corneas clear. PERRL. THROAT & NECK: normal and no erythema or exudates noted. LUNG: clear to auscultation bilaterally HEART: regular rate and rhythm, S1S2, no murmur, no JVD ABDOMEN: soft, non-tender, non-distended. Bowel sounds normal.  
EXTREMITIES:  No edema, 2+ pedal/radial pulses bilaterally SKIN: no rash or abnormalities NEUROLOGIC: Drowsy. Cranial nerves 2-12 grossly intact. Lab/Data Review: 
Recent Results (from the past 24 hour(s)) GLUCOSE, POC Collection Time: 11/21/18 11:03 AM  
Result Value Ref Range Glucose (POC) 139 (H) 65 - 100 mg/dL GLUCOSE, POC Collection Time: 11/21/18  3:53 PM  
Result Value Ref Range Glucose (POC) 85 65 - 100 mg/dL GLUCOSE, POC Collection Time: 11/21/18  9:31 PM  
Result Value Ref Range Glucose (POC) 126 (H) 65 - 100 mg/dL PLEASE READ & DOCUMENT PPD TEST IN 72 HRS Collection Time: 11/21/18 10:05 PM  
Result Value Ref Range PPD negative Negative  
 mm Induration 0 mm DRUG SCREEN, URINE Collection Time: 11/21/18 10:15 PM  
Result Value Ref Range PCP(PHENCYCLIDINE) NEGATIVE BENZODIAZEPINES NEGATIVE     
 COCAINE NEGATIVE AMPHETAMINES NEGATIVE METHADONE NEGATIVE     
 THC (TH-CANNABINOL) NEGATIVE     
 OPIATES NEGATIVE     
 BARBITURATES NEGATIVE     
POC G3 Collection Time: 11/21/18 11:35 PM  
Result Value Ref Range Device: NASAL CANNULA    
 FIO2 (POC) 28 %  pH (POC) 7.532 (H) 7.35 - 7.45    
 pCO2 (POC) 36.7 35 - 45 MMHG  
 pO2 (POC) 65 (L) 75 - 100 MMHG  
 HCO3 (POC) 30.8 (H) 22 - 26 MMOL/L  
 sO2 (POC) 95 95 - 98 % Base excess (POC) 8 mmol/L Allens test (POC) YES Site LEFT RADIAL Patient temp. 98.6 Specimen type (POC) ARTERIAL Performed by Tariq   
 CO2, POC 32 MMOL/L Flow rate (POC) 2.000 L/min Respiratory comment: NurseNotified COLLECT TIME 2,330 MAGNESIUM Collection Time: 11/22/18  3:43 AM  
Result Value Ref Range Magnesium 2.0 1.8 - 2.4 mg/dL CBC WITH AUTOMATED DIFF Collection Time: 11/22/18  3:43 AM  
Result Value Ref Range WBC 10.3 4.3 - 11.1 K/uL  
 RBC 4.53 4.23 - 5.6 M/uL  
 HGB 10.7 (L) 13.6 - 17.2 g/dL HCT 32.3 (L) 41.1 - 50.3 % MCV 71.3 (L) 79.6 - 97.8 FL  
 MCH 23.6 (L) 26.1 - 32.9 PG  
 MCHC 33.1 31.4 - 35.0 g/dL  
 RDW 15.5 % PLATELET 691 614 - 062 K/uL MPV 11.3 9.4 - 12.3 FL ABSOLUTE NRBC 0.00 0.0 - 0.2 K/uL  
 DF AUTOMATED NEUTROPHILS 72 43 - 78 % LYMPHOCYTES 18 13 - 44 % MONOCYTES 9 4.0 - 12.0 % EOSINOPHILS 0 (L) 0.5 - 7.8 % BASOPHILS 0 0.0 - 2.0 % IMMATURE GRANULOCYTES 1 0.0 - 5.0 %  
 ABS. NEUTROPHILS 7.4 1.7 - 8.2 K/UL  
 ABS. LYMPHOCYTES 1.9 0.5 - 4.6 K/UL  
 ABS. MONOCYTES 0.9 0.1 - 1.3 K/UL  
 ABS. EOSINOPHILS 0.0 0.0 - 0.8 K/UL  
 ABS. BASOPHILS 0.0 0.0 - 0.2 K/UL  
 ABS. IMM. GRANS. 0.1 0.0 - 0.5 K/UL EKG, 12 LEAD, INITIAL Collection Time: 11/22/18  6:12 AM  
Result Value Ref Range Ventricular Rate 95 BPM  
 Atrial Rate 95 BPM  
 P-R Interval 160 ms QRS Duration 174 ms Q-T Interval 438 ms QTC Calculation (Bezet) 550 ms Calculated P Axis 42 degrees Calculated R Axis -65 degrees Calculated T Axis 99 degrees Diagnosis Normal sinus rhythm Possible Left atrial enlargement Left axis deviation Non-specific intra-ventricular conduction block Inferior infarct (cited on or before 14-NOV-2018) Cannot rule out Anterior infarct (cited on or before 18-NOV-2018) T wave abnormality, consider lateral ischemia Abnormal ECG 
 When compared with ECG of 21-NOV-2018 06:49, No significant change was found Confirmed by Tiny Brooke ([id number]) on 11/22/2018 7:17:00 AM 
  
GLUCOSE, POC Collection Time: 11/22/18  6:24 AM  
Result Value Ref Range Glucose (POC) 104 (H) 65 - 100 mg/dL Imaging: Xr Chest Sngl V Result Date: 11/17/2018 IMPRESSION: Flash pulmonary edema suspected. Xr Chest Sngl V Result Date: 11/13/2018 IMPRESSION: Endotracheal tube advancement with tip overlying the tracheal air column at the level of the clavicular heads. Unchanged positioning of support lines and tubes. Unchanged bibasilar atelectasis and pulmonary venous vascular congestion. VOICE DICTATED BY: Dr. Magi Allen Xr Chest Sngl V Result Date: 11/13/2018 IMPRESSION: ET tube tip well above the clavicles. It could be advanced 5 to 6 cm. NG tube tip could also be advanced 5 to 10 cm. There is only mild central pulmonary vascular prominence. No pneumothorax status post left-sided subclavian central venous catheter placement. Xr Chest Pa Lat Result Date: 11/20/2018 IMPRESSION: Bilateral patchy infiltrates, possibly pneumonia. Xr Abd (kub) Result Date: 11/13/2018 IMPRESSION: Enteric tube tip overlies the gastric body, advancing from the prior abdominal x-ray. VOICE DICTATED BY: Dr. Magi Allen Xr Abd (kub) Result Date: 11/13/2018 IMPRESSION: Injury tube tip is in proximal stomach. The sidehole is near the GE junction. Advancing 5 to 10 cm recommended. Moderate amount of intestinal gas. Xr Chest Jose Cowper Result Date: 11/18/2018 IMPRESSION: Vascular congestion. No significant change. Xr Chest Jose Cowper Result Date: 11/17/2018 IMPRESSION: 1. Extubation. 2. Bilateral perihilar densities likely due to vascular congestion. Findings similar to prior exam. 
 
Xr Chest Jose Cowper Result Date: 11/16/2018 IMPRESSION:  Unchanged left lung base atelectasis or infiltrate. Results for orders placed or performed during the hospital encounter of 18  
2D ECHO COMPLETE ADULT (TTE) W OR WO CONTR Narrative [address] [facility] [address] [place], [address] [facility] 
[phone] Transthoracic Echocardiogram 
2D, M-mode, Doppler, and Color Doppler Patient: Sj Frye 
MR #: [id number] : 1960 Age: 66 years Gender: Male Study date: 2018 Account #: [de-identified] Height: 69 in 
Weight: 180.6 lb 
BSA: 1.98 mï¾² Status:Routine Location: 3305 BP: 105/ 71 Allergies: NO KNOWN ALLERGENS Sonographer:  Tim Faye RDCS Group:  [facility] Referring Physician:  Kilo Reynoso MD 
Reading Physician:  Celi Reyes MD 
 
INDICATIONS: Heart Failure *Imaging done with pt supine, pt is on vent, imaging was respiratory 
dependent. * PROCEDURE: This was a routine study. A transthoracic echocardiogram was 
performed. The study included complete 2D imaging, M-mode, complete spectral 
Doppler, and color Doppler. Intravenous contrast (Definity) was administered. Image quality was adequate. LEFT VENTRICLE: The ventricle was markedly dilated. Systolic function was 
markedly reduced. Ejection fraction was estimated in the range of 10 % to 20  
%. [place] was akinetic. Hyperechoic structures noted in apex in 2D images,  
shadowing 
noted in Definity images, can not rule out the presence of thrombus. There  
was 
severe diffuse hypokinesis with regional variation. Wall thickness was at the 
upper limits of normal. The study was not technically sufficient to allow 
evaluation of LV diastolic function. Average E/e'~ 13. RIGHT VENTRICLE: The size was normal. Systolic function was normal. The 
tricuspid jet envelope definition was inadequate for estimation of RV  
systolic 
pressure. LEFT ATRIUM: The atrium was moderately dilated.  
 
RIGHT ATRIUM: Size was normal. 
 
 SYSTEMIC VEINS: IVC: The inferior vena cava was dilated. The respirophasic 
change in diameter was less than 50%. AORTIC VALVE: The valve was not well visualized. There was no evidence for 
stenosis. There was trivial regurgitation. MITRAL VALVE: Valve structure was normal. There was no evidence for stenosis. There was moderate regurgitation. TRICUSPID VALVE: The valve structure was normal. There was no evidence for 
stenosis. There was trivial regurgitation. PULMONIC VALVE: Not well visualized. There was no evidence for stenosis. There 
was no insufficiency. PERICARDIUM: There was no pericardial effusion. AORTA: The root exhibited normal size. SUMMARY: 
 
-  Left ventricle: The ventricle was markedly dilated. Systolic function was 
markedly reduced. Ejection fraction was estimated in the range of 10 % to 20  
%. [place] was akinetic. Hyperechoic structures noted in apex in 2D images,  
shadowing 
noted in Definity images, can not rule out the presence of thrombus. There  
was 
severe diffuse hypokinesis with regional variation. Wall thickness was at the 
upper limits of normal. 
 
-  Left atrium: The atrium was moderately dilated. -  Inferior vena cava, hepatic veins: The inferior vena cava was dilated. The 
respirophasic change in diameter was less than 50%. -  Mitral valve: There was moderate regurgitation. SYSTEM MEASUREMENT TABLES 
 
2D mode AoR Diam (2D): 2.8 cm 
LA Dimension (2D): 4 cm IVS/LVPW (2D): 1 IVSd (2D): 1.1 cm 
LVIDd (2D): 6.5 cm LVIDs (2D): 5.7 cm 
LVOT Area (2D): 3.1 cm2 LVPWd (2D): 1.2 cm Tissue Doppler Imaging LV Peak Early Zendejas Tissue Zack; Mean; Lateral MA (TDI): 14 cm/s LV Peak Early Zendejas Tissue Zack; Medial MA (TDI): 8.8 cm/s Unspecified Scan Mode Peak Grad; Mean; Antegrade Flow: 8 mm[Hg] Vmax; Antegrade Flow: 140 cm/s LVOT Diam: 2 cm 
MV Peak Zack/LV Peak Tissue Zack E-Wave; Lateral MA: 9.6 MV Peak Zack/LV Peak Tissue Zack E-Wave; Medial MA: 15.2 Peak Grad; Mean; Antegrade Flow: 12 mm[Hg] Vmax; Antegrade Flow: 171 cm/s Prepared and signed by 
 
Josefina Romano MD 
Signed [id number] 17:47:43 Cultures: All Micro Results Procedure Component Value Units Date/Time CULTURE, BLOOD [[id number]] Collected:  11/20/18 1802 Order Status:  Completed Specimen:  Blood Updated:  11/22/18 0645 Special Requests: --     
  LEFT Antecubital 
  
  Culture result: NO GROWTH 2 DAYS     
 CULTURE, BLOOD [[id number]] Collected:  11/20/18 1814 Order Status:  Completed Specimen:  Blood Updated:  11/22/18 0645 Special Requests: --     
  RIGHT 
HAND Culture result: NO GROWTH 2 DAYS     
 CULTURE, BLOOD [[id number]] Collected:  11/15/18 1247 Order Status:  Completed Specimen:  Blood Updated:  11/20/18 4358 Special Requests: --     
  LEFT 
HAND Culture result: NO GROWTH 5 DAYS     
 CULTURE, BLOOD [[id number]] Collected:  11/15/18 1238 Order Status:  Completed Specimen:  Blood Updated:  11/20/18 6921 Special Requests: --     
  RIGHT 
ARM Culture result: NO GROWTH 5 DAYS     
 CULTURE, RESPIRATORY/SPUTUM/BRONCH Rosalia Stone STAIN [[id number]]  (Abnormal)  (Susceptibility) Collected:  11/15/18 1207 Order Status:  Completed Specimen:  Sputum,ET Suction Updated:  11/19/18 0803 Special Requests: NO SPECIAL REQUESTS     
  GRAM STAIN MANY GRAM POSITIVE COCCI     
   FEW GRAM NEGATIVE RODS     
   50 TO 79 WBC'S SEEN PER OIF  
   NO EPITHELIAL CELLS SEEN     
   3+ MUCUS PRESENT Culture result:    
  HEAVY STREPTOCOCCUS PNEUMONIAE  
     
      
  LIGHT NORMAL RESPIRATORY MARYELLEN Assessment/Plan:  
 
Principal Problem: 
  [facility] (11/13/2018) - EF 10% 
- Continue Amiodarone - To get ICD when EP feels appropriate Active Problems: 
  Systolic congestive heart failure ([facility] (11/13/2018) - EF 10% 
- Continue Furosemide - Continue Coreg 
- Continue Lisinopril Acute respiratory failure with hypoxia ([address] (11/13/2018) - Likely persistent due to pneumonia 
- Continue Augmentin (Day 2/10) - Continue Furosemide - Wean oxygen as appropriate Pneumonia due to Streptococcus pneumoniae (11/21/2018) - CXR with bilateral infiltrates - Sputum culture from 11/15 while intubated growing heavy Strep pneumo 
- Started Augmentin (based on cultures) on evening of 11/20 
- Start Mucinex to help with sputum clearance - Wean oxygen as appropriate Fever (11/20/018) - No fevers since starting Augmentin - Likely now due to pneumonia --> on Augmentin - Repeat blood cultures NGTD 
- UA unremarkable Microcytic Anemia (11/22/2018) - Appears chronic - Will check iron studies - If low, may need IV iron supplementation Acute renal failure ([address] (11/13/2018) - Resolved Hypotension (11/13/2018) - Resolved Type II diabetes mellitus ([address] (2/4/2016) - No acute issues - Continue Humalog SSI Schizophrenia ([address] (11/13/2018) - Patient lethargic/drowsy this morning 
- Hold AM dose of Clozaril and monitor Dispo - Continue Augmentin. Check CMP/iron studies. Awaiting ICD placement. DIET CARDIAC Regular DVT Prophylaxis: Heparin Signed By: Aileen Sanders DO November 22, 2018

## 2018-11-22 NOTE — PROGRESS NOTES
Problem: Falls - Risk of 
Goal: *Absence of Falls Document Treeyamini Manning Fall Risk and appropriate interventions in the flowsheet. Outcome: Progressing Towards Goal 
Fall Risk Interventions: 
Mobility Interventions: Bed/chair exit alarm, Communicate number of staff needed for ambulation/transfer, Patient to call before getting OOB Mentation Interventions: Bed/chair exit alarm, Door open when patient unattended, More frequent rounding Medication Interventions: Bed/chair exit alarm, Evaluate medications/consider consulting pharmacy, Patient to call before getting OOB, Teach patient to arise slowly Elimination Interventions: Call light in reach, Patient to call for help with toileting needs, Toileting schedule/hourly rounds History of Falls Interventions: Bed/chair exit alarm, Door open when patient unattended, Investigate reason for fall Patient progressing towards goal with no falls on current admission. Personal belongings are within reach. Bed is in the low and locked position with side rails up x2. Yellow gripper socks to bilateral feet. Call light within reach and patient verbalizes understanding of use. Bed alarm on. Problem: Pressure Injury - Risk of 
Goal: *Prevention of pressure injury Document Laurent Scale and appropriate interventions in the flowsheet. Outcome: Progressing Towards Goal 
Pressure Injury Interventions: 
Sensory Interventions: Assess need for specialty bed, Discuss PT/OT consult with provider, Check visual cues for pain, Keep linens dry and wrinkle-free, Maintain/enhance activity level Moisture Interventions: Apply protective barrier, creams and emollients, Internal/External urinary devices, Limit adult briefs Activity Interventions: Increase time out of bed, Assess need for specialty bed Mobility Interventions: Assess need for specialty bed No signs of skin breakdown. Nutrition Interventions: Document food/fluid/supplement intake Friction and Shear Interventions: HOB 30 degrees or less, Minimize layers

## 2018-11-23 LAB
ALBUMIN SERPL-MCNC: 2 G/DL (ref 3.5–5)
ALBUMIN/GLOB SERPL: 0.4 {RATIO} (ref 1.2–3.5)
ALP SERPL-CCNC: 106 U/L (ref 50–136)
ALT SERPL-CCNC: 42 U/L (ref 12–65)
ANION GAP SERPL CALC-SCNC: 7 MMOL/L (ref 7–16)
AST SERPL-CCNC: 34 U/L (ref 15–37)
ATRIAL RATE: 86 BPM
BASOPHILS # BLD: 0 K/UL (ref 0–0.2)
BASOPHILS NFR BLD: 0 % (ref 0–2)
BILIRUB SERPL-MCNC: 0.6 MG/DL (ref 0.2–1.1)
BUN SERPL-MCNC: 16 MG/DL (ref 6–23)
CALCIUM SERPL-MCNC: 8.5 MG/DL (ref 8.3–10.4)
CALCULATED P AXIS, ECG09: 59 DEGREES
CALCULATED R AXIS, ECG10: -70 DEGREES
CALCULATED T AXIS, ECG11: 96 DEGREES
CHLORIDE SERPL-SCNC: 101 MMOL/L (ref 98–107)
CO2 SERPL-SCNC: 32 MMOL/L (ref 21–32)
CREAT SERPL-MCNC: 1.17 MG/DL (ref 0.8–1.5)
DEPRECATED S PYO AG THROAT QL EIA: NEGATIVE
DIAGNOSIS, 93000: NORMAL
DIFFERENTIAL METHOD BLD: ABNORMAL
EOSINOPHIL # BLD: 0 K/UL (ref 0–0.8)
EOSINOPHIL NFR BLD: 0 % (ref 0.5–7.8)
ERYTHROCYTE [DISTWIDTH] IN BLOOD BY AUTOMATED COUNT: 15.4 %
FLUAV AG NPH QL IA: NEGATIVE
FLUBV AG NPH QL IA: NEGATIVE
GLOBULIN SER CALC-MCNC: 4.6 G/DL (ref 2.3–3.5)
GLUCOSE BLD STRIP.AUTO-MCNC: 105 MG/DL (ref 65–100)
GLUCOSE BLD STRIP.AUTO-MCNC: 88 MG/DL (ref 65–100)
GLUCOSE BLD STRIP.AUTO-MCNC: 94 MG/DL (ref 65–100)
GLUCOSE BLD STRIP.AUTO-MCNC: 97 MG/DL (ref 65–100)
GLUCOSE SERPL-MCNC: 100 MG/DL (ref 65–100)
HCT VFR BLD AUTO: 31.7 % (ref 41.1–50.3)
HGB BLD-MCNC: 10.3 G/DL (ref 13.6–17.2)
IMM GRANULOCYTES # BLD: 0 K/UL (ref 0–0.5)
IMM GRANULOCYTES NFR BLD AUTO: 1 % (ref 0–5)
LYMPHOCYTES # BLD: 1.8 K/UL (ref 0.5–4.6)
LYMPHOCYTES NFR BLD: 21 % (ref 13–44)
MAGNESIUM SERPL-MCNC: 2.4 MG/DL (ref 1.8–2.4)
MCH RBC QN AUTO: 23.5 PG (ref 26.1–32.9)
MCHC RBC AUTO-ENTMCNC: 32.5 G/DL (ref 31.4–35)
MCV RBC AUTO: 72.4 FL (ref 79.6–97.8)
MONOCYTES # BLD: 0.7 K/UL (ref 0.1–1.3)
MONOCYTES NFR BLD: 8 % (ref 4–12)
NEUTS SEG # BLD: 6.2 K/UL (ref 1.7–8.2)
NEUTS SEG NFR BLD: 70 % (ref 43–78)
NRBC # BLD: 0 K/UL (ref 0–0.2)
P-R INTERVAL, ECG05: 200 MS
PLATELET # BLD AUTO: 284 K/UL (ref 150–450)
PMV BLD AUTO: 11.4 FL (ref 9.4–12.3)
POTASSIUM SERPL-SCNC: 3.8 MMOL/L (ref 3.5–5.1)
PROT SERPL-MCNC: 6.6 G/DL (ref 6.3–8.2)
Q-T INTERVAL, ECG07: 450 MS
QRS DURATION, ECG06: 174 MS
QTC CALCULATION (BEZET), ECG08: 538 MS
RBC # BLD AUTO: 4.38 M/UL (ref 4.23–5.6)
SODIUM SERPL-SCNC: 140 MMOL/L (ref 136–145)
SPECIMEN SOURCE: NORMAL
VENTRICULAR RATE, ECG03: 86 BPM
WBC # BLD AUTO: 8.8 K/UL (ref 4.3–11.1)

## 2018-11-23 PROCEDURE — 87880 STREP A ASSAY W/OPTIC: CPT

## 2018-11-23 PROCEDURE — 74011250637 HC RX REV CODE- 250/637: Performed by: INTERNAL MEDICINE

## 2018-11-23 PROCEDURE — 36415 COLL VENOUS BLD VENIPUNCTURE: CPT

## 2018-11-23 PROCEDURE — 77030020263 HC SOL INJ SOD CL0.9% LFCR 1000ML

## 2018-11-23 PROCEDURE — 83735 ASSAY OF MAGNESIUM: CPT

## 2018-11-23 PROCEDURE — 74011250636 HC RX REV CODE- 250/636: Performed by: INTERNAL MEDICINE

## 2018-11-23 PROCEDURE — 74011250636 HC RX REV CODE- 250/636: Performed by: HOSPITALIST

## 2018-11-23 PROCEDURE — 82962 GLUCOSE BLOOD TEST: CPT

## 2018-11-23 PROCEDURE — 80053 COMPREHEN METABOLIC PANEL: CPT

## 2018-11-23 PROCEDURE — 85025 COMPLETE CBC W/AUTO DIFF WBC: CPT

## 2018-11-23 PROCEDURE — 74011250637 HC RX REV CODE- 250/637: Performed by: HOSPITALIST

## 2018-11-23 PROCEDURE — 87081 CULTURE SCREEN ONLY: CPT

## 2018-11-23 PROCEDURE — 87804 INFLUENZA ASSAY W/OPTIC: CPT

## 2018-11-23 PROCEDURE — 65660000000 HC RM CCU STEPDOWN

## 2018-11-23 PROCEDURE — 93005 ELECTROCARDIOGRAM TRACING: CPT | Performed by: INTERNAL MEDICINE

## 2018-11-23 RX ORDER — CARVEDILOL 3.12 MG/1
3.12 TABLET ORAL 2 TIMES DAILY WITH MEALS
Status: DISCONTINUED | OUTPATIENT
Start: 2018-11-24 | End: 2018-11-23

## 2018-11-23 RX ORDER — CARVEDILOL 3.12 MG/1
3.12 TABLET ORAL 2 TIMES DAILY WITH MEALS
Status: DISCONTINUED | OUTPATIENT
Start: 2018-11-23 | End: 2018-11-30 | Stop reason: HOSPADM

## 2018-11-23 RX ADMIN — POTASSIUM CHLORIDE 40 MEQ: 20 TABLET, EXTENDED RELEASE ORAL at 08:56

## 2018-11-23 RX ADMIN — AMOXICILLIN AND CLAVULANATE POTASSIUM 1 TABLET: 875; 125 TABLET, FILM COATED ORAL at 08:56

## 2018-11-23 RX ADMIN — CLOZAPINE 500 MG: 100 TABLET ORAL at 21:45

## 2018-11-23 RX ADMIN — Medication 10 ML: at 06:00

## 2018-11-23 RX ADMIN — FUROSEMIDE 40 MG: 40 TABLET ORAL at 08:57

## 2018-11-23 RX ADMIN — AMOXICILLIN AND CLAVULANATE POTASSIUM 1 TABLET: 875; 125 TABLET, FILM COATED ORAL at 18:05

## 2018-11-23 RX ADMIN — Medication 5 ML: at 15:32

## 2018-11-23 RX ADMIN — HEPARIN SODIUM 5000 UNITS: 5000 INJECTION INTRAVENOUS; SUBCUTANEOUS at 10:07

## 2018-11-23 RX ADMIN — CHLORASEPTIC 1 SPRAY: 1.5 LIQUID ORAL at 18:07

## 2018-11-23 RX ADMIN — ASPIRIN 81 MG 81 MG: 81 TABLET ORAL at 08:56

## 2018-11-23 RX ADMIN — GUAIFENESIN 1200 MG: 600 TABLET, EXTENDED RELEASE ORAL at 08:57

## 2018-11-23 RX ADMIN — CARVEDILOL 3.12 MG: 3.12 TABLET, FILM COATED ORAL at 18:05

## 2018-11-23 RX ADMIN — AMIODARONE HYDROCHLORIDE 400 MG: 200 TABLET ORAL at 21:44

## 2018-11-23 RX ADMIN — Medication 10 ML: at 21:44

## 2018-11-23 RX ADMIN — PANTOPRAZOLE SODIUM 40 MG: 40 TABLET, DELAYED RELEASE ORAL at 08:57

## 2018-11-23 RX ADMIN — CLOZAPINE 200 MG: 100 TABLET ORAL at 09:04

## 2018-11-23 RX ADMIN — CARVEDILOL 6.25 MG: 6.25 TABLET, FILM COATED ORAL at 08:57

## 2018-11-23 RX ADMIN — GUAIFENESIN 1200 MG: 600 TABLET, EXTENDED RELEASE ORAL at 21:44

## 2018-11-23 RX ADMIN — SODIUM CHLORIDE 250 ML: 900 INJECTION, SOLUTION INTRAVENOUS at 15:33

## 2018-11-23 RX ADMIN — HEPARIN SODIUM 5000 UNITS: 5000 INJECTION INTRAVENOUS; SUBCUTANEOUS at 21:43

## 2018-11-23 RX ADMIN — AMIODARONE HYDROCHLORIDE 400 MG: 200 TABLET ORAL at 08:56

## 2018-11-23 NOTE — PROGRESS NOTES
Still do not have bed offer but [facility] willing to consider when medically stable. Per Dr. Shivam Pulido patient not medically stable for d/c until after ICD is placed. Notified Neil Martinez the [facility] of this and she will follow up on Monday for possible acceptance. CM following.

## 2018-11-23 NOTE — PROGRESS NOTES
Patient's blood pressure 79/50, patient asymptomatic. Per Dr. Mai Herbert, recheck patient's blood pressure again in one our and give 250 ml/h bolus if SBP is still in 70's. Order's received to hold patient's daily lisinopril dose.

## 2018-11-23 NOTE — PROGRESS NOTES
Problem: Falls - Risk of 
Goal: *Absence of Falls Document Eliel Timmonsh Fall Risk and appropriate interventions in the flowsheet. Fall Risk Interventions: 
Mobility Interventions: Bed/chair exit alarm, Patient to call before getting OOB Mentation Interventions: Bed/chair exit alarm Medication Interventions: Bed/chair exit alarm, Patient to call before getting OOB Elimination Interventions: Call light in reach History of Falls Interventions: Bed/chair exit alarm

## 2018-11-23 NOTE — PROGRESS NOTES
Spoke with Dr. Faiza Rivera about patient's BP 89/53. Per Dr. Faiza Rivera decrease patient's coreg to 3.125mg and hold patient's second dose of lasix for today.

## 2018-11-23 NOTE — PROGRESS NOTES
MEWS score noted to be 3. Charge nurse, Huey Brizuela, RN informed and assessed patient. High MEWS score noted due to LOC responds to voice and BP 90/56. Vitals signs to be completed every 2 hours. Will continue to monitor.

## 2018-11-23 NOTE — PROGRESS NOTES
Problem: Falls - Risk of 
Goal: *Absence of Falls Document Lindsey Peña Fall Risk and appropriate interventions in the flowsheet. Outcome: Progressing Towards Goal 
Fall Risk Interventions: 
Mobility Interventions: Bed/chair exit alarm, Communicate number of staff needed for ambulation/transfer, OT consult for ADLs, Patient to call before getting OOB, PT Consult for mobility concerns, PT Consult for assist device competence Mentation Interventions: Bed/chair exit alarm, Door open when patient unattended, More frequent rounding, Room close to nurse's station Medication Interventions: Bed/chair exit alarm, Evaluate medications/consider consulting pharmacy, Patient to call before getting OOB, Teach patient to arise slowly Elimination Interventions: Bed/chair exit alarm, Call light in reach, Patient to call for help with toileting needs, Toilet paper/wipes in reach History of Falls Interventions: Bed/chair exit alarm, Door open when patient unattended, Evaluate medications/consider consulting pharmacy, Room close to nurse's station Problem: Pressure Injury - Risk of 
Goal: *Prevention of pressure injury Document Laurent Scale and appropriate interventions in the flowsheet. Outcome: Progressing Towards Goal 
Pressure Injury Interventions: 
Sensory Interventions: Assess changes in LOC, Discuss PT/OT consult with provider, Keep linens dry and wrinkle-free, Minimize linen layers, Maintain/enhance activity level Moisture Interventions: Absorbent underpads, Check for incontinence Q2 hours and as needed, Maintain skin hydration (lotion/cream), Minimize layers Activity Interventions: Increase time out of bed, Pressure redistribution bed/mattress(bed type), PT/OT evaluation Mobility Interventions: HOB 30 degrees or less, PT/OT evaluation, Pressure redistribution bed/mattress (bed type) Nutrition Interventions: Document food/fluid/supplement intake Friction and Shear Interventions: Foam dressings/transparent film/skin sealants, HOB 30 degrees or less, Lift sheet, Minimize layers

## 2018-11-23 NOTE — PROGRESS NOTES
Verbal bedside report given to Sujatalawanda Molina, oncoming RN. Patient's situation, background, assessment and recommendations provided. Opportunity for questions provided. Oncoming RN assumed care of patient.

## 2018-11-23 NOTE — PROGRESS NOTES
Hospitalist Progress Note Patient: Russell Quintana MRN: [id number]  SSN: [id number] YOB: 1960  Age: 66 y.o. Sex: male Admit Date: 11/13/2018 LOS: 10 days Subjective:  
 
From previous notes: \"66year-old male with a past medical history of CHF with EF of 20-25%, non-insulin diabetes mellitus, schizophrenia admitted to the ICU on 11/13/2018 after he had a cardiac arrest at the [facility]. Stan Ewing had a witnessed collapse in the staff initiated CPR, right ear was placed which showed shockable rhythm which was performed and there was a spontaneous return of circulation, patient had another cardiac arrest in the ER with DAYANNA Epperson was admitted to the ICU he was placed on hypothermia protocol, patient is currently awake and following commands.  An echocardiogram showed EF of 10-20%, patient was briefly sent over to cardiac telemetry floor where he went into acute flash pulmonary edema and he was placed on a BiPAP and currently weaned back to nasal cannula.  Hospitalist service was consulted for medical management and taking over the care from ICU service. Stan Ewing was seen this morning, he is awake, not in any acute distress, he is off amiodarone drip on 3 L of oxygen. \" He had a fever of 101.3F on 11/18 so ICD placement was postponed. 11/23 - Pt seen at bedside Reports feeling okay Alert, awake and oriented to place and person. Denies chest pain, nausea/vomiting, abdominal pain. No fever over last 24 hours Reports having sore throat Review of systems negative except stated above. Objective:  
 
Visit Vitals BP 95/64 (BP 1 Location: Left arm, BP Patient Position: At rest) Pulse 84 Temp 97.9 °F (36.6 °C) Resp 18 Ht 5' 10\" (1.778 m) Comment: per EMR Wt 77.3 kg (170 lb 6.4 oz) SpO2 94% BMI 24.45 kg/m² Oxygen Therapy O2 Sat (%): 94 % (11/23/18 0430) Pulse via Oximetry: 94 beats per minute (11/22/18 1910) O2 Device: Nasal cannula (11/23/18 0432) O2 Flow Rate (L/min): 3 l/min (11/23/18 0432) FIO2 (%): 30 % (11/17/18 2028) Intake and Output:  
 
Intake/Output Summary (Last 24 hours) at 11/23/2018 0719 Last data filed at 11/23/2018 3214 Gross per 24 hour Intake 240 ml Output 1650 ml Net -1410 ml Physical Exam:  
GENERAL: Drowsy, cooperative, no distress, appears stated age EYE: conjunctivae/corneas clear. PERRL. THROAT & NECK: normal and no erythema or exudates noted. LUNG: clear to auscultation bilaterally HEART: regular rate and rhythm, S1S2, no murmur, no JVD ABDOMEN: soft, non-tender, non-distended. Bowel sounds normal.  
EXTREMITIES:  No edema, 2+ pedal/radial pulses bilaterally SKIN: no rash or abnormalities NEUROLOGIC: Drowsy. Cranial nerves 2-12 grossly intact. Lab/Data Review: 
Recent Results (from the past 24 hour(s)) GLUCOSE, POC Collection Time: 11/22/18 10:37 AM  
Result Value Ref Range Glucose (POC) 93 65 - 100 mg/dL GLUCOSE, POC Collection Time: 11/22/18  3:40 PM  
Result Value Ref Range Glucose (POC) 125 (H) 65 - 100 mg/dL GLUCOSE, POC Collection Time: 11/22/18  9:33 PM  
Result Value Ref Range Glucose (POC) 116 (H) 65 - 100 mg/dL MAGNESIUM Collection Time: 11/23/18  3:53 AM  
Result Value Ref Range Magnesium 2.4 1.8 - 2.4 mg/dL CBC WITH AUTOMATED DIFF Collection Time: 11/23/18  3:53 AM  
Result Value Ref Range WBC 8.8 4.3 - 11.1 K/uL  
 RBC 4.38 4.23 - 5.6 M/uL  
 HGB 10.3 (L) 13.6 - 17.2 g/dL HCT 31.7 (L) 41.1 - 50.3 % MCV 72.4 (L) 79.6 - 97.8 FL  
 MCH 23.5 (L) 26.1 - 32.9 PG  
 MCHC 32.5 31.4 - 35.0 g/dL  
 RDW 15.4 % PLATELET 500 114 - 358 K/uL MPV 11.4 9.4 - 12.3 FL ABSOLUTE NRBC 0.00 0.0 - 0.2 K/uL  
 DF AUTOMATED NEUTROPHILS 70 43 - 78 % LYMPHOCYTES 21 13 - 44 % MONOCYTES 8 4.0 - 12.0 % EOSINOPHILS 0 (L) 0.5 - 7.8 % BASOPHILS 0 0.0 - 2.0 % IMMATURE GRANULOCYTES 1 0.0 - 5.0 % ABS. NEUTROPHILS 6.2 1.7 - 8.2 K/UL  
 ABS. LYMPHOCYTES 1.8 0.5 - 4.6 K/UL  
 ABS. MONOCYTES 0.7 0.1 - 1.3 K/UL  
 ABS. EOSINOPHILS 0.0 0.0 - 0.8 K/UL  
 ABS. BASOPHILS 0.0 0.0 - 0.2 K/UL  
 ABS. IMM. GRANS. 0.0 0.0 - 0.5 K/UL METABOLIC PANEL, COMPREHENSIVE Collection Time: 11/23/18  3:53 AM  
Result Value Ref Range Sodium 140 136 - 145 mmol/L Potassium 3.8 3.5 - 5.1 mmol/L Chloride 101 98 - 107 mmol/L  
 CO2 32 21 - 32 mmol/L Anion gap 7 7 - 16 mmol/L Glucose 100 65 - 100 mg/dL BUN 16 6 - 23 MG/DL Creatinine 1.17 0.8 - 1.5 MG/DL  
 GFR est AA >60 >60 ml/min/1.73m2 GFR est non-AA >60 >60 ml/min/1.73m2 Calcium 8.5 8.3 - 10.4 MG/DL Bilirubin, total 0.6 0.2 - 1.1 MG/DL  
 ALT (SGPT) 42 12 - 65 U/L  
 AST (SGOT) 34 15 - 37 U/L Alk. phosphatase 106 50 - 136 U/L Protein, total 6.6 6.3 - 8.2 g/dL Albumin 2.0 (L) 3.5 - 5.0 g/dL Globulin 4.6 (H) 2.3 - 3.5 g/dL A-G Ratio 0.4 (L) 1.2 - 3.5 GLUCOSE, POC Collection Time: 11/23/18  6:08 AM  
Result Value Ref Range Glucose (POC) 94 65 - 100 mg/dL EKG, 12 LEAD, INITIAL Collection Time: 11/23/18  6:27 AM  
Result Value Ref Range Ventricular Rate 86 BPM  
 Atrial Rate 86 BPM  
 P-R Interval 200 ms QRS Duration 174 ms Q-T Interval 450 ms QTC Calculation (Bezet) 538 ms Calculated P Axis 59 degrees Calculated R Axis -70 degrees Calculated T Axis 96 degrees Diagnosis Normal sinus rhythm Possible Left atrial enlargement Left axis deviation Non-specific intra-ventricular conduction block Inferior infarct , age undetermined Cannot rule out Anterior infarct , age undetermined T wave abnormality, consider lateral ischemia Abnormal ECG No previous ECGs available Imaging: Xr Chest Sngl V Result Date: 11/17/2018 IMPRESSION: Flash pulmonary edema suspected. Xr Chest Sngl V Result Date: 11/13/2018 IMPRESSION: Endotracheal tube advancement with tip overlying the tracheal air column at the level of the clavicular heads. Unchanged positioning of support lines and tubes. Unchanged bibasilar atelectasis and pulmonary venous vascular congestion. VOICE DICTATED BY: Dr. Carlos Ruvalcaba Xr Chest Sngl V Result Date: 2018 IMPRESSION: ET tube tip well above the clavicles. It could be advanced 5 to 6 cm. NG tube tip could also be advanced 5 to 10 cm. There is only mild central pulmonary vascular prominence. No pneumothorax status post left-sided subclavian central venous catheter placement. Xr Chest Pa Lat Result Date: 2018 IMPRESSION: Bilateral patchy infiltrates, possibly pneumonia. Xr Abd (kub) Result Date: 2018 IMPRESSION: Enteric tube tip overlies the gastric body, advancing from the prior abdominal x-ray. VOICE DICTATED BY: Dr. Carlos Ruvalcaba Xr Abd (kub) Result Date: 2018 IMPRESSION: Injury tube tip is in proximal stomach. The sidehole is near the GE junction. Advancing 5 to 10 cm recommended. Moderate amount of intestinal gas. Xr Chest [facility] Result Date: 2018 IMPRESSION: Vascular congestion. No significant change. Xr Chest [facility] Result Date: 2018 IMPRESSION: 1. Extubation. 2. Bilateral perihilar densities likely due to vascular congestion. Findings similar to prior exam. 
 
Xr Chest [facility] Result Date: 2018 IMPRESSION:  Unchanged left lung base atelectasis or infiltrate. Results for orders placed or performed during the hospital encounter of 18  
2D ECHO COMPLETE ADULT (TTE) W OR WO CONTR [facility]arrative [facility] [place], [address] [facility] 
[phone] Transthoracic Echocardiogram 
2D, M-mode, Doppler, and Color Doppler Patient: Sanjuanita Guzman 
MR #: [id number] : 1960 Age: 66 years Gender: Male Study date: 2018 Account #: [de-identified] Height: 69 in 
Weight: 180.6 lb 
BSA: 1.98 mï¾² Status:Routine Location: 3305 BP: 105/ 71 Allergies: NO KNOWN ALLERGENS Sonographer:  Nico Carrillo [facility] Group:  [facility] Referring Physician:  Nanine Murray Cheryle Kite., MD 
[place] Physician:  Bryan Varma MD 
 
INDICATIONS: Heart Failure *Imaging done with pt supine, pt is on vent, imaging was respiratory 
dependent. * PROCEDURE: This was a routine study. A transthoracic echocardiogram was 
performed. The study included complete 2D imaging, M-mode, complete spectral 
Doppler, and color Doppler. Intravenous contrast (Definity) was administered. Image quality was adequate. LEFT VENTRICLE: The ventricle was markedly dilated. Systolic function was 
markedly reduced. Ejection fraction was estimated in the range of 10 % to 20  
%. [place] was akinetic. Hyperechoic structures noted in apex in 2D images,  
shadowing 
noted in Definity images, can not rule out the presence of thrombus. There  
was 
severe diffuse hypokinesis with regional variation. Wall thickness was at the 
upper limits of normal. The study was not technically sufficient to allow 
evaluation of LV diastolic function. Average E/e'~ 13. RIGHT VENTRICLE: The size was normal. Systolic function was normal. The 
tricuspid jet envelope definition was inadequate for estimation of RV  
systolic 
pressure. LEFT ATRIUM: The atrium was moderately dilated. RIGHT ATRIUM: Size was normal. 
 
SYSTEMIC VEINS: IVC: The inferior vena cava was dilated. The respirophasic 
change in diameter was less than 50%. AORTIC VALVE: The valve was not well visualized. There was no evidence for 
stenosis. There was trivial regurgitation. MITRAL VALVE: Valve structure was normal. There was no evidence for stenosis. There was moderate regurgitation. TRICUSPID VALVE: The valve structure was normal. There was no evidence for 
stenosis. There was trivial regurgitation. PULMONIC VALVE: Not well visualized. There was no evidence for stenosis. There was no insufficiency. PERICARDIUM: There was no pericardial effusion. AORTA: The root exhibited normal size. SUMMARY: 
 
-  Left ventricle: The ventricle was markedly dilated. Systolic function was 
markedly reduced. Ejection fraction was estimated in the range of 10 % to 20  
%. [place] was akinetic. Hyperechoic structures noted in apex in 2D images,  
shadowing 
noted in Definity images, can not rule out the presence of thrombus. There  
was 
severe diffuse hypokinesis with regional variation. Wall thickness was at the 
upper limits of normal. 
 
-  Left atrium: The atrium was moderately dilated. -  Inferior vena cava, hepatic veins: The inferior vena cava was dilated. The 
respirophasic change in diameter was less than 50%. -  Mitral valve: There was moderate regurgitation. SYSTEM MEASUREMENT TABLES 
 
2D mode AoR Diam (2D): 2.8 cm 
LA Dimension (2D): 4 cm IVS/LVPW (2D): 1 IVSd (2D): 1.1 cm 
LVIDd (2D): 6.5 cm LVIDs (2D): 5.7 cm 
LVOT Area (2D): 3.1 cm2 LVPWd (2D): 1.2 cm Tissue Doppler Imaging LV Peak Early Zendejas Tissue Zack; Mean; Lateral MA (TDI): 14 cm/s LV Peak Early Zendejas Tissue Zack; Medial MA (TDI): 8.8 cm/s Unspecified Scan Mode Peak Grad; Mean; Antegrade Flow: 8 mm[Hg] Vmax; Antegrade Flow: 140 cm/s LVOT Diam: 2 cm 
MV Peak Zack/LV Peak Tissue Zack E-Wave; Lateral MA: 9.6 MV Peak Zack/LV Peak Tissue Zack E-Wave; Medial MA: 15.2 Peak Grad; Mean; Antegrade Flow: 12 mm[Hg] Vmax; Antegrade Flow: 171 cm/s Prepared and signed by 
 
Uriah Haywood MD 
Signed [id number] 17:47:43 Cultures: All Micro Results Procedure Component Value Units Date/Time CULTURE, BLOOD [[id number]] Collected:  11/20/18 1802 Order Status:  Completed Specimen:  Blood Updated:  11/22/18 0645 Special Requests: --     
  LEFT Antecubital 
  
  Culture result: NO GROWTH 2 DAYS     
 CULTURE, BLOOD [[id number]] Collected:  11/20/18 8979 Order Status:  Completed Specimen:  Blood Updated:  11/22/18 0645 Special Requests: --     
  RIGHT 
HAND Culture result: NO GROWTH 2 DAYS     
 CULTURE, BLOOD [[id number]] Collected:  11/15/18 1247 Order Status:  Completed Specimen:  Blood Updated:  11/20/18 3772 Special Requests: --     
  LEFT 
HAND Culture result: NO GROWTH 5 DAYS     
 CULTURE, BLOOD [[id number]] Collected:  11/15/18 1238 Order Status:  Completed Specimen:  Blood Updated:  11/20/18 4142 Special Requests: --     
  RIGHT 
ARM Culture result: NO GROWTH 5 DAYS     
 CULTURE, RESPIRATORY/SPUTUM/BRONCH [place] STAIN [[id number]]  (Abnormal)  (Susceptibility) Collected:  11/15/18 1207 Order Status:  Completed Specimen:  Sputum,ET Suction Updated:  11/19/18 4990 Special Requests: NO SPECIAL REQUESTS     
  GRAM STAIN MANY GRAM POSITIVE COCCI     
   FEW GRAM NEGATIVE RODS     
   50 TO 79 WBC'S SEEN PER OIF  
   NO EPITHELIAL CELLS SEEN     
   3+ MUCUS PRESENT Culture result:    
  HEAVY STREPTOCOCCUS PNEUMONIAE  
     
      
  LIGHT NORMAL RESPIRATORY MARYELLEN Assessment/Plan:  
 
Principal Problem: 
  [facility] (11/13/2018) - EF 10% 
- Continue Amiodarone - To get ICD when EP feels appropriate Active Problems: 
Sore throat: 
- will add chlorseptic spray 
- will check influenza and strep Ag Systolic congestive heart failure ([facility] (11/13/2018) - EF 10% 
- Continue Furosemide - Continue Coreg 
- Continue Lisinopril Acute respiratory failure with hypoxia ([facility] (11/13/2018) - Likely persistent due to pneumonia 
- Continue Augmentin (Day 2/10) - Continue Furosemide - Wean oxygen as appropriate Pneumonia due to Streptococcus pneumoniae (11/21/2018) - CXR with bilateral infiltrates - Sputum culture from 11/15 while intubated growing heavy Strep pneumo 
- Started Augmentin (based on cultures) on evening of 11/20 
- Start Mucinex to help with sputum clearance - Wean oxygen as appropriate Fever (11/20/018) - No fevers since starting Augmentin - Likely now due to pneumonia --> on Augmentin - Repeat blood cultures NGTD 
- UA unremarkable Microcytic Anemia (11/22/2018) - Appears chronic - Will check iron studies - If low, may need IV iron supplementation Acute renal failure ([facility] (11/13/2018) - Resolved Hypotension (11/13/2018) - Resolved Type II diabetes mellitus ([facility] (2/4/2016) - No acute issues - Continue Humalog SSI [facility] (11/13/2018) - Patient lethargic/drowsy this morning 
- Hold AM dose of Clozaril and monitor Dispo - Continue Augmentin. Check CMP/iron studies. Awaiting ICD placement. DIET CARDIAC Regular DVT Prophylaxis: Heparin Disposition: plan of care discussed with pt, nursing staff and . Pt will be evaluated for nursing home today. Signed By: Kameron Gurrola MD   
 November 23, 2018

## 2018-11-24 LAB
ALBUMIN SERPL-MCNC: 2.2 G/DL (ref 3.5–5)
ALBUMIN/GLOB SERPL: 0.5 {RATIO} (ref 1.2–3.5)
ALP SERPL-CCNC: 113 U/L (ref 50–136)
ALT SERPL-CCNC: 39 U/L (ref 12–65)
ANION GAP SERPL CALC-SCNC: 9 MMOL/L (ref 7–16)
AST SERPL-CCNC: 27 U/L (ref 15–37)
ATRIAL RATE: 86 BPM
BASOPHILS # BLD: 0 K/UL (ref 0–0.2)
BASOPHILS NFR BLD: 0 % (ref 0–2)
BILIRUB SERPL-MCNC: 0.6 MG/DL (ref 0.2–1.1)
BUN SERPL-MCNC: 17 MG/DL (ref 6–23)
CALCIUM SERPL-MCNC: 8.6 MG/DL (ref 8.3–10.4)
CALCULATED P AXIS, ECG09: 67 DEGREES
CALCULATED R AXIS, ECG10: -22 DEGREES
CALCULATED T AXIS, ECG11: 109 DEGREES
CHLORIDE SERPL-SCNC: 102 MMOL/L (ref 98–107)
CO2 SERPL-SCNC: 29 MMOL/L (ref 21–32)
CREAT SERPL-MCNC: 1.34 MG/DL (ref 0.8–1.5)
DIAGNOSIS, 93000: NORMAL
DIFFERENTIAL METHOD BLD: ABNORMAL
EOSINOPHIL # BLD: 0 K/UL (ref 0–0.8)
EOSINOPHIL NFR BLD: 0 % (ref 0.5–7.8)
ERYTHROCYTE [DISTWIDTH] IN BLOOD BY AUTOMATED COUNT: 15.6 %
GLOBULIN SER CALC-MCNC: 4.5 G/DL (ref 2.3–3.5)
GLUCOSE BLD STRIP.AUTO-MCNC: 113 MG/DL (ref 65–100)
GLUCOSE BLD STRIP.AUTO-MCNC: 119 MG/DL (ref 65–100)
GLUCOSE BLD STRIP.AUTO-MCNC: 124 MG/DL (ref 65–100)
GLUCOSE BLD STRIP.AUTO-MCNC: 132 MG/DL (ref 65–100)
GLUCOSE BLD STRIP.AUTO-MCNC: 99 MG/DL (ref 65–100)
GLUCOSE SERPL-MCNC: 91 MG/DL (ref 65–100)
HCT VFR BLD AUTO: 30.7 % (ref 41.1–50.3)
HGB BLD-MCNC: 9.9 G/DL (ref 13.6–17.2)
IMM GRANULOCYTES # BLD: 0 K/UL (ref 0–0.5)
IMM GRANULOCYTES NFR BLD AUTO: 1 % (ref 0–5)
LYMPHOCYTES # BLD: 2 K/UL (ref 0.5–4.6)
LYMPHOCYTES NFR BLD: 23 % (ref 13–44)
MAGNESIUM SERPL-MCNC: 2.4 MG/DL (ref 1.8–2.4)
MCH RBC QN AUTO: 23.4 PG (ref 26.1–32.9)
MCHC RBC AUTO-ENTMCNC: 32.2 G/DL (ref 31.4–35)
MCV RBC AUTO: 72.6 FL (ref 79.6–97.8)
MONOCYTES # BLD: 0.8 K/UL (ref 0.1–1.3)
MONOCYTES NFR BLD: 9 % (ref 4–12)
NEUTS SEG # BLD: 5.8 K/UL (ref 1.7–8.2)
NEUTS SEG NFR BLD: 67 % (ref 43–78)
NRBC # BLD: 0 K/UL (ref 0–0.2)
P-R INTERVAL, ECG05: 204 MS
PLATELET # BLD AUTO: 336 K/UL (ref 150–450)
PMV BLD AUTO: 11.2 FL (ref 9.4–12.3)
POTASSIUM SERPL-SCNC: 4 MMOL/L (ref 3.5–5.1)
PROT SERPL-MCNC: 6.7 G/DL (ref 6.3–8.2)
Q-T INTERVAL, ECG07: 464 MS
QRS DURATION, ECG06: 186 MS
QTC CALCULATION (BEZET), ECG08: 555 MS
RBC # BLD AUTO: 4.23 M/UL (ref 4.23–5.6)
SODIUM SERPL-SCNC: 140 MMOL/L (ref 136–145)
VENTRICULAR RATE, ECG03: 86 BPM
WBC # BLD AUTO: 8.6 K/UL (ref 4.3–11.1)

## 2018-11-24 PROCEDURE — 74011250637 HC RX REV CODE- 250/637: Performed by: INTERNAL MEDICINE

## 2018-11-24 PROCEDURE — 65660000000 HC RM CCU STEPDOWN

## 2018-11-24 PROCEDURE — 82962 GLUCOSE BLOOD TEST: CPT

## 2018-11-24 PROCEDURE — 74011250636 HC RX REV CODE- 250/636: Performed by: INTERNAL MEDICINE

## 2018-11-24 PROCEDURE — 74011250637 HC RX REV CODE- 250/637: Performed by: HOSPITALIST

## 2018-11-24 PROCEDURE — 80053 COMPREHEN METABOLIC PANEL: CPT

## 2018-11-24 PROCEDURE — 93005 ELECTROCARDIOGRAM TRACING: CPT | Performed by: INTERNAL MEDICINE

## 2018-11-24 PROCEDURE — 36415 COLL VENOUS BLD VENIPUNCTURE: CPT

## 2018-11-24 PROCEDURE — 85025 COMPLETE CBC W/AUTO DIFF WBC: CPT

## 2018-11-24 PROCEDURE — 83735 ASSAY OF MAGNESIUM: CPT

## 2018-11-24 RX ORDER — CLOZAPINE 100 MG/1
500 TABLET ORAL
Status: DISCONTINUED | OUTPATIENT
Start: 2018-11-24 | End: 2018-11-30 | Stop reason: HOSPADM

## 2018-11-24 RX ADMIN — AMOXICILLIN AND CLAVULANATE POTASSIUM 1 TABLET: 875; 125 TABLET, FILM COATED ORAL at 08:32

## 2018-11-24 RX ADMIN — GUAIFENESIN 1200 MG: 600 TABLET, EXTENDED RELEASE ORAL at 21:56

## 2018-11-24 RX ADMIN — ASPIRIN 81 MG 81 MG: 81 TABLET ORAL at 08:32

## 2018-11-24 RX ADMIN — CARVEDILOL 3.12 MG: 3.12 TABLET, FILM COATED ORAL at 08:32

## 2018-11-24 RX ADMIN — POTASSIUM CHLORIDE 40 MEQ: 20 TABLET, EXTENDED RELEASE ORAL at 08:31

## 2018-11-24 RX ADMIN — Medication 10 ML: at 21:56

## 2018-11-24 RX ADMIN — Medication 5 ML: at 14:49

## 2018-11-24 RX ADMIN — AMOXICILLIN AND CLAVULANATE POTASSIUM 1 TABLET: 875; 125 TABLET, FILM COATED ORAL at 17:49

## 2018-11-24 RX ADMIN — HEPARIN SODIUM 5000 UNITS: 5000 INJECTION INTRAVENOUS; SUBCUTANEOUS at 10:13

## 2018-11-24 RX ADMIN — FUROSEMIDE 40 MG: 40 TABLET ORAL at 08:31

## 2018-11-24 RX ADMIN — PANTOPRAZOLE SODIUM 40 MG: 40 TABLET, DELAYED RELEASE ORAL at 08:32

## 2018-11-24 RX ADMIN — AMIODARONE HYDROCHLORIDE 400 MG: 200 TABLET ORAL at 08:31

## 2018-11-24 RX ADMIN — AMIODARONE HYDROCHLORIDE 400 MG: 200 TABLET ORAL at 21:56

## 2018-11-24 RX ADMIN — GUAIFENESIN 1200 MG: 600 TABLET, EXTENDED RELEASE ORAL at 08:31

## 2018-11-24 RX ADMIN — CLOZAPINE 200 MG: 100 TABLET ORAL at 08:34

## 2018-11-24 RX ADMIN — LISINOPRIL 2.5 MG: 5 TABLET ORAL at 08:32

## 2018-11-24 RX ADMIN — CLOZAPINE 500 MG: 100 TABLET ORAL at 21:57

## 2018-11-24 RX ADMIN — Medication 10 ML: at 06:00

## 2018-11-24 RX ADMIN — HEPARIN SODIUM 5000 UNITS: 5000 INJECTION INTRAVENOUS; SUBCUTANEOUS at 21:56

## 2018-11-24 NOTE — PROGRESS NOTES
Spoke with Dr. Fady Weinberg regarding patient's BP 86/57 and the pattern of afternoon hypotension that patient has had the past few days. Patient asymptomatic with no complaints. Per Dr. Fady Weinebrg, ok to hold patient's evening 3.125mg dose of coreg. Will continue to monitor.

## 2018-11-24 NOTE — PROGRESS NOTES
Verbal bedside report given to Jayne Campos oncoming RN. Patient's situation, background, assessment and recommendations provided. Opportunity for questions provided. Oncoming RN assumed care of patient.

## 2018-11-24 NOTE — PROGRESS NOTES
Hospitalist Progress Note   
2018 Admit Date: 2018  2:01 PM  
NAME: Oneal Raphael :  1960 MRN:  [id number] Attending: Ben Valdes MD 
PCP:  Other, MD Leroy 
 
SUBJECTIVE:  
Patient 58M with pmhx of CHF with EG 20-25%, NIDM, schizophrenia admitted to ICU on  after having had cardiac arrest at the adult [facility]. Pt had witnessed collapse and staff initiated CPR, placed AED with shock and then ROSC. Pt again with another cardiac arrest in ER with ROSC. Admitted to ICU and placed on hypothermia protocol. Echo showed EF 10-20%. Treated briefly on telemetry for acute flash pulm edema requiring BIPAP. Hospitalist service consulted for medical mgmt and taking over care from ICU service. Noted on  to have fever of 101.3 so ICD placement post-poned. CXR  with bilateral patchy infiltrates suspected pneumonia, started on augmentin. Flu, strep ag and blood cultures negative.  - pt reports sore throat is improving. Mild non-productive cough. Review of Systems negative with exception of pertinent positives noted above PHYSICAL EXAM  
 
Visit Vitals BP 95/62 (BP 1 Location: Left arm, BP Patient Position: At rest) Pulse 89 Temp 98.6 °F (37 °C) Resp 16 Ht 5' 10\" (1.778 m) Comment: per EMR Wt 76.6 kg (168 lb 14 oz) SpO2 95% BMI 24.23 kg/m² Temp (24hrs), Av.9 °F (37.2 °C), Min:98.4 °F (36.9 °C), Max:99.6 °F (37.6 °C) Oxygen Therapy O2 Sat (%): 95 % (18 1243) Pulse via Oximetry: 94 beats per minute (18 0729) O2 Device: Room air (18 0745) O2 Flow Rate (L/min): 2 l/min (18 0421) FIO2 (%): 30 % (18) Intake/Output Summary (Last 24 hours) at 2018 1439 Last data filed at 2018 1205 Gross per 24 hour Intake 240 ml Output 1550 ml Net -1310 ml General: No acute distress   
Lungs:  CTA Bilaterally. Heart:  Regular rate and rhythm,  No murmur, rub, or gallop Abdomen: Soft, Non distended, Non tender, Positive bowel sounds Extremities: No cyanosis, clubbing or edema Neurologic:  No focal deficits ASSESSMENT Active Hospital Problems Diagnosis Date Noted  Microcytic anemia 11/22/2018  Pneumonia due to Streptococcus pneumoniae ([facility] 11/21/2018  Cardiac arrest ([address] 11/13/2018  Acute respiratory failure with hypoxia ([address] 11/13/2018  Hypotension 11/13/2018  Systolic congestive heart failure ([address] 11/13/2018  Schizophrenia ([address] 11/13/2018  Acute renal failure ([address] 11/13/2018  Type II diabetes mellitus ([address] 02/04/2016 A/p Cardiac arrest ([facility] [address] (11/13/2018) - EF 10% 
- Continue Amiodarone - To get ICD when EP feels appropriate 
  
Active Problems: 
Sore throat: 
- continue chlorseptic spray 
- influenza and strep negative 
  
  Systolic congestive heart failure ([address] (11/13/2018) - EF 10% 
- Continue Furosemide - Continue Coreg 
- Continue Lisinopril 
  
  Acute respiratory failure with hypoxia ([address] (11/13/2018) - Likely persistent due to pneumonia, repeat cxr in AM 
- Continue Augmentin (Day 5/10) - Continue Furosemide - Wean oxygen as appropriate 
  
  Pneumonia due to Streptococcus pneumoniae (11/21/2018) - CXR with bilateral infiltrates - Sputum culture from 11/15 while intubated growing heavy Strep pneumo 
- Started Augmentin (based on cultures) on evening of 11/20 
- Start Mucinex to help with sputum clearance - Wean oxygen as appropriate 
  
  Fever (11/20/018) - No fevers since starting Augmentin - Likely now due to pneumonia --> on Augmentin - Repeat blood cultures NGTD 
- UA unremarkable 
  
  Microcytic Anemia (11/22/2018) -  anemia of chronic disease, stable. Acute renal failure ([address] (11/13/2018) - Resolved 
  
  Hypotension (11/13/2018) - Resolved 
  
  Type II diabetes mellitus ([address] (2/4/2016) - No acute issues - Continue Humalog SSI 
  
  Schizophrenia ([address] (11/13/2018) - stable. Cont current 
 
  
Dispo - await ICD placement. ? Orrum STR on discharge when medically stable DVT Prophylaxis: hep sq Signed By: Andrew Barajas DO November 24, 2018

## 2018-11-24 NOTE — PROGRESS NOTES
Verbal bedside report given to [facility], oncoming RN. Patient's situation, background, assessment and recommendations provided. Opportunity for questions provided. Oncoming RN assumed care of patient.

## 2018-11-25 LAB
ALBUMIN SERPL-MCNC: 2.4 G/DL (ref 3.5–5)
ALBUMIN/GLOB SERPL: 0.6 {RATIO} (ref 1.2–3.5)
ALP SERPL-CCNC: 125 U/L (ref 50–136)
ALT SERPL-CCNC: 37 U/L (ref 12–65)
ANION GAP SERPL CALC-SCNC: 8 MMOL/L (ref 7–16)
AST SERPL-CCNC: 26 U/L (ref 15–37)
ATRIAL RATE: 88 BPM
BACTERIA SPEC CULT: NORMAL
BACTERIA SPEC CULT: NORMAL
BASOPHILS # BLD: 0 K/UL (ref 0–0.2)
BASOPHILS NFR BLD: 0 % (ref 0–2)
BILIRUB SERPL-MCNC: 0.5 MG/DL (ref 0.2–1.1)
BUN SERPL-MCNC: 17 MG/DL (ref 6–23)
CALCIUM SERPL-MCNC: 8.7 MG/DL (ref 8.3–10.4)
CALCULATED P AXIS, ECG09: 71 DEGREES
CALCULATED R AXIS, ECG10: -65 DEGREES
CALCULATED T AXIS, ECG11: 102 DEGREES
CHLORIDE SERPL-SCNC: 103 MMOL/L (ref 98–107)
CO2 SERPL-SCNC: 28 MMOL/L (ref 21–32)
CREAT SERPL-MCNC: 1.34 MG/DL (ref 0.8–1.5)
DIAGNOSIS, 93000: NORMAL
DIFFERENTIAL METHOD BLD: ABNORMAL
EOSINOPHIL # BLD: 0.1 K/UL (ref 0–0.8)
EOSINOPHIL NFR BLD: 1 % (ref 0.5–7.8)
ERYTHROCYTE [DISTWIDTH] IN BLOOD BY AUTOMATED COUNT: 15.9 %
GLOBULIN SER CALC-MCNC: 4.3 G/DL (ref 2.3–3.5)
GLUCOSE BLD STRIP.AUTO-MCNC: 101 MG/DL (ref 65–100)
GLUCOSE BLD STRIP.AUTO-MCNC: 110 MG/DL (ref 65–100)
GLUCOSE BLD STRIP.AUTO-MCNC: 90 MG/DL (ref 65–100)
GLUCOSE BLD STRIP.AUTO-MCNC: 90 MG/DL (ref 65–100)
GLUCOSE SERPL-MCNC: 95 MG/DL (ref 65–100)
HCT VFR BLD AUTO: 32.6 % (ref 41.1–50.3)
HGB BLD-MCNC: 10.6 G/DL (ref 13.6–17.2)
IMM GRANULOCYTES # BLD: 0 K/UL (ref 0–0.5)
IMM GRANULOCYTES NFR BLD AUTO: 0 % (ref 0–5)
LYMPHOCYTES # BLD: 2 K/UL (ref 0.5–4.6)
LYMPHOCYTES NFR BLD: 21 % (ref 13–44)
MAGNESIUM SERPL-MCNC: 2.6 MG/DL (ref 1.8–2.4)
MCH RBC QN AUTO: 24 PG (ref 26.1–32.9)
MCHC RBC AUTO-ENTMCNC: 32.5 G/DL (ref 31.4–35)
MCV RBC AUTO: 73.9 FL (ref 79.6–97.8)
MONOCYTES # BLD: 0.8 K/UL (ref 0.1–1.3)
MONOCYTES NFR BLD: 8 % (ref 4–12)
NEUTS SEG # BLD: 6.6 K/UL (ref 1.7–8.2)
NEUTS SEG NFR BLD: 69 % (ref 43–78)
NRBC # BLD: 0 K/UL (ref 0–0.2)
P-R INTERVAL, ECG05: 210 MS
PLATELET # BLD AUTO: 372 K/UL (ref 150–450)
PMV BLD AUTO: 11.3 FL (ref 9.4–12.3)
POTASSIUM SERPL-SCNC: 4 MMOL/L (ref 3.5–5.1)
PROT SERPL-MCNC: 6.7 G/DL (ref 6.3–8.2)
Q-T INTERVAL, ECG07: 442 MS
QRS DURATION, ECG06: 176 MS
QTC CALCULATION (BEZET), ECG08: 534 MS
RBC # BLD AUTO: 4.41 M/UL (ref 4.23–5.6)
SERVICE CMNT-IMP: NORMAL
SERVICE CMNT-IMP: NORMAL
SODIUM SERPL-SCNC: 139 MMOL/L (ref 136–145)
VENTRICULAR RATE, ECG03: 88 BPM
WBC # BLD AUTO: 9.5 K/UL (ref 4.3–11.1)

## 2018-11-25 PROCEDURE — 74011250637 HC RX REV CODE- 250/637: Performed by: HOSPITALIST

## 2018-11-25 PROCEDURE — 74011250636 HC RX REV CODE- 250/636: Performed by: INTERNAL MEDICINE

## 2018-11-25 PROCEDURE — 65660000000 HC RM CCU STEPDOWN

## 2018-11-25 PROCEDURE — 97530 THERAPEUTIC ACTIVITIES: CPT

## 2018-11-25 PROCEDURE — 80053 COMPREHEN METABOLIC PANEL: CPT

## 2018-11-25 PROCEDURE — 74011250637 HC RX REV CODE- 250/637: Performed by: INTERNAL MEDICINE

## 2018-11-25 PROCEDURE — 36415 COLL VENOUS BLD VENIPUNCTURE: CPT

## 2018-11-25 PROCEDURE — 83735 ASSAY OF MAGNESIUM: CPT

## 2018-11-25 PROCEDURE — 82962 GLUCOSE BLOOD TEST: CPT

## 2018-11-25 PROCEDURE — 93005 ELECTROCARDIOGRAM TRACING: CPT | Performed by: INTERNAL MEDICINE

## 2018-11-25 PROCEDURE — 85025 COMPLETE CBC W/AUTO DIFF WBC: CPT

## 2018-11-25 RX ADMIN — LISINOPRIL 2.5 MG: 5 TABLET ORAL at 09:12

## 2018-11-25 RX ADMIN — GUAIFENESIN 1200 MG: 600 TABLET, EXTENDED RELEASE ORAL at 09:10

## 2018-11-25 RX ADMIN — AMOXICILLIN AND CLAVULANATE POTASSIUM 1 TABLET: 875; 125 TABLET, FILM COATED ORAL at 09:11

## 2018-11-25 RX ADMIN — HEPARIN SODIUM 5000 UNITS: 5000 INJECTION INTRAVENOUS; SUBCUTANEOUS at 09:14

## 2018-11-25 RX ADMIN — ASPIRIN 81 MG 81 MG: 81 TABLET ORAL at 09:11

## 2018-11-25 RX ADMIN — POTASSIUM CHLORIDE 40 MEQ: 20 TABLET, EXTENDED RELEASE ORAL at 09:12

## 2018-11-25 RX ADMIN — Medication 5 ML: at 14:00

## 2018-11-25 RX ADMIN — AMOXICILLIN AND CLAVULANATE POTASSIUM 1 TABLET: 875; 125 TABLET, FILM COATED ORAL at 17:43

## 2018-11-25 RX ADMIN — Medication 10 ML: at 05:43

## 2018-11-25 RX ADMIN — GUAIFENESIN 1200 MG: 600 TABLET, EXTENDED RELEASE ORAL at 21:28

## 2018-11-25 RX ADMIN — CLOZAPINE 500 MG: 100 TABLET ORAL at 21:30

## 2018-11-25 RX ADMIN — AMIODARONE HYDROCHLORIDE 400 MG: 200 TABLET ORAL at 21:28

## 2018-11-25 RX ADMIN — CLOZAPINE 200 MG: 100 TABLET ORAL at 09:13

## 2018-11-25 RX ADMIN — HEPARIN SODIUM 5000 UNITS: 5000 INJECTION INTRAVENOUS; SUBCUTANEOUS at 21:28

## 2018-11-25 RX ADMIN — AMIODARONE HYDROCHLORIDE 400 MG: 200 TABLET ORAL at 09:11

## 2018-11-25 RX ADMIN — PANTOPRAZOLE SODIUM 40 MG: 40 TABLET, DELAYED RELEASE ORAL at 09:10

## 2018-11-25 RX ADMIN — Medication 10 ML: at 21:36

## 2018-11-25 RX ADMIN — CARVEDILOL 3.12 MG: 3.12 TABLET, FILM COATED ORAL at 09:11

## 2018-11-25 NOTE — PROGRESS NOTES
Notified Keshia Cardenas NP of consult. Cardiology is aware and they will see him Monday regarding ICD.

## 2018-11-25 NOTE — PROGRESS NOTES
Patient stated that he takes his evening dose of clozapine at bedtime, not at 1800 as scheduled on mar currently. Patient prefers to take medicine at this time. Changes made on mar.

## 2018-11-25 NOTE — PROGRESS NOTES
Bedside and Verbal shift change report given to self (oncoming nurse) by Chivo Chaves RN (offgoing nurse).  Report included the following information SBAR, Kardex, ED Summary, Procedure Summary, Intake/Output, MAR, Recent Results and Cardiac Rhythm Sr.

## 2018-11-25 NOTE — PROGRESS NOTES
Bedside and Verbal shift change report given to Vicky Aldana RN (oncoming nurse) by self Hero napier).  Report included the following information SBAR, Kardex, ED Summary, Procedure Summary, Intake/Output, MAR, Recent Results and Cardiac Rhythm SR.

## 2018-11-25 NOTE — PROGRESS NOTES
Problem: Self Care Deficits Care Plan (Adult) Goal: *Acute Goals and Plan of Care (Insert Text) 1. Pt will toilet with SBA 2. Pt will complete functional mobility for ADLs with SBA 3. Pt will complete lower body dressing with SBA using AE as needed 4. Pt will complete grooming and hygiene at sink with SBA 5. Pt will demonstrate independence with HEP to promote increased BUE strength and functional use for ADLs 6. Pt will tolerate 23 minutes functional activity with one or fewer rest breaks to promote increased endurance for ADLs 7. Pt will demonstrate improved cognition to complete ADLs with min or fewer cues to promote increased safety and independence Timeframe: 7 days OCCUPATIONAL THERAPY: Daily Note, Treatment Day: 1st and AM 11/25/2018INPATIENT: Hospital Day: 13 Payor: CARE IMPROVEMENT PLUS [place] / Product Type: Managed Care Medicare /  
  
NAME/AGE/GENDER: Alonzo Fairchild is a 66 y.o. male PRIMARY DIAGNOSIS:  Cardiac arrest [facility]) Cardiac arrest [facility]) Cardiac arrest ([facility] [address] ICD-10: Treatment Diagnosis:  
 · Generalized Muscle Weakness (M62.81) Precautions/Allergies: 
  falls Patient has no known allergies. ASSESSMENT:  
Mr. Pascale Tello was admitted with witnessed cardiac arrest and collapse at adult day care. Pt was supine upon arrival. Pt completed bed mobility and functional transfer with the assistance listed below using HHA. Pt is progressing towards goals. Continue POC. This section established at most recent assessment PROBLEM LIST (Impairments causing functional limitations): 1. Decreased Strength 2. Decreased ADL/Functional Activities 3. Decreased Transfer Abilities 4. Decreased Balance 5. Decreased Activity Tolerance 6. Decreased Cognition INTERVENTIONS PLANNED: (Benefits and precautions of occupational therapy have been discussed with the patient.) 1. Activities of daily living training 2. Adaptive equipment training 3. Balance training 4. Therapeutic activity 5. Therapeutic exercise TREATMENT PLAN: Frequency/Duration: Follow patient 3 times/ week to address above goals. Rehabilitation Potential For Stated Goals: Good RECOMMENDED REHABILITATION/EQUIPMENT: (at time of discharge pending progress): Due to the probability of continued deficits (see above) this patient will likely need continued skilled occupational therapy after discharge. Equipment:  
? None at this time OCCUPATIONAL PROFILE AND HISTORY:  
History of Present Injury/Illness (Reason for Referral): 
See H&P Past Medical History/Comorbidities:  
Mr. Wendie Kincaid  has a past medical history of Diabetes ([address] Heart failure ([address] Hypertension, Psychiatric disorder, and Systolic CHF, acute on chronic ([address] Mr. Wendie Kincaid  has a past surgical history that includes hx appendectomy. Social History/Living Environment:  
Home Environment: Private residence # Steps to Enter: 1 One/Two Story Residence: One story Living Alone: No 
Support Systems: Family member(s)(sister) Patient Expects to be Discharged to[de-identified] Unknown Current DME Used/Available at Home: Cane, straight, Shower chair Tub or Shower Type: Tub/Shower combination Prior Level of Function/Work/Activity: 
Independent w/ ADLs, cane for mobility, lives w/ sister, goes to adult day care Number of Personal Factors/Comorbidities that affect the Plan of Care: Expanded review of therapy/medical records (1-2):  MODERATE COMPLEXITY ASSESSMENT OF OCCUPATIONAL PERFORMANCE[de-identified]  
Activities of Daily Living:  
Basic ADLs (From Assessment) Complex ADLs (From Assessment) Feeding: Independent Oral Facial Hygiene/Grooming: Contact guard assistance Bathing: Minimum assistance Upper Body Dressing: Minimum assistance Lower Body Dressing: Minimum assistance Toileting: Minimum assistance Instrumental ADL Meal Preparation: Maximum assistance Homemaking: Maximum assistance Medication Management: Maximum assistance Financial Management: Maximum assistance Grooming/Bathing/Dressing Activities of Daily Living Cognitive Retraining Safety/Judgement: Fall prevention Bed/Mat Mobility Supine to Sit: Supervision Sit to Stand: Contact guard assistance Bed to Chair: Contact guard assistance Most Recent Physical Functioning:  
Gross Assessment: 
  
         
  
Posture: 
  
Balance: 
Sitting: Intact Standing: With support Bed Mobility: 
Supine to Sit: Supervision Wheelchair Mobility: 
  
Transfers: 
Sit to Stand: Contact guard assistance Bed to Chair: Contact guard assistance Patient Vitals for the past 6 hrs: 
 BP BP Patient Position SpO2 Pulse 18 0435 100/63 At rest 96 % 88  
18 0914 92/61 At rest 97 % 91 Mental Status Neurologic State: Alert Orientation Level: Oriented X4 Cognition: Follows commands Perception: Verbal, Tactile Perseveration: Tactile cues provided, Verbal cues provided Safety/Judgement: Fall prevention Physical Skills Involved: 
1. Balance 2. Strength 3. Activity Tolerance Cognitive Skills Affected (resulting in the inability to perform in a timely and safe manner): 1. Executive Function 2. Short Term Recall 3. Long Term Memory 4. Sustained Attention 5. Divided Attention 6. Comprehension Psychosocial Skills Affected: 1. Habits/Routines 2. Environmental Adaptation 3. Self-Awareness 4. Awareness of Others Number of elements that affect the Plan of Care: 5+:  HIGH COMPLEXITY CLINICAL DECISION MAKIN [facility] [address] [id number] AM-PAC 6 Clicks Daily Activity Inpatient Short Form How much help from another person does the patient currently need. .. Total A Lot A Little None 1. Putting on and taking off regular lower body clothing? [] 1   [] 2   [x] 3   [] 4  
2. Bathing (including washing, rinsing, drying)?    [] 1   [] 2   [x] 3   [] 4  
 3.  Toileting, which includes using toilet, bedpan or urinal?   [] 1   [] 2   [x] 3   [] 4  
4. Putting on and taking off regular upper body clothing? [] 1   [] 2   [] 3   [x] 4  
5. Taking care of personal grooming such as brushing teeth? [] 1   [] 2   [] 3   [x] 4  
6. Eating meals? [] 1   [] 2   [] 3   [x] 4  
© 2007, Trustees of [address] [place] [address] [address] [address], under license to [employer]. All rights reserved Score:  Initial: 21 Most Recent: X (Date: -- ) Interpretation of Tool:  Represents activities that are increasingly more difficult (i.e. Bed mobility, Transfers, Gait). Score 24 23 22-20 19-15 14-10 9-7 6 Modifier CH CI CJ CK CL CM CN   
 
? Self Care:  
  - CURRENT STATUS: CJ - 20%-39% impaired, limited or restricted  - GOAL STATUS: CI - 1%-19% impaired, limited or restricted  - D/C STATUS:  ---------------To be determined--------------- Payor: CARE IMPROVEMENT PLUS [place] CARE IMPROVEMENT [place] / Product Type: Managed Care Medicare /   
 
Medical Necessity:    
· Patient is expected to demonstrate progress in strength, balance and functional technique to increase independence with ADLs and improve safety during ADLs. Reason for Services/Other Comments: 
· Patient continues to require skilled intervention due to decreased ADLs, safety, and functional performance from baseline. Use of outcome tool(s) and clinical judgement create a POC that gives a: MODERATE COMPLEXITY  
 
 
 
TREATMENT:  
(In addition to Assessment/Re-Assessment sessions the following treatments were rendered) Pre-treatment Symptoms/Complaints:   
Pain: Initial:  
Pain Intensity 1: 0  Post Session:  0 Therapeutic Activity: (    10): Therapeutic activities including bed mobility and functional mobility  to improve mobility and strength. Required min verbal cues   to promote motor control of bilateral, upper extremity(s), lower extremity(s).   
 
 
 
Braces/Orthotics/Lines/Etc:  
· IV 
 · O2 Device: Room air Treatment/Session Assessment:   
· Response to Treatment:  No adverse reaction · Interdisciplinary Collaboration:  
o Certified Occupational Therapy Assistant 
o Registered Nurse · After treatment position/precautions:  
o Up in chair 
o Bed alarm/tab alert on 
o Bed/Chair-wheels locked 
o Call light within reach 
o RN notified · Compliance with Program/Exercises: Will assess as treatment progresses. · Recommendations/Intent for next treatment session: \"Next visit will focus on advancements to more challenging activities and reduction in assistance provided\". Total Treatment Duration: OT Patient Time In/Time Out Time In: 0820 Time Out: 0830 Angela Kim [address]

## 2018-11-25 NOTE — PROGRESS NOTES
Hospitalist Progress Note   
2018 Admit Date: 2018  2:01 PM  
NAME: Rupali Us :  1960 MRN:  [id number] Attending: Britni Vazquez MD 
PCP:  Other, MD Leroy 
 
SUBJECTIVE:  
Patient 58M with pmhx of CHF with EG 20-25%, NIDM, schizophrenia admitted to ICU on  after having had cardiac arrest at the adult  center. Pt had witnessed collapse and staff initiated CPR, placed AED with shock and then ROSC. Pt again with another cardiac arrest in ER with ROSC. Admitted to ICU and placed on hypothermia protocol. Echo showed EF 10-20%. Treated briefly on telemetry for acute flash pulm edema requiring BIPAP. Hospitalist service consulted for medical mgmt and taking over care from ICU service. Noted on  to have fever of 101.3 so ICD placement post-poned. CXR  with bilateral patchy infiltrates suspected pneumonia, started on augmentin. Flu, strep ag and blood cultures negative.   - \"I feel much better\". Review of Systems negative with exception of pertinent positives noted above PHYSICAL EXAM  
 
Visit Vitals BP (!) 82/56 (BP 1 Location: Right arm, BP Patient Position: At rest) Pulse 87 Temp 98.3 °F (36.8 °C) Resp 16 Ht 5' 10\" (1.778 m) Comment: per EMR Wt 75.2 kg (165 lb 12.8 oz) SpO2 95% BMI 23.79 kg/m² Temp (24hrs), Av.8 °F (36.6 °C), Min:97 °F (36.1 °C), Max:98.6 °F (37 °C) Oxygen Therapy O2 Sat (%): 95 % (18 1340) Pulse via Oximetry: 94 beats per minute (18 0729) O2 Device: Room air (18 4529) O2 Flow Rate (L/min): 2 l/min (18 3201) FIO2 (%): 30 % (18) Intake/Output Summary (Last 24 hours) at 2018 1500 Last data filed at 2018 1340 Gross per 24 hour Intake 300 ml Output 1200 ml Net -900 ml General: No acute distress   
Lungs:  CTA Bilaterally. Heart:  Regular rate and rhythm,  No murmur, rub, or gallop Abdomen: Soft, Non distended, Non tender, Positive bowel sounds Extremities: No cyanosis, clubbing or edema Neurologic:  No focal deficits ASSESSMENT Active Hospital Problems Diagnosis Date Noted  Microcytic anemia 11/22/2018  Pneumonia due to Streptococcus pneumoniae ([facility] [address] 11/21/2018  Cardiac arrest ([facility] [address] 11/13/2018  Acute respiratory failure with hypoxia ([address] 11/13/2018  Hypotension 11/13/2018  Systolic congestive heart failure ([address] 11/13/2018  Schizophrenia ([facility] [address] 11/13/2018  Acute renal failure ([address] 11/13/2018  Type II diabetes mellitus ([address] 02/04/2016 A/p Cardiac arrest ([facility] [address] (11/13/2018) - EF 10% 
- Continue Amiodarone - To get ICD when EP feels appropriate 
  
Active Problems: 
Sore throat: 
- resolved - influenza and strep negative 
  
  Systolic congestive heart failure ([facility] [address] (11/13/2018) - EF 10% 
- holding lisinopril and lasix secondary to hypotension.   
  
  Acute respiratory failure with hypoxia ([address] (11/13/2018) 
- weaned to room air.  
- Continue Augmentin (Day 6/10) - Holding furosemide - Wean oxygen as appropriate 
  
  Pneumonia due to Streptococcus pneumoniae (11/21/2018) - CXR with bilateral infiltrates - Sputum culture from 11/15 while intubated growing heavy Strep pneumo 
- Started Augmentin (based on cultures) on evening of 11/20 
  
    Microcytic Anemia (11/22/2018) -  anemia of chronic disease, stable. Acute renal failure ([facility] [address] (11/13/2018) - Resolved 
  
  Hypotension (11/13/2018) - Borderline in 80's, holding lisinopril and lasix  
  
  Type II diabetes mellitus ([facility] [address] (2/4/2016) - No acute issues - Continue Humalog SSI 
  
  Schizophrenia ([facility] [address] (11/13/2018) - stable. Cont current 
 
  
Dispo - await ICD placement. ? [place] STR on discharge when medically stable DVT Prophylaxis: hep sq Signed By: Letty Hercules DO November 25, 2018

## 2018-11-25 NOTE — PROGRESS NOTES
Patient is calm as he sits in chair Seemed to enjoy his breakfast 
Encouraged with presence and words of hope No family present Kory Huggins, staff [address], [phone] [facility] [address]  /   [email or web address]

## 2018-11-26 LAB
ALBUMIN SERPL-MCNC: 2.5 G/DL (ref 3.5–5)
ALBUMIN/GLOB SERPL: 0.5 {RATIO} (ref 1.2–3.5)
ALP SERPL-CCNC: 142 U/L (ref 50–136)
ALT SERPL-CCNC: 38 U/L (ref 12–65)
ANION GAP SERPL CALC-SCNC: 9 MMOL/L (ref 7–16)
AST SERPL-CCNC: 36 U/L (ref 15–37)
ATRIAL RATE: 99 BPM
BACTERIA SPEC CULT: NORMAL
BASOPHILS # BLD: 0 K/UL (ref 0–0.2)
BASOPHILS NFR BLD: 0 % (ref 0–2)
BILIRUB SERPL-MCNC: 0.4 MG/DL (ref 0.2–1.1)
BUN SERPL-MCNC: 16 MG/DL (ref 6–23)
CALCIUM SERPL-MCNC: 8.8 MG/DL (ref 8.3–10.4)
CALCULATED P AXIS, ECG09: 46 DEGREES
CALCULATED R AXIS, ECG10: -72 DEGREES
CALCULATED T AXIS, ECG11: 93 DEGREES
CHLORIDE SERPL-SCNC: 105 MMOL/L (ref 98–107)
CO2 SERPL-SCNC: 25 MMOL/L (ref 21–32)
CREAT SERPL-MCNC: 1.31 MG/DL (ref 0.8–1.5)
DIAGNOSIS, 93000: NORMAL
DIFFERENTIAL METHOD BLD: ABNORMAL
EOSINOPHIL # BLD: 0 K/UL (ref 0–0.8)
EOSINOPHIL NFR BLD: 0 % (ref 0.5–7.8)
ERYTHROCYTE [DISTWIDTH] IN BLOOD BY AUTOMATED COUNT: 16 %
GLOBULIN SER CALC-MCNC: 4.6 G/DL (ref 2.3–3.5)
GLUCOSE BLD STRIP.AUTO-MCNC: 138 MG/DL (ref 65–100)
GLUCOSE BLD STRIP.AUTO-MCNC: 71 MG/DL (ref 65–100)
GLUCOSE BLD STRIP.AUTO-MCNC: 75 MG/DL (ref 65–100)
GLUCOSE BLD STRIP.AUTO-MCNC: 95 MG/DL (ref 65–100)
GLUCOSE SERPL-MCNC: 86 MG/DL (ref 65–100)
HCT VFR BLD AUTO: 31.7 % (ref 41.1–50.3)
HGB BLD-MCNC: 10.1 G/DL (ref 13.6–17.2)
IMM GRANULOCYTES # BLD: 0 K/UL (ref 0–0.5)
IMM GRANULOCYTES NFR BLD AUTO: 1 % (ref 0–5)
LYMPHOCYTES # BLD: 1.8 K/UL (ref 0.5–4.6)
LYMPHOCYTES NFR BLD: 21 % (ref 13–44)
MAGNESIUM SERPL-MCNC: 2.5 MG/DL (ref 1.8–2.4)
MCH RBC QN AUTO: 23.4 PG (ref 26.1–32.9)
MCHC RBC AUTO-ENTMCNC: 31.9 G/DL (ref 31.4–35)
MCV RBC AUTO: 73.4 FL (ref 79.6–97.8)
MONOCYTES # BLD: 0.8 K/UL (ref 0.1–1.3)
MONOCYTES NFR BLD: 10 % (ref 4–12)
NEUTS SEG # BLD: 5.7 K/UL (ref 1.7–8.2)
NEUTS SEG NFR BLD: 68 % (ref 43–78)
NRBC # BLD: 0 K/UL (ref 0–0.2)
P-R INTERVAL, ECG05: 212 MS
PLATELET # BLD AUTO: 415 K/UL (ref 150–450)
PMV BLD AUTO: 11 FL (ref 9.4–12.3)
POTASSIUM SERPL-SCNC: 4.2 MMOL/L (ref 3.5–5.1)
PROT SERPL-MCNC: 7.1 G/DL (ref 6.3–8.2)
Q-T INTERVAL, ECG07: 454 MS
QRS DURATION, ECG06: 188 MS
QTC CALCULATION (BEZET), ECG08: 582 MS
RBC # BLD AUTO: 4.32 M/UL (ref 4.23–5.6)
SERVICE CMNT-IMP: NORMAL
SODIUM SERPL-SCNC: 139 MMOL/L (ref 136–145)
VENTRICULAR RATE, ECG03: 99 BPM
WBC # BLD AUTO: 8.3 K/UL (ref 4.3–11.1)

## 2018-11-26 PROCEDURE — 97110 THERAPEUTIC EXERCISES: CPT

## 2018-11-26 PROCEDURE — 74011250637 HC RX REV CODE- 250/637: Performed by: INTERNAL MEDICINE

## 2018-11-26 PROCEDURE — 82962 GLUCOSE BLOOD TEST: CPT

## 2018-11-26 PROCEDURE — 65660000000 HC RM CCU STEPDOWN

## 2018-11-26 PROCEDURE — 97530 THERAPEUTIC ACTIVITIES: CPT

## 2018-11-26 PROCEDURE — 36415 COLL VENOUS BLD VENIPUNCTURE: CPT

## 2018-11-26 PROCEDURE — 93005 ELECTROCARDIOGRAM TRACING: CPT | Performed by: INTERNAL MEDICINE

## 2018-11-26 PROCEDURE — 85025 COMPLETE CBC W/AUTO DIFF WBC: CPT

## 2018-11-26 PROCEDURE — 74011250636 HC RX REV CODE- 250/636: Performed by: INTERNAL MEDICINE

## 2018-11-26 PROCEDURE — 74011250637 HC RX REV CODE- 250/637: Performed by: HOSPITALIST

## 2018-11-26 PROCEDURE — 80053 COMPREHEN METABOLIC PANEL: CPT

## 2018-11-26 PROCEDURE — 83735 ASSAY OF MAGNESIUM: CPT

## 2018-11-26 RX ADMIN — GUAIFENESIN 1200 MG: 600 TABLET, EXTENDED RELEASE ORAL at 21:38

## 2018-11-26 RX ADMIN — AMOXICILLIN AND CLAVULANATE POTASSIUM 1 TABLET: 875; 125 TABLET, FILM COATED ORAL at 17:02

## 2018-11-26 RX ADMIN — Medication 5 ML: at 08:07

## 2018-11-26 RX ADMIN — CLOZAPINE 200 MG: 100 TABLET ORAL at 08:04

## 2018-11-26 RX ADMIN — HEPARIN SODIUM 5000 UNITS: 5000 INJECTION INTRAVENOUS; SUBCUTANEOUS at 21:37

## 2018-11-26 RX ADMIN — PANTOPRAZOLE SODIUM 40 MG: 40 TABLET, DELAYED RELEASE ORAL at 08:03

## 2018-11-26 RX ADMIN — ASPIRIN 81 MG 81 MG: 81 TABLET ORAL at 08:03

## 2018-11-26 RX ADMIN — Medication 5 ML: at 21:38

## 2018-11-26 RX ADMIN — AMOXICILLIN AND CLAVULANATE POTASSIUM 1 TABLET: 875; 125 TABLET, FILM COATED ORAL at 08:02

## 2018-11-26 RX ADMIN — AMIODARONE HYDROCHLORIDE 400 MG: 200 TABLET ORAL at 21:36

## 2018-11-26 RX ADMIN — CLOZAPINE 500 MG: 100 TABLET ORAL at 21:35

## 2018-11-26 RX ADMIN — Medication 10 ML: at 05:41

## 2018-11-26 RX ADMIN — AMIODARONE HYDROCHLORIDE 400 MG: 200 TABLET ORAL at 08:03

## 2018-11-26 RX ADMIN — GUAIFENESIN 1200 MG: 600 TABLET, EXTENDED RELEASE ORAL at 08:02

## 2018-11-26 RX ADMIN — POTASSIUM CHLORIDE 40 MEQ: 20 TABLET, EXTENDED RELEASE ORAL at 08:02

## 2018-11-26 NOTE — PROGRESS NOTES
[facility] CARDIOLOGY PROGRESS NOTE 
      
 
11/26/2018 2:57 PM 
 
Admit Date: 11/13/2018 Admit Diagnosis: Cardiac arrest ([facility] [address] Subjective: No complaints this AM, no chest pain or shortness of breath Interval History: (History of pertinent interval events obtained from nursing staff) ROS: 
GEN:  No fever or chills Cardiovascular:  As noted above Pulmonary:  As noted above Neuro:  No new focal motor or sensory loss Objective:  
 
Vitals:  
 11/26/18 0030 11/26/18 0440 11/26/18 0810 11/26/18 1449 BP: 96/65 99/66 97/56 94/59 Pulse: 90 92 91 97 Resp: 16 18 20 20 Temp: 98 °F (36.7 °C) 98.2 °F (36.8 °C) 98.2 °F (36.8 °C) 98.4 °F (36.9 °C) SpO2: 100% 97% 97% 98% Weight: 75 kg (165 lb 6.4 oz) Height:      
 
 
Physical Exam: 
Hetal Halfway, Well Nourished, No Acute Distress, Alert & Oriented x 3, appropriate mood. Neck- supple, no JVD 
CV- regular rate and rhythm no MRG Lung- clear bilaterally Abd- soft, nontender, nondistended Ext- no edema bilaterally. Skin- warm and dry Current Facility-Administered Medications Medication Dose Route Frequency  cloZAPine (CLOZARIL) tablet 500 mg (Patient Supplied)  500 mg Oral QHS  phenol throat spray (CHLORASEPTIC) 1 Spray  1 Spray Oral PRN  
 carvedilol (COREG) tablet 3.125 mg  3.125 mg Oral BID WITH MEALS  guaiFENesin ER (MUCINEX) tablet 1,200 mg  1,200 mg Oral Q12H  
 heparin (porcine) injection 5,000 Units  5,000 Units SubCUTAneous Q12H  
 amoxicillin-clavulanate (AUGMENTIN) 875-125 mg per tablet 1 Tab  1 Tab Oral BID WITH MEALS  insulin lispro (HUMALOG) injection   SubCUTAneous AC&HS  
 dextrose 40% (GLUTOSE) oral gel 1 Tube  15 g Oral PRN  
 glucagon (GLUCAGEN) injection 1 mg  1 mg IntraMUSCular PRN  
 dextrose (D50W) injection syrg 12.5-25 g  25-50 mL IntraVENous PRN  potassium chloride (K-DUR, KLOR-CON) SR tablet 40 mEq  40 mEq Oral DAILY  pantoprazole (PROTONIX) tablet 40 mg  40 mg Oral ACB  acetaminophen (TYLENOL) solution 650 mg  650 mg Oral Q4H PRN  
 amiodarone (CORDARONE) tablet 400 mg  400 mg Oral BID  cloZAPine (CLOZARIL) tablet 200 mg (Patient Supplied)  200 mg Oral DAILY  polyvinyl alcohol (LIQUIFILM TEARS) 1.4 % ophthalmic solution 1 Drop  1 Drop Both Eyes PRN  
 aspirin chewable tablet 81 mg  81 mg Oral DAILY  sodium chloride (NS) flush 5-10 mL  5-10 mL IntraVENous Q8H  
 sodium chloride (NS) flush 5-10 mL  5-10 mL IntraVENous PRN  
 midazolam (VERSED) injection 2 mg  2 mg IntraVENous Q2H PRN Data Review:  
Recent Results (from the past 24 hour(s)) GLUCOSE, POC Collection Time: 11/25/18  4:12 PM  
Result Value Ref Range Glucose (POC) 90 65 - 100 mg/dL GLUCOSE, POC Collection Time: 11/25/18  9:20 PM  
Result Value Ref Range Glucose (POC) 90 65 - 100 mg/dL MAGNESIUM Collection Time: 11/26/18  3:35 AM  
Result Value Ref Range Magnesium 2.5 (H) 1.8 - 2.4 mg/dL CBC WITH AUTOMATED DIFF Collection Time: 11/26/18  3:35 AM  
Result Value Ref Range WBC 8.3 4.3 - 11.1 K/uL  
 RBC 4.32 4.23 - 5.6 M/uL  
 HGB 10.1 (L) 13.6 - 17.2 g/dL HCT 31.7 (L) 41.1 - 50.3 % MCV 73.4 (L) 79.6 - 97.8 FL  
 MCH 23.4 (L) 26.1 - 32.9 PG  
 MCHC 31.9 31.4 - 35.0 g/dL  
 RDW 16.0 % PLATELET 070 790 - 305 K/uL MPV 11.0 9.4 - 12.3 FL ABSOLUTE NRBC 0.00 0.0 - 0.2 K/uL  
 DF AUTOMATED NEUTROPHILS 68 43 - 78 % LYMPHOCYTES 21 13 - 44 % MONOCYTES 10 4.0 - 12.0 % EOSINOPHILS 0 (L) 0.5 - 7.8 % BASOPHILS 0 0.0 - 2.0 % IMMATURE GRANULOCYTES 1 0.0 - 5.0 %  
 ABS. NEUTROPHILS 5.7 1.7 - 8.2 K/UL  
 ABS. LYMPHOCYTES 1.8 0.5 - 4.6 K/UL  
 ABS. MONOCYTES 0.8 0.1 - 1.3 K/UL  
 ABS. EOSINOPHILS 0.0 0.0 - 0.8 K/UL  
 ABS. BASOPHILS 0.0 0.0 - 0.2 K/UL  
 ABS. IMM. GRANS. 0.0 0.0 - 0.5 K/UL METABOLIC PANEL, COMPREHENSIVE Collection Time: 11/26/18  3:35 AM  
Result Value Ref Range Sodium 139 136 - 145 mmol/L Potassium 4.2 3.5 - 5.1 mmol/L Chloride 105 98 - 107 mmol/L  
 CO2 25 21 - 32 mmol/L Anion gap 9 7 - 16 mmol/L Glucose 86 65 - 100 mg/dL BUN 16 6 - 23 MG/DL Creatinine 1.31 0.8 - 1.5 MG/DL  
 GFR est AA >60 >60 ml/min/1.73m2 GFR est non-AA >60 >60 ml/min/1.73m2 Calcium 8.8 8.3 - 10.4 MG/DL Bilirubin, total 0.4 0.2 - 1.1 MG/DL  
 ALT (SGPT) 38 12 - 65 U/L  
 AST (SGOT) 36 15 - 37 U/L Alk. phosphatase 142 (H) 50 - 136 U/L Protein, total 7.1 6.3 - 8.2 g/dL Albumin 2.5 (L) 3.5 - 5.0 g/dL Globulin 4.6 (H) 2.3 - 3.5 g/dL A-G Ratio 0.5 (L) 1.2 - 3.5 GLUCOSE, POC Collection Time: 11/26/18  6:24 AM  
Result Value Ref Range Glucose (POC) 71 65 - 100 mg/dL EKG, 12 LEAD, INITIAL Collection Time: 11/26/18  6:43 AM  
Result Value Ref Range Ventricular Rate 99 BPM  
 Atrial Rate 99 BPM  
 P-R Interval 212 ms QRS Duration 188 ms Q-T Interval 454 ms QTC Calculation (Bezet) 582 ms Calculated P Axis 46 degrees Calculated R Axis -72 degrees Calculated T Axis 93 degrees Diagnosis Sinus rhythm with 1st degree A-V block with occasional Premature ventricular  
complexes Left axis deviation Non-specific intra-ventricular conduction block Inferior infarct (cited on or before 14-NOV-2018) Anterolateral infarct , age undetermined Abnormal ECG When compared with ECG of 25-NOV-2018 07:44, 
Premature ventricular complexes are now Present T wave inversion less evident in Lateral leads Confirmed by Cate Whitfield ([id number]) on 11/26/2018 8:39:24 AM 
  
GLUCOSE, POC Collection Time: 11/26/18 11:05 AM  
Result Value Ref Range Glucose (POC) 75 65 - 100 mg/dL EKG:  (EKG has been independently visualized by me with interpretation below) Assessment:  
 
Principal Problem: 
  Cardiac arrest ([address] (11/13/2018) Active Problems: 
  Type II diabetes mellitus ([facility] (2/4/2016) Acute respiratory failure with hypoxia ([facility] [address] (11/13/2018) Hypotension (11/13/2018) Systolic congestive heart failure ([facility] [address] (11/13/2018) Schizophrenia ([facility] [address] (11/13/2018) Acute renal failure ([facility] [address] (11/13/2018) Pneumonia due to Streptococcus pneumoniae ([facility] [address] (11/21/2018) Microcytic anemia (11/22/2018) 
 
  
66year old with a history of schizophrenia, NICM, admitted after cardiac arrest/VF arrest. As such, he will be an ICD candidate He does have a RBBB with a QRS > 150 msec which is a class IIb indication for a CRT. His EF is 10-20%.  
 
Plan: 1.  NICM, EF 10%, RBBB with QRS duration 178 msec, NYHA class III: 59yo with NICM now s/p VF cardiac arrest and meets criteria for ICD implantation. Plan for ICD in the AM 
 
2.  NICM: off O2 this AM, volume status improved, cont OMT with coreg and lisinopril 3. Fever: resolved, finishing treatment for pneumonia 4. PPx: heparin Amber Melo MD 
Cardiology/Electrophysiology

## 2018-11-26 NOTE — PROGRESS NOTES
Verbal bedside report received from [facility], [address] [place] [address]. Patient's situation, background, assessment and recommendations were provided. Kardex, Mar, and recent results also reviewed. Opportunity for questions provided. Assumed care of patient.

## 2018-11-26 NOTE — PROGRESS NOTES
Care Management Interventions Palliative Care Criteria Met (RRAT>21 & CHF Dx)?: No(RRAT 16 Dx Cardiac arrest) Transition of Care Consult (CM Consult): Discharge Planning Physical Therapy Consult: Yes Occupational Therapy Consult: Yes Speech Therapy Consult: Yes Current Support Network: Relative's Home(lives with his sister Cory Rolling [phone]) Confirm Follow Up Transport: Family Plan discussed with Pt/Family/Caregiver: Yes Freedom of Choice Offered: Yes Discharge Location Discharge Placement: Skilled nursing facility Patient to have ICD and then will be able to go to rehab. Only facility that will consider patient currently is [facility] rehab and they will evaluate patient closer to d/c and make determination of bed offer. CM following.

## 2018-11-26 NOTE — PROGRESS NOTES
Problem: Falls - Risk of 
Goal: *Absence of Falls Document Alexey Celis Fall Risk and appropriate interventions in the flowsheet. Outcome: Progressing Towards Goal 
Fall Risk Interventions: 
Mobility Interventions: Bed/chair exit alarm, Communicate number of staff needed for ambulation/transfer, OT consult for ADLs, Patient to call before getting OOB, PT Consult for mobility concerns, PT Consult for assist device competence Mentation Interventions: Adequate sleep, hydration, pain control, Bed/chair exit alarm, Door open when patient unattended, Room close to nurse's station Medication Interventions: Bed/chair exit alarm, Evaluate medications/consider consulting pharmacy, Patient to call before getting OOB, Teach patient to arise slowly Elimination Interventions: Bed/chair exit alarm, Call light in reach, Patient to call for help with toileting needs, Toilet paper/wipes in reach History of Falls Interventions: Bed/chair exit alarm, Door open when patient unattended, Room close to nurse's station Problem: Pressure Injury - Risk of 
Goal: *Prevention of pressure injury Document Laurent Scale and appropriate interventions in the flowsheet. Outcome: Progressing Towards Goal 
Pressure Injury Interventions: 
Sensory Interventions: Minimize linen layers, Keep linens dry and wrinkle-free Moisture Interventions: Minimize layers, Absorbent underpads Activity Interventions: Increase time out of bed, Pressure redistribution bed/mattress(bed type), PT/OT evaluation Mobility Interventions: HOB 30 degrees or less, Pressure redistribution bed/mattress (bed type) Nutrition Interventions: Document food/fluid/supplement intake, Offer support with meals,snacks and hydration Friction and Shear Interventions: HOB 30 degrees or less, Minimize layers

## 2018-11-26 NOTE — PROGRESS NOTES
End of Shift note: Patient without complaints today--no SO. No N/v or CP. Pt sat up in chair for good part of afternoon as well. Planning for ICD tomorrow. NPO at MN. Pt otherwise resting comfortably. No apparent needs or distress noted.

## 2018-11-26 NOTE — PROGRESS NOTES
Verbal bedside report given to [facility], oncoming RN. Patient's situation, background, assessment and recommendations provided. Kardex, Mar, and recent results also reviewed. Opportunity for questions provided. Oncoming RN assumed care of patient.

## 2018-11-26 NOTE — PROGRESS NOTES
Verbal bedside report given to carmenza Brito RN. Patient's situation, background, assessment and recommendations provided. Opportunity for questions provided. Oncelvia RN assumed care of patient.

## 2018-11-26 NOTE — PROGRESS NOTES
No ICD plans for today. Confirmed with Dr. Johanna Hardy. Updated family (pt's sister--named Jodi). Will try tentatively for tomorrow with Dr. Dawood Paris.  Pt updated as well and given meal.

## 2018-11-26 NOTE — PROGRESS NOTES
Problem: Mobility Impaired (Adult and Pediatric) Goal: *Acute Goals and Plan of Care (Insert Text) LTG: 
(1.)Mr. Bird will move from supine to sit and sit to supine , scoot up and down and roll side to side in bed with MODIFIED INDEPENDENCE within 7 treatment day(s). (2.)Mr. Gretta Zimmerman will transfer from bed to chair and chair to bed with STAND BY ASSIST using the least restrictive device within 7 treatment day(s). (3.)Mr. Bird will ambulate with CONTACT GUARD ASSIST for 75 feet with the least restrictive device within 7 treatment day(s). (4.)Mr. Gretta Zimmerman will participate in therapeutic activity/exercises x 23 minutes for increased strength within 7 days. ________________________________________________________________________________________________ PHYSICAL THERAPY: Daily Note, Treatment Day: 1st, AM 11/26/2018INPATIENT: Hospital Day: 14 
Payor: CARE IMPROVEMENT PLUS [place] / Product Type: [employer] Medicare /  
  
NAME/AGE/GENDER: Bucth Barahona is a 66 y.o. male PRIMARY DIAGNOSIS: Cardiac arrest [facility]) Cardiac arrest [facility]) Cardiac arrest ([facility] [address] ICD-10: Treatment Diagnosis:  
 · Generalized Muscle Weakness (M62.81) · Difficulty in walking, Not elsewhere classified (R26.2) · History of falling (Z91.81) Precaution/Allergies: 
Patient has no known allergies. ASSESSMENT:  
Mr. Gretta Zimmerman was supine in bed upon arrival and agreeable to getting OOB. Family present who provided encouragement and motivation to participate. Pt continues to have flat affect with decreased attention. Pt able to come to sitting on EOB with SBA and good sitting balance. He was able to stand with CGA-SBA and RW demonstrating fair to poor balance even with RW. Attempted ambulating around room using min-modA/RW/gait belt but pt very unsteady with occasional knee buckling and almost ataxic gait.   Pt performed several STS transfers from chair with SBA as well as standing balance activities with CGA. Pt participated in seated TE but appears to fatigue quickly and required redirection several times due to decreased attention. Pt progressed in functional mobility today. This section established at most recent assessment PROBLEM LIST (Impairments causing functional limitations): 1. Decreased Strength 2. Decreased ADL/Functional Activities 3. Decreased Transfer Abilities 4. Decreased Ambulation Ability/Technique 5. Decreased Balance 6. Decreased Activity Tolerance INTERVENTIONS PLANNED: (Benefits and precautions of physical therapy have been discussed with the patient.) 1. Balance Exercise 2. Bed Mobility 3. Family Education 4. Gait Training 5. Therapeutic Activites 6. Therapeutic Exercise/Strengthening 7. Transfer Training TREATMENT PLAN: Frequency/Duration: 3 times a week for duration of hospital stay Rehabilitation Potential For Stated Goals: Good RECOMMENDED REHABILITATION/EQUIPMENT: (at time of discharge pending progress): Due to the probability of continued deficits (see above) this patient will likely need continued skilled physical therapy after discharge. Equipment:  
? None at this time HISTORY:  
History of Present Injury/Illness (Reason for Referral): 
See H&P Past Medical History/Comorbidities:  
Mr. Marvin Fuentes  has a past medical history of Diabetes ([address] Heart failure ([address] Hypertension, Psychiatric disorder, and Systolic CHF, acute on chronic ([address] Mr. Marvin Fuentes  has a past surgical history that includes hx appendectomy. Social History/Living Environment:  
Home Environment: Private residence # Steps to Enter: 1 One/Two Story Residence: One story Living Alone: No 
Support Systems: Family member(s)(sister) Patient Expects to be Discharged to[de-identified] Unknown Current DME Used/Available at Home: Cane, straight, Shower chair Tub or Shower Type: Tub/Shower combination Prior Level of Function/Work/Activity: Pt reported that he lives with his sister in a one story home and was independent with ADLs PTA. Uses SPC for ambulation with recent fall. \  
Number of Personal Factors/Comorbidities that affect the Plan of Care: 3+: HIGH COMPLEXITY EXAMINATION:  
Most Recent Physical Functioning:  
Gross Assessment: 
AROM: Within functional limits Strength: Generally decreased, functional(B hip flexion and dorsiflexion 4/5) Posture: 
  
Balance: 
Sitting: Intact Standing: Impaired Standing - Static: Fair Standing - Dynamic : Poor Bed Mobility: 
Supine to Sit: Stand-by assistance Wheelchair Mobility: 
  
Transfers: 
Sit to Stand: Stand-by assistance Stand to Sit: Stand-by assistance Bed to Chair: Contact guard assistance;Minimum assistance Interventions: Manual cues; Safety awareness training;Verbal cues; Visual cues Gait: 
  
Base of Support: Narrowed Speed/Gogo: Fluctuations Step Length: Right shortened;Left shortened Gait Abnormalities: Ataxic;Trunk sway increased Distance (ft): 4 Feet (ft) Assistive Device: Walker, rolling;Gait belt Ambulation - Level of Assistance: Minimal assistance; Moderate assistance Body Structures Involved: 1. Heart 2. Muscles Body Functions Affected: 1. Cardio 2. Neuromusculoskeletal 
3. Movement Related Activities and Participation Affected: 1. General Tasks and Demands 2. Mobility 3. Domestic Life 4. Community, Social and [place] Number of elements that affect the Plan of Care: 4+: HIGH COMPLEXITY CLINICAL PRESENTATION:  
Presentation: Evolving clinical presentation with changing clinical characteristics: MODERATE COMPLEXITY CLINICAL DECISION MAKING:  
[employer] 6 Clicks Basic Mobility Inpatient Short Form How much difficulty does the patient currently have. .. Unable A Lot A Little None 1. Turning over in bed (including adjusting bedclothes, sheets and blankets)? [] 1   [] 2   [] 3   [x] 4 2.  Sitting down on and standing up from a chair with arms ( e.g., wheelchair, bedside commode, etc.)   [] 1   [] 2   [x] 3   [] 4  
3. Moving from lying on back to sitting on the side of the bed? [] 1   [] 2   [] 3   [x] 4 How much help from another person does the patient currently need. .. Total A Lot A Little None 4. Moving to and from a bed to a chair (including a wheelchair)? [] 1   [] 2   [x] 3   [] 4  
5. Need to walk in hospital room? [] 1   [x] 2   [] 3   [] 4  
6. Climbing 3-5 steps with a railing? [] 1   [x] 2   [] 3   [] 4  
© 2007, Trustees of [facility] [employer], under license to [employer]. All rights reserved Score:  Initial: 18 Most Recent: X (Date: -- ) Interpretation of Tool:  Represents activities that are increasingly more difficult (i.e. Bed mobility, Transfers, Gait). Score 24 23 22-20 19-15 14-10 9-7 6 Modifier CH CI CJ CK CL CM CN   
 
? Mobility - Walking and Moving Around:  
  - CURRENT STATUS: CK - 40%-59% impaired, limited or restricted  - GOAL STATUS: CJ - 20%-39% impaired, limited or restricted  - D/C STATUS:  ---------------To be determined--------------- Payor: CARE IMPROVEMENT PLUS [place] / Product Type: Managed Care Medicare /   
 
Medical Necessity:    
· Patient demonstrates good rehab potential due to higher previous functional level. Reason for Services/Other Comments: 
· Patient continues to require skilled intervention due to decreased balance and functional mobility. Use of outcome tool(s) and clinical judgement create a POC that gives a: Questionable prediction of patient's progress: MODERATE COMPLEXITY  
  
 
 
 
TREATMENT:  
(In addition to Assessment/Re-Assessment sessions the following treatments were rendered) Pre-treatment Symptoms/Complaints:  None Pain: Initial:  
Pain Intensity 1: 0  Post Session:  0 Therapeutic Exercise: (11 Minutes):  Exercises per grid below to improve strength and activity tolerance. Required minimal visual and verbal cues to promote proper body alignment and promote proper body mechanics. Progressed repetitions and complexity of movement as indicated. Date: 
11/19/18 Date: 
11/26/18 Date: 
  
ACTIVITY/EXERCISE AM PM AM PM AM PM  
Seated LAQ 2 x 20 B  2 x 10 B 
1 x 15 B Seated marching 2 x 15 B  2 x 10 B 
1 x 15 B Seated heel taps 2 x 20 B  2 x 20 B Seated hip abd/add 1 x 20 B  2 x 10 B 
1 x 15 B Seated toe taps 2 x 20 B  2 x 20 B Standing marching   1 x 5 B Standing hip abd/add   1 x 10 B Heel raises   1 x 5 B     
        
        
        
B = bilateral; AA = active assistive; A = active; P = passive Therapeutic Activity: (    12 minutes): Therapeutic activities including Bed transfers, Chair transfers, Ambulation on level ground and standing activities to improve mobility, strength, balance and coordination. Required moderate   to promote static and dynamic balance in standing and promote coordination of bilateral, upper extremity(s), lower extremity(s). Braces/Orthotics/Lines/Etc:  
· Room air Treatment/Session Assessment:   
· Response to Treatment:  See above. · Interdisciplinary Collaboration:  
o Physical Therapist 
o Registered Nurse · After treatment position/precautions:  
o Up in chair 
o Bed alarm/tab alert on 
o Bed/Chair-wheels locked 
o Call light within reach 
o RN notified 
o Family at bedside · Compliance with Program/Exercises: Will assess as treatment progresses · Recommendations/Intent for next treatment session: \"Next visit will focus on advancements to more challenging activities and reduction in assistance provided\". Total Treatment Duration: PT Patient Time In/Time Out Time In: 9376 Time Out: 4049 Greta Duarte, PT, DPT

## 2018-11-26 NOTE — PROGRESS NOTES
Hospitalist Progress Note   
2018 Admit Date: 2018  2:01 PM  
NAME: Adam Delacruz :  1960 MRN:  [id number] Attending: Kelin Villaseñor MD 
PCP:  Markus, MD Leroy 
 
SUBJECTIVE:  
Patient 58M with pmhx of CHF with EG 20-25%, NIDM, schizophrenia admitted to ICU on  after having had cardiac arrest at the adult  center. Pt had witnessed collapse and staff initiated CPR, placed AED with shock and then ROSC. Pt again with another cardiac arrest in ER with ROSC. Admitted to ICU and placed on hypothermia protocol. Echo showed EF 10-20%. Treated briefly on telemetry for acute flash pulm edema requiring BIPAP. Hospitalist service consulted for medical mgmt and taking over care from ICU service. Noted on  to have fever of 101.3 so ICD placement post-poned. CXR  with bilateral patchy infiltrates suspected pneumonia, started on augmentin. Flu, strep ag and blood cultures negative.  - sitting in bedside chair. Denies cough or sore throat today. Review of Systems negative with exception of pertinent positives noted above PHYSICAL EXAM  
 
Visit Vitals BP 97/56 Pulse 91 Temp 98.2 °F (36.8 °C) Resp 20 Ht 5' 10\" (1.778 m) Comment: per EMR Wt 75 kg (165 lb 6.4 oz) SpO2 97% BMI 23.73 kg/m² Temp (24hrs), Av.2 °F (36.8 °C), Min:98 °F (36.7 °C), Max:98.5 °F (36.9 °C) Oxygen Therapy O2 Sat (%): 97 % (18 0810) Pulse via Oximetry: 94 beats per minute (18 0729) O2 Device: Room air (18 1226) O2 Flow Rate (L/min): 2 l/min (18 0421) FIO2 (%): 30 % (18) Intake/Output Summary (Last 24 hours) at 2018 1431 Last data filed at 2018 5102 Gross per 24 hour Intake 0 ml Output 500 ml Net -500 ml General: No acute distress   
Lungs:  CTA Bilaterally. Heart:  Regular rate and rhythm,  No murmur, rub, or gallop Abdomen: Soft, Non distended, Non tender, Positive bowel sounds Extremities: No cyanosis, clubbing or edema Neurologic:  No focal deficits ASSESSMENT Active Hospital Problems Diagnosis Date Noted  Microcytic anemia 11/22/2018  Pneumonia due to Streptococcus pneumoniae ([facility] [address] 11/21/2018  Cardiac arrest ([facility] [address] 11/13/2018  Acute respiratory failure with hypoxia ([address] 11/13/2018  Hypotension 11/13/2018  Systolic congestive heart failure ([address] 11/13/2018  Schizophrenia ([facility] [address] 11/13/2018  Acute renal failure ([address] 11/13/2018  Type II diabetes mellitus ([address] 02/04/2016 A/p Cardiac arrest ([facility] [address] (11/13/2018) - EF 10% 
- Continue Amiodarone - To get ICD when EP feels appropriate 
  
Active Problems: 
Sore throat: 
- resolved - influenza and strep negative 
  
  Systolic congestive heart failure ([facility] [address] (11/13/2018) - EF 10% 
- holding lisinopril and lasix secondary to hypotension.   
  
  Acute respiratory failure with hypoxia ([address] (11/13/2018) 
- weaned to room air.  
- Continue Augmentin (Day 7/10) - Holding furosemide 
  
  Pneumonia due to Streptococcus pneumoniae (11/21/2018) - CXR with bilateral infiltrates - Sputum culture from 11/15 while intubated growing heavy Strep pneumo 
- Started Augmentin (based on cultures) on evening of 11/20 
  
    Microcytic Anemia (11/22/2018) -  anemia of chronic disease, stable. Acute renal failure ([address] (11/13/2018) - Resolved 
  
  Hypotension (11/13/2018) - Borderline SBP in 80's-90's.  holding lisinopril and lasix  
  
  Type II diabetes mellitus ([facility] [address] (2/4/2016) - No acute issues - Continue Humalog SSI 
  
  Schizophrenia ([facility] [address] (11/13/2018) - stable. Cont current 
 
  
Dispo - await ICD placement - hopefully tomorrow ? [facility] STR on discharge when medically stable DVT Prophylaxis: hep sq Signed By: Esme Parker, DO November 26, 2018

## 2018-11-26 NOTE — PROGRESS NOTES
Verbal bedside report received from Marybeth Cadet, [facility]. Assumed care of patient.

## 2018-11-27 ENCOUNTER — APPOINTMENT (OUTPATIENT)
Dept: GENERAL RADIOLOGY | Age: 58
DRG: 226 | End: 2018-11-27
Attending: INTERNAL MEDICINE
Payer: MEDICARE

## 2018-11-27 ENCOUNTER — ANESTHESIA (OUTPATIENT)
Dept: SURGERY | Age: 58
DRG: 226 | End: 2018-11-27
Payer: MEDICARE

## 2018-11-27 ENCOUNTER — ANESTHESIA EVENT (OUTPATIENT)
Dept: SURGERY | Age: 58
DRG: 226 | End: 2018-11-27
Payer: MEDICARE

## 2018-11-27 LAB
ALBUMIN SERPL-MCNC: 2.6 G/DL (ref 3.5–5)
ALBUMIN/GLOB SERPL: 0.6 {RATIO} (ref 1.2–3.5)
ALP SERPL-CCNC: 150 U/L (ref 50–136)
ALT SERPL-CCNC: 39 U/L (ref 12–65)
ANION GAP SERPL CALC-SCNC: 6 MMOL/L (ref 7–16)
AST SERPL-CCNC: 33 U/L (ref 15–37)
ATRIAL RATE: 91 BPM
BASOPHILS # BLD: 0 K/UL (ref 0–0.2)
BASOPHILS NFR BLD: 0 % (ref 0–2)
BILIRUB SERPL-MCNC: 0.4 MG/DL (ref 0.2–1.1)
BUN SERPL-MCNC: 17 MG/DL (ref 6–23)
CALCIUM SERPL-MCNC: 8.7 MG/DL (ref 8.3–10.4)
CALCULATED P AXIS, ECG09: 49 DEGREES
CALCULATED R AXIS, ECG10: -63 DEGREES
CALCULATED T AXIS, ECG11: 102 DEGREES
CHLORIDE SERPL-SCNC: 105 MMOL/L (ref 98–107)
CO2 SERPL-SCNC: 26 MMOL/L (ref 21–32)
CREAT SERPL-MCNC: 1.44 MG/DL (ref 0.8–1.5)
DIAGNOSIS, 93000: NORMAL
DIFFERENTIAL METHOD BLD: ABNORMAL
EOSINOPHIL # BLD: 0 K/UL (ref 0–0.8)
EOSINOPHIL NFR BLD: 0 % (ref 0.5–7.8)
ERYTHROCYTE [DISTWIDTH] IN BLOOD BY AUTOMATED COUNT: 16.1 %
GLOBULIN SER CALC-MCNC: 4.4 G/DL (ref 2.3–3.5)
GLUCOSE BLD STRIP.AUTO-MCNC: 132 MG/DL (ref 65–100)
GLUCOSE BLD STRIP.AUTO-MCNC: 146 MG/DL (ref 65–100)
GLUCOSE BLD STRIP.AUTO-MCNC: 81 MG/DL (ref 65–100)
GLUCOSE BLD STRIP.AUTO-MCNC: 87 MG/DL (ref 65–100)
GLUCOSE BLD STRIP.AUTO-MCNC: 92 MG/DL (ref 65–100)
GLUCOSE SERPL-MCNC: 93 MG/DL (ref 65–100)
HCT VFR BLD AUTO: 31.6 % (ref 41.1–50.3)
HGB BLD-MCNC: 10 G/DL (ref 13.6–17.2)
IMM GRANULOCYTES # BLD: 0.1 K/UL (ref 0–0.5)
IMM GRANULOCYTES NFR BLD AUTO: 1 % (ref 0–5)
LYMPHOCYTES # BLD: 2.2 K/UL (ref 0.5–4.6)
LYMPHOCYTES NFR BLD: 22 % (ref 13–44)
MAGNESIUM SERPL-MCNC: 2.4 MG/DL (ref 1.8–2.4)
MCH RBC QN AUTO: 23.3 PG (ref 26.1–32.9)
MCHC RBC AUTO-ENTMCNC: 31.6 G/DL (ref 31.4–35)
MCV RBC AUTO: 73.5 FL (ref 79.6–97.8)
MONOCYTES # BLD: 0.9 K/UL (ref 0.1–1.3)
MONOCYTES NFR BLD: 9 % (ref 4–12)
NEUTS SEG # BLD: 6.6 K/UL (ref 1.7–8.2)
NEUTS SEG NFR BLD: 68 % (ref 43–78)
NRBC # BLD: 0 K/UL (ref 0–0.2)
P-R INTERVAL, ECG05: 222 MS
PLATELET # BLD AUTO: 464 K/UL (ref 150–450)
PMV BLD AUTO: 11.3 FL (ref 9.4–12.3)
POTASSIUM SERPL-SCNC: 4.3 MMOL/L (ref 3.5–5.1)
PROT SERPL-MCNC: 7 G/DL (ref 6.3–8.2)
Q-T INTERVAL, ECG07: 464 MS
QRS DURATION, ECG06: 184 MS
QTC CALCULATION (BEZET), ECG08: 570 MS
RBC # BLD AUTO: 4.3 M/UL (ref 4.23–5.6)
SODIUM SERPL-SCNC: 137 MMOL/L (ref 136–145)
VENTRICULAR RATE, ECG03: 91 BPM
WBC # BLD AUTO: 9.7 K/UL (ref 4.3–11.1)

## 2018-11-27 PROCEDURE — 74011250637 HC RX REV CODE- 250/637: Performed by: INTERNAL MEDICINE

## 2018-11-27 PROCEDURE — 65660000000 HC RM CCU STEPDOWN

## 2018-11-27 PROCEDURE — 74011250637 HC RX REV CODE- 250/637: Performed by: HOSPITALIST

## 2018-11-27 PROCEDURE — 93005 ELECTROCARDIOGRAM TRACING: CPT | Performed by: INTERNAL MEDICINE

## 2018-11-27 PROCEDURE — 36415 COLL VENOUS BLD VENIPUNCTURE: CPT

## 2018-11-27 PROCEDURE — 83735 ASSAY OF MAGNESIUM: CPT

## 2018-11-27 PROCEDURE — 85025 COMPLETE CBC W/AUTO DIFF WBC: CPT

## 2018-11-27 PROCEDURE — 80053 COMPREHEN METABOLIC PANEL: CPT

## 2018-11-27 PROCEDURE — 71046 X-RAY EXAM CHEST 2 VIEWS: CPT

## 2018-11-27 PROCEDURE — 82962 GLUCOSE BLOOD TEST: CPT

## 2018-11-27 RX ORDER — LIDOCAINE HYDROCHLORIDE AND EPINEPHRINE 20; 5 MG/ML; UG/ML
20 INJECTION, SOLUTION EPIDURAL; INFILTRATION; INTRACAUDAL; PERINEURAL ONCE
Status: ACTIVE | OUTPATIENT
Start: 2018-11-27 | End: 2018-11-28

## 2018-11-27 RX ADMIN — GUAIFENESIN 1200 MG: 600 TABLET, EXTENDED RELEASE ORAL at 09:11

## 2018-11-27 RX ADMIN — CLOZAPINE 200 MG: 100 TABLET ORAL at 09:16

## 2018-11-27 RX ADMIN — GUAIFENESIN 1200 MG: 600 TABLET, EXTENDED RELEASE ORAL at 21:02

## 2018-11-27 RX ADMIN — Medication 5 ML: at 21:04

## 2018-11-27 RX ADMIN — Medication 10 ML: at 09:13

## 2018-11-27 RX ADMIN — AMOXICILLIN AND CLAVULANATE POTASSIUM 1 TABLET: 875; 125 TABLET, FILM COATED ORAL at 17:08

## 2018-11-27 RX ADMIN — AMIODARONE HYDROCHLORIDE 400 MG: 200 TABLET ORAL at 09:12

## 2018-11-27 RX ADMIN — AMIODARONE HYDROCHLORIDE 400 MG: 200 TABLET ORAL at 21:02

## 2018-11-27 RX ADMIN — CLOZAPINE 500 MG: 100 TABLET ORAL at 21:02

## 2018-11-27 RX ADMIN — POTASSIUM CHLORIDE 40 MEQ: 20 TABLET, EXTENDED RELEASE ORAL at 09:12

## 2018-11-27 RX ADMIN — PANTOPRAZOLE SODIUM 40 MG: 40 TABLET, DELAYED RELEASE ORAL at 09:12

## 2018-11-27 RX ADMIN — ASPIRIN 81 MG 81 MG: 81 TABLET ORAL at 09:12

## 2018-11-27 RX ADMIN — AMOXICILLIN AND CLAVULANATE POTASSIUM 1 TABLET: 875; 125 TABLET, FILM COATED ORAL at 09:12

## 2018-11-27 NOTE — PROGRESS NOTES
End of Shift note: Patient without complaints today--no SOB. No N/v or CP. Pt sat up in chair for good part of afternoon as well. ICD rescheduled for first case on Thursday morning. Pt otherwise resting comfortably. No apparent needs or distress noted. Family updated several times today on plan of care by phone. Currently at bedside now, with additional update provided.

## 2018-11-27 NOTE — PROGRESS NOTES
Pt off floor for test/ procedure, will follow up for treatment as schedule/ pt's status allows.  
 
Mel Coreas, OT

## 2018-11-27 NOTE — PROGRESS NOTES
Verbal bedside report received from [address]. Patient's situation, background, assessment and recommendations were provided. Kardex, Mar, and recent results also reviewed. Opportunity for questions provided. Assumed care of patient.

## 2018-11-27 NOTE — PROGRESS NOTES
Problem: Falls - Risk of 
Goal: *Absence of Falls Document Matthew Levels Fall Risk and appropriate interventions in the flowsheet. Outcome: Progressing Towards Goal 
Fall Risk Interventions: 
Mobility Interventions: Bed/chair exit alarm, Communicate number of staff needed for ambulation/transfer, Patient to call before getting OOB, PT Consult for mobility concerns, PT Consult for assist device competence Mentation Interventions: Bed/chair exit alarm, Door open when patient unattended, Reorient patient, More frequent rounding Medication Interventions: Bed/chair exit alarm, Evaluate medications/consider consulting pharmacy, Patient to call before getting OOB, Teach patient to arise slowly Elimination Interventions: Bed/chair exit alarm, Call light in reach, Patient to call for help with toileting needs, Toilet paper/wipes in reach, Toileting schedule/hourly rounds, Urinal in reach History of Falls Interventions: Bed/chair exit alarm, Consult care management for discharge planning, Door open when patient unattended, Evaluate medications/consider consulting pharmacy, Investigate reason for fall, Room close to nurse's station Problem: Pressure Injury - Risk of 
Goal: *Prevention of pressure injury Document Laurent Scale and appropriate interventions in the flowsheet. Pressure Injury Interventions: 
Sensory Interventions: Minimize linen layers, Keep linens dry and wrinkle-free Moisture Interventions: Minimize layers, Absorbent underpads Activity Interventions: PT/OT evaluation, Pressure redistribution bed/mattress(bed type), Increase time out of bed Mobility Interventions: PT/OT evaluation, Pressure redistribution bed/mattress (bed type) Nutrition Interventions: Document food/fluid/supplement intake, Discuss nutritional consult with provider, Offer support with meals,snacks and hydration Friction and Shear Interventions: HOB 30 degrees or less, Minimize layers

## 2018-11-27 NOTE — PROGRESS NOTES
Patient has been accepted for rehab at [facility]. Left message for patient KARLY [employer] of bed offer at [facility]. CM following.

## 2018-11-27 NOTE — PROGRESS NOTES
Hospitalist Progress Note   
2018 Admit Date: 2018  2:01 PM  
NAME: Shar Aparicio :  1960 MRN:  [id number] Attending: Augustin Crockett MD 
PCP:  Markus, MD Leroy 
 
SUBJECTIVE:  
Patient 58M with pmhx of CHF with EG 20-25%, NIDM, schizophrenia admitted to ICU on  after having had cardiac arrest at the adult  center. Pt had witnessed collapse and staff initiated CPR, placed AED with shock and then ROSC. Pt again with another cardiac arrest in ER with ROSC. Admitted to ICU and placed on hypothermia protocol. Echo showed EF 10-20%. Treated briefly on telemetry for acute flash pulm edema requiring BIPAP. Hospitalist service consulted for medical mgmt and taking over care from ICU service. Noted on  to have fever of 101.3 so ICD placement post-poned. CXR  with bilateral patchy infiltrates suspected pneumonia, started on augmentin. Flu, strep ag and blood cultures negative.  - laying in bed. Reports improvement in cough. No acute complaints Review of Systems negative with exception of pertinent positives noted above PHYSICAL EXAM  
 
Visit Vitals BP 95/68 Pulse 89 Temp 98.6 °F (37 °C) Resp 18 Ht 5' 10\" (1.778 m) Comment: per EMR Wt 72.6 kg (160 lb) SpO2 96% BMI 22.96 kg/m² Temp (24hrs), Av.6 °F (37 °C), Min:97.8 °F (36.6 °C), Max:99.7 °F (37.6 °C) Oxygen Therapy O2 Sat (%): 96 % (18 1329) Pulse via Oximetry: 94 beats per minute (18 0729) O2 Device: Room air (18 1208) O2 Flow Rate (L/min): 2 l/min (18 0421) FIO2 (%): 30 % (18) Intake/Output Summary (Last 24 hours) at 2018 1503 Last data filed at 2018 1453 Gross per 24 hour Intake 480 ml Output 775 ml Net -295 ml General: No acute distress   
Lungs:  CTA, no wheezing or rhonchi Heart:  Regular rate and rhythm,  No murmur, rub, or gallop Abdomen: Soft, Non distended, Non tender, Positive bowel sounds Extremities: No cyanosis, clubbing or edema Neurologic:  No focal deficits ASSESSMENT Active Hospital Problems Diagnosis Date Noted  Microcytic anemia 11/22/2018  Pneumonia due to Streptococcus pneumoniae ([facility] [address] 11/21/2018  Cardiac arrest ([facility] [address] 11/13/2018  Acute respiratory failure with hypoxia ([address] 11/13/2018  Hypotension 11/13/2018  Systolic congestive heart failure ([address] 11/13/2018  Schizophrenia ([facility] [address] 11/13/2018  Acute renal failure ([address] 11/13/2018  Type II diabetes mellitus ([address] 02/04/2016 A/p Cardiac arrest ([facility] [address] (11/13/2018) - EF 10% 
- Continue Amiodarone - To get ICD when EP feels appropriate 
  
Active Problems: 
Sore throat: 
- resolved - influenza and strep negative 
  
  Systolic congestive heart failure ([facility] [address] (11/13/2018) - EF 10% 
- holding lisinopril and lasix secondary to hypotension.   
  
  Acute respiratory failure with hypoxia ([address] (11/13/2018) 
- weaned to room air.  
- Continue Augmentin (Day 8/10) - Holding furosemide 
  
  Pneumonia due to Streptococcus pneumoniae (11/21/2018) - repeat CXR much improved - Sputum culture from 11/15 while intubated growing heavy Strep pneumo 
- Started Augmentin (based on cultures) on evening of 11/20 
  
    Microcytic Anemia (11/22/2018) -  anemia of chronic disease, stable. Acute renal failure ([address] (11/13/2018) - Resolved 
  
  Hypotension (11/13/2018) - Borderline SBP in 80's-90's.  holding lisinopril and lasix  
  
  Type II diabetes mellitus ([facility] [address] (2/4/2016) - No acute issues - Continue Humalog SSI 
  
  Schizophrenia ([facility] [address] (11/13/2018) - stable. Cont current 
 
  
Dispo - await ICD placement - hopefully tomorrow ? [facility] STR on discharge when medically stable DVT Prophylaxis: hep sq Signed By: Kendra Armenta DO November 27, 2018

## 2018-11-27 NOTE — PROGRESS NOTES
PT Note: 
Treatment attempted this AM but pt refused reporting he didn't \"feel like it. \"  Will re-attempt pending schedule and pt status. Thanks, Deedee Cummins, PT, DPT

## 2018-11-27 NOTE — ANESTHESIA PREPROCEDURE EVALUATION
Anesthetic History Review of Systems / Medical History Patient summary reviewed and pertinent labs reviewed Pulmonary Neuro/Psych Psychiatric history Cardiovascular Valvular problems/murmurs (moderate): mitral insufficiency CHF (EF 10/20%): NYHA Classification III 
 
 
 
 
  
GI/Hepatic/Renal 
  
 
 
Renal disease: CRI Endo/Other Diabetes Anemia Other Findings Comments: 58M with pmhx of CHF with EG 20-25%, NIDM, schizophrenia admitted to ICU on 11/13 after having had cardiac arrest at the [facility]. Pt had witnessed collapse and staff initiated CPR, placed AED with shock and then ROSC. Pt again with another cardiac arrest in ER with ROSC. Admitted to ICU and placed on hypothermia protocol. Echo showed EF 10-20%. Treated briefly on telemetry for acute flash pulm edema requiring BIPAP. Hospitalist service consulted for medical mgmt and taking over care from ICU service. Noted on 11/18 to have fever of 101.3 so ICD placement post-poned. CXR 11/20 with bilateral patchy infiltrates suspected pneumonia, started on augmentin. Flu, strep ag and blood cultures negative. CXR 11/27 with infiltrates largely resolved. Physical Exam 
 
Airway Mallampati: II 
TM Distance: 4 - 6 cm Neck ROM: normal range of motion Mouth opening: Normal 
 
 Cardiovascular Rhythm: regular Rate: normal 
 
 
 
 Dental 
 
Dentition: Poor dentition Pulmonary Breath sounds clear to auscultation Abdominal 
 
 
 
 Other Findings Anesthetic Plan ASA: 4 Anesthesia type: total IV anesthesia Induction: Intravenous Anesthetic plan and risks discussed with: Patient Case cancelled due to inappropriate NPO status

## 2018-11-27 NOTE — PROGRESS NOTES
Bedside and Verbal shift change report given to Kayla Zhang. Report included the following information SBAR, Kardex, MAR, Accordion and Recent Results.

## 2018-11-27 NOTE — PROGRESS NOTES
[facility] CARDIOLOGY PROGRESS NOTE 
      
 
11/27/2018 2:57 PM 
 
Admit Date: 11/13/2018 Admit Diagnosis: Cardiac arrest ([facility] Subjective: No complaints this AM, no chest pain or shortness of breath Interval History: (History of pertinent interval events obtained from nursing staff) ROS: 
GEN:  No fever or chills Cardiovascular:  As noted above Pulmonary:  As noted above Neuro:  No new focal motor or sensory loss Objective:  
 
Vitals:  
 11/26/18 1751 11/26/18 2136 11/27/18 0043 11/27/18 6474 BP: 92/66 (!) 88/58 (!) 84/58 (!) 82/55 Pulse: 95 93 95 92 Resp: 20 18 18 18 Temp: 98.4 °F (36.9 °C) 98.5 °F (36.9 °C) 99.7 °F (37.6 °C) 98.5 °F (36.9 °C) SpO2: 97% 97% 98% 99% Weight:    160 lb (72.6 kg) Height:      
 
 
Physical Exam: 
Cristobal Cancer, Well Nourished, No Acute Distress, appear diaphoretic on exam.  
Neck- supple, no JVD 
CV- regular rate and rhythm no MRG Lung- clear bilaterally Abd- soft, nontender, nondistended Ext- no edema bilaterally. Skin- warm and dry Current Facility-Administered Medications Medication Dose Route Frequency  cloZAPine (CLOZARIL) tablet 500 mg (Patient Supplied)  500 mg Oral QHS  phenol throat spray (CHLORASEPTIC) 1 Spray  1 Spray Oral PRN  
 carvedilol (COREG) tablet 3.125 mg  3.125 mg Oral BID WITH MEALS  guaiFENesin ER (MUCINEX) tablet 1,200 mg  1,200 mg Oral Q12H  
 heparin (porcine) injection 5,000 Units  5,000 Units SubCUTAneous Q12H  
 amoxicillin-clavulanate (AUGMENTIN) 875-125 mg per tablet 1 Tab  1 Tab Oral BID WITH MEALS  insulin lispro (HUMALOG) injection   SubCUTAneous AC&HS  
 dextrose 40% (GLUTOSE) oral gel 1 Tube  15 g Oral PRN  
 glucagon (GLUCAGEN) injection 1 mg  1 mg IntraMUSCular PRN  
 dextrose (D50W) injection syrg 12.5-25 g  25-50 mL IntraVENous PRN  potassium chloride (K-DUR, KLOR-CON) SR tablet 40 mEq  40 mEq Oral DAILY  pantoprazole (PROTONIX) tablet 40 mg  40 mg Oral ACB  acetaminophen (TYLENOL) solution 650 mg  650 mg Oral Q4H PRN  
 amiodarone (CORDARONE) tablet 400 mg  400 mg Oral BID  cloZAPine (CLOZARIL) tablet 200 mg (Patient Supplied)  200 mg Oral DAILY  polyvinyl alcohol (LIQUIFILM TEARS) 1.4 % ophthalmic solution 1 Drop  1 Drop Both Eyes PRN  
 aspirin chewable tablet 81 mg  81 mg Oral DAILY  sodium chloride (NS) flush 5-10 mL  5-10 mL IntraVENous Q8H  
 sodium chloride (NS) flush 5-10 mL  5-10 mL IntraVENous PRN  
 midazolam (VERSED) injection 2 mg  2 mg IntraVENous Q2H PRN Data Review:  
Recent Results (from the past 24 hour(s)) GLUCOSE, POC Collection Time: 11/26/18 11:05 AM  
Result Value Ref Range Glucose (POC) 75 65 - 100 mg/dL GLUCOSE, POC Collection Time: 11/26/18  4:54 PM  
Result Value Ref Range Glucose (POC) 138 (H) 65 - 100 mg/dL GLUCOSE, POC Collection Time: 11/26/18  9:52 PM  
Result Value Ref Range Glucose (POC) 95 65 - 100 mg/dL METABOLIC PANEL, COMPREHENSIVE Collection Time: 11/27/18  3:57 AM  
Result Value Ref Range Sodium 137 136 - 145 mmol/L Potassium 4.3 3.5 - 5.1 mmol/L Chloride 105 98 - 107 mmol/L  
 CO2 26 21 - 32 mmol/L Anion gap 6 (L) 7 - 16 mmol/L Glucose 93 65 - 100 mg/dL BUN 17 6 - 23 MG/DL Creatinine 1.44 0.8 - 1.5 MG/DL  
 GFR est AA >60 >60 ml/min/1.73m2 GFR est non-AA 54 (L) >60 ml/min/1.73m2 Calcium 8.7 8.3 - 10.4 MG/DL Bilirubin, total 0.4 0.2 - 1.1 MG/DL  
 ALT (SGPT) 39 12 - 65 U/L  
 AST (SGOT) 33 15 - 37 U/L Alk. phosphatase 150 (H) 50 - 136 U/L Protein, total 7.0 6.3 - 8.2 g/dL Albumin 2.6 (L) 3.5 - 5.0 g/dL Globulin 4.4 (H) 2.3 - 3.5 g/dL A-G Ratio 0.6 (L) 1.2 - 3.5    
CBC WITH AUTOMATED DIFF Collection Time: 11/27/18  3:57 AM  
Result Value Ref Range WBC 9.7 4.3 - 11.1 K/uL  
 RBC 4.30 4.23 - 5.6 M/uL  
 HGB 10.0 (L) 13.6 - 17.2 g/dL HCT 31.6 (L) 41.1 - 50.3 % MCV 73.5 (L) 79.6 - 97.8 FL  
 MCH 23.3 (L) 26.1 - 32.9 PG  
 MCHC 31.6 31.4 - 35.0 g/dL  
 RDW 16.1 % PLATELET 741 (H) 473 - 450 K/uL MPV 11.3 9.4 - 12.3 FL ABSOLUTE NRBC 0.00 0.0 - 0.2 K/uL  
 DF AUTOMATED NEUTROPHILS 68 43 - 78 % LYMPHOCYTES 22 13 - 44 % MONOCYTES 9 4.0 - 12.0 % EOSINOPHILS 0 (L) 0.5 - 7.8 % BASOPHILS 0 0.0 - 2.0 % IMMATURE GRANULOCYTES 1 0.0 - 5.0 %  
 ABS. NEUTROPHILS 6.6 1.7 - 8.2 K/UL  
 ABS. LYMPHOCYTES 2.2 0.5 - 4.6 K/UL  
 ABS. MONOCYTES 0.9 0.1 - 1.3 K/UL  
 ABS. EOSINOPHILS 0.0 0.0 - 0.8 K/UL  
 ABS. BASOPHILS 0.0 0.0 - 0.2 K/UL  
 ABS. IMM. GRANS. 0.1 0.0 - 0.5 K/UL MAGNESIUM Collection Time: 11/27/18  3:57 AM  
Result Value Ref Range Magnesium 2.4 1.8 - 2.4 mg/dL GLUCOSE, POC Collection Time: 11/27/18  6:26 AM  
Result Value Ref Range Glucose (POC) 87 65 - 100 mg/dL EKG, 12 LEAD, INITIAL Collection Time: 11/27/18  6:36 AM  
Result Value Ref Range Ventricular Rate 91 BPM  
 Atrial Rate 91 BPM  
 P-R Interval 222 ms QRS Duration 184 ms Q-T Interval 464 ms QTC Calculation (Bezet) 570 ms Calculated P Axis 49 degrees Calculated R Axis -63 degrees Calculated T Axis 102 degrees Diagnosis Sinus rhythm with 1st degree A-V block Left axis deviation Non-specific intra-ventricular conduction block Inferior infarct (cited on or before 14-NOV-2018) Anterolateral infarct (cited on or before 18-NOV-2018) Abnormal ECG When compared with ECG of 26-NOV-2018 06:43, 
Premature ventricular complexes are no longer Present Serial changes of Anterior infarct Present EKG:  (EKG has been independently visualized by me with interpretation below) Assessment:  
 
Principal Problem: 
  Cardiac arrest ([facility] (11/13/2018) Active Problems: 
  Type II diabetes mellitus ([facility] (2/4/2016) Acute respiratory failure with hypoxia ([facility] (11/13/2018) Hypotension (11/13/2018) Systolic congestive heart failure ([facility] (11/13/2018) Schizophrenia ([facility] (11/13/2018) Acute renal failure ([facility] (11/13/2018) Pneumonia due to Streptococcus pneumoniae ([facility] (11/21/2018) Microcytic anemia (11/22/2018) 
 
  
66year old with a history of schizophrenia, NICM, admitted after cardiac arrest/VF arrest. As such, he will be an ICD candidate He does have a RBBB with a QRS > 150 msec which is a class IIb indication for a CRT. His EF is 10-20%.  
 
Plan: 1.  NICM, EF 10%, RBBB with QRS duration 178 msec, NYHA class III: 59yo with NICM now s/p VF cardiac arrest and meets criteria for ICD implantation. We will look towards ICD today vs Thursday if the schedule allows. -Volume status improved, cont GDMT with coreg and lisinopril. 2. Fever: resolved, finishing treatment for pneumonia 3. PPx: heparin, on hold for procedure ICD I discussed in detail the potential benefits of ICD therapy, including improved mortality and prevention of SCD. Additionally, I discussed with the patient the potential risks of ICD implantation, including the risk of bleeding, infection, venous occlusion, DVT/PE, pneumothorax, cardiac tamponade, perforation, need for urgent open heart surgery, device/lead failure, lead dislodgement, inappropriate shock(s), heart attack, stroke, arrhythmia, radiation skin injury, kidney damage/failure oversedation, respiratory arrest, and even death. The patient understands these risks in the context of the potential benefits of the device implantation, and agrees to proceed. CRT-D I discussed in detail the potential benefits of CRT-D therapy, including improved mortality, prevention of SCD,  decreased hospitalization, beneficial cardiac remodeling, and prevention of disease progression.  Additionally, I discussed with the patient the potential risks of CRT-D implantation, including the risk of bleeding, infection, venous occlusion, DVT/PE, pneumothorax, cardiac tamponade, perforation, need for urgent open heart surgery, device/lead failure, lead dislodgement, inability to place an LV lead via the coronary sinus, inappropriate shock(s), heart attack, stroke, arrhythmia, radiation skin injury, kidney damage/failure, oversedation, respiratory arrest, and even death. The patient understands these risks in the context of the potential benefits of the device implantation, and agrees to proceed. DFT I discussed with the patient the potential risks of DFT tesing including the risk of device/lead failure, lead dislodgement, heart attack, stroke, arrhythmia, oversedation, respiratory arrest, and even death. The patient understands these risks in the context of the potential benefits and agrees to proceed. Christopher Malloy. Simin García MD, MS Clinical Cardiac Electrophysiology [facility]

## 2018-11-27 NOTE — PROGRESS NOTES
Problem: Nutrition Deficit Goal: *Optimize nutritional status Nutrition - F/U Assessment: 
Diet: 11/13 NPO, 11/18 regular then NPO, 11/19 regular then NPO, 11/20 cardiac Patient was not present in room during RD rounds today. He is still awaiting ICD placement. Patient has ate well per nursing staff. Anthropometrics: Ht - 5'10\", wgt - 72.6 kg (3rd floor bed 11/26/18) Macronutrient Needs: 
Estimated calorie needs - 6513-7488 bonny/day (20-25 bonny/kg/day) Estimated protein needs - 60-90 gm pro/day (0.8-1.2 gm pro/kgIBW/day) (GFR >60 ml/min) Intake/Comparative Standards: 
Based on the past 4 recorded meals the patient has consumed: 70% of meals. This potentially meets ~85% of estimated kcal needs and ~100% of estimated protein needs.  
  
Intervention: 
Meals and Snacks: Cardiac Oral Nutrition Supplements: Add Ensure Enlive BID Nutrition Discharge Plan: Too soon to determine. Daniele Cerna [address], [address] [place] [address], LD  
[phone]

## 2018-11-28 ENCOUNTER — ANESTHESIA EVENT (OUTPATIENT)
Dept: SURGERY | Age: 58
DRG: 226 | End: 2018-11-28
Payer: MEDICARE

## 2018-11-28 LAB
ALBUMIN SERPL-MCNC: 2.7 G/DL (ref 3.5–5)
ALBUMIN/GLOB SERPL: 0.6 {RATIO} (ref 1.2–3.5)
ALP SERPL-CCNC: 163 U/L (ref 50–136)
ALT SERPL-CCNC: 48 U/L (ref 12–65)
ANION GAP SERPL CALC-SCNC: 9 MMOL/L (ref 7–16)
AST SERPL-CCNC: 59 U/L (ref 15–37)
ATRIAL RATE: 89 BPM
BASOPHILS # BLD: 0 K/UL (ref 0–0.2)
BASOPHILS NFR BLD: 0 % (ref 0–2)
BILIRUB SERPL-MCNC: 0.5 MG/DL (ref 0.2–1.1)
BUN SERPL-MCNC: 19 MG/DL (ref 6–23)
CALCIUM SERPL-MCNC: 9.2 MG/DL (ref 8.3–10.4)
CALCULATED P AXIS, ECG09: 53 DEGREES
CALCULATED R AXIS, ECG10: -69 DEGREES
CALCULATED T AXIS, ECG11: 101 DEGREES
CHLORIDE SERPL-SCNC: 105 MMOL/L (ref 98–107)
CO2 SERPL-SCNC: 25 MMOL/L (ref 21–32)
CREAT SERPL-MCNC: 1.44 MG/DL (ref 0.8–1.5)
DIAGNOSIS, 93000: NORMAL
DIFFERENTIAL METHOD BLD: ABNORMAL
EOSINOPHIL # BLD: 0 K/UL (ref 0–0.8)
EOSINOPHIL NFR BLD: 0 % (ref 0.5–7.8)
ERYTHROCYTE [DISTWIDTH] IN BLOOD BY AUTOMATED COUNT: 16.3 %
GLOBULIN SER CALC-MCNC: 4.6 G/DL (ref 2.3–3.5)
GLUCOSE BLD STRIP.AUTO-MCNC: 124 MG/DL (ref 65–100)
GLUCOSE BLD STRIP.AUTO-MCNC: 140 MG/DL (ref 65–100)
GLUCOSE BLD STRIP.AUTO-MCNC: 150 MG/DL (ref 65–100)
GLUCOSE BLD STRIP.AUTO-MCNC: 91 MG/DL (ref 65–100)
GLUCOSE SERPL-MCNC: 90 MG/DL (ref 65–100)
HCT VFR BLD AUTO: 33.5 % (ref 41.1–50.3)
HGB BLD-MCNC: 10.7 G/DL (ref 13.6–17.2)
IMM GRANULOCYTES # BLD: 0 K/UL (ref 0–0.5)
IMM GRANULOCYTES NFR BLD AUTO: 0 % (ref 0–5)
LYMPHOCYTES # BLD: 1.7 K/UL (ref 0.5–4.6)
LYMPHOCYTES NFR BLD: 22 % (ref 13–44)
MAGNESIUM SERPL-MCNC: 2.5 MG/DL (ref 1.8–2.4)
MCH RBC QN AUTO: 23.8 PG (ref 26.1–32.9)
MCHC RBC AUTO-ENTMCNC: 31.9 G/DL (ref 31.4–35)
MCV RBC AUTO: 74.4 FL (ref 79.6–97.8)
MONOCYTES # BLD: 0.7 K/UL (ref 0.1–1.3)
MONOCYTES NFR BLD: 9 % (ref 4–12)
NEUTS SEG # BLD: 5.2 K/UL (ref 1.7–8.2)
NEUTS SEG NFR BLD: 68 % (ref 43–78)
NRBC # BLD: 0 K/UL (ref 0–0.2)
P-R INTERVAL, ECG05: 200 MS
PLATELET # BLD AUTO: 504 K/UL (ref 150–450)
PMV BLD AUTO: 11.2 FL (ref 9.4–12.3)
POTASSIUM SERPL-SCNC: 4.8 MMOL/L (ref 3.5–5.1)
PROT SERPL-MCNC: 7.3 G/DL (ref 6.3–8.2)
Q-T INTERVAL, ECG07: 468 MS
QRS DURATION, ECG06: 186 MS
QTC CALCULATION (BEZET), ECG08: 569 MS
RBC # BLD AUTO: 4.5 M/UL (ref 4.23–5.6)
SODIUM SERPL-SCNC: 139 MMOL/L (ref 136–145)
VENTRICULAR RATE, ECG03: 89 BPM
WBC # BLD AUTO: 7.7 K/UL (ref 4.3–11.1)

## 2018-11-28 PROCEDURE — 74011250637 HC RX REV CODE- 250/637: Performed by: INTERNAL MEDICINE

## 2018-11-28 PROCEDURE — 65660000000 HC RM CCU STEPDOWN

## 2018-11-28 PROCEDURE — 83735 ASSAY OF MAGNESIUM: CPT

## 2018-11-28 PROCEDURE — 74011636637 HC RX REV CODE- 636/637: Performed by: HOSPITALIST

## 2018-11-28 PROCEDURE — 97530 THERAPEUTIC ACTIVITIES: CPT

## 2018-11-28 PROCEDURE — 74011250637 HC RX REV CODE- 250/637: Performed by: HOSPITALIST

## 2018-11-28 PROCEDURE — 85025 COMPLETE CBC W/AUTO DIFF WBC: CPT

## 2018-11-28 PROCEDURE — 93005 ELECTROCARDIOGRAM TRACING: CPT | Performed by: INTERNAL MEDICINE

## 2018-11-28 PROCEDURE — 36415 COLL VENOUS BLD VENIPUNCTURE: CPT

## 2018-11-28 PROCEDURE — 82962 GLUCOSE BLOOD TEST: CPT

## 2018-11-28 PROCEDURE — 80053 COMPREHEN METABOLIC PANEL: CPT

## 2018-11-28 RX ADMIN — Medication 5 ML: at 06:03

## 2018-11-28 RX ADMIN — Medication 5 ML: at 21:39

## 2018-11-28 RX ADMIN — CLOZAPINE 200 MG: 100 TABLET ORAL at 09:44

## 2018-11-28 RX ADMIN — CARVEDILOL 3.12 MG: 3.12 TABLET, FILM COATED ORAL at 09:42

## 2018-11-28 RX ADMIN — INSULIN LISPRO 1 UNITS: 100 INJECTION, SOLUTION INTRAVENOUS; SUBCUTANEOUS at 11:21

## 2018-11-28 RX ADMIN — AMIODARONE HYDROCHLORIDE 400 MG: 200 TABLET ORAL at 21:36

## 2018-11-28 RX ADMIN — CARVEDILOL 3.12 MG: 3.12 TABLET, FILM COATED ORAL at 17:15

## 2018-11-28 RX ADMIN — AMOXICILLIN AND CLAVULANATE POTASSIUM 1 TABLET: 875; 125 TABLET, FILM COATED ORAL at 09:37

## 2018-11-28 RX ADMIN — AMOXICILLIN AND CLAVULANATE POTASSIUM 1 TABLET: 875; 125 TABLET, FILM COATED ORAL at 17:15

## 2018-11-28 RX ADMIN — AMIODARONE HYDROCHLORIDE 400 MG: 200 TABLET ORAL at 09:42

## 2018-11-28 RX ADMIN — GUAIFENESIN 1200 MG: 600 TABLET, EXTENDED RELEASE ORAL at 09:38

## 2018-11-28 RX ADMIN — ASPIRIN 81 MG 81 MG: 81 TABLET ORAL at 09:39

## 2018-11-28 RX ADMIN — GUAIFENESIN 1200 MG: 600 TABLET, EXTENDED RELEASE ORAL at 21:36

## 2018-11-28 RX ADMIN — POTASSIUM CHLORIDE 40 MEQ: 20 TABLET, EXTENDED RELEASE ORAL at 09:37

## 2018-11-28 RX ADMIN — CLOZAPINE 500 MG: 100 TABLET ORAL at 21:36

## 2018-11-28 RX ADMIN — PANTOPRAZOLE SODIUM 40 MG: 40 TABLET, DELAYED RELEASE ORAL at 09:39

## 2018-11-28 NOTE — PROGRESS NOTES
Problem: Mobility Impaired (Adult and Pediatric) Goal: *Acute Goals and Plan of Care (Insert Text) LTG: 
(1.)Mr. Bird will move from supine to sit and sit to supine , scoot up and down and roll side to side in bed with MODIFIED INDEPENDENCE within 7 treatment day(s). (2.)Mr. Marci Yanes will transfer from bed to chair and chair to bed with STAND BY ASSIST using the least restrictive device within 7 treatment day(s). (3.)Mr. Bird will ambulate with CONTACT GUARD ASSIST for 75 feet with the least restrictive device within 7 treatment day(s). (4.)Mr. Marci Yanes will participate in therapeutic activity/exercises x 23 minutes for increased strength within 7 days. ________________________________________________________________________________________________ PHYSICAL THERAPY: Daily Note, Treatment Day: 2nd, PM 11/28/2018INPATIENT: Hospital Day: 16 Payor: CARE IMPROVEMENT PLUS [place] / Product Type: [employer] Medicare /  
  
NAME/AGE/GENDER: Fuad Sun is a 66 y.o. male PRIMARY DIAGNOSIS: Cardiac arrest [facility]) Cardiac arrest [facility]) Cardiac arrest ([facility] 1 Day Post-Op ICD-10: Treatment Diagnosis:  
 · Generalized Muscle Weakness (M62.81) · Difficulty in walking, Not elsewhere classified (R26.2) · History of falling (Z91.81) Precaution/Allergies: 
Patient has no known allergies. ASSESSMENT:  
Mr. Marci Yanes was supine in bed upon arrival and agreeable to getting OOB. Pt able to come to sitting on EOB with supervision. He was able to stand with SBA and RW. Pt then transferred to chair with CGA-Betzaida. Pt performed several bouts of ambulation in room with RW/gait belt. He seems to do better with step-to gait pattern and simple cues. Pt occasionally required Betzaida for handling RW and then Korin Redmond when he attempted to sit unsafely. Pt progressed in ambulation today and will continue POC. This section established at most recent assessment PROBLEM LIST (Impairments causing functional limitations): 1. Decreased Strength 2. Decreased ADL/Functional Activities 3. Decreased Transfer Abilities 4. Decreased Ambulation Ability/Technique 5. Decreased Balance 6. Decreased Activity Tolerance INTERVENTIONS PLANNED: (Benefits and precautions of physical therapy have been discussed with the patient.) 1. Balance Exercise 2. Bed Mobility 3. Family Education 4. Gait Training 5. Therapeutic Activites 6. Therapeutic Exercise/Strengthening 7. Transfer Training TREATMENT PLAN: Frequency/Duration: 3 times a week for duration of hospital stay Rehabilitation Potential For Stated Goals: Good RECOMMENDED REHABILITATION/EQUIPMENT: (at time of discharge pending progress): Due to the probability of continued deficits (see above) this patient will likely need continued skilled physical therapy after discharge. Equipment:  
? None at this time HISTORY:  
History of Present Injury/Illness (Reason for Referral): 
See H&P Past Medical History/Comorbidities:  
Mr. Jade Young  has a past medical history of Diabetes ([facility] Heart failure ([facility] Hypertension, Psychiatric disorder, and Systolic CHF, acute on chronic ([facility] Mr. Jade Young  has a past surgical history that includes hx appendectomy. Social History/Living Environment:  
Home Environment: Private residence # Steps to Enter: 1 One/Two Story Residence: One story Living Alone: No 
Support Systems: Family member(s)(sister) Patient Expects to be Discharged to[de-identified] Unknown Current DME Used/Available at Home: Cane, straight, Shower chair Tub or Shower Type: Tub/Shower combination Prior Level of Function/Work/Activity: 
Pt reported that he lives with his sister in a one story home and was independent with ADLs PTA. Uses SPC for ambulation with recent fall. \  
Number of Personal Factors/Comorbidities that affect the Plan of Care: 3+: HIGH COMPLEXITY EXAMINATION:  
 Most Recent Physical Functioning:  
Gross Assessment: 
AROM: Within functional limits Strength: Generally decreased, functional(B hip flexion and dorsiflexion 4/5) Posture: 
  
Balance: 
Sitting: Intact Standing: Impaired Standing - Static: Fair Standing - Dynamic : Poor Bed Mobility: 
Supine to Sit: Supervision Wheelchair Mobility: 
  
Transfers: 
Sit to Stand: Stand-by assistance Stand to Sit: Stand-by assistance Bed to Chair: Contact guard assistance Interventions: Manual cues; Safety awareness training;Verbal cues; Visual cues Gait: 
  
Base of Support: Narrowed Speed/Gogo: Fluctuations Step Length: Left shortened;Right shortened Gait Abnormalities: Decreased step clearance;Trunk sway increased Distance (ft): 20 Feet (ft)(x 2) Assistive Device: Walker, rolling Ambulation - Level of Assistance: Minimal assistance; Moderate assistance Body Structures Involved: 1. Heart 2. Muscles Body Functions Affected: 1. Cardio 2. Neuromusculoskeletal 
3. Movement Related Activities and Participation Affected: 1. General Tasks and Demands 2. Mobility 3. Domestic Life 4. Community, Social and [place] Number of elements that affect the Plan of Care: 4+: HIGH COMPLEXITY CLINICAL PRESENTATION:  
Presentation: Evolving clinical presentation with changing clinical characteristics: MODERATE COMPLEXITY CLINICAL DECISION MAKING:  
[facility] AM-PAC 6 Clicks Basic Mobility Inpatient Short Form How much difficulty does the patient currently have. .. Unable A Lot A Little None 1. Turning over in bed (including adjusting bedclothes, sheets and blankets)? [] 1   [] 2   [] 3   [x] 4  
2. Sitting down on and standing up from a chair with arms ( e.g., wheelchair, bedside commode, etc.)   [] 1   [] 2   [x] 3   [] 4  
3. Moving from lying on back to sitting on the side of the bed? [] 1   [] 2   [] 3   [x] 4 How much help from another person does the patient currently need. .. Total A Lot A Little None 4. Moving to and from a bed to a chair (including a wheelchair)? [] 1   [] 2   [x] 3   [] 4  
5. Need to walk in hospital room? [] 1   [x] 2   [] 3   [] 4  
6. Climbing 3-5 steps with a railing? [] 1   [x] 2   [] 3   [] 4  
© 2007, Trustees of [address] [place] [address] [address] [address], under license to [employer]. All rights reserved Score:  Initial: 18 Most Recent: X (Date: -- ) Interpretation of Tool:  Represents activities that are increasingly more difficult (i.e. Bed mobility, Transfers, Gait). Score 24 23 22-20 19-15 14-10 9-7 6 Modifier CH CI CJ CK CL CM CN   
 
? Mobility - Walking and Moving Around:  
  - CURRENT STATUS: CK - 40%-59% impaired, limited or restricted  - GOAL STATUS: CJ - 20%-39% impaired, limited or restricted  - D/C STATUS:  ---------------To be determined--------------- Payor: CARE IMPROVEMENT PLUS [place] / Product Type: [employer] Medicare /   
 
Medical Necessity:    
· Patient demonstrates good rehab potential due to higher previous functional level. Reason for Services/Other Comments: 
· Patient continues to require skilled intervention due to decreased balance and functional mobility. Use of outcome tool(s) and clinical judgement create a POC that gives a: Questionable prediction of patient's progress: MODERATE COMPLEXITY  
  
 
 
 
TREATMENT:  
(In addition to Assessment/Re-Assessment sessions the following treatments were rendered) Pre-treatment Symptoms/Complaints:  None Pain: Initial:  
Pain Intensity 1: 0  Post Session:  0 Therapeutic Exercise: ( ):  Exercises per grid below to improve strength and activity tolerance. Required minimal visual and verbal cues to promote proper body alignment and promote proper body mechanics. Progressed repetitions and complexity of movement as indicated. Date: 
11/19/18 Date: 11/26/18 Date: 
  
ACTIVITY/EXERCISE AM PM AM PM AM PM  
Seated LAQ 2 x 20 B  2 x 10 B 
1 x 15 B Seated marching 2 x 15 B  2 x 10 B 
1 x 15 B Seated heel taps 2 x 20 B  2 x 20 B Seated hip abd/add 1 x 20 B  2 x 10 B 
1 x 15 B Seated toe taps 2 x 20 B  2 x 20 B Standing marching   1 x 5 B Standing hip abd/add   1 x 10 B Heel raises   1 x 5 B     
        
        
        
B = bilateral; AA = active assistive; A = active; P = passive Therapeutic Activity: (    12 minutes): Therapeutic activities including Bed transfers, Chair transfers, and Ambulation on level ground to improve mobility, strength, balance and coordination. Required moderate   to promote static and dynamic balance in standing and promote coordination of bilateral, upper extremity(s), lower extremity(s). . 
 
Braces/Orthotics/Lines/Etc:  
· Room air Treatment/Session Assessment:   
· Response to Treatment:  See above. · Interdisciplinary Collaboration:  
o Physical Therapist 
o Registered Nurse · After treatment position/precautions:  
o Up in chair 
o Bed alarm/tab alert on 
o Bed/Chair-wheels locked 
o Call light within reach 
o RN notified · Compliance with Program/Exercises: Will assess as treatment progresses · Recommendations/Intent for next treatment session: \"Next visit will focus on advancements to more challenging activities and reduction in assistance provided\". Total Treatment Duration: PT Patient Time In/Time Out Time In: 80 Time Out: 1500 Mal Reach, PT, DPT

## 2018-11-28 NOTE — PROGRESS NOTES
Problem: Falls - Risk of 
Goal: *Absence of Falls Document Simona Deal Fall Risk and appropriate interventions in the flowsheet. Outcome: Progressing Towards Goal 
Fall Risk Interventions: 
Mobility Interventions: Bed/chair exit alarm, Communicate number of staff needed for ambulation/transfer, Patient to call before getting OOB, OT consult for ADLs, PT Consult for mobility concerns, PT Consult for assist device competence Mentation Interventions: Bed/chair exit alarm, Adequate sleep, hydration, pain control, Door open when patient unattended, Increase mobility, More frequent rounding, Reorient patient, Update white board Medication Interventions: Bed/chair exit alarm, Patient to call before getting OOB, Teach patient to arise slowly Elimination Interventions: Bed/chair exit alarm, Call light in reach, Patient to call for help with toileting needs, Urinal in reach History of Falls Interventions: Bed/chair exit alarm, Door open when patient unattended, Investigate reason for fall, Consult care management for discharge planning Problem: Pressure Injury - Risk of 
Goal: *Prevention of pressure injury Document Laurent Scale and appropriate interventions in the flowsheet. Outcome: Progressing Towards Goal 
Pressure Injury Interventions: 
Sensory Interventions: Assess changes in LOC, Assess need for specialty bed, Avoid rigorous massage over bony prominences, Check visual cues for pain, Float heels, Keep linens dry and wrinkle-free, Maintain/enhance activity level, Minimize linen layers Moisture Interventions: Minimize layers, Absorbent underpads Activity Interventions: Increase time out of bed, PT/OT evaluation Mobility Interventions: PT/OT evaluation Nutrition Interventions: Document food/fluid/supplement intake, Offer support with meals,snacks and hydration Friction and Shear Interventions: Lift sheet, Lift team/patient mobility team, Minimize layers

## 2018-11-28 NOTE — PROGRESS NOTES
Bedside and Verbal shift change report given to self (oncoming nurse) by Doreen Saenz RN and Riky Ramos RN (offgoing nurse).  Report included the following information SBAR, Kardex, ED Summary, Procedure Summary, Intake/Output, MAR, Recent Results and Cardiac Rhythm SR.

## 2018-11-28 NOTE — PROGRESS NOTES
Problem: Falls - Risk of 
Goal: *Absence of Falls Document [facility] Fur Fall Risk and appropriate interventions in the flowsheet. Outcome: Progressing Towards Goal 
Fall Risk Interventions: 
Mobility Interventions: Bed/chair exit alarm, Communicate number of staff needed for ambulation/transfer, Patient to call before getting OOB, PT Consult for mobility concerns, PT Consult for assist device competence, Strengthening exercises (ROM-active/passive), Utilize walker, cane, or other assistive device Mentation Interventions: Bed/chair exit alarm, Adequate sleep, hydration, pain control, Door open when patient unattended, Increase mobility, More frequent rounding, Reorient patient, Room close to nurse's station, Update white board Medication Interventions: Bed/chair exit alarm, Evaluate medications/consider consulting pharmacy, Patient to call before getting OOB Elimination Interventions: Bed/chair exit alarm, Call light in reach, Patient to call for help with toileting needs, Toileting schedule/hourly rounds, Urinal in reach History of Falls Interventions: Bed/chair exit alarm, Door open when patient unattended, Room close to nurse's station Problem: Pressure Injury - Risk of 
Goal: *Prevention of pressure injury Document Laurent Scale and appropriate interventions in the flowsheet. Outcome: Progressing Towards Goal 
Pressure Injury Interventions: 
Sensory Interventions: Assess changes in LOC, Assess need for specialty bed, Avoid rigorous massage over bony prominences, Check visual cues for pain, Float heels, Keep linens dry and wrinkle-free, Maintain/enhance activity level, Minimize linen layers Moisture Interventions: Minimize layers, Absorbent underpads Activity Interventions: Increase time out of bed, Pressure redistribution bed/mattress(bed type) Mobility Interventions: Chair cushion, HOB 30 degrees or less, PT/OT evaluation, Pressure redistribution bed/mattress (bed type) Nutrition Interventions: Document food/fluid/supplement intake Friction and Shear Interventions: Lift sheet, Lift team/patient mobility team, Minimize layers Problem: Ventilator Management Goal: *Adequate oxygenation and ventilation Outcome: Progressing Towards Goal 
Room air

## 2018-11-28 NOTE — PROGRESS NOTES
Bedside and Verbal shift change report given to Alex Bojorquez RN and Mariluz Fleming RN (oncoming nurse) by self [facility] ).  Report included the following information SBAR, Kardex, ED Summary, Procedure Summary, Intake/Output, MAR, Recent Results and Cardiac Rhythm SR.

## 2018-11-28 NOTE — PROGRESS NOTES
Verbal bedside report given to [facility], oncoming RN. Patient's situation, background, assessment and recommendations provided. Kardex, Mar, and recent results also reviewed. Opportunity for questions provided. Oncoming RN assumed care of patient.

## 2018-11-28 NOTE — PROGRESS NOTES
Bedside and verbal shift report received from Navin Rousseau RN. Patient situation, background, assessment, and recommendation reviewed. Assumed care of the patient at this time.

## 2018-11-28 NOTE — PROGRESS NOTES
Bedside and Verbal shift change report given to self (oncoming nurse) by  Suzanne Newman RN (offgoing nurse). Report included the following information SBAR, Kardex, ED Summary, Procedure Summary, Intake/Output, MAR, Recent Results and Cardiac Rhythm SR with wide QRS.

## 2018-11-28 NOTE — PROGRESS NOTES
Hospitalist Progress Note   
2018 Admit Date: 2018  2:01 PM  
NAME: Fara Savage :  1960 MRN:  [id number] Attending: Manjinder Jordan MD 
PCP:  Other, MD Leroy 
 
SUBJECTIVE:  
Patient 58M with pmhx of CHF with EG 20-25%, NIDM, schizophrenia admitted to ICU on  after having had cardiac arrest at the adult  center. Pt had witnessed collapse and staff initiated CPR, placed AED with shock and then ROSC. Pt again with another cardiac arrest in ER with ROSC. Admitted to ICU and placed on hypothermia protocol. Echo showed EF 10-20%. Treated briefly on telemetry for acute flash pulm edema requiring BIPAP. Hospitalist service consulted for medical mgmt and taking over care from ICU service. Noted on  to have fever of 101.3 so ICD placement post-poned. CXR  with bilateral patchy infiltrates suspected pneumonia, started on augmentin. Flu, strep ag and blood cultures negative. Repeat cxr much improved.  - resting in bedside chair. Family present in the room. Review of Systems negative with exception of pertinent positives noted above PHYSICAL EXAM  
 
Visit Vitals BP 91/65 Pulse 84 Temp 98.1 °F (36.7 °C) Resp 20 Ht 5' 10\" (1.778 m) Comment: per EMR Wt 71.6 kg (157 lb 12.8 oz) SpO2 98% BMI 22.64 kg/m² Temp (24hrs), Av.3 °F (36.8 °C), Min:97.8 °F (36.6 °C), Max:98.9 °F (37.2 °C) Oxygen Therapy O2 Sat (%): 98 % (18 1252) Pulse via Oximetry: 94 beats per minute (18 0729) O2 Device: Room air (18 1252) O2 Flow Rate (L/min): 2 l/min (18) FIO2 (%): 30 % (18) Intake/Output Summary (Last 24 hours) at 2018 1607 Last data filed at 2018 5096 Gross per 24 hour Intake 480 ml Output 400 ml Net 80 ml General: No acute distress   
Lungs:  CTA, no wheezing or rhonchi Heart:  Regular rate and rhythm,  No murmur, rub, or gallop Abdomen: Soft, Non distended, Non tender, Positive bowel sounds Extremities: No cyanosis, clubbing or edema Neurologic:  No focal deficits ASSESSMENT Active Hospital Problems Diagnosis Date Noted  Microcytic anemia 11/22/2018  Pneumonia due to Streptococcus pneumoniae ([address] 11/21/2018  Cardiac arrest ([address] 11/13/2018  Acute respiratory failure with hypoxia ([address] 11/13/2018  Hypotension 11/13/2018  Systolic congestive heart failure ([address] 11/13/2018  Schizophrenia ([address] 11/13/2018  Acute renal failure ([address] 11/13/2018  Type II diabetes mellitus ([address] 02/04/2016 A/p Cardiac arrest ([address] (11/13/2018) - EF 10% 
- Continue Amiodarone - To get ICD 11/29 
  
Active Problems: 
Sore throat: 
- resolved - influenza and strep negative 
  
  Systolic congestive heart failure ([address] (11/13/2018) - EF 10% 
- holding lisinopril and lasix secondary to hypotension.   
  
  Acute respiratory failure with hypoxia ([address] (11/13/2018) 
- weaned to room air.  
- Continue Augmentin (Day 9/10) - Holding furosemide 
  
  Pneumonia due to Streptococcus pneumoniae (11/21/2018) - repeat CXR much improved - Sputum culture from 11/15 while intubated growing heavy Strep pneumo 
- Started Augmentin (based on cultures) on evening of 11/20 
  
    Microcytic Anemia (11/22/2018) -  anemia of chronic disease, stable. Acute renal failure ([address] (11/13/2018) - Resolved 
  
  Hypotension (11/13/2018) - Borderline SBP in 80's-90's.  holding lisinopril and lasix  
  
  Type II diabetes mellitus ([address] (2/4/2016) - No acute issues - Continue Humalog SSI 
  
  Schizophrenia ([address] (11/13/2018) - stable. Cont current 
 
  
Dispo - await ICD placement - needs to happen on 11/29 to be able to discharge to [facility], holding bed till Friday. DVT Prophylaxis: hep sq Signed By: [facility], DO November 28, 2018

## 2018-11-28 NOTE — PROGRESS NOTES
Bedside and Verbal shift change report given to Herminio Loco, RN (oncoming nurse) by Gisela Morin (offgoing nurse). Report included the following information SBAR, Kardex, Intake/Output, Recent Results and Cardiac Rhythm NSR .

## 2018-11-29 ENCOUNTER — ANESTHESIA (OUTPATIENT)
Dept: SURGERY | Age: 58
DRG: 226 | End: 2018-11-29
Payer: MEDICARE

## 2018-11-29 ENCOUNTER — APPOINTMENT (OUTPATIENT)
Dept: GENERAL RADIOLOGY | Age: 58
DRG: 226 | End: 2018-11-29
Attending: INTERNAL MEDICINE
Payer: MEDICARE

## 2018-11-29 LAB
ALBUMIN SERPL-MCNC: 2.6 G/DL (ref 3.5–5)
ALBUMIN/GLOB SERPL: 0.6 {RATIO} (ref 1.2–3.5)
ALP SERPL-CCNC: 163 U/L (ref 50–136)
ALT SERPL-CCNC: 40 U/L (ref 12–65)
ANION GAP SERPL CALC-SCNC: 8 MMOL/L (ref 7–16)
AST SERPL-CCNC: 23 U/L (ref 15–37)
ATRIAL RATE: 87 BPM
ATRIAL RATE: 88 BPM
BASOPHILS # BLD: 0 K/UL (ref 0–0.2)
BASOPHILS NFR BLD: 0 % (ref 0–2)
BILIRUB SERPL-MCNC: 0.4 MG/DL (ref 0.2–1.1)
BUN SERPL-MCNC: 22 MG/DL (ref 6–23)
CALCIUM SERPL-MCNC: 8.7 MG/DL (ref 8.3–10.4)
CALCULATED P AXIS, ECG09: 12 DEGREES
CALCULATED R AXIS, ECG10: -55 DEGREES
CALCULATED R AXIS, ECG10: -71 DEGREES
CALCULATED T AXIS, ECG11: 38 DEGREES
CALCULATED T AXIS, ECG11: 90 DEGREES
CHLORIDE SERPL-SCNC: 104 MMOL/L (ref 98–107)
CO2 SERPL-SCNC: 26 MMOL/L (ref 21–32)
CREAT SERPL-MCNC: 1.4 MG/DL (ref 0.8–1.5)
DIAGNOSIS, 93000: NORMAL
DIAGNOSIS, 93000: NORMAL
DIFFERENTIAL METHOD BLD: ABNORMAL
EOSINOPHIL # BLD: 0 K/UL (ref 0–0.8)
EOSINOPHIL NFR BLD: 0 % (ref 0.5–7.8)
ERYTHROCYTE [DISTWIDTH] IN BLOOD BY AUTOMATED COUNT: 16.5 % (ref 11.9–14.6)
GLOBULIN SER CALC-MCNC: 4.3 G/DL (ref 2.3–3.5)
GLUCOSE BLD STRIP.AUTO-MCNC: 102 MG/DL (ref 65–100)
GLUCOSE BLD STRIP.AUTO-MCNC: 148 MG/DL (ref 65–100)
GLUCOSE BLD STRIP.AUTO-MCNC: 86 MG/DL (ref 65–100)
GLUCOSE BLD STRIP.AUTO-MCNC: 94 MG/DL (ref 65–100)
GLUCOSE SERPL-MCNC: 96 MG/DL (ref 65–100)
HCT VFR BLD AUTO: 32.1 % (ref 41.1–50.3)
HGB BLD-MCNC: 10.3 G/DL (ref 13.6–17.2)
IMM GRANULOCYTES # BLD: 0 K/UL (ref 0–0.5)
IMM GRANULOCYTES NFR BLD AUTO: 0 % (ref 0–5)
LYMPHOCYTES # BLD: 1.7 K/UL (ref 0.5–4.6)
LYMPHOCYTES NFR BLD: 25 % (ref 13–44)
MAGNESIUM SERPL-MCNC: 2.3 MG/DL (ref 1.8–2.4)
MCH RBC QN AUTO: 23.7 PG (ref 26.1–32.9)
MCHC RBC AUTO-ENTMCNC: 32.1 G/DL (ref 31.4–35)
MCV RBC AUTO: 74 FL (ref 79.6–97.8)
MONOCYTES # BLD: 0.7 K/UL (ref 0.1–1.3)
MONOCYTES NFR BLD: 11 % (ref 4–12)
NEUTS SEG # BLD: 4.4 K/UL (ref 1.7–8.2)
NEUTS SEG NFR BLD: 64 % (ref 43–78)
NRBC # BLD: 0 K/UL (ref 0–0.2)
P-R INTERVAL, ECG05: 236 MS
PLATELET # BLD AUTO: 505 K/UL (ref 150–450)
PMV BLD AUTO: 11 FL (ref 9.4–12.3)
POTASSIUM SERPL-SCNC: 4.2 MMOL/L (ref 3.5–5.1)
PROT SERPL-MCNC: 6.9 G/DL (ref 6.3–8.2)
Q-T INTERVAL, ECG07: 480 MS
Q-T INTERVAL, ECG07: 554 MS
QRS DURATION, ECG06: 186 MS
QRS DURATION, ECG06: 188 MS
QTC CALCULATION (BEZET), ECG08: 580 MS
QTC CALCULATION (BEZET), ECG08: 677 MS
RBC # BLD AUTO: 4.34 M/UL (ref 4.23–5.6)
SODIUM SERPL-SCNC: 138 MMOL/L (ref 136–145)
VENTRICULAR RATE, ECG03: 88 BPM
VENTRICULAR RATE, ECG03: 90 BPM
WBC # BLD AUTO: 6.9 K/UL (ref 4.3–11.1)

## 2018-11-29 PROCEDURE — 76060000035 HC ANESTHESIA 2 TO 2.5 HR: Performed by: INTERNAL MEDICINE

## 2018-11-29 PROCEDURE — 65660000000 HC RM CCU STEPDOWN

## 2018-11-29 PROCEDURE — 74011250636 HC RX REV CODE- 250/636: Performed by: HOSPITALIST

## 2018-11-29 PROCEDURE — 74011250637 HC RX REV CODE- 250/637: Performed by: INTERNAL MEDICINE

## 2018-11-29 PROCEDURE — 33249 INSJ/RPLCMT DEFIB W/LEAD(S): CPT

## 2018-11-29 PROCEDURE — 02HK3KZ INSERTION OF DEFIBRILLATOR LEAD INTO RIGHT VENTRICLE, PERCUTANEOUS APPROACH: ICD-10-PCS | Performed by: INTERNAL MEDICINE

## 2018-11-29 PROCEDURE — A4565 SLINGS: HCPCS

## 2018-11-29 PROCEDURE — 02H63KZ INSERTION OF DEFIBRILLATOR LEAD INTO RIGHT ATRIUM, PERCUTANEOUS APPROACH: ICD-10-PCS | Performed by: INTERNAL MEDICINE

## 2018-11-29 PROCEDURE — 36415 COLL VENOUS BLD VENIPUNCTURE: CPT

## 2018-11-29 PROCEDURE — 74011250636 HC RX REV CODE- 250/636: Performed by: ANESTHESIOLOGY

## 2018-11-29 PROCEDURE — 74011250637 HC RX REV CODE- 250/637: Performed by: HOSPITALIST

## 2018-11-29 PROCEDURE — 0JH609Z INSERTION OF CARDIAC RESYNCHRONIZATION DEFIBRILLATOR PULSE GENERATOR INTO CHEST SUBCUTANEOUS TISSUE AND FASCIA, OPEN APPROACH: ICD-10-PCS | Performed by: INTERNAL MEDICINE

## 2018-11-29 PROCEDURE — 76937 US GUIDE VASCULAR ACCESS: CPT

## 2018-11-29 PROCEDURE — 74011000250 HC RX REV CODE- 250

## 2018-11-29 PROCEDURE — 80053 COMPREHEN METABOLIC PANEL: CPT

## 2018-11-29 PROCEDURE — 74011636320 HC RX REV CODE- 636/320: Performed by: INTERNAL MEDICINE

## 2018-11-29 PROCEDURE — 93005 ELECTROCARDIOGRAM TRACING: CPT | Performed by: INTERNAL MEDICINE

## 2018-11-29 PROCEDURE — 74011250636 HC RX REV CODE- 250/636

## 2018-11-29 PROCEDURE — 82962 GLUCOSE BLOOD TEST: CPT

## 2018-11-29 PROCEDURE — 33225 L VENTRIC PACING LEAD ADD-ON: CPT

## 2018-11-29 PROCEDURE — 74011000272 HC RX REV CODE- 272: Performed by: INTERNAL MEDICINE

## 2018-11-29 PROCEDURE — 71045 X-RAY EXAM CHEST 1 VIEW: CPT

## 2018-11-29 PROCEDURE — 83735 ASSAY OF MAGNESIUM: CPT

## 2018-11-29 PROCEDURE — B51N1ZZ FLUOROSCOPY OF LEFT UPPER EXTREMITY VEINS USING LOW OSMOLAR CONTRAST: ICD-10-PCS | Performed by: INTERNAL MEDICINE

## 2018-11-29 PROCEDURE — 02HL3KZ INSERTION OF DEFIBRILLATOR LEAD INTO LEFT VENTRICLE, PERCUTANEOUS APPROACH: ICD-10-PCS | Performed by: INTERNAL MEDICINE

## 2018-11-29 PROCEDURE — 74011000250 HC RX REV CODE- 250: Performed by: INTERNAL MEDICINE

## 2018-11-29 PROCEDURE — 85025 COMPLETE CBC W/AUTO DIFF WBC: CPT

## 2018-11-29 RX ORDER — SODIUM CHLORIDE 0.9 % (FLUSH) 0.9 %
5-10 SYRINGE (ML) INJECTION EVERY 8 HOURS
Status: DISCONTINUED | OUTPATIENT
Start: 2018-11-29 | End: 2018-11-30 | Stop reason: HOSPADM

## 2018-11-29 RX ORDER — FENTANYL CITRATE 50 UG/ML
INJECTION, SOLUTION INTRAMUSCULAR; INTRAVENOUS AS NEEDED
Status: DISCONTINUED | OUTPATIENT
Start: 2018-11-29 | End: 2018-11-29 | Stop reason: HOSPADM

## 2018-11-29 RX ORDER — SODIUM CHLORIDE 0.9 % (FLUSH) 0.9 %
5-10 SYRINGE (ML) INJECTION AS NEEDED
Status: DISCONTINUED | OUTPATIENT
Start: 2018-11-29 | End: 2018-11-30 | Stop reason: HOSPADM

## 2018-11-29 RX ORDER — LIDOCAINE HYDROCHLORIDE 20 MG/ML
INJECTION, SOLUTION EPIDURAL; INFILTRATION; INTRACAUDAL; PERINEURAL AS NEEDED
Status: DISCONTINUED | OUTPATIENT
Start: 2018-11-29 | End: 2018-11-29 | Stop reason: HOSPADM

## 2018-11-29 RX ORDER — BUPIVACAINE HYDROCHLORIDE AND EPINEPHRINE 5; 5 MG/ML; UG/ML
60 INJECTION, SOLUTION EPIDURAL; INTRACAUDAL; PERINEURAL ONCE
Status: COMPLETED | OUTPATIENT
Start: 2018-11-29 | End: 2018-11-29

## 2018-11-29 RX ORDER — CEFAZOLIN SODIUM IN 0.9 % NACL 2 G/50 ML
2 INTRAVENOUS SOLUTION, PIGGYBACK (ML) INTRAVENOUS ONCE
Status: DISCONTINUED | OUTPATIENT
Start: 2018-11-29 | End: 2018-11-29

## 2018-11-29 RX ORDER — HYDROMORPHONE HYDROCHLORIDE 2 MG/ML
0.5 INJECTION, SOLUTION INTRAMUSCULAR; INTRAVENOUS; SUBCUTANEOUS
Status: DISCONTINUED | OUTPATIENT
Start: 2018-11-29 | End: 2018-11-30 | Stop reason: HOSPADM

## 2018-11-29 RX ORDER — ACETAMINOPHEN 325 MG/1
650 TABLET ORAL
Status: DISCONTINUED | OUTPATIENT
Start: 2018-11-29 | End: 2018-11-30 | Stop reason: HOSPADM

## 2018-11-29 RX ORDER — CEFAZOLIN SODIUM/WATER 2 G/20 ML
2 SYRINGE (ML) INTRAVENOUS
Status: COMPLETED | OUTPATIENT
Start: 2018-11-29 | End: 2018-11-29

## 2018-11-29 RX ORDER — LIDOCAINE HYDROCHLORIDE 10 MG/ML
0.3 INJECTION INFILTRATION; PERINEURAL ONCE
Status: ACTIVE | OUTPATIENT
Start: 2018-11-29 | End: 2018-11-29

## 2018-11-29 RX ORDER — PROPOFOL 10 MG/ML
INJECTION, EMULSION INTRAVENOUS
Status: DISCONTINUED | OUTPATIENT
Start: 2018-11-29 | End: 2018-11-29 | Stop reason: HOSPADM

## 2018-11-29 RX ORDER — MIDAZOLAM HYDROCHLORIDE 1 MG/ML
INJECTION, SOLUTION INTRAMUSCULAR; INTRAVENOUS AS NEEDED
Status: DISCONTINUED | OUTPATIENT
Start: 2018-11-29 | End: 2018-11-29 | Stop reason: HOSPADM

## 2018-11-29 RX ORDER — SODIUM CHLORIDE, SODIUM LACTATE, POTASSIUM CHLORIDE, CALCIUM CHLORIDE 600; 310; 30; 20 MG/100ML; MG/100ML; MG/100ML; MG/100ML
100 INJECTION, SOLUTION INTRAVENOUS CONTINUOUS
Status: DISPENSED | OUTPATIENT
Start: 2018-11-29 | End: 2018-11-30

## 2018-11-29 RX ORDER — GLYCOPYRROLATE 0.2 MG/ML
INJECTION INTRAMUSCULAR; INTRAVENOUS AS NEEDED
Status: DISCONTINUED | OUTPATIENT
Start: 2018-11-29 | End: 2018-11-29 | Stop reason: HOSPADM

## 2018-11-29 RX ORDER — MIDAZOLAM HYDROCHLORIDE 1 MG/ML
2 INJECTION, SOLUTION INTRAMUSCULAR; INTRAVENOUS
Status: ACTIVE | OUTPATIENT
Start: 2018-11-29 | End: 2018-11-30

## 2018-11-29 RX ORDER — SODIUM CHLORIDE, SODIUM LACTATE, POTASSIUM CHLORIDE, CALCIUM CHLORIDE 600; 310; 30; 20 MG/100ML; MG/100ML; MG/100ML; MG/100ML
100 INJECTION, SOLUTION INTRAVENOUS CONTINUOUS
Status: DISPENSED | OUTPATIENT
Start: 2018-11-29 | End: 2018-11-29

## 2018-11-29 RX ORDER — OXYCODONE HYDROCHLORIDE 5 MG/1
10 TABLET ORAL
Status: ACTIVE | OUTPATIENT
Start: 2018-11-29 | End: 2018-11-30

## 2018-11-29 RX ADMIN — FENTANYL CITRATE 25 MCG: 50 INJECTION, SOLUTION INTRAMUSCULAR; INTRAVENOUS at 09:57

## 2018-11-29 RX ADMIN — Medication 5 ML: at 21:39

## 2018-11-29 RX ADMIN — Medication 10 ML: at 06:11

## 2018-11-29 RX ADMIN — Medication 5 ML: at 21:40

## 2018-11-29 RX ADMIN — CARVEDILOL 3.12 MG: 3.12 TABLET, FILM COATED ORAL at 17:02

## 2018-11-29 RX ADMIN — ASPIRIN 81 MG 81 MG: 81 TABLET ORAL at 11:37

## 2018-11-29 RX ADMIN — AMIODARONE HYDROCHLORIDE 400 MG: 200 TABLET ORAL at 11:37

## 2018-11-29 RX ADMIN — Medication 2 G: at 08:17

## 2018-11-29 RX ADMIN — PANTOPRAZOLE SODIUM 40 MG: 40 TABLET, DELAYED RELEASE ORAL at 06:11

## 2018-11-29 RX ADMIN — CARVEDILOL 3.12 MG: 3.12 TABLET, FILM COATED ORAL at 11:37

## 2018-11-29 RX ADMIN — GUAIFENESIN 1200 MG: 600 TABLET, EXTENDED RELEASE ORAL at 11:37

## 2018-11-29 RX ADMIN — CLOZAPINE 200 MG: 100 TABLET ORAL at 11:43

## 2018-11-29 RX ADMIN — POTASSIUM CHLORIDE 40 MEQ: 20 TABLET, EXTENDED RELEASE ORAL at 11:37

## 2018-11-29 RX ADMIN — MIDAZOLAM HYDROCHLORIDE 1 MG: 1 INJECTION, SOLUTION INTRAMUSCULAR; INTRAVENOUS at 08:10

## 2018-11-29 RX ADMIN — IOPAMIDOL 35 ML: 755 INJECTION, SOLUTION INTRAVENOUS at 09:58

## 2018-11-29 RX ADMIN — AMIODARONE HYDROCHLORIDE 400 MG: 200 TABLET ORAL at 21:34

## 2018-11-29 RX ADMIN — BUPIVACAINE HYDROCHLORIDE AND EPINEPHRINE BITARTRATE 150 MG: 5; .005 INJECTION, SOLUTION EPIDURAL; INTRACAUDAL; PERINEURAL at 08:00

## 2018-11-29 RX ADMIN — SODIUM CHLORIDE, SODIUM LACTATE, POTASSIUM CHLORIDE, AND CALCIUM CHLORIDE: 600; 310; 30; 20 INJECTION, SOLUTION INTRAVENOUS at 08:04

## 2018-11-29 RX ADMIN — GUAIFENESIN 1200 MG: 600 TABLET, EXTENDED RELEASE ORAL at 21:34

## 2018-11-29 RX ADMIN — FENTANYL CITRATE 25 MCG: 50 INJECTION, SOLUTION INTRAMUSCULAR; INTRAVENOUS at 08:10

## 2018-11-29 RX ADMIN — MIDAZOLAM HYDROCHLORIDE 1 MG: 1 INJECTION, SOLUTION INTRAMUSCULAR; INTRAVENOUS at 08:26

## 2018-11-29 RX ADMIN — CLOZAPINE 500 MG: 100 TABLET ORAL at 21:35

## 2018-11-29 RX ADMIN — GLYCOPYRROLATE 0.1 MG: 0.2 INJECTION INTRAMUSCULAR; INTRAVENOUS at 08:28

## 2018-11-29 RX ADMIN — LIDOCAINE HYDROCHLORIDE 100 MG: 20 INJECTION, SOLUTION EPIDURAL; INFILTRATION; INTRACAUDAL; PERINEURAL at 08:12

## 2018-11-29 RX ADMIN — NEOMYCIN AND POLYMYXIN B SULFATES: 40; 200000 SOLUTION IRRIGATION at 09:58

## 2018-11-29 RX ADMIN — PROPOFOL 50 MCG/KG/MIN: 10 INJECTION, EMULSION INTRAVENOUS at 08:12

## 2018-11-29 NOTE — PROGRESS NOTES
TRANSFER - OUT REPORT: 
 
Verbal report given to Chaim Melendez on Oneal Raphael  being transferred to [facility] for routine post - op Report consisted of patients Situation, Background, Assessment and  
Recommendations(SBAR). Information from the following report(s) SBAR, Procedure Summary, Intake/Output, MAR and Cardiac Rhythm Paced was reviewed with the receiving nurse. Lines:  
Peripheral IV 11/27/18 Left Hand (Active) Site Assessment Clean, dry, & intact; Clean;Dry 11/29/2018 10:17 AM  
Phlebitis Assessment 0 11/29/2018 10:17 AM  
Infiltration Assessment 0 11/29/2018 10:17 AM  
Dressing Status Clean, dry, & intact; Clean;Dry 11/29/2018 10:17 AM  
Dressing Type Tape;Transparent 11/29/2018 10:17 AM  
Hub Color/Line Status Patent; Infusing;Pink 11/29/2018 10:17 AM  
Action Taken Open ports on tubing capped 11/29/2018  5:50 AM  
Alcohol Cap Used No 11/29/2018  5:50 AM  
   
Peripheral IV 11/29/18 Anterior;Distal;Right;Superior; Upper Arm (Active) Site Assessment Clean, dry, & intact; Clean;Dry 11/29/2018 10:17 AM  
Phlebitis Assessment 0 11/29/2018 10:17 AM  
Infiltration Assessment 0 11/29/2018 10:17 AM  
Dressing Status Clean, dry, & intact; Clean;Dry 11/29/2018 10:17 AM  
Dressing Type Tape;Transparent 11/29/2018 10:17 AM  
Hub Color/Line Status Patent;Capped;Pink 11/29/2018 10:17 AM  
  
 
Opportunity for questions and clarification was provided.

## 2018-11-29 NOTE — PROGRESS NOTES
OT attempted to see pt in am. Pt was off floor for a procedure. Will attempt at another time/date as schedule permits. Thank you FUENTES Virk/OTTO

## 2018-11-29 NOTE — PROGRESS NOTES
Patient returned to floor. Placed on monitor. Re-assessed. Discussed POC. Left arm in sling. Instructed to keep left arm immobile. Verbalized understanding. Call light within reach.

## 2018-11-29 NOTE — PROGRESS NOTES
Problem: Falls - Risk of 
Goal: *Absence of Falls Document Barb Heart Fall Risk and appropriate interventions in the flowsheet. Outcome: Progressing Towards Goal 
Fall Risk Interventions: 
Mobility Interventions: Bed/chair exit alarm, Communicate number of staff needed for ambulation/transfer, Patient to call before getting OOB, PT Consult for mobility concerns, PT Consult for assist device competence Mentation Interventions: Bed/chair exit alarm, Increase mobility, More frequent rounding, Reorient patient, Update white board Medication Interventions: Bed/chair exit alarm, Patient to call before getting OOB, Teach patient to arise slowly Elimination Interventions: Bed/chair exit alarm, Call light in reach, Patient to call for help with toileting needs, Urinal in reach History of Falls Interventions: Bed/chair exit alarm, Door open when patient unattended, Room close to nurse's station

## 2018-11-29 NOTE — PROCEDURES
Attending:  Jordan Sandy. Geovany Segura MD    Referring: Jennifer Pierre DO    Pre-Procedure Diagnosis  1. Chronic systolic heart failure, ejection fraction of 10-15%  2. Nonischemic dilated cardiomyopathy. 3. Class III heart failure symptoms. 4. Acute on Chronic systolic heart failure on optimal medical therapy. 5. Secondary prevention indications for defibrillator  6. Life expectancy greater than 1 year. 7. RBBB with QRS duration of >150 msec, class IIB indication. Procedure Performed  1. Insertion of Biotronik biventricular implantable cardioverter defibrillator. 2. Insertion of left ventricular lead at time of new system Implantation. Anesthesia: MAC     Estimated Blood Loss: Less than 10 mL     Specimens: * No specimens in log *     Complications: None     Fluoroscopy Time: 9.3 minutes    Contrast: 35 ml      The procedure, indications, risks, benefits, and alternatives were discussed with the patient and family members, who desired to proceed after questions were answered and informed consent was documented. Procedure: After informed consent was obtained, the patient was brought to the Electrophysiology Laboratory in a fasting state and was prepped and draped in sterile fashion. Prophylactic antibiotic was administered 10 minutes prior to skin incision (refer to anesthesia procedural documentation for details). MAC was administered by the anesthesia team with continuous oxygen saturation measurement and blood pressure measurement. A venogram of the left axillary and left subclavian was performed which demonstrated no significant obstruction throughout its course. Local anesthetic (sensorcaine) was delivered to the left pectoral region and an incision was made parallel to the deltopectoral groove. A subcutaneous pocket was created using blunt dissection and electrocautery, and adequate hemostasis was established.   Access was obtained in left axillary vein using the modified Seldinger technique under fluoroscopic guidance with the placement of one short wire. Additional access x 2 was obtained under fluoroscopic guidance using a modified Seldinger technique with placement of one short wire and one long wire down into the RA and advanced below the diaphragm. Next, placement of a peel-away sheath over the first guidewire was performed. A permanent RV single coil ICD lead was then advanced under fluoroscopic guidance into the RV apex in a stable position with satisfactory sensing and pacing characteristics. The peel away sheath was removed. Another Safe-sheath was then placed over the second guidewire. A permanent pacing lead was advanced under fluoroscopic guidance and positioned in the right atrium at the location of the right atrial appendage. Stable RA appendage position with satisfactory sensing and pacing characteristics was obtained. The sheath was peeled. The leads were sutured to the underlying pectoralis fascia using 2 non-absorbable 2-0 Ethibond sutures around each lead's collar. The lead slack and position was assessed under fluoroscopy while securing the lead collars to ensure proper length. After positioning the RA and RV leads, a standard Merit (TM) Jazzy Makos LV delivery sheath was advanced over the long access wire and dilator and wire were removed. The braided inner core sheath was then advanced into the Jazzy Makos and used to cannulate the coronary sinus using contrast when necessary. A Wholey wire and was used to allow a smooth transition into the CS body. A coronary sinus venogram was then performed using a balloon occluding catheter. The left ventricular lead was then advanced into a posterolateral brach over a Whisper wire. Adequate thresholds were obtained with an adequate margin between phrenic stim and loss of capture. The delivery sheaths were then slit and/or peeled with fluoroscopy demonstrating stable position.  The lead was then sutured to the underlying pectoralis fascia using two 2-0 Ethibond sutures around the lead collar. Final lead testing via the pacing system analyzer (PSA) demonstrated stable sensing, impedance and pacing thresholds. The lead pins were then cleaned with antibiotic soaked gauze, dried gently, and attached to a new defibrillator generator. Pins were directly observed to pass the tip electrode, and the ring hex wrench screws were secured, and leads tug tested. The pocket was irrigated copiously with a saline antimicrobial solution. The device and leads were gently positioned within the pocket. The wound was closed with multiple layers of absorbable suture (2-0 Quill) followed by skin closure with 4-0 V-Loc in a running fashion. Fluoroscopic images were interpreted by me, in multiple projections. Exofin solution was placed over the wound, allowed to dry, and then a sterile Aquacel dressing was placed over the wound. The patient tolerated the procedure well and left the lab in good condition. All sharps and sponges were counted x 2 and no complications were noted. No DFT testing was performed.      Device and Lead Information  Pulse Generator Model #  Serial # Location Implant/Explant   [id number] Biotronik [id number] Left Pectoral Implant     Lead Model Number  Serial Number Lead position Implant/Explant   RA [id number] Biotronik [id number] RA Appendage Implant     RV   [id number] Biotronik [id number] RV [place] Implant   LV [id number] Biotronik [id number] Posterial Lateral Implant     Lead Sensitivity and Threshold  Lead R or P sensitivity (mv) Threshold (V) Threshold PW (msec) Impedance (ohms) Final output Voltage (V) Final PW (msec)   RA 1.8 0.4 0.4 652 3.5 0.4   RV 22.0 0.6 0.4 681 3.5 0.4   LV 22.2 1.5 1.0 435 2.5 1.0     Bradycardia Settings  Claudy Mode LRL URL Pace AVD (ms) Sense AVD (ms) Rate Response Mode Switching Mode SW Rate   DDDR 60 130 160 140 On On 160     Tachycardia Settings  Zone Type VT2 VF   ON/OFF/  MONITOR ON ON   Zone Rate 194 231   1st Therapy Type ATP-burst x1 ATP-burst x1   Energy (J) 85% 85%   2nd Therapy Type ATP-burst x1 Shock    Energy (J) 80% 40   3rd Therapy Type Shock Shock   Energy (J) 40 40   4th Therapy Type Shock Shock   Energy (J) 40 40   5th Therapy Type Shock Shock   Energy (J) 40 40   6-8th Therapy Type Shock Shock   Energy (J) 40 40     Defibrillation Testing:  **DID NOT TEST**    Shock Impedance: 79 ohms    LV Pacing Configuration: [id number] ? RVcoil       IMPRESSION: Successful implantation of Biotronik biventricular implantable cardioverter defibrillator for primary prevention of sudden death. MRI conditional.    PLAN: Overnight observation and further mgmt per internal medicine. CXR and pain control. Routine device clinic follow up in 1-2 weeks. EP follow up in 3-4 months. Isamar Rob.  Reyna Gutierrez MD, [address]  Clinical Cardiac Electrophysiology  [facility]    11/29/2018  10:10 AM

## 2018-11-29 NOTE — PROGRESS NOTES
TRANSFER - OUT REPORT: 
 
Verbal report given to Linette Corrales RN (name) on Talat Gambino  being transferred to EP lab (unit) for ordered procedure Report consisted of patients Situation, Background, Assessment and  
Recommendations(SBAR). Information from the following report(s) SBAR, Kardex, ED Summary, Procedure Summary, Intake/Output, MAR, Recent Results and Cardiac Rhythm SR was reviewed with the receiving nurse. Lines:  
Peripheral IV 11/27/18 Left Hand (Active) Site Assessment Clean, dry, & intact 11/29/2018  5:50 AM  
Phlebitis Assessment 0 11/29/2018  5:50 AM  
Infiltration Assessment 0 11/29/2018  5:50 AM  
Dressing Status Clean, dry, & intact 11/29/2018  5:50 AM  
Dressing Type Tape;Transparent 11/29/2018  5:50 AM  
Hub Color/Line Status Flushed;Patent;Capped 11/29/2018  5:50 AM  
Action Taken Open ports on tubing capped 11/29/2018  5:50 AM  
Alcohol Cap Used No 11/29/2018  5:50 AM  
  
 
Opportunity for questions and clarification was provided. [facility] and patient should be transported with: 
 Monitor

## 2018-11-29 NOTE — PROGRESS NOTES
Bedside and Verbal shift change report given to self (oncoming nurse) by Eleuterio Smith RN and Ashly Cain RN (offgoing nurse). Report included the following information SBAR, Kardex, ED Summary, Procedure Summary, Intake/Output, MAR, Recent Results and Cardiac Rhythm paced. Left subclavian pacer site covered with Aquacel dressing which is C/D/I. LUE immobilized in sling and swathe.

## 2018-11-29 NOTE — PROGRESS NOTES
Physical Therapy Note: 
 
Chart reviewed and PT treatment session attempted this AM. Patient not see this AM for physical treatment session, secondary to being off the floor for an ICD placement. Will continue to follow patient and re-attempt at a later time/date as schedule permits/patient available. Thank you, CONSUELO SorianoT

## 2018-11-29 NOTE — PROGRESS NOTES
Patient given daily bath with Carlo wipes and then chest and arms washed down with Hibiclens solution and wipes. No shaving of chest required as patient lacks hair in that region. Gown and linens changed. Patient denies need to use urinal at present. Patient has been NPO since MN.

## 2018-11-29 NOTE — PROGRESS NOTES
Problem: Falls - Risk of 
Goal: *Absence of Falls Document Migue Youngblood Fall Risk and appropriate interventions in the flowsheet. Outcome: Progressing Towards Goal 
Fall Risk Interventions: 
Mobility Interventions: Bed/chair exit alarm, Communicate number of staff needed for ambulation/transfer, Patient to call before getting OOB, PT Consult for mobility concerns, Strengthening exercises (ROM-active/passive), Utilize walker, cane, or other assistive device Mentation Interventions: Bed/chair exit alarm, Increase mobility, More frequent rounding, Reorient patient, Update white board Medication Interventions: Evaluate medications/consider consulting pharmacy, Bed/chair exit alarm, Patient to call before getting OOB Elimination Interventions: Call light in reach, Patient to call for help with toileting needs, Toileting schedule/hourly rounds History of Falls Interventions: Door open when patient unattended, Investigate reason for fall, Room close to nurse's station Problem: Pressure Injury - Risk of 
Goal: *Prevention of pressure injury Document Laurent Scale and appropriate interventions in the flowsheet. Outcome: Progressing Towards Goal 
Pressure Injury Interventions: 
Sensory Interventions: Assess changes in LOC, Assess need for specialty bed, Avoid rigorous massage over bony prominences, Check visual cues for pain, Float heels, Keep linens dry and wrinkle-free, Maintain/enhance activity level, Minimize linen layers Moisture Interventions: Minimize layers, Absorbent underpads Activity Interventions: Increase time out of bed, PT/OT evaluation, Pressure redistribution bed/mattress(bed type) Mobility Interventions: HOB 30 degrees or less, Pressure redistribution bed/mattress (bed type), PT/OT evaluation Nutrition Interventions: Document food/fluid/supplement intake Friction and Shear Interventions: Lift sheet, Lift team/patient mobility team, Minimize layers Problem: Ventilator Management Goal: *Adequate oxygenation and ventilation Outcome: Progressing Towards Goal 
Patient o2 sats % on room air

## 2018-11-29 NOTE — PROGRESS NOTES
[facility] CARDIOLOGY PROGRESS NOTE 
      
 
11/29/2018 2:57 PM 
 
Admit Date: 11/13/2018 Admit Diagnosis: Cardiac arrest ([facility] [address] Subjective: No complaints this AM, no chest pain or shortness of breath Interval History: (History of pertinent interval events obtained from nursing staff) ROS: 
GEN:  No fever or chills Cardiovascular:  As noted above Pulmonary:  As noted above Neuro:  No new focal motor or sensory loss Objective:  
 
Vitals:  
 11/28/18 2118 11/28/18 2136 11/29/18 0022 11/29/18 3528 BP: (!) 85/55 93/59 (!) 82/49 104/63 Pulse: 86 93 90 88 Resp: 18 18 18 Temp: 98.3 °F (36.8 °C)  97.8 °F (36.6 °C) 98.4 °F (36.9 °C) SpO2: 99%  97% 96% Weight:    162 lb 11.2 oz (73.8 kg) Height:      
 
 
Physical Exam: 
Zadie Gosselin, Well Nourished, No Acute Distress, appear diaphoretic on exam.  
Neck- supple, no JVD 
CV- regular rate and rhythm no MRG Lung- clear bilaterally Abd- soft, nontender, nondistended Ext- no edema bilaterally. Skin- warm and dry Current Facility-Administered Medications Medication Dose Route Frequency  lidocaine (XYLOCAINE) 10 mg/mL (1 %) injection 0.3 mL  0.3 mL SubCUTAneous ONCE  
 lactated Ringers infusion  100 mL/hr IntraVENous CONTINUOUS  
 sodium chloride (NS) flush 5-10 mL  5-10 mL IntraVENous Q8H  
 sodium chloride (NS) flush 5-10 mL  5-10 mL IntraVENous PRN  
 midazolam (VERSED) injection 2 mg  2 mg IntraVENous ONCE PRN  
 lactated Ringers infusion  100 mL/hr IntraVENous CONTINUOUS  
 sodium chloride (NS) flush 5-10 mL  5-10 mL IntraVENous PRN  
 oxyCODONE IR (ROXICODONE) tablet 10 mg  10 mg Oral ONCE PRN  
 HYDROmorphone (PF) (DILAUDID) injection 0.5 mg  0.5 mg IntraVENous Multiple  promethazine (PHENERGAN) with saline injection 6.25 mg  6.25 mg IntraVENous Q15MIN PRN  
 bacitracin 50,000 Units, neomycin-polymyxin B  2 mL in sterile water irrigation 1,000 mL irrigation   Irrigation ONCE  
  bupivacaine-EPINEPHrine (PF) (SENSORCAINE PF) 0.5 %-1:200,000 injection 300 mg  60 mL SubCUTAneous ONCE  
 iopamidol (ISOVUE-370) 76 % injection 50 mL  50 mL IntraVENous RAD ONCE  
 ceFAZolin (ANCEF) 2 g/20 mL in sterile water IV syringe  2 g IntraVENous ON CALL TO OR  
 cloZAPine (CLOZARIL) tablet 500 mg (Patient Supplied)  500 mg Oral QHS  phenol throat spray (CHLORASEPTIC) 1 Spray  1 Spray Oral PRN  
 carvedilol (COREG) tablet 3.125 mg  3.125 mg Oral BID WITH MEALS  guaiFENesin ER (MUCINEX) tablet 1,200 mg  1,200 mg Oral Q12H  
 amoxicillin-clavulanate (AUGMENTIN) 875-125 mg per tablet 1 Tab  1 Tab Oral BID WITH MEALS  insulin lispro (HUMALOG) injection   SubCUTAneous AC&HS  
 dextrose 40% (GLUTOSE) oral gel 1 Tube  15 g Oral PRN  
 glucagon (GLUCAGEN) injection 1 mg  1 mg IntraMUSCular PRN  
 dextrose (D50W) injection syrg 12.5-25 g  25-50 mL IntraVENous PRN  potassium chloride (K-DUR, KLOR-CON) SR tablet 40 mEq  40 mEq Oral DAILY  pantoprazole (PROTONIX) tablet 40 mg  40 mg Oral ACB  acetaminophen (TYLENOL) solution 650 mg  650 mg Oral Q4H PRN  
 amiodarone (CORDARONE) tablet 400 mg  400 mg Oral BID  cloZAPine (CLOZARIL) tablet 200 mg (Patient Supplied)  200 mg Oral DAILY  polyvinyl alcohol (LIQUIFILM TEARS) 1.4 % ophthalmic solution 1 Drop  1 Drop Both Eyes PRN  
 aspirin chewable tablet 81 mg  81 mg Oral DAILY  sodium chloride (NS) flush 5-10 mL  5-10 mL IntraVENous Q8H  
 sodium chloride (NS) flush 5-10 mL  5-10 mL IntraVENous PRN  
 midazolam (VERSED) injection 2 mg  2 mg IntraVENous Q2H PRN Data Review:  
Recent Results (from the past 24 hour(s)) GLUCOSE, POC Collection Time: 11/28/18 10:39 AM  
Result Value Ref Range Glucose (POC) 150 (H) 65 - 100 mg/dL GLUCOSE, POC Collection Time: 11/28/18  3:55 PM  
Result Value Ref Range Glucose (POC) 124 (H) 65 - 100 mg/dL GLUCOSE, POC Collection Time: 11/28/18  9:55 PM  
Result Value Ref Range Glucose (POC) 140 (H) 65 - 100 mg/dL MAGNESIUM Collection Time: 11/29/18  3:34 AM  
Result Value Ref Range Magnesium 2.3 1.8 - 2.4 mg/dL CBC WITH AUTOMATED DIFF Collection Time: 11/29/18  3:34 AM  
Result Value Ref Range WBC 6.9 4.3 - 11.1 K/uL  
 RBC 4.34 4.23 - 5.6 M/uL  
 HGB 10.3 (L) 13.6 - 17.2 g/dL HCT 32.1 (L) 41.1 - 50.3 % MCV 74.0 (L) 79.6 - 97.8 FL  
 MCH 23.7 (L) 26.1 - 32.9 PG  
 MCHC 32.1 31.4 - 35.0 g/dL  
 RDW 16.5 (H) 11.9 - 14.6 % PLATELET 305 (H) 691 - 450 K/uL MPV 11.0 9.4 - 12.3 FL ABSOLUTE NRBC 0.00 0.0 - 0.2 K/uL  
 DF AUTOMATED NEUTROPHILS 64 43 - 78 % LYMPHOCYTES 25 13 - 44 % MONOCYTES 11 4.0 - 12.0 % EOSINOPHILS 0 (L) 0.5 - 7.8 % BASOPHILS 0 0.0 - 2.0 % IMMATURE GRANULOCYTES 0 0.0 - 5.0 %  
 ABS. NEUTROPHILS 4.4 1.7 - 8.2 K/UL  
 ABS. LYMPHOCYTES 1.7 0.5 - 4.6 K/UL  
 ABS. MONOCYTES 0.7 0.1 - 1.3 K/UL  
 ABS. EOSINOPHILS 0.0 0.0 - 0.8 K/UL  
 ABS. BASOPHILS 0.0 0.0 - 0.2 K/UL  
 ABS. IMM. GRANS. 0.0 0.0 - 0.5 K/UL METABOLIC PANEL, COMPREHENSIVE Collection Time: 11/29/18  3:34 AM  
Result Value Ref Range Sodium 138 136 - 145 mmol/L Potassium 4.2 3.5 - 5.1 mmol/L Chloride 104 98 - 107 mmol/L  
 CO2 26 21 - 32 mmol/L Anion gap 8 7 - 16 mmol/L Glucose 96 65 - 100 mg/dL BUN 22 6 - 23 MG/DL Creatinine 1.40 0.8 - 1.5 MG/DL  
 GFR est AA >60 >60 ml/min/1.73m2 GFR est non-AA 56 (L) >60 ml/min/1.73m2 Calcium 8.7 8.3 - 10.4 MG/DL Bilirubin, total 0.4 0.2 - 1.1 MG/DL  
 ALT (SGPT) 40 12 - 65 U/L  
 AST (SGOT) 23 15 - 37 U/L Alk. phosphatase 163 (H) 50 - 136 U/L Protein, total 6.9 6.3 - 8.2 g/dL Albumin 2.6 (L) 3.5 - 5.0 g/dL Globulin 4.3 (H) 2.3 - 3.5 g/dL A-G Ratio 0.6 (L) 1.2 - 3.5 EKG, 12 LEAD, INITIAL Collection Time: 11/29/18  6:23 AM  
Result Value Ref Range Ventricular Rate 88 BPM  
 Atrial Rate 88 BPM  
 P-R Interval 236 ms QRS Duration 188 ms Q-T Interval 480 ms QTC Calculation (Bezet) 580 ms Calculated P Axis 12 degrees Calculated R Axis -71 degrees Calculated T Axis 90 degrees Diagnosis Sinus rhythm with 1st degree A-V block Left atrial enlargement Left axis deviation Right bundle branch block Inferior infarct (cited on or before 14-NOV-2018) T wave abnormality, consider lateral ischemia Abnormal ECG When compared with ECG of 28-NOV-2018 06:56, No significant change was found Confirmed by [facility]  MD ()[id number] ([id number]) on 11/29/2018 7:30:47 AM 
  
GLUCOSE, POC Collection Time: 11/29/18  6:30 AM  
Result Value Ref Range Glucose (POC) 94 65 - 100 mg/dL EKG:  (EKG has been independently visualized by me with interpretation below) Assessment:  
 
Principal Problem: 
  Cardiac arrest ([address] (11/13/2018) Active Problems: 
  Type II diabetes mellitus ([address] (2/4/2016) Acute respiratory failure with hypoxia ([address] (11/13/2018) Hypotension (11/13/2018) Systolic congestive heart failure ([address] (11/13/2018) Schizophrenia ([address] (11/13/2018) Acute renal failure ([address] (11/13/2018) Pneumonia due to Streptococcus pneumoniae ([address] (11/21/2018) Microcytic anemia (11/22/2018) 
 
  
66year old with a history of schizophrenia, NICM, admitted after cardiac arrest/VF arrest. As such, he will be an ICD candidate He does have a RBBB with a QRS > 150 msec which is a class IIb indication for a CRT. His EF is 10-20%.  
 
Plan: 1.  NICM, EF 10%, RBBB with QRS duration 178 msec, NYHA class III: 59yo with NICM now s/p VF cardiac arrest and meets criteria for ICD implantation. We will look towards ICD today vs Thursday if the schedule allows. -Volume status improved, cont GDMT with coreg and lisinopril. 2. Fever: resolved, finishing treatment for pneumonia 3. PPx: heparin, on hold for procedure ICD I discussed in detail the potential benefits of ICD therapy, including improved mortality and prevention of SCD. Additionally, I discussed with the patient the potential risks of ICD implantation, including the risk of bleeding, infection, venous occlusion, DVT/PE, pneumothorax, cardiac tamponade, perforation, need for urgent open heart surgery, device/lead failure, lead dislodgement, inappropriate shock(s), heart attack, stroke, arrhythmia, radiation skin injury, kidney damage/failure oversedation, respiratory arrest, and even death. The patient understands these risks in the context of the potential benefits of the device implantation, and agrees to proceed. CRT-D I discussed in detail the potential benefits of CRT-D therapy, including improved mortality, prevention of SCD,  decreased hospitalization, beneficial cardiac remodeling, and prevention of disease progression. Additionally, I discussed with the patient the potential risks of CRT-D implantation, including the risk of bleeding, infection, venous occlusion, DVT/PE, pneumothorax, cardiac tamponade, perforation, need for urgent open heart surgery, device/lead failure, lead dislodgement, inability to place an LV lead via the coronary sinus, inappropriate shock(s), heart attack, stroke, arrhythmia, radiation skin injury, kidney damage/failure, oversedation, respiratory arrest, and even death. The patient understands these risks in the context of the potential benefits of the device implantation, and agrees to proceed. DFT I discussed with the patient the potential risks of DFT tesing including the risk of device/lead failure, lead dislodgement, heart attack, stroke, arrhythmia, oversedation, respiratory arrest, and even death. The patient understands these risks in the context of the potential benefits and agrees to proceed. Isamar Rob. Reyna Gutierrez MD, MS Clinical Cardiac Electrophysiology [facility] Cardiology

## 2018-11-29 NOTE — PROGRESS NOTES
TRANSFER - IN REPORT: 
 
Verbal report received from Parmjit Kaba RN on [employer] being received from CP for routine progression of care Report consisted of patients Situation, Background, Assessment and Recommendations(SBAR). Information from the following report(s) Procedure Summary, MAR and Recent Results was reviewed with the receiving nurse. Opportunity for questions and clarification was provided. Assessment completed upon patients arrival to unit and care assumed.

## 2018-11-29 NOTE — PROGRESS NOTES
Care Management Interventions Palliative Care Criteria Met (RRAT>21 & CHF Dx)?: No(RRAT 16 Dx Cardiac arrest) Transition of Care Consult (CM Consult): Discharge Planning Physical Therapy Consult: Yes Occupational Therapy Consult: Yes Speech Therapy Consult: Yes Current Support Network: Relative's Home(lives with his sister Kimberlee Levin [phone]) Confirm Follow Up Transport: Family Plan discussed with Pt/Family/Caregiver: Yes Freedom of Choice Offered: Yes Discharge Location Discharge Placement: Skilled nursing facility Patient had ICD placed today. D/C plan is [facility] when medically stable. They have bed ready for patient Friday if stable. CM following.

## 2018-11-29 NOTE — PROGRESS NOTES
Bedside and Verbal shift change report given to Otis Vargas RN (oncoming nurse) by Brianna Overton RN (offgoing nurse). Report included the following information SBAR, Kardex, Procedure Summary, MAR, Recent Results and Cardiac Rhythm Paced.

## 2018-11-29 NOTE — ANESTHESIA POSTPROCEDURE EVALUATION
Procedure(s): BIV ICD ROOM 302. Anesthesia Post Evaluation Multimodal analgesia: multimodal analgesia used between 6 hours prior to anesthesia start to PACU discharge Patient location during evaluation: PACU Patient participation: complete - patient participated Level of consciousness: awake and alert Pain management: adequate Airway patency: patent Anesthetic complications: no 
Cardiovascular status: acceptable Respiratory status: acceptable Hydration status: acceptable Post anesthesia nausea and vomiting:  none Visit Vitals BP 90/88 Pulse 91 Temp 37 °C (98.6 °F) Resp 20 Ht 5' 10\" (1.778 m) Wt 73.8 kg (162 lb 11.2 oz) SpO2 97% BMI 23.34 kg/m²

## 2018-11-29 NOTE — PROGRESS NOTES
Bedside and Verbal shift change report given to Senait Valentin, RN and Kia Hussein RN (oncoming nurse) by self Linda napier). Report included the following information SBAR, Kardex, ED Summary, Procedure Summary, Intake/Output, MAR, Recent Results and Cardiac Rhythm SR. Patient off floor in EP lab.

## 2018-11-29 NOTE — PROGRESS NOTES
Bedside and verbal shift report received from Vandana Brock RN. Patient in the EP lab for ICD placement.

## 2018-11-29 NOTE — ANESTHESIA PREPROCEDURE EVALUATION
Anesthetic History Review of Systems / Medical History Patient summary reviewed and pertinent labs reviewed Pulmonary Neuro/Psych Psychiatric history Cardiovascular Valvular problems/murmurs (moderate): mitral insufficiency CHF (EF 10/20%): NYHA Classification III Dysrhythmias GI/Hepatic/Renal 
  
 
 
Renal disease: CRI Endo/Other Diabetes Anemia Other Findings Comments: 58M with pmhx of CHF with EG 20-25%, NIDM, schizophrenia admitted to ICU on 11/13 after having had cardiac arrest at the [facility]. Pt had witnessed collapse and staff initiated CPR, placed AED with shock and then ROSC. Pt again with another cardiac arrest in ER with ROSC. Admitted to ICU and placed on hypothermia protocol. Echo showed EF 10-20%. Treated briefly on telemetry for acute flash pulm edema requiring BIPAP. Hospitalist service consulted for medical mgmt and taking over care from ICU service. Noted on 11/18 to have fever of 101.3 so ICD placement post-poned. CXR 11/20 with bilateral patchy infiltrates suspected pneumonia, started on augmentin. Flu, strep ag and blood cultures negative. CXR 11/27 with infiltrates largely resolved. Physical Exam 
 
Airway Mallampati: II 
TM Distance: 4 - 6 cm Neck ROM: normal range of motion Mouth opening: Normal 
 
 Cardiovascular Rhythm: regular Rate: normal 
 
 
 
 Dental 
 
Dentition: Poor dentition Pulmonary Blanca Maclachlan Abdominal 
 
 
 
 Other Findings Anesthetic Plan ASA: 4 Anesthesia type: total IV anesthesia Induction: Intravenous Anesthetic plan and risks discussed with: Patient

## 2018-11-29 NOTE — PROGRESS NOTES
Progress Note Patient: Bright Sanchez MRN: [id number]  SSN: [id number] YOB: 1960  Age: 66 y.o. Sex: male Admit Date: 11/13/2018 LOS: 16 days Subjective:  
 
From previous note: 
 
\" Patient 58M with pmhx of CHF with EG 20-25%, NIDM, schizophrenia admitted to ICU on 11/13 after having had cardiac arrest at the adult  center. Pt had witnessed collapse and staff initiated CPR, placed AED with shock and then ROSC. Pt again with another cardiac arrest in ER with ROSC. Admitted to ICU and placed on hypothermia protocol. Echo showed EF 10-20%. Treated briefly on telemetry for acute flash pulm edema requiring BIPAP. Hospitalist service consulted for medical mgmt and taking over care from ICU service. Noted on 11/18 to have fever of 101.3 so ICD placement post-poned. CXR 11/20 with bilateral patchy infiltrates suspected pneumonia, started on augmentin. Flu, strep ag and blood cultures negative. Repeat cxr much improved. \" 
 
Patient underwent the following procedure today.  
 1. Insertion of Biotronik biventricular implantable cardioverter defibrillator. 2. Insertion of left ventricular lead at time of new system Implantation. He is doing well now. Lying in bed. Not hungry yet. No pain. Objective:  
 
Vitals:  
 11/29/18 1019 11/29/18 1131 11/29/18 1152 11/29/18 1400 BP: 92/65 95/66 94/67 90/88 Pulse: 82 82 85 91 Resp: 10 18  20 Temp: 97.8 °F (36.6 °C) 97.9 °F (36.6 °C)  98.6 °F (37 °C) SpO2: 100% 95%  97% Weight:      
Height:      
  
 
Intake and Output: 
Current Shift: 11/29 0701 - 11/29 1900 In: 400 [I.V.:400] Out: 25 Last three shifts: 11/27 1901 - 11/29 0700 In: 1160 [P.O.:1160] Out: 775 [Urine:775] Physical Exam:  
 
General:                    The patient is a pleasant middle aged male in no acute distress. lying comfortably in bed. Head:                                   Normocephalic/atraumatic. Eyes:                                   No palpebral pallor or scleral icterus. ENT:                                    External auricular and nasal exam within normal limits. Mucous membranes are moist. 
Neck:                                   Supple, non-tender, no JVD. Left upper chest wall with a implanted defibrillator. Lungs:                       Clear to auscultation bilaterally without wheezes or crackles. No respiratory distress or accessory muscle use. Heart:                                  Regular rate and rhythm, without murmurs, rubs, or gallops. Abdomen:                  Soft, non-tender, non-distended with normoactive bowel sounds. Genitourinary:           No tenderness over the bladder or bilateral CVAs. Extremities:               Without clubbing, cyanosis, or edema. Skin:                                    Normal color, texture, and turgor. No rashes, lesions, or jaundice. Pulses:                      Radial and dorsalis pedis pulses present 2+ bilaterally. Capillary refill <2s. Neurologic:                CN II-XII grossly intact and symmetrical.  
                                            Moving all four extremities well with no focal deficits. Psychiatric:                Pleasant demeanor, appropriate affect. Alert and oriented x 3 Lab/Data Review: 
 
Recent Results (from the past 24 hour(s)) GLUCOSE, POC Collection Time: 11/28/18  3:55 PM  
Result Value Ref Range Glucose (POC) 124 (H) 65 - 100 mg/dL GLUCOSE, POC Collection Time: 11/28/18  9:55 PM  
Result Value Ref Range Glucose (POC) 140 (H) 65 - 100 mg/dL MAGNESIUM Collection Time: 11/29/18  3:34 AM  
Result Value Ref Range Magnesium 2.3 1.8 - 2.4 mg/dL CBC WITH AUTOMATED DIFF Collection Time: 11/29/18  3:34 AM  
Result Value Ref Range WBC 6.9 4.3 - 11.1 K/uL  
 RBC 4.34 4.23 - 5.6 M/uL  
 HGB 10.3 (L) 13.6 - 17.2 g/dL HCT 32.1 (L) 41.1 - 50.3 % MCV 74.0 (L) 79.6 - 97.8 FL  
 MCH 23.7 (L) 26.1 - 32.9 PG  
 MCHC 32.1 31.4 - 35.0 g/dL  
 RDW 16.5 (H) 11.9 - 14.6 % PLATELET 626 (H) 867 - 450 K/uL MPV 11.0 9.4 - 12.3 FL ABSOLUTE NRBC 0.00 0.0 - 0.2 K/uL  
 DF AUTOMATED NEUTROPHILS 64 43 - 78 % LYMPHOCYTES 25 13 - 44 % MONOCYTES 11 4.0 - 12.0 % EOSINOPHILS 0 (L) 0.5 - 7.8 % BASOPHILS 0 0.0 - 2.0 % IMMATURE GRANULOCYTES 0 0.0 - 5.0 %  
 ABS. NEUTROPHILS 4.4 1.7 - 8.2 K/UL  
 ABS. LYMPHOCYTES 1.7 0.5 - 4.6 K/UL  
 ABS. MONOCYTES 0.7 0.1 - 1.3 K/UL  
 ABS. EOSINOPHILS 0.0 0.0 - 0.8 K/UL  
 ABS. BASOPHILS 0.0 0.0 - 0.2 K/UL  
 ABS. IMM. GRANS. 0.0 0.0 - 0.5 K/UL METABOLIC PANEL, COMPREHENSIVE Collection Time: 11/29/18  3:34 AM  
Result Value Ref Range Sodium 138 136 - 145 mmol/L Potassium 4.2 3.5 - 5.1 mmol/L Chloride 104 98 - 107 mmol/L  
 CO2 26 21 - 32 mmol/L Anion gap 8 7 - 16 mmol/L Glucose 96 65 - 100 mg/dL BUN 22 6 - 23 MG/DL Creatinine 1.40 0.8 - 1.5 MG/DL  
 GFR est AA >60 >60 ml/min/1.73m2 GFR est non-AA 56 (L) >60 ml/min/1.73m2 Calcium 8.7 8.3 - 10.4 MG/DL Bilirubin, total 0.4 0.2 - 1.1 MG/DL  
 ALT (SGPT) 40 12 - 65 U/L  
 AST (SGOT) 23 15 - 37 U/L Alk. phosphatase 163 (H) 50 - 136 U/L Protein, total 6.9 6.3 - 8.2 g/dL Albumin 2.6 (L) 3.5 - 5.0 g/dL Globulin 4.3 (H) 2.3 - 3.5 g/dL A-G Ratio 0.6 (L) 1.2 - 3.5 EKG, 12 LEAD, INITIAL Collection Time: 11/29/18  6:23 AM  
Result Value Ref Range Ventricular Rate 88 BPM  
 Atrial Rate 88 BPM  
 P-R Interval 236 ms QRS Duration 188 ms Q-T Interval 480 ms QTC Calculation (Bezet) 580 ms Calculated P Axis 12 degrees Calculated R Axis -71 degrees Calculated T Axis 90 degrees Diagnosis Sinus rhythm with 1st degree A-V block Left atrial enlargement Left axis deviation Right bundle branch block Inferior infarct (cited on or before 14-NOV-2018) T wave abnormality, consider lateral ischemia Abnormal ECG When compared with ECG of 28-NOV-2018 06:56, No significant change was found Confirmed by [facility]  MD ()KWAN ([id number]) on 11/29/2018 7:30:47 AM 
  
GLUCOSE, POC Collection Time: 11/29/18  6:30 AM  
Result Value Ref Range Glucose (POC) 94 65 - 100 mg/dL GLUCOSE, POC Collection Time: 11/29/18 11:49 AM  
Result Value Ref Range Glucose (POC) 102 (H) 65 - 100 mg/dL EKG, 12 LEAD, INITIAL Collection Time: 11/29/18 12:15 PM  
Result Value Ref Range Ventricular Rate 90 BPM  
 Atrial Rate 87 BPM  
 QRS Duration 186 ms  
 Q-T Interval 554 ms QTC Calculation (Bezet) 677 ms Calculated R Axis -55 degrees Calculated T Axis 38 degrees Diagnosis Ventricular-paced rhythm Biventricular pacemaker detected Abnormal ECG When compared with ECG of 29-NOV-2018 06:23, 
Electronic ventricular pacemaker has replaced Sinus rhythm Confirmed by Dulce Maria Olea MD (), Lyric Agudelo ([id number]) on 11/29/2018 1:21:18 PM 
  
 
XR chest  
11-29 IMPRESSION: No acute abnormality Current Facility-Administered Medications:  
  lidocaine (XYLOCAINE) 10 mg/mL (1 %) injection 0.3 mL, 0.3 mL, SubCUTAneous, ONCE, Eliezer Hunter IV, MD 
  lactated Ringers infusion, 100 mL/hr, IntraVENous, CONTINUOUS, Eliezer Hunter IV, MD 
  sodium chloride (NS) flush 5-10 mL, 5-10 mL, IntraVENous, Q8H, Eliezer Hunter IV, MD 
  sodium chloride (NS) flush 5-10 mL, 5-10 mL, IntraVENous, PRN, Eliezer Hunter IV, MD 
  midazolam (VERSED) injection 2 mg, 2 mg, IntraVENous, ONCE PRN, Eliezer Hunter IV, MD 
  sodium chloride (NS) flush 5-10 mL, 5-10 mL, IntraVENous, PRN, Eliezer Hunter IV, MD 
  oxyCODONE IR (ROXICODONE) tablet 10 mg, 10 mg, Oral, ONCE PRN, Eliezer Hunter IV, MD 
  HYDROmorphone (PF) (DILAUDID) injection 0.5 mg, 0.5 mg, IntraVENous, Multiple, Judith Hunter MD 
   promethazine (PHENERGAN) with saline injection 6.25 mg, 6.25 mg, IntraVENous, Q15MIN PRN, Eliezer Hunter IV, MD 
  sodium chloride (NS) flush 5-10 mL, 5-10 mL, IntraVENous, Q8H, Johnathan Flores MD 
  sodium chloride (NS) flush 5-10 mL, 5-10 mL, IntraVENous, PRN, Sandra Espinoza MD 
  acetaminophen (TYLENOL) tablet 650 mg, 650 mg, Oral, Q4H PRN, Sandra Espinoza MD 
  cloZAPine (CLOZARIL) tablet 500 mg (Patient Supplied), 500 mg, Oral, QHS, Neel Bedolla MD, 500 mg at 11/28/18 2136   phenol throat spray (CHLORASEPTIC) 1 Spray, 1 Spray, Oral, PRN, Garland Craig MD, 1 Spray at 11/23/18 1807   carvedilol (COREG) tablet 3.125 mg, 3.125 mg, Oral, BID WITH MEALS, Garland Craig MD, 3.125 mg at 11/29/18 1137 
  guaiFENesin ER (MUCINEX) tablet 1,200 mg, 1,200 mg, Oral, Q12H, Rose Mary Pastor DO, 1,200 mg at 11/29/18 1137 
  amoxicillin-clavulanate (AUGMENTIN) 875-125 mg per tablet 1 Tab, 1 Tab, Oral, BID WITH MEALS, Melissa Silveira DO, Stopped at 11/29/18 0800 
  insulin lispro (HUMALOG) injection, , SubCUTAneous, AC&HS, Neel Bedolla MD, Stopped at 11/28/18 1630 
  dextrose 40% (GLUTOSE) oral gel 1 Tube, 15 g, Oral, PRN, Neel Bedolla MD 
  glucagon (GLUCAGEN) injection 1 mg, 1 mg, IntraMUSCular, PRN, Neel Bedolla MD 
  dextrose (D50W) injection syrg 12.5-25 g, 25-50 mL, IntraVENous, PRN, Neel Bedolla MD 
  potassium chloride (K-DUR, KLOR-CON) SR tablet 40 mEq, 40 mEq, Oral, DAILY, Casey Mcardle, MD, 40 mEq at 11/29/18 1137   pantoprazole (PROTONIX) tablet 40 mg, 40 mg, Oral, ACB, Neel Bedolla MD, 40 mg at 11/29/18 6661 
  amiodarone (CORDARONE) tablet 400 mg, 400 mg, Oral, BID, Sandra Espinoza MD, 400 mg at 11/29/18 1137   cloZAPine (CLOZARIL) tablet 200 mg (Patient Supplied), 200 mg, Oral, DAILY, Bertin Blanton MD, 200 mg at 11/29/18 1143   polyvinyl alcohol (LIQUIFILM TEARS) 1.4 % ophthalmic solution 1 Drop, 1 Drop, Both Eyes, PRN, Gladys Terrell MD 
   aspirin chewable tablet 81 mg, 81 mg, Oral, DAILY, Devonte Bailey MD, 81 mg at 11/29/18 1137 
  sodium chloride (NS) flush 5-10 mL, 5-10 mL, IntraVENous, Q8H, Devonte Bailey MD, 10 mL at 11/29/18 6400 
  sodium chloride (NS) flush 5-10 mL, 5-10 mL, IntraVENous, PRN, Devonte Bailey MD, 10 mL at 11/20/18 1201 
  midazolam (VERSED) injection 2 mg, 2 mg, IntraVENous, Q2H PRN, Francis Maciel MD, 2 mg at 11/16/18 1104 Assessment:  
 
Principal Problem: 
  Cardiac arrest ([facility] [address] (11/13/2018) Active Problems: 
  Type II diabetes mellitus ([facility] [address] (2/4/2016) Acute respiratory failure with hypoxia ([address] (11/13/2018) Hypotension (11/13/2018) Systolic congestive heart failure ([address] (11/13/2018) Schizophrenia ([address] (11/13/2018) Acute renal failure ([address] (11/13/2018) Pneumonia due to Streptococcus pneumoniae ([facility] [address] (11/21/2018) Microcytic anemia (11/22/2018) Plan: S/P cardiac arrest  
Acute on chronic systolic CHF with EF 97-45% Dilated cardiomyopathy, non ischemic RBBB S/P insertion of implantable cardioverter defibrillator as above. Monitor. Vital signs are stable post-op. No fever. Pneumonia Due to streptococcus pneumoniae On Augmentin now. Continue till completion SUSANA Monitor renal function and intake and output. Avoid nephrotoxic agents. History of hypertension. BP is on the low side. Patient does not manifest signs of poor profusion. Monitor. Holding BP medications. Diabetes mellitus type 2 Monitor blood sugar. Cover with insulin sliding scale accordingly. DVT prophylaxis : heparin SC I have discussed the plan of care with patient. Disposition plan : STR tomorrow Signed By: Oneal Armijo MD   
 November 29, 2018

## 2018-11-29 NOTE — PROGRESS NOTES
Bedside report given by Tommy Rosado RN for continued care following EP procedure. Sleeping, awakens easily when name called. Aquaseal dsg intact to left chest wall.

## 2018-11-30 ENCOUNTER — APPOINTMENT (OUTPATIENT)
Dept: GENERAL RADIOLOGY | Age: 58
DRG: 226 | End: 2018-11-30
Attending: INTERNAL MEDICINE
Payer: MEDICARE

## 2018-11-30 VITALS
TEMPERATURE: 98.9 F | RESPIRATION RATE: 18 BRPM | BODY MASS INDEX: 22.62 KG/M2 | HEIGHT: 70 IN | WEIGHT: 158 LBS | DIASTOLIC BLOOD PRESSURE: 56 MMHG | SYSTOLIC BLOOD PRESSURE: 88 MMHG | OXYGEN SATURATION: 96 % | HEART RATE: 92 BPM

## 2018-11-30 LAB
ALBUMIN SERPL-MCNC: 2.6 G/DL (ref 3.5–5)
ALBUMIN/GLOB SERPL: 0.6 {RATIO} (ref 1.2–3.5)
ALP SERPL-CCNC: 167 U/L (ref 50–136)
ALT SERPL-CCNC: 31 U/L (ref 12–65)
ANION GAP SERPL CALC-SCNC: 7 MMOL/L (ref 7–16)
AST SERPL-CCNC: 27 U/L (ref 15–37)
ATRIAL RATE: 96 BPM
BASOPHILS # BLD: 0 K/UL (ref 0–0.2)
BASOPHILS NFR BLD: 0 % (ref 0–2)
BILIRUB SERPL-MCNC: 0.4 MG/DL (ref 0.2–1.1)
BUN SERPL-MCNC: 16 MG/DL (ref 6–23)
CALCIUM SERPL-MCNC: 8.6 MG/DL (ref 8.3–10.4)
CALCULATED P AXIS, ECG09: 2 DEGREES
CALCULATED R AXIS, ECG10: 112 DEGREES
CALCULATED T AXIS, ECG11: 43 DEGREES
CHLORIDE SERPL-SCNC: 106 MMOL/L (ref 98–107)
CO2 SERPL-SCNC: 24 MMOL/L (ref 21–32)
CREAT SERPL-MCNC: 1.23 MG/DL (ref 0.8–1.5)
DIAGNOSIS, 93000: NORMAL
DIFFERENTIAL METHOD BLD: ABNORMAL
EOSINOPHIL # BLD: 0 K/UL (ref 0–0.8)
EOSINOPHIL NFR BLD: 0 % (ref 0.5–7.8)
ERYTHROCYTE [DISTWIDTH] IN BLOOD BY AUTOMATED COUNT: 16.6 % (ref 11.9–14.6)
GLOBULIN SER CALC-MCNC: 4.3 G/DL (ref 2.3–3.5)
GLUCOSE BLD STRIP.AUTO-MCNC: 106 MG/DL (ref 65–100)
GLUCOSE BLD STRIP.AUTO-MCNC: 90 MG/DL (ref 65–100)
GLUCOSE SERPL-MCNC: 97 MG/DL (ref 65–100)
HCT VFR BLD AUTO: 31.4 % (ref 41.1–50.3)
HGB BLD-MCNC: 10 G/DL (ref 13.6–17.2)
IMM GRANULOCYTES # BLD: 0 K/UL (ref 0–0.5)
IMM GRANULOCYTES NFR BLD AUTO: 0 % (ref 0–5)
LYMPHOCYTES # BLD: 1.5 K/UL (ref 0.5–4.6)
LYMPHOCYTES NFR BLD: 16 % (ref 13–44)
MAGNESIUM SERPL-MCNC: 2.3 MG/DL (ref 1.8–2.4)
MCH RBC QN AUTO: 23.7 PG (ref 26.1–32.9)
MCHC RBC AUTO-ENTMCNC: 31.8 G/DL (ref 31.4–35)
MCV RBC AUTO: 74.4 FL (ref 79.6–97.8)
MONOCYTES # BLD: 1 K/UL (ref 0.1–1.3)
MONOCYTES NFR BLD: 11 % (ref 4–12)
NEUTS SEG # BLD: 6.9 K/UL (ref 1.7–8.2)
NEUTS SEG NFR BLD: 73 % (ref 43–78)
NRBC # BLD: 0 K/UL (ref 0–0.2)
P-R INTERVAL, ECG05: 136 MS
PLATELET # BLD AUTO: 433 K/UL (ref 150–450)
PMV BLD AUTO: 10.7 FL (ref 9.4–12.3)
POTASSIUM SERPL-SCNC: 4.2 MMOL/L (ref 3.5–5.1)
PROT SERPL-MCNC: 6.9 G/DL (ref 6.3–8.2)
Q-T INTERVAL, ECG07: 504 MS
QRS DURATION, ECG06: 180 MS
QTC CALCULATION (BEZET), ECG08: 636 MS
RBC # BLD AUTO: 4.22 M/UL (ref 4.23–5.6)
SODIUM SERPL-SCNC: 137 MMOL/L (ref 136–145)
VENTRICULAR RATE, ECG03: 96 BPM
WBC # BLD AUTO: 9.4 K/UL (ref 4.3–11.1)

## 2018-11-30 PROCEDURE — 74011250637 HC RX REV CODE- 250/637: Performed by: INTERNAL MEDICINE

## 2018-11-30 PROCEDURE — 82962 GLUCOSE BLOOD TEST: CPT

## 2018-11-30 PROCEDURE — 36415 COLL VENOUS BLD VENIPUNCTURE: CPT

## 2018-11-30 PROCEDURE — 74011250637 HC RX REV CODE- 250/637: Performed by: HOSPITALIST

## 2018-11-30 PROCEDURE — 71046 X-RAY EXAM CHEST 2 VIEWS: CPT

## 2018-11-30 PROCEDURE — 80053 COMPREHEN METABOLIC PANEL: CPT

## 2018-11-30 PROCEDURE — 85025 COMPLETE CBC W/AUTO DIFF WBC: CPT

## 2018-11-30 PROCEDURE — 83735 ASSAY OF MAGNESIUM: CPT

## 2018-11-30 PROCEDURE — 93005 ELECTROCARDIOGRAM TRACING: CPT | Performed by: INTERNAL MEDICINE

## 2018-11-30 RX ORDER — AMIODARONE HYDROCHLORIDE 200 MG/1
400 TABLET ORAL DAILY
Status: DISCONTINUED | OUTPATIENT
Start: 2018-12-01 | End: 2018-11-30 | Stop reason: HOSPADM

## 2018-11-30 RX ORDER — AMIODARONE HYDROCHLORIDE 400 MG/1
400 TABLET ORAL DAILY
Qty: 30 TAB | Refills: 0 | Status: SHIPPED | OUTPATIENT
Start: 2018-12-01 | End: 2018-12-14 | Stop reason: SDUPTHER

## 2018-11-30 RX ADMIN — POTASSIUM CHLORIDE 40 MEQ: 20 TABLET, EXTENDED RELEASE ORAL at 09:26

## 2018-11-30 RX ADMIN — Medication 10 ML: at 06:28

## 2018-11-30 RX ADMIN — GUAIFENESIN 1200 MG: 600 TABLET, EXTENDED RELEASE ORAL at 09:26

## 2018-11-30 RX ADMIN — CARVEDILOL 3.12 MG: 3.12 TABLET, FILM COATED ORAL at 09:27

## 2018-11-30 RX ADMIN — ASPIRIN 81 MG 81 MG: 81 TABLET ORAL at 09:27

## 2018-11-30 RX ADMIN — CLOZAPINE 200 MG: 100 TABLET ORAL at 09:27

## 2018-11-30 RX ADMIN — PANTOPRAZOLE SODIUM 40 MG: 40 TABLET, DELAYED RELEASE ORAL at 06:27

## 2018-11-30 NOTE — DISCHARGE INSTRUCTIONS
DISCHARGE SUMMARY from Nurse    PATIENT INSTRUCTIONS:    After general anesthesia or intravenous sedation, for 24 hours or while taking prescription Narcotics:  · Limit your activities  · Do not drive and operate hazardous machinery  · Do not make important personal or business decisions  · Do  not drink alcoholic beverages  · If you have not urinated within 8 hours after discharge, please contact your surgeon on call. Report the following to your surgeon:  · Excessive pain, swelling, redness or odor of or around the surgical area  · Temperature over 100.5  · Nausea and vomiting lasting longer than 4 hours or if unable to take medications  · Any signs of decreased circulation or nerve impairment to extremity: change in color, persistent  numbness, tingling, coldness or increase pain  · Any questions    What to do at Home:  Recommended activity: Activity as tolerated. *  Please give a list of your current medications to your Primary Care Provider. *  Please update this list whenever your medications are discontinued, doses are      changed, or new medications (including over-the-counter products) are added. *  Please carry medication information at all times in case of emergency situations. These are general instructions for a healthy lifestyle:    No smoking/ No tobacco products/ Avoid exposure to second hand smoke  Surgeon General's Warning:  Quitting smoking now greatly reduces serious risk to your health. Obesity, smoking, and sedentary lifestyle greatly increases your risk for illness    A healthy diet, regular physical exercise & weight monitoring are important for maintaining a healthy lifestyle    You may be retaining fluid if you have a history of heart failure or if you experience any of the following symptoms:  Weight gain of 3 pounds or more overnight or 5 pounds in a week, increased swelling in our hands or feet or shortness of breath while lying flat in bed.   Please call your doctor as soon as you notice any of these symptoms; do not wait until your next office visit. Recognize signs and symptoms of STROKE:    F-face looks uneven    A-arms unable to move or move unevenly    S-speech slurred or non-existent    T-time-call 911 as soon as signs and symptoms begin-DO NOT go       Back to bed or wait to see if you get better-TIME IS BRAIN. Warning Signs of HEART ATTACK     Call 911 if you have these symptoms:   Chest discomfort. Most heart attacks involve discomfort in the center of the chest that lasts more than a few minutes, or that goes away and comes back. It can feel like uncomfortable pressure, squeezing, fullness, or pain.  Discomfort in other areas of the upper body. Symptoms can include pain or discomfort in one or both arms, the back, neck, jaw, or stomach.  Shortness of breath with or without chest discomfort.  Other signs may include breaking out in a cold sweat, nausea, or lightheadedness. Don't wait more than five minutes to call 911 - MINUTES MATTER! Fast action can save your life. Calling 911 is almost always the fastest way to get lifesaving treatment. Emergency Medical Services staff can begin treatment when they arrive -- up to an hour sooner than if someone gets to the hospital by car. The discharge information has been reviewed with the patient. The patient verbalized understanding. Discharge medications reviewed with the patient and appropriate educational materials and side effects teaching were provided. ___________________________________________________________________________________________________________________________________     Learning About ICDs (Implantable Cardioverter-Defibrillators)  What is an ICD (implantable cardioverter-defibrillator)? An ICD (implantable cardioverter-defibrillator) is a small, battery-powered device. It fixes serious changes in your heartbeat.  ICDs are used in people who have had a life-threatening heart rhythm or are at high risk of having one. The ICD is placed under the skin of the chest. It's attached to one or two wires (called leads). Most of the time, these leads go into the heart through a vein. How does an ICD work? An ICD is always checking your heart rate and rhythm. If the ICD detects a life-threatening, rapid heart rhythm, it tries to slow the rhythm back to normal using electrical pulses. If the bad rhythm doesn't stop, the ICD sends an electric shock to the heart. This restores a normal rhythm. The device then goes back to its watchful mode. Some ICDs also can fix a heart rate that is too slow. The ICD does this without using a shock. It can send out electrical pulses to speed up a heart rate that is too slow. Your doctor will check the ICD regularly to make sure it is working right and isn't causing any problems. Your doctor will also check the battery to see if it needs to be replaced. How is an ICD placed? Your doctor will put the ICD under the skin in your chest during minor surgery. You will likely have medicine to make you feel relaxed and sleepy during the surgery. Your doctor makes a small cut (incision) in your upper chest. He or she puts one or two leads (wires) through the cut. Most of the time, the leads go into a large blood vessel in the upper chest. Then your doctor guides the leads through the blood vessel into your heart. Your doctor connects the leads to the ICD and places it in your chest. Then the incision is closed. Your doctor also programs the ICD. Most people spend the night in the hospital, just to make sure that the device is working and that there are no problems from the surgery. How does it feel to get a shock? The shock from an ICD hurts briefly. People feel it in different ways. It's been described as feeling like a punch in the chest. But the shock is a sign that the ICD is doing its job. It's there to save your life.  You won't feel any pain if the ICD uses electrical pulses to fix a heart rate that is too fast or too slow. There's no way to know how often a shock might occur. It might never happen. Not knowing when or if a shock might happen may make you nervous. Knowing what to do if you get shocked can help. Ask your doctor for an action plan. This plan will guide you step-by-step if a shock happens. What else should you know? You can live a normal life with your ICD. Here are a few tips for living well with your ICD. · Avoid strong magnetic and electrical fields. These can keep your device from working right. Most office equipment and home appliances are safe to use. Check with your doctor about which things you should use with caution and which you should stay away from. · Be sure that any doctor, dentist, or other health professional you see knows that you have an ICD. · Always carry a card that tells what kind of device you have. Wear medical alert jewelry that says you have an ICD. You can buy this at most drugstores. · If you do get a shock, follow your action plan for what to do. · You can lead an active life with an ICD. Ask your doctor what sort of activity and intensity is safe for you. As you plan for your future and your end of life, be sure to include plans for your ICD. You can make the decision to turn off your ICD as part of the medical treatment you want at the end of life. Follow-up care is a key part of your treatment and safety. Be sure to make and go to all appointments, and call your doctor if you are having problems. It's also a good idea to know your test results and keep a list of the medicines you take. Where can you learn more? Go to http://peace-daniel.info/. Enter K781 in the search box to learn more about \"Learning About ICDs (Implantable Cardioverter-Defibrillators). \"  Current as of: December 6, 2017  Content Version: 11.8  © 9742-2274 [employer].  Care instructions adapted under license by [facility] (which disclaims liability or warranty for this information). If you have questions about a medical condition or this instruction, always ask your healthcare professional. [address] any warranty or liability for your use of this information. Amiodarone (By mouth)   Amiodarone Hydrochloride (b-gef-QT-da-fabian madalyn-droe-KLOR-tony)  Treats heart rhythm problems. Brand Name(s): Cordarone, Pacerone   There may be other brand names for this medicine. When This Medicine Should Not Be Used: This medicine is not right for everyone. Do not use it if you had an allergic reaction to amiodarone or iodine, or you are pregnant or breastfeeding. How to Use This Medicine:   Tablet  · Take your medicine as directed. Your dose may need to be changed several times to find what works best for you. · Take this medicine the same way every day. This means take it at the same time and take it consistently with or without food. · This medicine should come with a Medication Guide. Ask your pharmacist for a copy if you do not have one. · Missed dose: Take a dose as soon as you remember. If it is almost time for your next dose, wait until then and take a regular dose. Do not take extra medicine to make up for a missed dose. · Store the medicine in a closed container at room temperature, away from heat, moisture, and direct light. Drugs and Foods to Avoid:   Ask your doctor or pharmacist before using any other medicine, including over-the-counter medicines, vitamins, and herbal products. · Some foods and medicines can affect how amiodarone works.  Tell your doctor if you are using any of the following:  ¨ Cimetidine, clopidogrel, cyclosporine, dabigatran, dextromethorphan, digoxin, fentanyl, ledipasvir/sofosbuvir, lithium, loratadine, phenytoin, rifampin, simeprevir, sofosbuvir, or Brittani's wort  ¨ Blood pressure medicines (including diltiazem, verapamil)  ¨ Blood thinner (including warfarin)  ¨ Medicine to lower cholesterol (including atorvastatin, cholestyramine, lovastatin, simvastatin)  ¨ Medicine to treat depression (including trazodone)  ¨ Medicine to treat heart rhythm problems (including flecainide, lidocaine, procainamide, quinidine)  ¨ Medicine to treat HIV/AIDS  ¨ Medicine to treat an infection  ¨ Phenothiazine medicine (including chlorpromazine, perphenazine, promethazine, thioridazine)  · Do not eat grapefruit or drink grapefruit juice while you are using this medicine. Warnings While Using This Medicine:   · It is not safe to take this medicine during pregnancy. It could harm an unborn baby. Tell your doctor right away if you become pregnant. · Tell your doctor if you have liver problems, heart disease, heart failure, thyroid problems, or lung disease or breathing problems. Tell your doctor if you have a pacemaker or another implanted heart device. · This medicine may cause the following problems:  ¨ Lung problems  ¨ Worsening heart rhythm problems  ¨ Thyroid problems  ¨ Liver problems  ¨ Changes in vision  · Do not stop using this medicine suddenly. Your doctor will need to slowly decrease your dose before you stop it completely. · This medicine may make your skin more sensitive to sunlight. Wear sunscreen. Do not use sunlamps or tanning beds. Your skin may become discolored if you use this medicine for a long time. · Your doctor will do lab tests at regular visits to check on the effects of this medicine. Keep all appointments. · Keep all medicine out of the reach of children. Never share your medicine with anyone.   Possible Side Effects While Using This Medicine:   Call your doctor right away if you notice any of these side effects:  · Allergic reaction: Itching or hives, swelling in your face or hands, swelling or tingling in your mouth or throat, chest tightness, trouble breathing  · Blistering, peeling, or red skin rash  · Blurred vision or other vision changes, eye pain  · Chest pain, cough, trouble breathing  · Dark urine or pale stools, nausea, vomiting, loss of appetite, stomach pain, yellow skin or eyes  · Fast, slow, pounding, or uneven heartbeat (new or worsening)  · Lightheadedness, dizziness, or fainting  · Numbness, tingling, or burning pain in your hands, arms, legs, or feet  · Weight gain or loss, nervousness, tremors, trouble sleeping, unusual tiredness, hair loss  If you notice these less serious side effects, talk with your doctor:   · Mild nausea, vomiting, or constipation  If you notice other side effects that you think are caused by this medicine, tell your doctor. Call your doctor for medical advice about side effects. You may report side effects to FDA at 9-435-FDA-1567  © 2017 [facility] Information is for End User's use only and may not be sold, redistributed or otherwise used for commercial purposes. The above information is an  only. It is not intended as medical advice for individual conditions or treatments. Talk to your doctor, nurse or pharmacist before following any medical regimen to see if it is safe and effective for you.

## 2018-11-30 NOTE — PROGRESS NOTES
TRANSFER - OUT REPORT: 
 
Verbal report given to Rizwan Orta RN on [facility]  being transferred to [facility] for routine progression of care Report consisted of patients Situation, Background, Assessment and Recommendations(SBAR). Information from the following report(s) SBAR, Kardex and Procedure Summary was reviewed with the receiving nurse. Opportunity for questions and clarification was provided.

## 2018-11-30 NOTE — DISCHARGE SUMMARY
Discharge Summary     Patient: Iram Mireles MRN: [id number]  SSN: [id number]    YOB: 1960  Age: 66 y.o. Sex: male       Admit Date: 11/13/2018    Discharge Date: 11/30/2018      Admission Diagnoses: Cardiac arrest [facility])    Discharge Diagnoses:   Problem List as of 11/30/2018 Date Reviewed: 11/18/2018          Codes Class Noted - Resolved    Microcytic anemia ICD-10-CM: D50.9  ICD-9-CM: 280.9  11/22/2018 - Present        Pneumonia due to Streptococcus pneumoniae [facility]) ICD-10-CM: J13  ICD-9-CM: 268  11/21/2018 - Present        * (Principal) Cardiac arrest ([facility] ICD-10-CM: I46.9  ICD-9-CM: 427.5  11/13/2018 - Present        Acute respiratory failure with hypoxia ([facility] ICD-10-CM: J96.01  ICD-9-CM: 518.81  11/13/2018 - Present        Hypotension ICD-10-CM: I95.9  ICD-9-CM: 458.9  11/13/2018 - Present        Systolic congestive heart failure ([facility] ICD-10-CM: I50.20  ICD-9-CM: 428.20, 428.0  11/13/2018 - Present        Schizophrenia ([facility] (Chronic) ICD-10-CM: F20.9  ICD-9-CM: 295.90  11/13/2018 - Present        Acute renal failure ([facility] ICD-10-CM: N17.9  ICD-9-CM: 584.9  11/13/2018 - Present        CHF (congestive heart failure) (HCC) (Chronic) ICD-10-CM: I50.9  ICD-9-CM: 428.0  5/19/2016 - Present        HTN (hypertension) (Chronic) ICD-10-CM: I10  ICD-9-CM: 401.9  5/19/2016 - Present        Type II diabetes mellitus ([facility] (Chronic) ICD-10-CM: E11.9  ICD-9-CM: 250.00  2/4/2016 - Present        RESOLVED: ARF (acute renal failure) ([facility] ICD-10-CM: N17.9  ICD-9-CM: 584.9  5/23/2016 - 11/13/2018        RESOLVED: Systolic CHF, acute on chronic [facility]) ICD-10-CM: I50.23  ICD-9-CM: 428.23, 428.0  2/4/2016 - 11/13/2018               Discharge Condition: Stable    Hospital Course:     From previous note:     \" Patient 58M with pmhx of CHF with EG 20-25%, NIDM, schizophrenia admitted to ICU on 11/13 after having had cardiac arrest at the adult  center.  Pt had witnessed collapse and staff initiated CPR, placed AED with shock and then ROSC. Pt again with another cardiac arrest in ER with ROSC. Admitted to ICU and placed on hypothermia protocol. Echo showed EF 10-20%. Treated briefly on telemetry for acute flash pulm edema requiring BIPAP. Hospitalist service consulted for medical mgmt and taking over care from ICU service. Noted on 11/18 to have fever of 101.3 so ICD placement post-poned. CXR 11/20 with bilateral patchy infiltrates suspected pneumonia, started on augmentin and he completed the course. Flu, strep ag and blood cultures negative. Repeat cxr much improved with no acute abnormality. \"      Patient has been admitted and seen in consultation by cardiology. hospital course is significant for events as mentioned above. Patient has been gradually improving and on 11-,  patient underwent the following procedure;   1. Insertion of Biotronik biventricular implantable cardioverter defibrillator. 2. Insertion of left ventricular lead at time of new system Implantation. After the procedure above, patient has been stable with stable vital signs though BP mostly stays low. Today, 11-, patient is deemed stable to go to a nursing home. Physical Exam:      General:                    The patient is a pleasant middle aged male in no acute distress.  lying comfortably in bed. answering questions well. WPEZ:                                   SVFZJLLILMWSH/DFQTGRIIHV. Eyes:                                   No palpebral pallor or scleral icterus. ENT:                                    External auricular and nasal exam within normal limits.                                             XICEYM membranes are moist.  Neck:                                   Supple, non-tender, no JVD. Left upper chest wall with an implanted defibrillator. no bleeding. No redness.    Lungs:                       Clear to auscultation bilaterally without wheezes or crackles.                                             No respiratory distress or accessory muscle use. Heart:                                  Regular rate and rhythm, without murmurs, rubs, or gallops. Abdomen:                  Soft, non-tender, non-distended with normoactive bowel sounds. Genitourinary:           No tenderness over the bladder or bilateral CVAs. Extremities:               Without clubbing, cyanosis, or edema. Skin:                                    Normal color, texture, and turgor. No rashes, lesions, or jaundice. Pulses:                      Radial and dorsalis pedis pulses present 2+ bilaterally.                                               Capillary refill <2s. Neurologic:                CN II-XII grossly intact and symmetrical.                                               Moving all four extremities well with no focal deficits. Psychiatric:                Pleasant demeanor, appropriate affect. Alert and oriented x 3    Consults: Cardiology    Significant Diagnostic Studies:     XR chest   11-29  IMPRESSION: No acute abnormality    Echo  11-  SUMMARY:    -  Left ventricle: The ventricle was markedly dilated. Systolic function was  markedly reduced. Ejection fraction was estimated in the range of 10 % to 20   %. [place] was akinetic. Hyperechoic structures noted in apex in 2D images,   shadowing  noted in Definity images, can not rule out the presence of thrombus. There   was  severe diffuse hypokinesis with regional variation. Wall thickness was at the  upper limits of normal.    -  Left atrium: The atrium was moderately dilated. -  Inferior vena cava, hepatic veins: The inferior vena cava was dilated. The  respirophasic change in diameter was less than 50%. -  Mitral valve: There was moderate regurgitation.     Recent Results (from the past 24 hour(s))   GLUCOSE, POC    Collection Time: 11/29/18  4:03 PM   Result Value Ref Range    Glucose (POC) 86 65 - 100 mg/dL GLUCOSE, POC    Collection Time: 11/29/18  9:59 PM   Result Value Ref Range    Glucose (POC) 148 (H) 65 - 100 mg/dL   MAGNESIUM    Collection Time: 11/30/18  3:39 AM   Result Value Ref Range    Magnesium 2.3 1.8 - 2.4 mg/dL   CBC WITH AUTOMATED DIFF    Collection Time: 11/30/18  3:39 AM   Result Value Ref Range    WBC 9.4 4.3 - 11.1 K/uL    RBC 4.22 (L) 4.23 - 5.6 M/uL    HGB 10.0 (L) 13.6 - 17.2 g/dL    HCT 31.4 (L) 41.1 - 50.3 %    MCV 74.4 (L) 79.6 - 97.8 FL    MCH 23.7 (L) 26.1 - 32.9 PG    MCHC 31.8 31.4 - 35.0 g/dL    RDW 16.6 (H) 11.9 - 14.6 %    PLATELET 036 004 - 027 K/uL    MPV 10.7 9.4 - 12.3 FL    ABSOLUTE NRBC 0.00 0.0 - 0.2 K/uL    DF AUTOMATED      NEUTROPHILS 73 43 - 78 %    LYMPHOCYTES 16 13 - 44 %    MONOCYTES 11 4.0 - 12.0 %    EOSINOPHILS 0 (L) 0.5 - 7.8 %    BASOPHILS 0 0.0 - 2.0 %    IMMATURE GRANULOCYTES 0 0.0 - 5.0 %    ABS. NEUTROPHILS 6.9 1.7 - 8.2 K/UL    ABS. LYMPHOCYTES 1.5 0.5 - 4.6 K/UL    ABS. MONOCYTES 1.0 0.1 - 1.3 K/UL    ABS. EOSINOPHILS 0.0 0.0 - 0.8 K/UL    ABS. BASOPHILS 0.0 0.0 - 0.2 K/UL    ABS. IMM. GRANS. 0.0 0.0 - 0.5 K/UL   METABOLIC PANEL, COMPREHENSIVE    Collection Time: 11/30/18  3:39 AM   Result Value Ref Range    Sodium 137 136 - 145 mmol/L    Potassium 4.2 3.5 - 5.1 mmol/L    Chloride 106 98 - 107 mmol/L    CO2 24 21 - 32 mmol/L    Anion gap 7 7 - 16 mmol/L    Glucose 97 65 - 100 mg/dL    BUN 16 6 - 23 MG/DL    Creatinine 1.23 0.8 - 1.5 MG/DL    GFR est AA >60 >60 ml/min/1.73m2    GFR est non-AA >60 >60 ml/min/1.73m2    Calcium 8.6 8.3 - 10.4 MG/DL    Bilirubin, total 0.4 0.2 - 1.1 MG/DL    ALT (SGPT) 31 12 - 65 U/L    AST (SGOT) 27 15 - 37 U/L    Alk.  phosphatase 167 (H) 50 - 136 U/L    Protein, total 6.9 6.3 - 8.2 g/dL    Albumin 2.6 (L) 3.5 - 5.0 g/dL    Globulin 4.3 (H) 2.3 - 3.5 g/dL    A-G Ratio 0.6 (L) 1.2 - 3.5     GLUCOSE, POC    Collection Time: 11/30/18  5:49 AM   Result Value Ref Range    Glucose (POC) 90 65 - 100 mg/dL   EKG, 12 LEAD, INITIAL Collection Time: 11/30/18  7:13 AM   Result Value Ref Range    Ventricular Rate 96 BPM    Atrial Rate 96 BPM    P-R Interval 136 ms    QRS Duration 180 ms    Q-T Interval 504 ms    QTC Calculation (Bezet) 636 ms    Calculated P Axis 2 degrees    Calculated R Axis 112 degrees    Calculated T Axis 43 degrees    Diagnosis       Atrial-sensed ventricular-paced rhythm  Biventricular pacemaker detected  Abnormal ECG  When compared with ECG of 29-NOV-2018 12:15,  Vent.  rate has increased BY   6 BPM  Confirmed by KAREEM WAGONER (), Hemalatha Isbell ([id number]) on 11/30/2018 8:20:16 AM     GLUCOSE, POC    Collection Time: 11/30/18 10:57 AM   Result Value Ref Range    Glucose (POC) 106 (H) 65 - 100 mg/dL     All Micro Results     Procedure Component Value Units Date/Time    CULTURE, STREP THROAT [[id number]] Collected:  11/23/18 1809    Order Status:  Completed Specimen:  Throat Updated:  11/26/18 0842     Special Requests: --        NO SPECIAL REQUESTS  Reflexed from [id number]       Culture result:       NEGATIVE FOR GROUP A BETA STREPTOCOCCUS          CULTURE, BLOOD [[id number]] Collected:  11/20/18 1802    Order Status:  Completed Specimen:  Blood Updated:  11/25/18 0645     Special Requests: --        LEFT  Antecubital       Culture result: NO GROWTH 5 DAYS       CULTURE, BLOOD [[id number]] Collected:  11/20/18 1814    Order Status:  Completed Specimen:  Blood Updated:  11/25/18 0645     Special Requests: --        RIGHT  HAND       Culture result: NO GROWTH 5 DAYS       STREP AG SCREEN, GROUP A [[id number]] Collected:  11/23/18 1809    Order Status:  Completed Specimen:  Throat from Serum Updated:  11/23/18 1905     Group A Strep Ag ID NEGATIVE        INFLUENZA A & B AG (RAPID TEST) [[id number]] Collected:  11/23/18 1809    Order Status:  Completed Specimen:  Nasopharyngeal from Nasal washing Updated:  11/23/18 1905     Influenza A Ag NEGATIVE         Comment: NEGATIVE FOR THE PRESENCE OF INFLUENZA A ANTIGEN  INFECTION DUE TO INFLUENZA A CANNOT BE RULED OUT. BECAUSE THE ANTIGEN PRESENT IN THE SAMPLE MAY BE BELOW  THE DETECTION LIMIT OF THE TEST. A NEGATIVE TEST IS PRESUMPTIVE AND IT IS RECOMMENDED THAT THESE RESULTS BE CONFIRMED BY VIRAL CULTURE OR AN FDA-CLEARED INFLUENZA A AND B MOLECULAR ASSAY. Influenza B Ag NEGATIVE         Comment: NEGATIVE FOR THE PRESENCE OF INFLUENZA B ANTIGEN  INFECTION DUE TO INFLUENZA B CANNOT BE RULED OUT. BECAUSE THE ANTIGEN PRESENT IN THE SAMPLE MAY BE BELOW  THE DETECTION LIMIT OF THE TEST. A NEGATIVE TEST IS PRESUMPTIVE AND IT IS RECOMMENDED THAT THESE RESULTS BE CONFIRMED BY VIRAL CULTURE OR AN FDA-CLEARED INFLUENZA A AND B MOLECULAR ASSAY. Source NASOPHARYNGEAL       CULTURE, BLOOD [[id number]] Collected:  11/15/18 1247    Order Status:  Completed Specimen:  Blood Updated:  11/20/18 0716     Special Requests: --        LEFT  HAND       Culture result: NO GROWTH 5 DAYS       CULTURE, BLOOD [[id number]] Collected:  11/15/18 1238    Order Status:  Completed Specimen:  Blood Updated:  11/20/18 0716     Special Requests: --        RIGHT  ARM       Culture result: NO GROWTH 5 DAYS       CULTURE, RESPIRATORY/SPUTUM/BRONCH W GRAM STAIN [[id number]]  (Abnormal)  (Susceptibility) Collected:  11/15/18 1207    Order Status:  Completed Specimen:  Sputum,ET Suction Updated:  11/19/18 0737     Special Requests: NO SPECIAL REQUESTS        GRAM STAIN MANY GRAM POSITIVE COCCI         FEW GRAM NEGATIVE RODS         50 TO 79 WBC'S SEEN PER OIF      NO EPITHELIAL CELLS SEEN         3+ MUCUS PRESENT        Culture result:       HEAVY STREPTOCOCCUS PNEUMONIAE                  LIGHT NORMAL RESPIRATORY MARYELLEN                  Disposition: SNF    Discharge Medications:   Current Discharge Medication List      START taking these medications    Details   amiodarone (PACERONE) 400 mg tablet Take 1 Tab by mouth daily for 30 days.   Qty: 30 Tab, Refills: 0         CONTINUE these medications which have NOT CHANGED    Details lisinopril (PRINIVIL, ZESTRIL) 10 mg tablet Take 10 mg by mouth daily. potassium chloride (K-DUR, KLOR-CON) 20 mEq tablet Take 20 mEq by mouth daily. furosemide (LASIX) 40 mg tablet Take 40 mg by mouth daily. scopolamine (TRANSDERM-SCOP) 1 mg over 3 days pt3d 1 Patch by TransDERmal route every seventy-two (72) hours. polyethylene glycol (MIRALAX) 17 gram/dose powder Take 17 g by mouth as needed. simvastatin (ZOCOR) 40 mg tablet Take 40 mg by mouth nightly. cloZAPine (CLOZARIL) 100 mg tablet Take 500 mg by mouth nightly. Indications: SUICIDAL BEHAVIOR IN SCHIZOPHRENIA      cloZAPine (CLOZARIL) 100 mg disintegrating tablet Take 200 mg by mouth daily. Indications: SUICIDAL BEHAVIOR IN SCHIZOPHRENIA      carvedilol (COREG) 6.25 mg tablet Take 3.125 mg by mouth two (2) times daily (with meals). aspirin 81 mg chewable tablet Take 81 mg by mouth daily. metFORMIN (GLUCOPHAGE) 500 mg tablet Take 500 mg by mouth nightly. benztropine (COGENTIN) 2 mg tablet Take 2 mg by mouth nightly. Indications: EXTRAPYRAMIDAL DISEASE      loratadine (CLARITIN) 10 mg tablet Take 10 mg by mouth daily. Activity: Activity as tolerated  Diet: Diabetic Diet, low salt, low fat, low cholesterol  Wound Care: Keep wound clean and dry    Follow-up Appointments   Procedures    FOLLOW UP VISIT Appointment in: 3 - 5 Days See your primary doctor. See your primary doctor. Standing Status:   Standing     Number of Occurrences:   1     Order Specific Question:   Appointment in     Answer:   3 - 5 Days     I have discussed the plan of care with patient and care team.     Time spent on discharge is 40 minutes.          Signed By: Brian Kelley MD     November 30, 2018

## 2018-11-30 NOTE — PROGRESS NOTES
[facility] CARDIOLOGY PROGRESS NOTE 
      
 
11/30/2018 6:40 AM 
 
Admit Date: 11/13/2018 Admit Diagnosis: Cardiac arrest ([facility] Subjective: No complaints this AM, no chest pain or shortness of breath, appropriate post op device pocket pain Interval History: (History of pertinent interval events obtained from nursing staff) Cardiac device placed yesterday, no events overnight, no immediate complications ROS: 
GEN:  No fever or chills Cardiovascular:  As noted above Pulmonary:  As noted above Neuro:  No new focal motor or sensory loss Objective:  
 
Vitals:  
 11/29/18 2045 11/29/18 2050 11/30/18 0017 11/30/18 1037 BP: 91/58 91/58 93/61 95/65 Pulse: 90 90 92 93 Resp: 18 18 18 Temp: 99.3 °F (37.4 °C)  99.3 °F (37.4 °C) 100 °F (37.8 °C) SpO2: 96%  96% 94% Weight:    158 lb (71.7 kg) Height:      
 
 
Physical Exam: 
Belita Mor, Well Nourished, No Acute Distress, Alert & Oriented x 3, appropriate mood. CV- regular rate and paced rhythm, left infraclavicular pocket with dressing in place, no evidence of hematoma, redness, warmth or excessive drainage Lung- clear bilaterally Abd- soft, nontender, nondistended Ext- no edema bilaterally. Current Facility-Administered Medications Medication Dose Route Frequency  lactated Ringers infusion  100 mL/hr IntraVENous CONTINUOUS  
 sodium chloride (NS) flush 5-10 mL  5-10 mL IntraVENous Q8H  
 sodium chloride (NS) flush 5-10 mL  5-10 mL IntraVENous PRN  
 midazolam (VERSED) injection 2 mg  2 mg IntraVENous ONCE PRN  
 sodium chloride (NS) flush 5-10 mL  5-10 mL IntraVENous PRN  
 oxyCODONE IR (ROXICODONE) tablet 10 mg  10 mg Oral ONCE PRN  
 HYDROmorphone (PF) (DILAUDID) injection 0.5 mg  0.5 mg IntraVENous Multiple  promethazine (PHENERGAN) with saline injection 6.25 mg  6.25 mg IntraVENous Q15MIN PRN  
 sodium chloride (NS) flush 5-10 mL  5-10 mL IntraVENous Q8H  
  sodium chloride (NS) flush 5-10 mL  5-10 mL IntraVENous PRN  
 acetaminophen (TYLENOL) tablet 650 mg  650 mg Oral Q4H PRN  
 cloZAPine (CLOZARIL) tablet 500 mg (Patient Supplied)  500 mg Oral QHS  phenol throat spray (CHLORASEPTIC) 1 Spray  1 Spray Oral PRN  
 carvedilol (COREG) tablet 3.125 mg  3.125 mg Oral BID WITH MEALS  guaiFENesin ER (MUCINEX) tablet 1,200 mg  1,200 mg Oral Q12H  
 insulin lispro (HUMALOG) injection   SubCUTAneous AC&HS  
 dextrose 40% (GLUTOSE) oral gel 1 Tube  15 g Oral PRN  
 glucagon (GLUCAGEN) injection 1 mg  1 mg IntraMUSCular PRN  
 dextrose (D50W) injection syrg 12.5-25 g  25-50 mL IntraVENous PRN  potassium chloride (K-DUR, KLOR-CON) SR tablet 40 mEq  40 mEq Oral DAILY  pantoprazole (PROTONIX) tablet 40 mg  40 mg Oral ACB  amiodarone (CORDARONE) tablet 400 mg  400 mg Oral BID  cloZAPine (CLOZARIL) tablet 200 mg (Patient Supplied)  200 mg Oral DAILY  polyvinyl alcohol (LIQUIFILM TEARS) 1.4 % ophthalmic solution 1 Drop  1 Drop Both Eyes PRN  
 aspirin chewable tablet 81 mg  81 mg Oral DAILY  sodium chloride (NS) flush 5-10 mL  5-10 mL IntraVENous Q8H  
 sodium chloride (NS) flush 5-10 mL  5-10 mL IntraVENous PRN  
 midazolam (VERSED) injection 2 mg  2 mg IntraVENous Q2H PRN Data Review:  
Recent Results (from the past 24 hour(s)) GLUCOSE, POC Collection Time: 11/29/18 11:49 AM  
Result Value Ref Range Glucose (POC) 102 (H) 65 - 100 mg/dL EKG, 12 LEAD, INITIAL Collection Time: 11/29/18 12:15 PM  
Result Value Ref Range Ventricular Rate 90 BPM  
 Atrial Rate 87 BPM  
 QRS Duration 186 ms  
 Q-T Interval 554 ms QTC Calculation (Bezet) 677 ms Calculated R Axis -55 degrees Calculated T Axis 38 degrees Diagnosis Ventricular-paced rhythm Biventricular pacemaker detected Abnormal ECG When compared with ECG of 29-NOV-2018 06:23, 
Electronic ventricular pacemaker has replaced Sinus rhythm Confirmed by Bret Driver MD (), Yanique Shown ([id number]) on 11/29/2018 1:21:18 PM 
  
GLUCOSE, POC Collection Time: 11/29/18  4:03 PM  
Result Value Ref Range Glucose (POC) 86 65 - 100 mg/dL GLUCOSE, POC Collection Time: 11/29/18  9:59 PM  
Result Value Ref Range Glucose (POC) 148 (H) 65 - 100 mg/dL MAGNESIUM Collection Time: 11/30/18  3:39 AM  
Result Value Ref Range Magnesium 2.3 1.8 - 2.4 mg/dL CBC WITH AUTOMATED DIFF Collection Time: 11/30/18  3:39 AM  
Result Value Ref Range WBC 9.4 4.3 - 11.1 K/uL  
 RBC 4.22 (L) 4.23 - 5.6 M/uL  
 HGB 10.0 (L) 13.6 - 17.2 g/dL HCT 31.4 (L) 41.1 - 50.3 % MCV 74.4 (L) 79.6 - 97.8 FL  
 MCH 23.7 (L) 26.1 - 32.9 PG  
 MCHC 31.8 31.4 - 35.0 g/dL  
 RDW 16.6 (H) 11.9 - 14.6 % PLATELET 699 637 - 805 K/uL MPV 10.7 9.4 - 12.3 FL ABSOLUTE NRBC 0.00 0.0 - 0.2 K/uL  
 DF AUTOMATED NEUTROPHILS 73 43 - 78 % LYMPHOCYTES 16 13 - 44 % MONOCYTES 11 4.0 - 12.0 % EOSINOPHILS 0 (L) 0.5 - 7.8 % BASOPHILS 0 0.0 - 2.0 % IMMATURE GRANULOCYTES 0 0.0 - 5.0 %  
 ABS. NEUTROPHILS 6.9 1.7 - 8.2 K/UL  
 ABS. LYMPHOCYTES 1.5 0.5 - 4.6 K/UL  
 ABS. MONOCYTES 1.0 0.1 - 1.3 K/UL  
 ABS. EOSINOPHILS 0.0 0.0 - 0.8 K/UL  
 ABS. BASOPHILS 0.0 0.0 - 0.2 K/UL  
 ABS. IMM. GRANS. 0.0 0.0 - 0.5 K/UL METABOLIC PANEL, COMPREHENSIVE Collection Time: 11/30/18  3:39 AM  
Result Value Ref Range Sodium 137 136 - 145 mmol/L Potassium 4.2 3.5 - 5.1 mmol/L Chloride 106 98 - 107 mmol/L  
 CO2 24 21 - 32 mmol/L Anion gap 7 7 - 16 mmol/L Glucose 97 65 - 100 mg/dL BUN 16 6 - 23 MG/DL Creatinine 1.23 0.8 - 1.5 MG/DL  
 GFR est AA >60 >60 ml/min/1.73m2 GFR est non-AA >60 >60 ml/min/1.73m2 Calcium 8.6 8.3 - 10.4 MG/DL Bilirubin, total 0.4 0.2 - 1.1 MG/DL  
 ALT (SGPT) 31 12 - 65 U/L  
 AST (SGOT) 27 15 - 37 U/L Alk. phosphatase 167 (H) 50 - 136 U/L Protein, total 6.9 6.3 - 8.2 g/dL Albumin 2.6 (L) 3.5 - 5.0 g/dL Globulin 4.3 (H) 2.3 - 3.5 g/dL A-G Ratio 0.6 (L) 1.2 - 3.5 GLUCOSE, POC Collection Time: 11/30/18  5:49 AM  
Result Value Ref Range Glucose (POC) 90 65 - 100 mg/dL EKG:  (EKG has been independently visualized by me with interpretation below) Assessment:  
 
Principal Problem: 
  Cardiac arrest ([address] (11/13/2018) Active Problems: 
  Type II diabetes mellitus ([address] (2/4/2016) Acute respiratory failure with hypoxia ([address] (11/13/2018) Hypotension (11/13/2018) Systolic congestive heart failure ([address] (11/13/2018) Schizophrenia ([address] (11/13/2018) Acute renal failure ([address] (11/13/2018) Pneumonia due to Streptococcus pneumoniae ([facility] (11/21/2018) Microcytic anemia (11/22/2018) Plan: 1. Cardiac device implantation: Pt device interrogation this AM reveals normal function, excellent pacing and sensing parameters, CXR without pneumothorax with excellent device position, no recognized complications. 2.  NICM, EF 10%, RBBB with QRS duration 178 msec, NYHA class III: 59yo with NICM now s/p VF cardiac arrest and had BiV ICD placed yesterday, cont OMT 3.  VF arrest: will decrease amiodarone to 400mg daily Eitan Melo MD 
Cardiology/Electrophysiology

## 2018-11-30 NOTE — PROGRESS NOTES
Bedside and Verbal shift change report given to Suha Millard RN (oncoming nurse) by self Abby napier). Report included the following information SBAR, Kardex, ED Summary, Procedure Summary, Intake/Output, MAR, Recent Results and Cardiac Rhythm paced.

## 2018-11-30 NOTE — PROGRESS NOTES
Family now here and IM letter able to be completed with his sister. In accordance with Medicare guidelines, a copy of the Important Letter from Medicare was presented to the patient/family in anticipation of expected discharge. A signed copy was placed in the patients chart.

## 2018-11-30 NOTE — PROGRESS NOTES
Problem: Falls - Risk of 
Goal: *Absence of Falls Document Matthew Levels Fall Risk and appropriate interventions in the flowsheet. Outcome: Progressing Towards Goal 
Fall Risk Interventions: 
Mobility Interventions: Bed/chair exit alarm, Communicate number of staff needed for ambulation/transfer, Patient to call before getting OOB, PT Consult for mobility concerns, PT Consult for assist device competence Mentation Interventions: Bed/chair exit alarm, Adequate sleep, hydration, pain control, Door open when patient unattended, Increase mobility, More frequent rounding, Reorient patient, Update white board, Room close to nurse's station Medication Interventions: Bed/chair exit alarm, Patient to call before getting OOB, Teach patient to arise slowly Elimination Interventions: Bed/chair exit alarm, Call light in reach, Patient to call for help with toileting needs History of Falls Interventions: Bed/chair exit alarm, Investigate reason for fall, Room close to nurse's station, Door open when patient unattended Patient progressing towards goal with no falls on current admission. Patient without confusion or agitation. Patient has very weak and unsteady gait on ambulation. Personal belongings are within reach. Bed is in the low and locked position with side rails up x3. Yellow gripper socks to bilateral feet. Call light within reach and patient verbalizes understanding of use. Problem: Pressure Injury - Risk of 
Goal: *Prevention of pressure injury Document Laurent Scale and appropriate interventions in the flowsheet. Outcome: Progressing Towards Goal 
Pressure Injury Interventions: 
Sensory Interventions: Assess changes in LOC, Assess need for specialty bed, Avoid rigorous massage over bony prominences, Float heels, Minimize linen layers Moisture Interventions: Minimize layers Activity Interventions: Increase time out of bed, PT/OT evaluation Mobility Interventions: HOB 30 degrees or less, Turn and reposition approx. every two hours(pillow and wedges)(pt turns/reposition) Nutrition Interventions: Document food/fluid/supplement intake, Offer support with meals,snacks and hydration Friction and Shear Interventions: Lift sheet, Lift team/patient mobility team, Minimize layers Problem: Pacer/ICD: Post-Procedure Goal: *Hemodynamically stable Outcome: Resolved/Met Date Met: 11/29/18 Patient has been hemodynamically stable on current shift with BP 90s/50s-60s and HR 80s-90s. Goal: *Optimal pain control at patient's stated goal 
Outcome: Resolved/Met Date Met: 11/29/18 Patient has denied pain on current shift. Goal: *Absence of signs and symptoms of infection or wound complication Outcome: Resolved/Met Date Met: 11/29/18 Left subclavian pacer site is covered with an Aquacel dressing which is clean, dry and intact. There are no signs or symptoms of bleeding, swelling, or infection. The left arm is in a sling and swathe and the patient verbalizes understanding of movement limitations of the left arm.

## 2018-11-30 NOTE — PROGRESS NOTES
Care Management Interventions Palliative Care Criteria Met (RRAT>21 & CHF Dx)?: No(RRAT 16 Dx Cardiac arrest) Mode of Transport at Discharge: BLS Transition of Care Consult (CM Consult): SNF Partner SNF: Yes Discharge Durable Medical Equipment: No 
Physical Therapy Consult: Yes Occupational Therapy Consult: Yes Speech Therapy Consult: No 
Current Support Network: Relative's Home(lives with his sister Мария Hall [phone]) Confirm Follow Up Transport: Family Plan discussed with Pt/Family/Caregiver: Yes Freedom of Choice Offered: Yes Discharge Location Discharge Placement: Skilled nursing facility Patient ready for d/c to SNF today. Notified sister Elkin Neves of d/c and she is in agreement with d/c plan. Patient to d/c to [facility] room 212 D by [email or web address] ambulance approximate 2 pm. Packet sent with patient with SNF orders, d/c summary, H/P PT notes, PPD and Passar.

## 2018-11-30 NOTE — PROGRESS NOTES
Care Management Interventions Palliative Care Criteria Met (RRAT>21 & CHF Dx)?: No(RRAT 16 Dx Cardiac arrest) Mode of Transport at Discharge: BLS Transition of Care Consult (CM Consult): SNF Partner SNF: Yes Discharge Durable Medical Equipment: No 
Physical Therapy Consult: Yes Occupational Therapy Consult: Yes Speech Therapy Consult: No 
Current Support Network: Relative's Home(lives with his sister Jarrett Walker [phone]) Confirm Follow Up Transport: Family Plan discussed with Pt/Family/Caregiver: Yes Freedom of Choice Offered: Yes Discharge Location Discharge Placement: Skilled nursing facility Patient S/P ICD and has bed available at [facility] when medically stable. Spoke with Dr. Brien Mendoza and he will see if okay with cardiology. Patient can d/c over weekend if not ready today. CM following.

## 2019-04-10 ENCOUNTER — HOSPITAL ENCOUNTER (EMERGENCY)
Age: 59
Discharge: HOME | End: 2019-04-10
Attending: EMERGENCY MEDICINE
Payer: MEDICARE

## 2019-04-10 VITALS
RESPIRATION RATE: 18 BRPM | OXYGEN SATURATION: 99 % | TEMPERATURE: 97.7 F | HEART RATE: 97 BPM | HEIGHT: 70 IN | WEIGHT: 164 LBS | SYSTOLIC BLOOD PRESSURE: 121 MMHG | BODY MASS INDEX: 23.48 KG/M2 | DIASTOLIC BLOOD PRESSURE: 68 MMHG

## 2019-04-10 DIAGNOSIS — G25.3 MYOCLONUS: Primary | ICD-10-CM

## 2019-04-10 DIAGNOSIS — G25.89 EXTRAPYRAMIDAL MOVEMENT DISORDER, DRUG-INDUCED: ICD-10-CM

## 2019-04-10 DIAGNOSIS — T50.905A EXTRAPYRAMIDAL MOVEMENT DISORDER, DRUG-INDUCED: ICD-10-CM

## 2019-04-10 DIAGNOSIS — F20.0 PARANOID SCHIZOPHRENIA (HCC): ICD-10-CM

## 2019-04-10 LAB
ALBUMIN SERPL-MCNC: 3.4 G/DL (ref 3.5–5)
ALBUMIN/GLOB SERPL: 0.8 {RATIO} (ref 1.2–3.5)
ALP SERPL-CCNC: 169 U/L (ref 50–136)
ALT SERPL-CCNC: 48 U/L (ref 12–65)
ANION GAP SERPL CALC-SCNC: 7 MMOL/L (ref 7–16)
AST SERPL-CCNC: 31 U/L (ref 15–37)
BASOPHILS # BLD: 0 K/UL (ref 0–0.2)
BASOPHILS NFR BLD: 0 % (ref 0–2)
BILIRUB SERPL-MCNC: 0.5 MG/DL (ref 0.2–1.1)
BUN SERPL-MCNC: 25 MG/DL (ref 6–23)
CALCIUM SERPL-MCNC: 9.1 MG/DL (ref 8.3–10.4)
CHLORIDE SERPL-SCNC: 107 MMOL/L (ref 98–107)
CO2 SERPL-SCNC: 28 MMOL/L (ref 21–32)
CREAT SERPL-MCNC: 1.65 MG/DL (ref 0.8–1.5)
DIFFERENTIAL METHOD BLD: ABNORMAL
EOSINOPHIL # BLD: 0 K/UL (ref 0–0.8)
EOSINOPHIL NFR BLD: 0 % (ref 0.5–7.8)
ERYTHROCYTE [DISTWIDTH] IN BLOOD BY AUTOMATED COUNT: 16.1 % (ref 11.9–14.6)
GLOBULIN SER CALC-MCNC: 4.3 G/DL (ref 2.3–3.5)
GLUCOSE SERPL-MCNC: 124 MG/DL (ref 65–100)
HCT VFR BLD AUTO: 40.3 % (ref 41.1–50.3)
HGB BLD-MCNC: 12.5 G/DL (ref 13.6–17.2)
IMM GRANULOCYTES # BLD AUTO: 0 K/UL (ref 0–0.5)
IMM GRANULOCYTES NFR BLD AUTO: 0 % (ref 0–5)
LYMPHOCYTES # BLD: 2 K/UL (ref 0.5–4.6)
LYMPHOCYTES NFR BLD: 19 % (ref 13–44)
MCH RBC QN AUTO: 22.9 PG (ref 26.1–32.9)
MCHC RBC AUTO-ENTMCNC: 31 G/DL (ref 31.4–35)
MCV RBC AUTO: 73.9 FL (ref 79.6–97.8)
MONOCYTES # BLD: 0.8 K/UL (ref 0.1–1.3)
MONOCYTES NFR BLD: 8 % (ref 4–12)
NEUTS SEG # BLD: 7.6 K/UL (ref 1.7–8.2)
NEUTS SEG NFR BLD: 73 % (ref 43–78)
NRBC # BLD: 0 K/UL (ref 0–0.2)
PLATELET # BLD AUTO: 209 K/UL (ref 150–450)
PMV BLD AUTO: 11.7 FL (ref 9.4–12.3)
POTASSIUM SERPL-SCNC: 3.5 MMOL/L (ref 3.5–5.1)
PROT SERPL-MCNC: 7.7 G/DL (ref 6.3–8.2)
RBC # BLD AUTO: 5.45 M/UL (ref 4.23–5.6)
SODIUM SERPL-SCNC: 142 MMOL/L (ref 136–145)
WBC # BLD AUTO: 10.5 K/UL (ref 4.3–11.1)

## 2019-04-10 PROCEDURE — 80053 COMPREHEN METABOLIC PANEL: CPT

## 2019-04-10 PROCEDURE — 85025 COMPLETE CBC W/AUTO DIFF WBC: CPT

## 2019-04-10 PROCEDURE — 96374 THER/PROPH/DIAG INJ IV PUSH: CPT | Performed by: EMERGENCY MEDICINE

## 2019-04-10 PROCEDURE — 99283 EMERGENCY DEPT VISIT LOW MDM: CPT | Performed by: EMERGENCY MEDICINE

## 2019-04-10 PROCEDURE — 74011250636 HC RX REV CODE- 250/636: Performed by: EMERGENCY MEDICINE

## 2019-04-10 RX ORDER — BENZTROPINE MESYLATE 1 MG/ML
2 INJECTION INTRAMUSCULAR; INTRAVENOUS ONCE
Status: COMPLETED | OUTPATIENT
Start: 2019-04-10 | End: 2019-04-10

## 2019-04-10 RX ORDER — BENZTROPINE MESYLATE 2 MG/1
2 TABLET ORAL 2 TIMES DAILY
Qty: 62 TAB | Refills: 1 | Status: SHIPPED | OUTPATIENT
Start: 2019-04-10 | End: 2019-05-11

## 2019-04-10 RX ADMIN — BENZTROPINE MESYLATE 2 MG: 1 INJECTION INTRAMUSCULAR; INTRAVENOUS at 11:28

## 2019-04-10 NOTE — ED NOTES
I have reviewed discharge instructions with the patient and caregiver. The patient and caregiver verbalized understanding. Patient left ED via Discharge Method: wheelchair to Home with family. Opportunity for questions and clarification provided. Patient given 1 scripts. No e-sign. To continue your aftercare when you leave the hospital, you may receive an automated call from our care team to check in on how you are doing. This is a free service and part of our promise to provide the best care and service to meet your aftercare needs.  If you have questions, or wish to unsubscribe from this service please call [phone]. Thank you for Choosing our [facility] Emergency Department.

## 2019-04-10 NOTE — ED TRIAGE NOTES
Pt has had shaking for the last 3-4 days. Also has uncontrollable drooling and is unable to stand. Pt's family member states he has not started any new medication in the past couple of weeks.

## 2019-04-10 NOTE — DISCHARGE INSTRUCTIONS
Stop the austeda  Stop the scopolamine patch  Start cogentin 1/2 -1 tablet (1-2mg) every 12 hours (next dose at bedtime)    Try to follow up with mental health as soon as possible    Return to the e.r. If worse in any way      Patient Education     Side Effects of Medicine: Care Instructions  Your Care Instructions  Medicines are a big part of treatment for many health problems. But all medicines have side effects. Often these are mild problems. They might include a dry mouth or upset stomach. But sometimes medicines can cause dangerous side effects. One example is a bad allergic reaction. The best treatment will depend on what side effects you have. If you have a serious side effect, you may need to stop taking the medicine. You may also need to take another medicine to treat the side effect. If you have a mild side effect, it may go away after you take the medicine for a while. The doctor has checked you carefully, but problems can develop later. If you notice any problems or new symptoms, get medical treatment right away. Follow-up care is a key part of your treatment and safety. Be sure to make and go to all appointments, and call your doctor if you are having problems. It's also a good idea to know your test results and keep a list of the medicines you take. How can you care for yourself at home? · Be safe with medicines. Take your medicines exactly as prescribed. Call your doctor if you think you are having a problem with your medicine. · Call your doctor if side effects bother you and you wonder if you should keep taking a medicine. Your doctor may be able to lower your dose or change your medicine. Do not suddenly quit taking your medicine unless a doctor tells you to. · Make sure your doctor has a list of all the medicines, vitamins, supplements, and herbal remedies you take. Ask about side effects. When should you call for help? Call 911 anytime you think you may need emergency care.  For example, call if:  · You have symptoms of a severe allergic reaction. These may include:  ¨ Sudden raised, red areas (hives) all over your body. ¨ Swelling of the throat, mouth, lips, or tongue. ¨ Trouble breathing. ¨ Passing out (losing consciousness). Or you may feel very lightheaded or suddenly feel weak, confused, or restless. Call your doctor now or seek immediate medical care if:  · You have symptoms of an allergic reaction, such as:  ¨ A rash or hives (raised, red areas on the skin). ¨ Itching. ¨ Swelling. ¨ Belly pain, nausea, or vomiting. Watch closely for changes in your health, and be sure to contact your doctor if:  · You think you are having a new problem with your medicine. · You do not get better as expected. Where can you learn more? Go to [employer]TractApps.be  Enter D152 in the search box to learn more about \"Side Effects of Medicine: Care Instructions. \"   © 2956-1227 [employer]. Care instructions adapted under license by [employer] (which disclaims liability or warranty for this information). This care instruction is for use with your licensed healthcare professional. If you have questions about a medical condition or this instruction, always ask your healthcare professional. [address] any warranty or liability for your use of this information. Content Version: 00.3.400187; Current as of: November 20, 2015           Patient Education        Medicine for Schizophrenia: Care Instructions  Your Care Instructions  Medicine is the best treatment for schizophrenia. But it can be hard to take the medicine. This may be because:  · You have severe side effects. · You don't believe you are ill. · You feel better. You may think you no longer need medicine. · You forget to take your medicine. This might be because of confused thinking or depression. · You have a drug or alcohol problem that gets in the way.   · You don't want to be reminded that you have a mental health problem. Taking medicine every day reminds you. But if you stop taking your medicine, you probably will have a relapse. A relapse means your symptoms return or get worse after you have been feeling better. As long as you are taking medicines, you will need to see your doctor on a regular basis. You may need to go to a hospital while you are changing or stopping medicines. Follow-up care is a key part of your treatment and safety. Be sure to make and go to all appointments, and call your doctor if you are having problems. It's also a good idea to know your test results and keep a list of the medicines you take. What medicines are used for schizophrenia? Many types of medicines can help you. It might be best to use more than one, but it may take time to find which medicines work well for you. This may be frustrating. But your doctor and family can support you during this time. Medicines used most often include:  · First-generation antipsychotics. Examples are chlorpromazine, haloperidol (Haldol), and perphenazine. They are used to reduce anxiety and agitation. They also keep you from hearing or seeing things that aren't there (hallucinations) and from believing things that aren't true (delusions). · Second-generation antipsychotics. Examples are aripiprazole (Abilify) and risperidone (Risperdal). These medicines keep you from hearing or seeing things that aren't there (hallucinations) and from believing things that aren't true (delusions). They also help the negative symptoms, like not caring about things or finding it hard to say how you feel. These medicines may have fewer side effects than first-generation medicines. These medicines sometimes have severe side effects. Always talk to your doctor about how they are working and how you are feeling. If you feel that a medicine isn't right for you, your doctor can help you find a new one.  Don't stop taking your medicines unless you talk to your doctor. How can you care for yourself at home? Take your medicine  · Be safe with medicines. Take your medicines exactly as prescribed. Call your doctor if you think you are having a problem with your medicine. · If you are having trouble taking your medicines or feel you don't need to take them, talk to your doctor. Your doctor may be able to change the medicine or the amount you take. Ask about long-acting medicines  · Ask your doctor about long-acting medicines that are injected (shots). You get a shot every week or every few weeks. This may be a good choice because:  ? You have a set day and time to get the shot. ? If you don't show up for your shot, your doctor knows right away. ? The medicine stays in your body longer. If you are a little late for a shot, you have more time to get help before your symptoms return. ? You are not reminded every day that you have a mental health problem. ? You don't have to carry pills with you. Have a routine  · Make a schedule for taking your medicines. Follow it every day. · Identify things you do every day at the same time, such as brushing your teeth. Use these activities to help remind you to take your medicines. · Set your watch alarm or a kitchen timer to remind you when to take your medicines. Or ask a family member to help you remember to take your medicines. · Keep the numbers for these national suicide hotlines: 5-959-408-TALK (5-589.785.4607) and 6-607-WXBCTPI (2-509.845.3151). If you or someone you know talks about suicide or about feeling hopeless, get help right away. Use a pillbox  · Use a plastic pillbox with dividers for each day's medicines. It can have a few or many compartments. Some have timers you can program. Choose one that fits your needs. · Put your pillbox in a place where it will remind you to take your medicines.  For example, if you need to take medicine 3 times a day with meals, put those medicines in a pillbox near where you eat.  · Keep one pill in its original bottle. Then if you forget what a pill is for, you can find the bottle it came from. When should you call for help? Call 911 anytime you think you may need emergency care. For example, call if:    · You are thinking about suicide or are threatening suicide.     · You feel you cannot stop from hurting yourself or someone else.     · You hear voices that tell you to hurt yourself or someone else or to do something illegal, such as destroy property or steal.    Call your doctor now or seek immediate medical care if:    · You show warning signs of suicide, such as talking about death or spending long periods of time alone.     · You hear voices.     · You think someone is trying to harm you.     · You cannot concentrate or are easily confused.     · You are drinking a lot of alcohol or using illegal drugs.     · You have a hard time taking care of basic needs, such as grooming.     · You have signs of neuroleptic malignant syndrome, a side effect of a medicine you may be taking. Signs include:  ? A fever of 102°F to 103°F.  ? A fast or irregular heartbeat. ? Rapid breathing. ? Severe sweating.     · You have signs of tardive dyskinesia, a side effect of a medicine you may be taking. These include:  ? Lip-smacking or continuous chewing. ? Tongue-twitching or thrusting the tongue out of the mouth. ? Quick and jerky movements (tics) of the head.    Watch closely for changes in your health, and be sure to contact your doctor if:    · Your symptoms come back or are getting worse after you have been getting better.     · You cannot go to your counseling sessions.     · You are not taking your medicines or you are thinking about not taking them. Where can you learn more? Go to http://peace-daniel.info/. Enter L426 in the search box to learn more about \"Medicine for Schizophrenia: Care Instructions. \"  Current as of: September 11, 2018  Content Version: 11.9  © 2498-8931 [employer]. Care instructions adapted under license by [employer] (which disclaims liability or warranty for this information). If you have questions about a medical condition or this instruction, always ask your healthcare professional. [place] [address] any warranty or liability for your use of this information.

## 2019-04-10 NOTE — ED PROVIDER NOTES
Patient with schizophrenia has been maintained on Clozaril for long-term maintenance presents with increased myoclonic jerking. Patient had been on Cogentin for a long time with mild amounts of myoclonic jerking as a side effect to his antipsychotic. About 2 years ago he was taken off the Cogentin and had been maintained on scopolamine patches instead. More recently the scopolamine was stopped and patient was started on a new medicine called:\"austedo\". This was about 15 or 16 days ago. Patient took the first 2 weeks of this medicine but after about 2 weeks started getting worse with increased myoclonic jerking so it was stopped and his family member put him back on the scopolamine patch for roughly the last 5 or 6 days. Over the last 2-3 days has been a dramatic increase in the myoclonic jerking activity. Drooling is about at baseline Past Medical History:  
Diagnosis Date  Diabetes ([address]  Heart failure ([address]  Hypertension  Psychiatric disorder   
 paranoid schziphrenia  Systolic CHF, acute on chronic ([address] 2/4/2016 Past Surgical History:  
Procedure Laterality Date  HX APPENDECTOMY Family History:  
Problem Relation Age of Onset  Heart Failure Mother  Heart Attack Father  Diabetes Sister  Hypertension Sister  Heart Disease Sister   
     valvular  No Known Problems Brother  No Known Problems Brother  Diabetes Sister  Diabetes Sister  Diabetes Sister  No Known Problems Sister  No Known Problems Sister  Lung Cancer Brother  Heart Attack Brother Social History Socioeconomic History  Marital status: SINGLE Spouse name: Not on file  Number of children: Not on file  Years of education: Not on file  Highest education level: Not on file Occupational History  Occupation: disabled Social Needs  Financial resource strain: Not on file  Food insecurity: Worry: Not on file Inability: Not on file  Transportation needs:  
  Medical: Not on file Non-medical: Not on file Tobacco Use  Smoking status: Former Smoker Packs/day: 0.50 Years: 16.00 Pack years: 8.00 Last attempt to quit: 2015 Years since quittin.2  Smokeless tobacco: Never Used  Tobacco comment: quit around  Substance and Sexual Activity  Alcohol use: No  
 Drug use: No  
 Sexual activity: Never Lifestyle  Physical activity:  
  Days per week: Not on file Minutes per session: Not on file  Stress: Not on file Relationships  Social connections:  
  Talks on phone: Not on file Gets together: Not on file Attends Orthodoxy service: Not on file Active member of club or organization: Not on file Attends meetings of clubs or organizations: Not on file Relationship status: Not on file  Intimate partner violence:  
  Fear of current or ex partner: Not on file Emotionally abused: Not on file Physically abused: Not on file Forced sexual activity: Not on file Other Topics Concern  Not on file Social History Narrative Lives with sister ALLERGIES: Patient has no known allergies. Review of Systems Constitutional: Negative for chills and fever. HENT: Positive for drooling. Negative for rhinorrhea and sore throat. Eyes: Negative for discharge and redness. Respiratory: Negative for cough and shortness of breath. Cardiovascular: Negative for chest pain. Gastrointestinal: Negative for abdominal pain, diarrhea, nausea and vomiting. Skin: Negative for rash. Neurological: Positive for tremors. Negative for dizziness and headaches. All other systems reviewed and are negative. Vitals:  
 04/10/19 1106 BP: 116/63 Pulse: 98 Resp: 18 Temp: 97.6 °F (36.4 °C) SpO2: 98% Weight: 74.4 kg (164 lb) Height: 5' 10\" (1.778 m) Physical Exam  
 Constitutional: He is oriented to person, place, and time. He appears well-developed and well-nourished. No distress. Frequent, large amplitude myoclonic jerking, multiple per minute HENT:  
Head: Normocephalic and atraumatic. Eyes: Pupils are equal, round, and reactive to light. Conjunctivae are normal. Right eye exhibits no discharge. Left eye exhibits no discharge. No scleral icterus. Neck: Normal range of motion. Neck supple. Cardiovascular: Normal rate, regular rhythm and normal heart sounds. Exam reveals no gallop. No murmur heard. Pulmonary/Chest: Effort normal and breath sounds normal. No respiratory distress. He has no wheezes. He has no rales. Abdominal: Soft. Bowel sounds are normal. There is no tenderness. There is no guarding. Musculoskeletal: Normal range of motion. He exhibits no edema. Neurological: He is alert and oriented to person, place, and time. He exhibits normal muscle tone. cni 2-12 grossly Skin: Skin is warm and dry. He is not diaphoretic. Psychiatric: He has a normal mood and affect. His behavior is normal.  
Nursing note and vitals reviewed. MDM Number of Diagnoses or Management Options Extrapyramidal movement disorder, drug-induced:  
Myoclonus:  
Paranoid schizophrenia [facility]):  
Diagnosis management comments: Medical decision making note: 
Myoclonic jerking/extraparametal side effects from antipsychotic. 
symptoms are better but not resolved following 2 mg IV Cogentin Attempts were made to reach his psychiatrist at [address] she is not in today Patient has been written for 2 mg Cogentin half to 1 tablet twice a day Stop scopolamine, stop Austedo f/u This concludes the \"medical decision making note\" part of this emergency department visit note. Procedures

## 2020-05-03 ENCOUNTER — HOSPITAL ENCOUNTER (EMERGENCY)
Age: 60
Discharge: HOME | End: 2020-05-03
Attending: EMERGENCY MEDICINE
Payer: MEDICARE

## 2020-05-03 ENCOUNTER — APPOINTMENT (OUTPATIENT)
Dept: GENERAL RADIOLOGY | Age: 60
End: 2020-05-03
Attending: EMERGENCY MEDICINE
Payer: MEDICARE

## 2020-05-03 VITALS
RESPIRATION RATE: 20 BRPM | OXYGEN SATURATION: 98 % | HEIGHT: 71 IN | HEART RATE: 92 BPM | SYSTOLIC BLOOD PRESSURE: 102 MMHG | BODY MASS INDEX: 22.12 KG/M2 | DIASTOLIC BLOOD PRESSURE: 67 MMHG | TEMPERATURE: 98.6 F | WEIGHT: 158 LBS

## 2020-05-03 DIAGNOSIS — R53.83 FATIGUE, UNSPECIFIED TYPE: Primary | ICD-10-CM

## 2020-05-03 DIAGNOSIS — I50.9 CHRONIC CONGESTIVE HEART FAILURE, UNSPECIFIED HEART FAILURE TYPE (HCC): ICD-10-CM

## 2020-05-03 LAB
ALBUMIN SERPL-MCNC: 3 G/DL (ref 3.5–5)
ALBUMIN/GLOB SERPL: 0.7 {RATIO} (ref 1.2–3.5)
ALP SERPL-CCNC: 137 U/L (ref 50–136)
ALT SERPL-CCNC: 19 U/L (ref 12–65)
ANION GAP SERPL CALC-SCNC: 5 MMOL/L (ref 7–16)
AST SERPL-CCNC: 17 U/L (ref 15–37)
BASOPHILS # BLD: 0 K/UL (ref 0–0.2)
BASOPHILS NFR BLD: 0 % (ref 0–2)
BILIRUB SERPL-MCNC: 0.5 MG/DL (ref 0.2–1.1)
BNP SERPL-MCNC: 2979 PG/ML (ref 5–125)
BUN SERPL-MCNC: 20 MG/DL (ref 6–23)
CALCIUM SERPL-MCNC: 9 MG/DL (ref 8.3–10.4)
CHLORIDE SERPL-SCNC: 106 MMOL/L (ref 98–107)
CO2 SERPL-SCNC: 29 MMOL/L (ref 21–32)
CREAT SERPL-MCNC: 2.07 MG/DL (ref 0.8–1.5)
DIFFERENTIAL METHOD BLD: ABNORMAL
EOSINOPHIL # BLD: 0 K/UL (ref 0–0.8)
EOSINOPHIL NFR BLD: 0 % (ref 0.5–7.8)
ERYTHROCYTE [DISTWIDTH] IN BLOOD BY AUTOMATED COUNT: 15.5 % (ref 11.9–14.6)
GLOBULIN SER CALC-MCNC: 4.3 G/DL (ref 2.3–3.5)
GLUCOSE SERPL-MCNC: 143 MG/DL (ref 65–100)
HCT VFR BLD AUTO: 38 % (ref 41.1–50.3)
HGB BLD-MCNC: 12 G/DL (ref 13.6–17.2)
IMM GRANULOCYTES # BLD AUTO: 0 K/UL (ref 0–0.5)
IMM GRANULOCYTES NFR BLD AUTO: 0 % (ref 0–5)
LACTATE SERPL-SCNC: 1 MMOL/L (ref 0.4–2)
LYMPHOCYTES # BLD: 1.7 K/UL (ref 0.5–4.6)
LYMPHOCYTES NFR BLD: 20 % (ref 13–44)
MCH RBC QN AUTO: 23.3 PG (ref 26.1–32.9)
MCHC RBC AUTO-ENTMCNC: 31.6 G/DL (ref 31.4–35)
MCV RBC AUTO: 73.9 FL (ref 79.6–97.8)
MONOCYTES # BLD: 0.7 K/UL (ref 0.1–1.3)
MONOCYTES NFR BLD: 9 % (ref 4–12)
NEUTS SEG # BLD: 5.7 K/UL (ref 1.7–8.2)
NEUTS SEG NFR BLD: 70 % (ref 43–78)
NRBC # BLD: 0 K/UL (ref 0–0.2)
PLATELET # BLD AUTO: 218 K/UL (ref 150–450)
PMV BLD AUTO: 11 FL (ref 9.4–12.3)
POTASSIUM SERPL-SCNC: 3.9 MMOL/L (ref 3.5–5.1)
PROT SERPL-MCNC: 7.3 G/DL (ref 6.3–8.2)
RBC # BLD AUTO: 5.14 M/UL (ref 4.23–5.6)
SODIUM SERPL-SCNC: 140 MMOL/L (ref 136–145)
WBC # BLD AUTO: 8.1 K/UL (ref 4.3–11.1)

## 2020-05-03 PROCEDURE — 99284 EMERGENCY DEPT VISIT MOD MDM: CPT

## 2020-05-03 PROCEDURE — 80053 COMPREHEN METABOLIC PANEL: CPT

## 2020-05-03 PROCEDURE — 83605 ASSAY OF LACTIC ACID: CPT

## 2020-05-03 PROCEDURE — 85025 COMPLETE CBC W/AUTO DIFF WBC: CPT

## 2020-05-03 PROCEDURE — 71046 X-RAY EXAM CHEST 2 VIEWS: CPT

## 2020-05-03 PROCEDURE — 83880 ASSAY OF NATRIURETIC PEPTIDE: CPT

## 2020-05-03 NOTE — ED NOTES
Pt hypotensive in triage. Pt denies any dizziness, but has been weak. Pt family member states his blood pressure normally runs very low.

## 2020-05-03 NOTE — ED TRIAGE NOTES
Pt arrives to triage ambulatory with mask in place. Pt family member present, reports pt has been getting weaker and having a poor apetite over the last few weeks, also reports teeth pain. Swelling noted to left lower jaw, pt drooling and having difficulty speaking.

## 2020-05-03 NOTE — ED NOTES
I have reviewed discharge instructions with the patient. The patient verbalized understanding. Patient left ED via Discharge Method: ambulatory to Home with sister, driving pt home. Opportunity for questions and clarification provided. Patient given 0 scripts. To continue your aftercare when you leave the hospital, you may receive an automated call from our care team to check in on how you are doing. This is a free service and part of our promise to provide the best care and service to meet your aftercare needs.  If you have questions, or wish to unsubscribe from this service please call [phone]. Thank you for Choosing our [facility] Emergency Department.

## 2020-05-03 NOTE — DISCHARGE INSTRUCTIONS
Call the cardiology office tomorrow to schedule a follow-up appointment this week. Continue taking his medications as prescribed and monitor his blood pressure.

## 2020-05-03 NOTE — ED PROVIDER NOTES
The patient is a 82-year-old male presenting to emerge department day with his sister who is his primary caregiver. She states for the last 2 days he has been significantly more weak than normal.  She says that if he will walk for about 10 to 15 feet he has to grab onto something because he gets so tired and needs to sit down. She has not noticed any increased work of breathing but has noticed he has had some weight loss. He has a history of heart disease and follows with [facility] cardiology. Patient denies any chest pain abdominal pain nausea vomiting. Denies any headache or vision changes. He denies any focal neurologic deficits.            Past Medical History:   Diagnosis Date    Diabetes ([address]     Heart failure ([facility] [facility]     Hypertension     Psychiatric disorder     paranoid schziphrenia    Systolic CHF, acute on chronic ([facility] [address] 2/4/2016       Past Surgical History:   Procedure Laterality Date    HX APPENDECTOMY           Family History:   Problem Relation Age of Onset    Heart Failure Mother     Heart Attack Father     Diabetes Sister     Hypertension Sister     Heart Disease Sister         valvular    No Known Problems Brother     No Known Problems Brother     Diabetes Sister     Diabetes Sister     Diabetes Sister     No Known Problems Sister     No Known Problems Sister     Lung Cancer Brother     Heart Attack Brother        Social History     Socioeconomic History    Marital status: SINGLE     Spouse name: Not on file    Number of children: Not on file    Years of education: Not on file    Highest education level: Not on file   Occupational History    Occupation: disabled   Social Needs    Financial resource strain: Not on file    Food insecurity     Worry: Not on file     Inability: Not on file   [employer] needs     Medical: Not on file     Non-medical: Not on file   Tobacco Use    Smoking status: Former Smoker     Packs/day: 0.50     Years: 16.00     Pack years: 8.00 Last attempt to quit: 2015     Years since quittin.3    Smokeless tobacco: Never Used    Tobacco comment: quit around     Substance and Sexual Activity    Alcohol use: No    Drug use: No    Sexual activity: Never   Lifestyle    Physical activity     Days per week: Not on file     Minutes per session: Not on file    Stress: Not on file   Relationships    Social connections     Talks on phone: Not on file     Gets together: Not on file     Attends Sikhism service: Not on file     Active member of club or organization: Not on file     Attends meetings of clubs or organizations: Not on file     Relationship status: Not on file    Intimate partner violence     Fear of current or ex partner: Not on file     Emotionally abused: Not on file     Physically abused: Not on file     Forced sexual activity: Not on file   Other Topics Concern    Not on file   Social History Narrative    Lives with sister         ALLERGIES: Claritin [loratadine]    Review of Systems   Constitutional: Positive for activity change, appetite change and fatigue. Negative for chills and fever. HENT: Positive for dental problem and drooling. History of chronic drooling   All other systems reviewed and are negative. Vitals:    20 0232 20 0304 20 0305 20 0331   BP:  98/72  102/67   Pulse: 90 96 96 92   Resp:  21  20   Temp:  98.6 °F (37 °C)  98.6 °F (37 °C)   SpO2: 98% 97% 98% 98%   Weight:       Height:                Physical Exam     GENERAL:The patient is thin, and dehydrated. VITAL SIGNS: Heart rate, blood pressure, respiratory rate reviewed as recorded in  nurse's notes  EYES: Pupils reactive. Extraocular motion intact. No conjunctival redness or drainage. EARS: No external masses or lesions. NOSE: No nasal drainage or epistaxis. MOUTH/THROAT: Pharynx clear; airway patent.   Very poor dentition with multiple missing teeth and the rest of the teeth that are present have severe decay in various stages. No dental abscess appreciated. Floor the mouth is soft. NECK: Supple, no meningeal signs. Trachea midline. No masses or thyromegaly. LUNGS: Breath sounds clear and equal bilaterally no accessory muscle use  CHEST: No deformity  CARDIOVASCULAR: Regular rate and rhythm  ABDOMEN: Soft without tenderness. No palpable masses or organomegaly. No  peritoneal signs. No rigidity. EXTREMITIES: No clubbing or cyanosis. No joint swelling. Normal muscle tone. No  restricted range of motion appreciated. NEUROLOGIC: Sensation is grossly intact. Cranial nerve exam reveals face is  symmetrical, tongue is midline speech is clear.  strength 5 out of 5 equal bilaterally. SKIN: No rash or petechiae. Good skin turgor palpated. MDM  Number of Diagnoses or Management Options  Chronic congestive heart failure, unspecified heart failure type [facility]):    Fatigue, unspecified type:   Diagnosis management comments: CHF, COPD, pneumonia,    MI, coronary artery disease, unstable angina, coronary artery disease, heart block,  electrolyte induced palpitations,    GERD, musculoskeletal pain, costochondritis, rib fracture, pleurisy,    Renal failure, electrolyte abnormality,         Amount and/or Complexity of Data Reviewed  Clinical lab tests: ordered and reviewed  Tests in the radiology section of CPT®: ordered and reviewed  Tests in the medicine section of CPT®: reviewed and ordered  Decide to obtain previous medical records or to obtain history from someone other than the patient: yes  Review and summarize past medical records: yes  Independent visualization of images, tracings, or specimens: yes      ED Course as of May 04 0712   Sun May 03, 2020   0211 IMPRESSION: Normal chest.   XR CHEST PA LAT [KH]   0211 NT pro-BNP(!): 2,979 [KH]   0211 Creatinine(!): 2.07 [KH]   0211 BUN: 20 [KH]   0211 GFR est AA(!): 43 [KH]   0310 Patient was able to walk a full lap around the emergency department with no shortness of breath hypoxia or distress. I talked to the patient and his sister about follow-up with her cardiologist this coming week in the office to talk about his blood pressure control. [KH]   2367 The patient sister and I talked about his low blood pressure she says that this is normal for him. The patient is asymptomatic with his blood pressure in the 70/50 range even with exertion.     [KH]      ED Course User Index  [KH] Leni Landaverde, DO       Procedures

## 2020-05-04 ENCOUNTER — PATIENT OUTREACH (OUTPATIENT)
Dept: CASE MANAGEMENT | Age: 60
End: 2020-05-04

## 2020-05-04 NOTE — PROGRESS NOTES
RNCM attempted to reach pt at only number listed, no answer and no name on vm. RNCM will continue attempts to reach pt.

## 2020-05-05 ENCOUNTER — PATIENT OUTREACH (OUTPATIENT)
Dept: CASE MANAGEMENT | Age: 60
End: 2020-05-05

## 2020-05-05 NOTE — PROGRESS NOTES
Patient contacted regarding recent discharge and COVID-19 risk   Ambulatory Care Manager contacted the family by telephone to perform post discharge assessment. Verified name and  with family as identifiers. Patient has following risk factors of: heart failure. ACM reviewed discharge instructions, medical action plan and red flags related to discharge diagnosis. Reviewed and educated them on any new and changed medications related to discharge diagnosis. Advised obtaining a 90-day supply of all daily and as-needed medications. Education provided regarding infection prevention, and signs and symptoms of COVID-19 and when to seek medical attention with family who verbalized understanding. Discussed exposure protocols and quarantine from [address] you at higher risk for severe illness  and given an opportunity for questions and concerns. The family agrees to contact the COVID-19 hotline [phone] or PCP office for questions related to their healthcare. ACM provided contact information for future reference. From CDC: Are you at higher risk for severe illness?  Wash your hands often.  Avoid close contact (6 feet, which is about two arm lengths) with people who are sick.  Put distance between yourself and other people if COVID-19 is spreading in your community.  Clean and disinfect frequently touched surfaces.  Avoid all cruise travel and non-essential air travel.  Call your healthcare professional if you have concerns about COVID-19 and your underlying condition or if you are sick. For more information on steps you can take to protect yourself, see CDC's How to Protect Yourself      Patient/family/caregiver given information for GetWell Loop and agrees to enroll yes  Patient's preferred e-mail:  [email or web address]  Patient's preferred phone number: [phone]  Based on Loop alert triggers, patient will be contacted by nurse care manager for worsening symptoms. Pt will be further monitored by COVID Loop Team based on risk factors.

## 2020-05-19 ENCOUNTER — PATIENT OUTREACH (OUTPATIENT)
Dept: CASE MANAGEMENT | Age: 60
End: 2020-05-19

## 2022-03-18 PROBLEM — I95.9 HYPOTENSION: Status: ACTIVE | Noted: 2018-11-13

## 2022-03-18 PROBLEM — I46.9 CARDIAC ARREST (HCC): Status: ACTIVE | Noted: 2018-11-13

## 2022-03-19 PROBLEM — J13 PNEUMONIA DUE TO STREPTOCOCCUS PNEUMONIAE (HCC): Status: ACTIVE | Noted: 2018-11-21

## 2022-03-19 PROBLEM — N17.9 ACUTE RENAL FAILURE (HCC): Status: ACTIVE | Noted: 2018-11-13

## 2022-03-19 PROBLEM — J96.01 ACUTE RESPIRATORY FAILURE WITH HYPOXIA (HCC): Status: ACTIVE | Noted: 2018-11-13

## 2022-03-19 PROBLEM — D50.9 MICROCYTIC ANEMIA: Status: ACTIVE | Noted: 2018-11-22

## 2022-03-19 PROBLEM — F20.9 SCHIZOPHRENIA (HCC): Status: ACTIVE | Noted: 2018-11-13

## 2022-03-20 PROBLEM — I50.20 SYSTOLIC CONGESTIVE HEART FAILURE (HCC): Status: ACTIVE | Noted: 2018-11-13

## 2022-07-10 ENCOUNTER — HOSPITAL ENCOUNTER (INPATIENT)
Age: 62
LOS: 2 days | Discharge: HOME HEALTH | DRG: 558 | End: 2022-07-12
Attending: EMERGENCY MEDICINE | Admitting: INTERNAL MEDICINE
Payer: MEDICARE

## 2022-07-10 ENCOUNTER — HOSPITAL ENCOUNTER (EMERGENCY)
Dept: GENERAL RADIOLOGY | Age: 62
Discharge: HOME | DRG: 558 | End: 2022-07-13
Payer: MEDICARE

## 2022-07-10 DIAGNOSIS — R62.7 FAILURE TO THRIVE IN ADULT: ICD-10-CM

## 2022-07-10 DIAGNOSIS — I50.22 CHRONIC SYSTOLIC HEART FAILURE (HCC): ICD-10-CM

## 2022-07-10 DIAGNOSIS — R74.8 ELEVATED CREATINE KINASE: ICD-10-CM

## 2022-07-10 DIAGNOSIS — R53.83 FATIGUE, UNSPECIFIED TYPE: Primary | ICD-10-CM

## 2022-07-10 PROBLEM — N18.31 STAGE 3A CHRONIC KIDNEY DISEASE (HCC): Status: ACTIVE | Noted: 2022-07-10

## 2022-07-10 PROBLEM — M62.82 RHABDOMYOLYSIS: Status: ACTIVE | Noted: 2022-07-10

## 2022-07-10 PROBLEM — R79.89 ELEVATED LFTS: Status: ACTIVE | Noted: 2022-07-10

## 2022-07-10 PROBLEM — F20.9 SCHIZOPHRENIA (HCC): Status: ACTIVE | Noted: 2018-11-13

## 2022-07-10 PROBLEM — I50.20 SYSTOLIC CONGESTIVE HEART FAILURE (HCC): Status: ACTIVE | Noted: 2018-11-13

## 2022-07-10 LAB
ALBUMIN SERPL-MCNC: 3.2 G/DL (ref 3.2–4.6)
ALBUMIN/GLOB SERPL: 0.7 {RATIO} (ref 1.2–3.5)
ALP SERPL-CCNC: 143 U/L (ref 50–136)
ALT SERPL-CCNC: 146 U/L (ref 12–65)
AMPHET UR QL SCN: NEGATIVE
ANION GAP SERPL CALC-SCNC: 3 MMOL/L (ref 7–16)
AST SERPL-CCNC: 154 U/L (ref 15–37)
B PERT DNA SPEC QL NAA+PROBE: NOT DETECTED
BARBITURATES UR QL SCN: NEGATIVE
BENZODIAZ UR QL: NEGATIVE
BILIRUB SERPL-MCNC: 0.5 MG/DL (ref 0.2–1.1)
BILIRUB UR QL: NEGATIVE
BORDETELLA PARAPERTUSSIS BY PCR: NOT DETECTED
BUN SERPL-MCNC: 26 MG/DL (ref 8–23)
C PNEUM DNA SPEC QL NAA+PROBE: NOT DETECTED
CALCIUM SERPL-MCNC: 9.4 MG/DL (ref 8.3–10.4)
CANNABINOIDS UR QL SCN: NEGATIVE
CHLORIDE SERPL-SCNC: 110 MMOL/L (ref 98–107)
CK SERPL-CCNC: 3268 U/L (ref 21–215)
CO2 SERPL-SCNC: 28 MMOL/L (ref 21–32)
COCAINE UR QL SCN: NEGATIVE
CREAT SERPL-MCNC: 1.6 MG/DL (ref 0.8–1.5)
ERYTHROCYTE [DISTWIDTH] IN BLOOD BY AUTOMATED COUNT: 15.7 % (ref 11.9–14.6)
FLUAV SUBTYP SPEC NAA+PROBE: NOT DETECTED
FLUBV RNA SPEC QL NAA+PROBE: NOT DETECTED
GLOBULIN SER CALC-MCNC: 4.3 G/DL (ref 2.3–3.5)
GLUCOSE SERPL-MCNC: 79 MG/DL (ref 65–100)
GLUCOSE UR QL STRIP.AUTO: NEGATIVE MG/DL
HADV DNA SPEC QL NAA+PROBE: NOT DETECTED
HCOV 229E RNA SPEC QL NAA+PROBE: NOT DETECTED
HCOV HKU1 RNA SPEC QL NAA+PROBE: NOT DETECTED
HCOV NL63 RNA SPEC QL NAA+PROBE: NOT DETECTED
HCOV OC43 RNA SPEC QL NAA+PROBE: NOT DETECTED
HCT VFR BLD AUTO: 39.8 % (ref 41.1–50.3)
HGB BLD-MCNC: 12.5 G/DL (ref 13.6–17.2)
HMPV RNA SPEC QL NAA+PROBE: NOT DETECTED
HPIV1 RNA SPEC QL NAA+PROBE: NOT DETECTED
HPIV2 RNA SPEC QL NAA+PROBE: NOT DETECTED
HPIV3 RNA SPEC QL NAA+PROBE: NOT DETECTED
HPIV4 RNA SPEC QL NAA+PROBE: NOT DETECTED
KETONES UR-MCNC: NEGATIVE MG/DL
LEUKOCYTE ESTERASE UR QL STRIP: NEGATIVE
M PNEUMO DNA SPEC QL NAA+PROBE: NOT DETECTED
MCH RBC QN AUTO: 23.1 PG (ref 26.1–32.9)
MCHC RBC AUTO-ENTMCNC: 31.4 G/DL (ref 31.4–35)
MCV RBC AUTO: 73.4 FL (ref 79.6–97.8)
METHADONE UR QL: NEGATIVE
NITRITE UR QL: NEGATIVE
NRBC # BLD: 0 K/UL (ref 0–0.2)
NT PRO BNP: 1583 PG/ML (ref 5–125)
OPIATES UR QL: NEGATIVE
PCP UR QL: NEGATIVE
PH UR: 5 [PH] (ref 5–9)
PLATELET # BLD AUTO: 145 K/UL (ref 150–450)
PMV BLD AUTO: 11.8 FL (ref 9.4–12.3)
POTASSIUM SERPL-SCNC: 4 MMOL/L (ref 3.5–5.1)
PROT SERPL-MCNC: 7.5 G/DL (ref 6.3–8.2)
PROT UR QL: NEGATIVE MG/DL
RBC # BLD AUTO: 5.42 M/UL (ref 4.23–5.6)
RBC # UR STRIP: NEGATIVE /UL
RSV RNA SPEC QL NAA+PROBE: NOT DETECTED
RV+EV RNA SPEC QL NAA+PROBE: NOT DETECTED
SARS-COV-2 RNA RESP QL NAA+PROBE: NOT DETECTED
SERVICE CMNT-IMP: NORMAL
SODIUM SERPL-SCNC: 141 MMOL/L (ref 138–145)
SP GR UR: 1.01 (ref 1–1.02)
TROPONIN I SERPL HS-MCNC: 18.8 PG/ML (ref 0–14)
TROPONIN I SERPL HS-MCNC: 23.2 PG/ML (ref 0–14)
TSH, 3RD GENERATION: 3.14 UIU/ML (ref 0.36–3.74)
UROBILINOGEN UR QL: 0.2 EU/DL (ref 0.2–1)
WBC # BLD AUTO: 6 K/UL (ref 4.3–11.1)

## 2022-07-10 PROCEDURE — 82550 ASSAY OF CK (CPK): CPT

## 2022-07-10 PROCEDURE — 81003 URINALYSIS AUTO W/O SCOPE: CPT

## 2022-07-10 PROCEDURE — 93005 ELECTROCARDIOGRAM TRACING: CPT | Performed by: NURSE PRACTITIONER

## 2022-07-10 PROCEDURE — 83880 ASSAY OF NATRIURETIC PEPTIDE: CPT

## 2022-07-10 PROCEDURE — 0202U NFCT DS 22 TRGT SARS-COV-2: CPT

## 2022-07-10 PROCEDURE — 6370000000 HC RX 637 (ALT 250 FOR IP): Performed by: INTERNAL MEDICINE

## 2022-07-10 PROCEDURE — 2500000003 HC RX 250 WO HCPCS: Performed by: INTERNAL MEDICINE

## 2022-07-10 PROCEDURE — 99285 EMERGENCY DEPT VISIT HI MDM: CPT

## 2022-07-10 PROCEDURE — 93005 ELECTROCARDIOGRAM TRACING: CPT | Performed by: INTERNAL MEDICINE

## 2022-07-10 PROCEDURE — 71045 X-RAY EXAM CHEST 1 VIEW: CPT

## 2022-07-10 PROCEDURE — 1100000000 HC RM PRIVATE

## 2022-07-10 PROCEDURE — 80307 DRUG TEST PRSMV CHEM ANLYZR: CPT

## 2022-07-10 PROCEDURE — 80053 COMPREHEN METABOLIC PANEL: CPT

## 2022-07-10 PROCEDURE — 84443 ASSAY THYROID STIM HORMONE: CPT

## 2022-07-10 PROCEDURE — 84484 ASSAY OF TROPONIN QUANT: CPT

## 2022-07-10 PROCEDURE — 80074 ACUTE HEPATITIS PANEL: CPT

## 2022-07-10 PROCEDURE — 2580000003 HC RX 258: Performed by: INTERNAL MEDICINE

## 2022-07-10 PROCEDURE — 2580000003 HC RX 258

## 2022-07-10 PROCEDURE — 36415 COLL VENOUS BLD VENIPUNCTURE: CPT

## 2022-07-10 PROCEDURE — 85027 COMPLETE CBC AUTOMATED: CPT

## 2022-07-10 RX ORDER — BENZTROPINE MESYLATE 1 MG/1
0.5 TABLET ORAL 2 TIMES DAILY
Status: DISCONTINUED | OUTPATIENT
Start: 2022-07-10 | End: 2022-07-12 | Stop reason: HOSPADM

## 2022-07-10 RX ORDER — CARVEDILOL 6.25 MG/1
12.5 TABLET ORAL 2 TIMES DAILY WITH MEALS
Status: DISCONTINUED | OUTPATIENT
Start: 2022-07-10 | End: 2022-07-10

## 2022-07-10 RX ORDER — ONDANSETRON 4 MG/1
4 TABLET, ORALLY DISINTEGRATING ORAL EVERY 8 HOURS PRN
Status: DISCONTINUED | OUTPATIENT
Start: 2022-07-10 | End: 2022-07-12 | Stop reason: HOSPADM

## 2022-07-10 RX ORDER — AMIODARONE HYDROCHLORIDE 200 MG/1
100 TABLET ORAL 2 TIMES DAILY
Status: DISCONTINUED | OUTPATIENT
Start: 2022-07-10 | End: 2022-07-12 | Stop reason: HOSPADM

## 2022-07-10 RX ORDER — PRAVASTATIN SODIUM 20 MG
20 TABLET ORAL NIGHTLY
COMMUNITY

## 2022-07-10 RX ORDER — ACETAMINOPHEN 650 MG/1
650 SUPPOSITORY RECTAL EVERY 6 HOURS PRN
Status: DISCONTINUED | OUTPATIENT
Start: 2022-07-10 | End: 2022-07-12 | Stop reason: HOSPADM

## 2022-07-10 RX ORDER — POLYETHYLENE GLYCOL 3350 17 G/17G
17 POWDER, FOR SOLUTION ORAL DAILY
Status: DISCONTINUED | OUTPATIENT
Start: 2022-07-11 | End: 2022-07-12 | Stop reason: HOSPADM

## 2022-07-10 RX ORDER — ONDANSETRON 2 MG/ML
4 INJECTION INTRAMUSCULAR; INTRAVENOUS EVERY 6 HOURS PRN
Status: DISCONTINUED | OUTPATIENT
Start: 2022-07-10 | End: 2022-07-12 | Stop reason: HOSPADM

## 2022-07-10 RX ORDER — SODIUM CHLORIDE 0.9 % (FLUSH) 0.9 %
5-40 SYRINGE (ML) INJECTION EVERY 12 HOURS SCHEDULED
Status: DISCONTINUED | OUTPATIENT
Start: 2022-07-10 | End: 2022-07-12 | Stop reason: HOSPADM

## 2022-07-10 RX ORDER — FUROSEMIDE 40 MG/1
20 TABLET ORAL DAILY
Status: DISCONTINUED | OUTPATIENT
Start: 2022-07-10 | End: 2022-07-10

## 2022-07-10 RX ORDER — LISINOPRIL 5 MG/1
10 TABLET ORAL DAILY
Status: DISCONTINUED | OUTPATIENT
Start: 2022-07-11 | End: 2022-07-12 | Stop reason: HOSPADM

## 2022-07-10 RX ORDER — ACETAMINOPHEN 325 MG/1
650 TABLET ORAL EVERY 6 HOURS PRN
Status: DISCONTINUED | OUTPATIENT
Start: 2022-07-10 | End: 2022-07-12 | Stop reason: HOSPADM

## 2022-07-10 RX ORDER — CARVEDILOL 6.25 MG/1
6.25 TABLET ORAL 2 TIMES DAILY WITH MEALS
Status: DISCONTINUED | OUTPATIENT
Start: 2022-07-11 | End: 2022-07-12 | Stop reason: HOSPADM

## 2022-07-10 RX ORDER — PRAVASTATIN SODIUM 20 MG
20 TABLET ORAL NIGHTLY
Status: DISCONTINUED | OUTPATIENT
Start: 2022-07-10 | End: 2022-07-12 | Stop reason: HOSPADM

## 2022-07-10 RX ORDER — 0.9 % SODIUM CHLORIDE 0.9 %
1000 INTRAVENOUS SOLUTION INTRAVENOUS ONCE
Status: COMPLETED | OUTPATIENT
Start: 2022-07-10 | End: 2022-07-10

## 2022-07-10 RX ORDER — SODIUM CHLORIDE 0.9 % (FLUSH) 0.9 %
5-40 SYRINGE (ML) INJECTION PRN
Status: DISCONTINUED | OUTPATIENT
Start: 2022-07-10 | End: 2022-07-12 | Stop reason: HOSPADM

## 2022-07-10 RX ORDER — BENZTROPINE MESYLATE 0.5 MG/1
0.5 TABLET ORAL NIGHTLY
COMMUNITY

## 2022-07-10 RX ORDER — ENOXAPARIN SODIUM 100 MG/ML
40 INJECTION SUBCUTANEOUS DAILY
Status: DISCONTINUED | OUTPATIENT
Start: 2022-07-11 | End: 2022-07-12 | Stop reason: HOSPADM

## 2022-07-10 RX ORDER — AMIODARONE HYDROCHLORIDE 100 MG/1
100 TABLET ORAL DAILY
Status: ON HOLD | COMMUNITY
End: 2022-07-12 | Stop reason: HOSPADM

## 2022-07-10 RX ORDER — SODIUM CHLORIDE 9 MG/ML
INJECTION, SOLUTION INTRAVENOUS CONTINUOUS
Status: DISCONTINUED | OUTPATIENT
Start: 2022-07-10 | End: 2022-07-11

## 2022-07-10 RX ORDER — POTASSIUM CHLORIDE 20 MEQ/1
20 TABLET, EXTENDED RELEASE ORAL DAILY
Status: DISCONTINUED | OUTPATIENT
Start: 2022-07-11 | End: 2022-07-12 | Stop reason: HOSPADM

## 2022-07-10 RX ORDER — POLYETHYLENE GLYCOL 3350 17 G/17G
17 POWDER, FOR SOLUTION ORAL DAILY PRN
Status: DISCONTINUED | OUTPATIENT
Start: 2022-07-10 | End: 2022-07-12 | Stop reason: HOSPADM

## 2022-07-10 RX ORDER — ASPIRIN 81 MG/1
81 TABLET, CHEWABLE ORAL DAILY
Status: DISCONTINUED | OUTPATIENT
Start: 2022-07-10 | End: 2022-07-12 | Stop reason: HOSPADM

## 2022-07-10 RX ORDER — CLOZAPINE 100 MG/1
500 TABLET ORAL NIGHTLY
Status: DISCONTINUED | OUTPATIENT
Start: 2022-07-10 | End: 2022-07-10

## 2022-07-10 RX ORDER — FUROSEMIDE 40 MG/1
40 TABLET ORAL DAILY
Status: DISCONTINUED | OUTPATIENT
Start: 2022-07-11 | End: 2022-07-12 | Stop reason: HOSPADM

## 2022-07-10 RX ORDER — SODIUM CHLORIDE 9 MG/ML
INJECTION, SOLUTION INTRAVENOUS PRN
Status: DISCONTINUED | OUTPATIENT
Start: 2022-07-10 | End: 2022-07-12 | Stop reason: HOSPADM

## 2022-07-10 RX ADMIN — TUBERCULIN PURIFIED PROTEIN DERIVATIVE 5 UNITS: 5 INJECTION, SOLUTION INTRADERMAL at 21:23

## 2022-07-10 RX ADMIN — BENZTROPINE MESYLATE 0.5 MG: 1 TABLET ORAL at 21:20

## 2022-07-10 RX ADMIN — SODIUM CHLORIDE 1000 ML: 900 INJECTION, SOLUTION INTRAVENOUS at 17:05

## 2022-07-10 RX ADMIN — AMIODARONE HYDROCHLORIDE 100 MG: 200 TABLET ORAL at 21:19

## 2022-07-10 RX ADMIN — PRAVASTATIN SODIUM 20 MG: 20 TABLET ORAL at 21:19

## 2022-07-10 RX ADMIN — SODIUM CHLORIDE: 9 INJECTION, SOLUTION INTRAVENOUS at 20:00

## 2022-07-10 RX ADMIN — ASPIRIN 81 MG: 81 TABLET, CHEWABLE ORAL at 21:19

## 2022-07-10 RX ADMIN — SODIUM CHLORIDE, PRESERVATIVE FREE 10 ML: 5 INJECTION INTRAVENOUS at 21:19

## 2022-07-10 ASSESSMENT — ENCOUNTER SYMPTOMS
DIARRHEA: 0
SHORTNESS OF BREATH: 0
VOMITING: 0
CONSTIPATION: 0
COUGH: 0
ABDOMINAL PAIN: 0
NAUSEA: 0

## 2022-07-10 NOTE — ED TRIAGE NOTES
Patient arrives in wheelchair to triage. Masked. Brought in by sister that he lives with. Reports fatigue worsening over past week. Reports some cough. Denies pain. Poor appetite. Sister reports he is sleeping a lot. Recently had 7 teeth pulled.

## 2022-07-10 NOTE — ED PROVIDER NOTES
[facility] Emergency Department Provider Note                   PCP:                Tran Escobar MD               Age: 63 y.o. Sex: male       ICD-10-CM    1. Fatigue, unspecified type  R53.83        DISPOSITION         MDM  Number of Diagnoses or Management Options  Fatigue, unspecified type: new, needed workup  Diagnosis management comments: 72-year-old male with a history of diabetes, congestive heart failure, hypertension, and schizophrenia who presents to the emergency department today with complaint of fatigue. He is chronically ill-appearing but is in no acute distress. He is not toxic appearing today. EKG shows paced rhythm. He denies chest pain or shortness of breath. His only complaint today is generalized fatigue. He has no unilateral weakness. He is alert and oriented. He denies any pain today. Case discussed with Dr. Serena Osuna. Awaiting labs currently. If labs are at baseline, plan to discharge home with his sister with close follow-up with his primary care provider. Case discussed at the end of shift with oncoming PA who will follow-up on labs.         Amount and/or Complexity of Data Reviewed  Clinical lab tests: reviewed  Tests in the radiology section of CPT®: reviewed  Discuss the patient with other providers: yes    Risk of Complications, Morbidity, and/or Mortality  Presenting problems: moderate  Diagnostic procedures: moderate  Management options: moderate         Orders Placed This Encounter   Procedures    XR CHEST PORTABLE    CBC    Comprehensive Metabolic Panel    Troponin    proBNP, N-TERMINAL    TSH    CK    POCT Urine Dipstick    POCT Urinalysis no Micro    EKG 12 Lead    Insert peripheral IV        Talon Singer is a 63 y.o. male who presents to the Emergency Department with chief complaint of    Chief Complaint   Patient presents with    Fatigue      72-year-old male with a history of diabetes, congestive heart failure, hypertension, and schizophrenia who presents to the emergency department today with complaint of fatigue. He is accompanied by his sister today. Patient reports that over the past 2 weeks he has had intermittent fatigue. His sister states that today has been one of his better days when he has been less fatigued. She states that he is unable to walk from the front door to the car which is about 10 yards without needing to stop because he is tired. The patient denies any fever, chills, chest pain, abdominal pain, shortness of breath, nausea, vomiting, diarrhea, dysuria, or other associated symptoms. He reports that he had 7 teeth pulled about 2 weeks ago but denies any issues from this. His sister states that he was on pain medication after having his teeth pulled but has not had any of the pain medication in about a week. His sister is the one that administers his medications. She denies any recent changes in his medications. She reports compliance with medications. The history is provided by the patient and a relative. Fatigue  Severity:  Moderate  Onset quality:  Gradual  Duration:  2 weeks  Timing:  Intermittent  Progression:  Waxing and waning  Chronicity:  New  Context: not alcohol use, not change in medication, not drug use and not urinary tract infection    Relieved by:  None tried  Worsened by: Activity  Ineffective treatments:  None tried  Associated symptoms: no abdominal pain, no chest pain, no cough, no diarrhea, no dizziness, no fever, no headaches, no nausea, no shortness of breath and no vomiting    Risk factors: congestive heart failure, diabetes and heart disease        All other systems reviewed and are negative. Review of Systems   Constitutional: Positive for appetite change and fatigue. Negative for chills and fever. Respiratory: Negative for cough and shortness of breath. Cardiovascular: Negative for chest pain.    Gastrointestinal: Negative for abdominal pain, constipation, diarrhea, nausea and vomiting. Neurological: Negative for dizziness and headaches. All other systems reviewed and are negative. Past Medical History:   Diagnosis Date    [facility]     Heart failure ([facility] [address]     Hypertension     Psychiatric disorder     paranoid schziphrenia    Systolic CHF, acute on chronic ([facility] [address] 2/4/2016        Past Surgical History:   Procedure Laterality Date    APPENDECTOMY          Family History   Problem Relation Age of Onset    Heart Failure Mother     Heart Attack Brother     Lung Cancer Brother     No Known Problems Sister     No Known Problems Sister     Heart Attack Father     Diabetes Sister     Hypertension Sister     Heart Disease Sister         valvular    No Known Problems Brother     No Known Problems Brother     Diabetes Sister     Diabetes Sister     Diabetes Sister            Social Connections:     Frequency of Communication with Friends and Family: Not on file    Frequency of Social Gatherings with Friends and Family: Not on file    Attends Jewish Services: Not on file    Active Member of Clubs or Organizations: Not on file    Attends Club or Organization Meetings: Not on file    Marital Status: Not on file        Allergies   Allergen Reactions    Loratadine Other (See Comments)     Interaction with other medication        Vitals signs and nursing note reviewed. Patient Vitals for the past 4 hrs:   Temp Pulse Resp BP SpO2   07/10/22 1605 -- 81 14 109/80 97 %   07/10/22 1600 -- 81 19 -- 100 %   07/10/22 1530 -- 80 14 -- 99 %   07/10/22 1500 -- 81 14 -- 97 %   07/10/22 1445 -- -- -- 115/84 --   07/10/22 1315 97.5 °F (36.4 °C) 87 16 107/88 96 %          Physical Exam  Vitals and nursing note reviewed. Constitutional:       General: He is not in acute distress. Appearance: Normal appearance. He is not ill-appearing, toxic-appearing or diaphoretic. HENT:      Head: Normocephalic and atraumatic.       Right Ear: External ear normal.      Left Ear: External ear normal.      Nose: Nose normal.   Eyes:      Extraocular Movements: Extraocular movements intact. Conjunctiva/sclera: Conjunctivae normal.   Cardiovascular:      Rate and Rhythm: Normal rate. Heart sounds: Normal heart sounds. Pulmonary:      Effort: Pulmonary effort is normal. No respiratory distress. Breath sounds: Normal breath sounds. Abdominal:      General: Abdomen is flat. Bowel sounds are normal. There is no distension. Palpations: Abdomen is soft. Tenderness: There is no abdominal tenderness. Musculoskeletal:         General: Normal range of motion. Cervical back: Normal range of motion. Right lower leg: No edema. Left lower leg: No edema. Skin:     General: Skin is warm and dry. Capillary Refill: Capillary refill takes less than 2 seconds. Neurological:      General: No focal deficit present. Mental Status: He is alert and oriented to person, place, and time. Psychiatric:         Mood and Affect: Mood normal.         Behavior: Behavior normal.         Thought Content:  Thought content normal.         Judgment: Judgment normal.          Procedures    ED EKG Interpretation  EKG was interpreted in the absence of a cardiologist.    Rate: Rate: Normal  EKG Interpretation: EKG Interpretation: paced rhythm  ST Segments: Normal ST segments - NO STEMI    Labs Reviewed   CBC - Abnormal; Notable for the following components:       Result Value    Hemoglobin 12.5 (*)     Hematocrit 39.8 (*)     MCV 73.4 (*)     MCH 23.1 (*)     RDW 15.7 (*)     Platelets 053 (*)     All other components within normal limits   COMPREHENSIVE METABOLIC PANEL - Abnormal; Notable for the following components:    Chloride 110 (*)     Anion Gap 3 (*)     BUN 26 (*)     CREATININE 1.60 (*)     GFR  57 (*)     GFR Non- 47 (*)      (*)      (*)     Alk Phosphatase 143 (*)     Globulin 4.3 (*)     Albumin/Globulin Ratio 0.7 (*)     All other components within normal limits   TROPONIN   PROBNP, N-TERMINAL   TSH   CK   POCT URINALYSIS DIPSTICK        XR CHEST PORTABLE   Final Result      No evidence of acute cardiopulmonary disease. ED Course as of 07/10/22 1620   Sun Jul 10, 2022   1555 XR CHEST PORTABLE  IMPRESSION:     No evidence of acute cardiopulmonary disease. [BC]      ED Course User Index  [BC] ELIZABETH Bolaños - CNP        Voice dictation software was used during the making of this note. This software is not perfect and grammatical and other typographical errors may be present. This note has not been completely proofread for errors.        ELIZABETH Bolaños - [facility]  07/10/22 1628

## 2022-07-10 NOTE — ED NOTES
82-year-old male with history of cardiac arrest, pacemaker, hypertension, diabetes, heart failure, paranoid schizophrenia presents to the emergency department with chief complaint of increasing fatigue over the past 2 weeks. Patient is accompanied by his sister who relates that it is becoming increasingly difficult for patient to walk even as far as 10 steps without having to take a rest. Pt  No longer able to get himself up to feed himself throughout the day as he used to. Patient sister states that she does not have any home health care, no plan set in place for palliative care or hospice care for this patient. She states that she would like patient to be admitted to the hospital so he can be evaluated and care can be set up to meet his needs. Lives with his sister and she is his primary caregiver. Took over care of this patient from nurse practitioner on day shift. At the time I assumed care, waiting on labs for BNP, troponin, TSH, CK. Resulted CBC, CMP consistent with prior lab work. Patrick Aragon BNP: 1583. BNP 2 years ago 2979  Troponin 18.8-we will draw a repeat troponin  CK 3, 268  TSH 3.140  Repeat troponin 23.2. Patient is not having any chest pain at this time. Due to patient's history of heart failure, will administer some fluids conservatively. Suspect elevation in CK likely due to 2 weeks of lying around without much moving. Based on patient's history it appears patient is suffering from failure to thrive and likely needs a plan in place for future care. Contact hospitalist regarding admission. Admitted to the hospital under the care of Dr. Anette Benjamin. Patient is stable at this time. Admitted for failure to thrive in adult, bated creatinine kinase, chronic systolic heart failure, fatigue unspecified.       Discussed this patient with my attending, Dr. Marium Zaidi, who is in agreement with treatment and disposition     Landon Banks, [address]  07/10/22 4607

## 2022-07-11 ENCOUNTER — APPOINTMENT (OUTPATIENT)
Dept: ULTRASOUND IMAGING | Age: 62
DRG: 558 | End: 2022-07-11
Payer: MEDICARE

## 2022-07-11 LAB
ALBUMIN SERPL-MCNC: 2.7 G/DL (ref 3.2–4.6)
ALBUMIN/GLOB SERPL: 0.7 {RATIO} (ref 1.2–3.5)
ALP SERPL-CCNC: 132 U/L (ref 50–136)
ALT SERPL-CCNC: 155 U/L (ref 12–65)
ANION GAP SERPL CALC-SCNC: 1 MMOL/L (ref 7–16)
AST SERPL-CCNC: 134 U/L (ref 15–37)
BASOPHILS # BLD: 0 K/UL (ref 0–0.2)
BASOPHILS NFR BLD: 0 % (ref 0–2)
BILIRUB SERPL-MCNC: 0.4 MG/DL (ref 0.2–1.1)
BUN SERPL-MCNC: 23 MG/DL (ref 8–23)
CALCIUM SERPL-MCNC: 9 MG/DL (ref 8.3–10.4)
CHLORIDE SERPL-SCNC: 109 MMOL/L (ref 98–107)
CK SERPL-CCNC: 1892 U/L (ref 21–215)
CO2 SERPL-SCNC: 30 MMOL/L (ref 21–32)
CREAT SERPL-MCNC: 1.3 MG/DL (ref 0.8–1.5)
DIFFERENTIAL METHOD BLD: ABNORMAL
EKG ATRIAL RATE: 81 BPM
EKG ATRIAL RATE: 84 BPM
EKG DIAGNOSIS: NORMAL
EKG DIAGNOSIS: NORMAL
EKG P AXIS: 56 DEGREES
EKG P AXIS: 76 DEGREES
EKG P-R INTERVAL: 198 MS
EKG P-R INTERVAL: 206 MS
EKG Q-T INTERVAL: 478 MS
EKG Q-T INTERVAL: 488 MS
EKG QRS DURATION: 188 MS
EKG QRS DURATION: 189 MS
EKG QTC CALCULATION (BAZETT): 555 MS
EKG QTC CALCULATION (BAZETT): 576 MS
EKG R AXIS: -69 DEGREES
EKG R AXIS: 255 DEGREES
EKG T AXIS: 58 DEGREES
EKG T AXIS: 79 DEGREES
EKG VENTRICULAR RATE: 81 BPM
EKG VENTRICULAR RATE: 84 BPM
EOSINOPHIL # BLD: 0 K/UL (ref 0–0.8)
EOSINOPHIL NFR BLD: 0 % (ref 0.5–7.8)
ERYTHROCYTE [DISTWIDTH] IN BLOOD BY AUTOMATED COUNT: 15.6 % (ref 11.9–14.6)
GLOBULIN SER CALC-MCNC: 4.1 G/DL (ref 2.3–3.5)
GLUCOSE SERPL-MCNC: 90 MG/DL (ref 65–100)
HAV IGM SER QL: NONREACTIVE
HBV CORE IGM SER QL: NONREACTIVE
HBV SURFACE AG SER QL: NONREACTIVE
HCT VFR BLD AUTO: 38.2 % (ref 41.1–50.3)
HCV AB SER QL: NONREACTIVE
HGB BLD-MCNC: 12 G/DL (ref 13.6–17.2)
IMM GRANULOCYTES # BLD AUTO: 0 K/UL (ref 0–0.5)
IMM GRANULOCYTES NFR BLD AUTO: 0 % (ref 0–5)
LYMPHOCYTES # BLD: 1.6 K/UL (ref 0.5–4.6)
LYMPHOCYTES NFR BLD: 17 % (ref 13–44)
MCH RBC QN AUTO: 23.2 PG (ref 26.1–32.9)
MCHC RBC AUTO-ENTMCNC: 31.4 G/DL (ref 31.4–35)
MCV RBC AUTO: 73.9 FL (ref 79.6–97.8)
MM INDURATION, POC: 0 MM (ref 0–5)
MONOCYTES # BLD: 1 K/UL (ref 0.1–1.3)
MONOCYTES NFR BLD: 10 % (ref 4–12)
NEUTS SEG # BLD: 7.2 K/UL (ref 1.7–8.2)
NEUTS SEG NFR BLD: 73 % (ref 43–78)
NRBC # BLD: 0 K/UL (ref 0–0.2)
PLATELET # BLD AUTO: 142 K/UL (ref 150–450)
PMV BLD AUTO: 11.9 FL (ref 9.4–12.3)
POTASSIUM SERPL-SCNC: 3.7 MMOL/L (ref 3.5–5.1)
PPD, POC: NEGATIVE
PROT SERPL-MCNC: 6.8 G/DL (ref 6.3–8.2)
RBC # BLD AUTO: 5.17 M/UL (ref 4.23–5.6)
SODIUM SERPL-SCNC: 140 MMOL/L (ref 138–145)
WBC # BLD AUTO: 9.8 K/UL (ref 4.3–11.1)

## 2022-07-11 PROCEDURE — 1100000000 HC RM PRIVATE

## 2022-07-11 PROCEDURE — 97116 GAIT TRAINING THERAPY: CPT

## 2022-07-11 PROCEDURE — 2580000003 HC RX 258: Performed by: NURSE PRACTITIONER

## 2022-07-11 PROCEDURE — 76700 US EXAM ABDOM COMPLETE: CPT

## 2022-07-11 PROCEDURE — 6370000000 HC RX 637 (ALT 250 FOR IP): Performed by: INTERNAL MEDICINE

## 2022-07-11 PROCEDURE — 97535 SELF CARE MNGMENT TRAINING: CPT

## 2022-07-11 PROCEDURE — 97161 PT EVAL LOW COMPLEX 20 MIN: CPT

## 2022-07-11 PROCEDURE — 6370000000 HC RX 637 (ALT 250 FOR IP): Performed by: FAMILY MEDICINE

## 2022-07-11 PROCEDURE — 85025 COMPLETE CBC W/AUTO DIFF WBC: CPT

## 2022-07-11 PROCEDURE — 2580000003 HC RX 258: Performed by: INTERNAL MEDICINE

## 2022-07-11 PROCEDURE — 36415 COLL VENOUS BLD VENIPUNCTURE: CPT

## 2022-07-11 PROCEDURE — 6360000002 HC RX W HCPCS: Performed by: INTERNAL MEDICINE

## 2022-07-11 PROCEDURE — 97165 OT EVAL LOW COMPLEX 30 MIN: CPT

## 2022-07-11 PROCEDURE — 82550 ASSAY OF CK (CPK): CPT

## 2022-07-11 PROCEDURE — 80053 COMPREHEN METABOLIC PANEL: CPT

## 2022-07-11 RX ORDER — CLOZAPINE 200 MG/1
500 TABLET ORAL NIGHTLY
Status: DISCONTINUED | OUTPATIENT
Start: 2022-07-12 | End: 2022-07-12 | Stop reason: HOSPADM

## 2022-07-11 RX ORDER — CLOZAPINE 200 MG/1
200 TABLET ORAL DAILY
Status: DISCONTINUED | OUTPATIENT
Start: 2022-07-11 | End: 2022-07-12 | Stop reason: HOSPADM

## 2022-07-11 RX ORDER — SODIUM CHLORIDE 9 MG/ML
INJECTION, SOLUTION INTRAVENOUS CONTINUOUS
Status: ACTIVE | OUTPATIENT
Start: 2022-07-11 | End: 2022-07-12

## 2022-07-11 RX ORDER — MIDODRINE HYDROCHLORIDE 5 MG/1
5 TABLET ORAL
Status: DISCONTINUED | OUTPATIENT
Start: 2022-07-11 | End: 2022-07-12 | Stop reason: HOSPADM

## 2022-07-11 RX ADMIN — SODIUM CHLORIDE: 9 INJECTION, SOLUTION INTRAVENOUS at 14:50

## 2022-07-11 RX ADMIN — MIDODRINE HYDROCHLORIDE 5 MG: 5 TABLET ORAL at 21:53

## 2022-07-11 RX ADMIN — POLYETHYLENE GLYCOL 3350 17 G: 17 POWDER, FOR SOLUTION ORAL at 08:13

## 2022-07-11 RX ADMIN — SODIUM CHLORIDE, PRESERVATIVE FREE 10 ML: 5 INJECTION INTRAVENOUS at 21:50

## 2022-07-11 RX ADMIN — CARVEDILOL 6.25 MG: 6.25 TABLET, FILM COATED ORAL at 08:12

## 2022-07-11 RX ADMIN — CLOZAPINE 200 MG: 200 TABLET ORAL at 08:13

## 2022-07-11 RX ADMIN — POTASSIUM CHLORIDE 20 MEQ: 20 TABLET, EXTENDED RELEASE ORAL at 08:12

## 2022-07-11 RX ADMIN — FUROSEMIDE 40 MG: 40 TABLET ORAL at 08:12

## 2022-07-11 RX ADMIN — ASPIRIN 81 MG: 81 TABLET, CHEWABLE ORAL at 08:11

## 2022-07-11 RX ADMIN — AMIODARONE HYDROCHLORIDE 100 MG: 200 TABLET ORAL at 21:51

## 2022-07-11 RX ADMIN — SODIUM CHLORIDE, PRESERVATIVE FREE 10 ML: 5 INJECTION INTRAVENOUS at 08:21

## 2022-07-11 RX ADMIN — ENOXAPARIN SODIUM 40 MG: 40 INJECTION SUBCUTANEOUS at 08:13

## 2022-07-11 RX ADMIN — AMIODARONE HYDROCHLORIDE 100 MG: 200 TABLET ORAL at 08:12

## 2022-07-11 RX ADMIN — BENZTROPINE MESYLATE 0.5 MG: 1 TABLET ORAL at 21:52

## 2022-07-11 RX ADMIN — BENZTROPINE MESYLATE 0.5 MG: 1 TABLET ORAL at 08:11

## 2022-07-11 RX ADMIN — PRAVASTATIN SODIUM 20 MG: 20 TABLET ORAL at 21:51

## 2022-07-11 RX ADMIN — LISINOPRIL 10 MG: 5 TABLET ORAL at 08:12

## 2022-07-11 NOTE — PROGRESS NOTES
ASSESSMENT NOTE    Attending Physician: Maricruz Irvin MD  Admit Problem: Chronic systolic heart failure ([facility] [I50.22]  Rhabdomyolysis [M62.82]  Elevated creatine kinase [R74.8]  Failure to thrive in adult [R62.7]  Fatigue, unspecified type [R53.83]  Date/Time of Admission: 7/10/2022  2:21 PM  Problem List:  Patient Active Problem List   Diagnosis    Hypotension    Cardiac arrest ([facility]    HTN (hypertension)    Acute renal failure ([facility]    Schizophrenia ([facility]    Microcytic anemia    Acute respiratory failure with hypoxia (HCC)    Pneumonia due to Streptococcus pneumoniae ([facility]    Systolic congestive heart failure (HCC)    Type II diabetes mellitus ([facility]    CHF (congestive heart failure) (HCC)    Rhabdomyolysis    Stage 3a chronic kidney disease ([facility]    Elevated LFTs       Service Assessment  Patient Orientation Alert and Oriented   Cognition Alert   History Provided By Child/Family   Primary Caregiver Family (Patient's sister Sergei Kaur)   Accompanied By/Relationship     Support Systems Family Members   Patient's Healthcare Decision Maker is: Legal Next of Rosalee 71   PCP Verified by CM Yes (Dr Denise Jewell  [phone])   Last Visit to PCP     Prior Functional Level     Current Functional Level     Can patient return to prior living arrangement     Ability to make needs known:     Family able to assist with home care needs:     Would you like for me to discuss the discharge plan with any other family members/significant others, and if so, who?      Financial Resources     Community Resources     CM/SW Referral       Social/Functional History  Lives With Family   Type of [address] One level   Home Access  (1 Step to enter)   Entrance Stairs - Number of Steps     Entrance Stairs - Rails     Bathroom Shower/Tub     Bathroom Toilet     Bathroom Equipment     Bathroom Accessibility     [facility] Help From Family   ADL [employer] Feeding     [address]     Meal Prep     Laundry     Vacuuming     Cleaning     Gardening     Yard Work     Driving     Shopping          Other (Comment)     [address] [place] Paying/Finance Responsibility     [address] Management     Other (Comment)     Ambuation Assistance     Transfer Assisstance     Active      Patient's  Info     Mode of Transportation     Education     Occupation On disability   Type of Occupation       Discharge Planning   Type of [place] Family Members   Support Systems Family Members   Current Services Prior To Admission None   Potential Assistance Needed N/A   DME     DME     DME Ordered? Cane   Potential Assistance Purchasing Medications No   Meds-to-Beds: Does the patient want to have any new prescriptions delivered to bedside prior to discharge? Type of Home Care Services None   Patient expects to be discharged to: House   Follow Up Appointment: Best Day/Time Monday AM   One/Two Story Residence: One story   # of Interior Steps     Height of Each Step (in)     [employer] Available     History of Falls? Yes     Services At/After Discharge  Transition of Care Consult (CM Consult): [address]   Internal Home Health No   Internal Hospice     Reason Outside Agency Chosen Unable to staff case   Partner SNF     Reason Why Partner SNF Not Chosen     Internal Comfort Care     Reason Outside [address] Discharge [address] Resource Information Provided?  No   Mode of Transport at Discharge Other (see comment)   Hospital Transport Time of Discharge     Confirm Follow Up Transport Family     Condition of Participation: Discharge Planning  The plan for Transition of Care is related to the following treatment goals: return to baseline   The Patient and/or Patient Representative was provided with a Choice of Provider? Patient   Name of the Patient Representative who was provided with the Choice of Provider and agrees with the Discharge Plan? The Patient and/or Patient Representative Agree with the Discharge Plan? Yes   Freedom of Choice list was provided with basic dialogue that supports the individualized plan of care/goals, treatment preferences, and shares the quality data associated with the providers?  Yes     Documentation for Discharge Appeal  Discharge Appealed by     Date notified by QIO of appeal request:     Time notified by QIO of appeal request:     Detailed Notice of Discharge given to:     Date Notice of Discharge given:     Time Notice of Discharge given:     [facility] [address] [facility]     [facility] [address] [facility]     Date Notified of Outcome     Time Notified of Outcome     Outcome of appeal           Temo Padilla RN 07/11/22 2:58 PM

## 2022-07-11 NOTE — PROGRESS NOTES
OCCUPATIONAL THERAPY Initial Assessment and Daily Note       OT Visit Days: 1  Acknowledge Orders  Time  OT Charge Capture  Rehab Caseload Tracker      Ervin Lockwood is a 63 y.o. male   PRIMARY DIAGNOSIS: Rhabdomyolysis  Chronic systolic heart failure (HCC) [I50.22]  Rhabdomyolysis [M62.82]  Elevated creatine kinase [R74.8]  Failure to thrive in adult [R62.7]  Fatigue, unspecified type [R53.83]       Reason for Referral: Generalized Muscle Weakness (M62.81)  Other lack of cordination (R27.8)  Difficulty in walking, Not elsewhere classified (R26.2)  Inpatient: Payor: Marina Larson / Plan: Abdiel Linn / Product Type: *No Product type* /     ASSESSMENT:     REHAB RECOMMENDATIONS:   Recommendation to date pending progress:  [address] [place] [address] Therapy    Equipment:     To Be Determined     ASSESSMENT:  Mr. Mishel Gonzalez presented to the hospital with increased lethargy and weakness. Pt with a PMH for paranoid schizophrenia which pt's sister reported can be limiting to the pt. At baseline, pt is able to manage is ADLs and assist with some of his IADLs. Pt has family to assist with his ADLs/IADLs as needed. Today, pt was received supine in bed with family at bedside. Completed bed mobility, LB dressing, sit>stand, ambulation with SPC, and grooming task standing at the sink with overall SBA. Minimally verbal and very soft spoken during session. Recommend pt return home with [facility] therapy services and family assistance. Ervin Lockwood currently demonstrates overall deficits in strength, balance, activity tolerance, and ADL performance. Patient would benefit from skilled OT services at this time in order to address functional deficits and OT goals stated above.      [facility] [address] [address] AM-PAC 6 Clicks Daily Activity Inpatient Short Form:    AM-PAC Daily Activity Inpatient   How much help for putting on and taking off regular lower body clothing?: A Little  How much help for Bathing?: A Little  How much help for Toileting?: A Little  How much help for putting on and taking off regular upper body clothing?: A Little  How much help for taking care of personal grooming?: A Little  How much help for eating meals?: None  AM-PAC Inpatient Daily Activity Raw Score: 19  AM-PAC Inpatient ADL T-Scale Score : 40.22  ADL Inpatient CMS 0-100% Score: 42.8  ADL Inpatient CMS G-Code Modifier : CK      SUBJECTIVE:     Mr. Trinidad Dumont states, \"Her name is Elzbieta Posada. \"     Social/Functional Home Layout: One level  Home Access:  (1 Step to enter)  Home Equipment: Project Dance Help From: Family    OBJECTIVE:     Timothy Laura / Velvet Mix / Love Yari: none    RESTRICTIONS/PRECAUTIONS:  Restrictions/Precautions: None    PAIN: VITALS / O2:   Pre Treatment:   Pain Assessment: None - Denies Pain      Post Treatment: no change        Vitals          Oxygen            GROSS EVALUATION: INTACT IMPAIRED   (See Comments)   UE AROM [x] []   UE PROM [x] []   Strength []   generalized weakness    Posture / Balance []  fair+ sitting and standing    Sensation [x]     Coordination []       Tone [x]       Edema [x]    Activity Tolerance []   fatigues    Hand Dominance R [] L []      COGNITION/  PERCEPTION: INTACT IMPAIRED   (See Comments)   Orientation [x]     Vision [x]     Hearing [x]     Cognition  [] Flat affect    Perception []       MOBILITY: I Mod I S SBA CGA Min Mod Max Total  NT x2 Comments:   Bed Mobility    Rolling [] [] [] [x] [] [] [] [] [] [] []    Supine to Sit [] [] [] [x] [] [] [] [] [] [] []    Scooting [] [] [] [x] [] [] [] [] [] [] []    Sit to Supine [] [] [] [x] [] [] [] [] [] [] []    Transfers    Sit to Stand [] [] [] [x] [] [] [] [] [] [] []    Bed to Chair [] [] [] [] [] [] [] [] [] [x] [] Pt refused    Stand to Sit [] [] [] [x] [] [] [] [] [] [] []    Tub/Shower [] [] [] [] [] [] [] [] [] [x] []     Toilet [] [] [] [] [] [] [] [] [] [x] []      [] [] [] [] [] [] [] [] [] [] []    I=Independent, Mod I=Modified Independent, S=Supervision/Setup, SBA=Standby Assistance, CGA=Contact Guard Assistance, Min=Minimal Assistance, Mod=Moderate Assistance, Max=Maximal Assistance, Total=Total Assistance, NT=Not Tested    ACTIVITIES OF DAILY LIVING: I Mod I S SBA CGA Min Mod Max Total NT Comments   BASIC ADLs:              Upper Body Bathing  [] [] [] [] [] [] [] [] [] [x]    Lower Body Bathing [] [] [] [] [] [] [] [] [] [x]    Toileting [] [] [] [] [] [] [] [] [] [x]    Upper Body Dressing [] [] [] [] [] [] [] [] [] [x]    Lower Body Dressing [] [] [] [x] [] [] [] [] [] [] Socks    Feeding [] [] [] [] [] [] [] [] [] [x]    Grooming [] [] [] [x] [] [] [] [] [] [] washed hands and face standing at the sink    Personal Device Care [] [] [] [] [] [] [] [] [] [x]    Functional Mobility [] [] [] [x] [] [] [] [] [] [] SPC   I=Independent, Mod I=Modified Independent, S=Supervision/Setup, SBA=Standby Assistance, CGA=Contact Guard Assistance, Min=Minimal Assistance, Mod=Moderate Assistance, Max=Maximal Assistance, Total=Total Assistance, NT=Not Tested    PLAN:     FREQUENCY/DURATION   OT Plan of Care: 3 times/week for duration of hospital stay or until stated goals are met, whichever comes first.    ACUTE OCCUPATIONAL THERAPY GOALS:   (Developed with and agreed upon by patient and/or caregiver.)  1. Patient will complete lower body bathing and dressing with MOD I and adaptive equipment as needed. 2.Patient will complete upper body bathing and dressing with MOD I and adaptive equipment as needed. 3. Patient will complete toileting with MOD I.   4. Patient will tolerate 25 minutes of OT treatment with 1-2 rest breaks to increase activity tolerance for ADLs. 5. Patient will complete functional transfers with MOD I and adaptive equipment as needed. 6. Patient will complete functional activity with MOD I and adaptive equipment as needed.     Timeframe: 7 visits     PROBLEM LIST:   (Skilled intervention is medically necessary to address:)  Decreased ADL/Functional Activities  Decreased Activity Tolerance  Decreased Balance  Decreased Cognition  Decreased Coordination  Decreased Safety Awareness  Decreased Strength  Decreased Transfer Abilities   INTERVENTIONS PLANNED:  (Benefits and precautions of occupational therapy have been discussed with the patient.)  Self Care Training  Therapeutic Activity  Therapeutic Exercise/HEP  Neuromuscular Re-education  Manual Therapy  Education         TREATMENT:     EVALUATION: LOW COMPLEXITY: (Untimed Charge)    TREATMENT:   Co-Treatment PT/OT necessary due to patient's decreased overall endurance/tolerance levels, as well as need for high level skilled assistance to complete functional transfers/mobility and functional tasks  Self Care (8 minutes): Patient participated in lower body dressing and grooming ADLs in unsupported sitting and standing with minimal verbal cueing to increase independence, decrease assistance required, increase activity tolerance and increase safety awareness. Patient also participated in functional mobility and functional transfer training to increase independence, decrease assistance required, increase activity tolerance and increase safety awareness. TREATMENT GRID:  N/A    AFTER TREATMENT PRECAUTIONS: Chair, Needs within reach, RN notified and Visitors at bedside    INTERDISCIPLINARY COLLABORATION:  RN/ PCT, PT/ PTA and OT/ RODRIGUEZ    EDUCATION:  Education Given To: Patient; Family  Education Provided: Role of Therapy;Plan of Care; Fall Prevention Strategies  Education Outcome: Demonstrated understanding;Verbalized understanding    TOTAL TREATMENT DURATION AND TIME:  Time In: 66  Time Out: [facility]  Minutes: Virgilio 49, OT

## 2022-07-11 NOTE — PROGRESS NOTES
TRANSFER - IN REPORT:    Verbal report received from Anjana on Ethelda Showers  being received from er for routine progression of patient care      Report consisted of patient's Situation, Background, Assessment and   Recommendations(SBAR). Information from the following report(s) ED Encounter Summary was reviewed with the receiving nurse. Opportunity for questions and clarification was provided. Assessment completed upon patient's arrival to unit and care assumed. Arrived awake, alert,oriented x4. Able to move extremities weakly. Sister in room. She verified meds. Dr. Cook Early in room. No skiin breakdown seen. Politely declines to put on hosp gown and prefers to wear his clothes from home.

## 2022-07-11 NOTE — PROGRESS NOTES
END OF SHIFT NOTE:    INTAKE/OUTPUT  07/10 0701 - 07/11 0700  In: -   Out: 1250 [Urine:1250]  Voiding: yes  Catheter: no  Drain:              Flatus: Patient cannot tell me if he does have flatus present. Stool:  0occurrences. Characteristics:       Emesis: 0 occurrences. Characteristics:        VITAL SIGNS  Patient Vitals for the past 12 hrs:   Temp Pulse Resp BP SpO2   07/11/22 0258 97.5 °F (36.4 °C) 79 18 94/70 98 %   07/10/22 2245 97.5 °F (36.4 °C) 82 16 100/71 98 %   07/10/22 1915 97.7 °F (36.5 °C) 79 16 107/68 98 %   07/10/22 1910 -- 79 -- 107/68 --   07/10/22 1815 -- 78 -- 102/78 99 %   07/10/22 1745 -- 80 -- 107/83 99 %   07/10/22 1730 -- 79 -- 104/78 100 %       Pain Assessment  @BSHSIFLOW(1171)@             Ambulating  No, slept long periods  Ultrasound called floor and would like breakfast held this am until after abd ultrasound done. EXpalined to pt and day shift aware. Shift report given to oncoming nurse at the bedside.     Barbara Hill RN

## 2022-07-11 NOTE — H&P
Hospitalist History and Physical   Admit Date:  7/10/2022  2:21 PM   Name:  Harini Puente   Age:  63 y.o. Sex:  male  :  1960   MRN:  [id number]   Room:      Presenting Complaint: Fatigue     Reason(s) for Admission: Chronic systolic heart failure (HCC) [I50.22]  Rhabdomyolysis [M62.82]  Elevated creatine kinase [R74.8]  Failure to thrive in adult [R62.7]  Fatigue, unspecified type [R53.83]     History of Present Illness:   Harini Puente is a 63 y.o. male with medical history of Chronic systolic CHF EF 32%, s/p PPM, HTN, CKD stage 3, ?afib on amioadarone, schizophrenia presents to ER with report of increasing lethargy and weakness x 2 weeks. Patient lives with his sister Yesi Emerson who is his caretaker. She reports patient is usually able to ambulate to make meals and do ADLs. For last 2 weeks has barely been out of bed per Valarie's report. Denies headache, fever, chills, nausea, vomiting, dysuria. Does endorse constipation. Denies numbness or tingling of extremities. Was able to walk from house to the care this morning. Patient denies falls, overheating, over-exertion, new medications, or seizure activity. ER workup notable for benign BMP, Cr 1.6 (baseline),   CK 3268, Trop 18-->23  Alk phos 143, ,   UDS negative    Review of Systems:  10 systems reviewed and negative except as noted in HPI. Assessment & Plan:     # Rhabdomyolysis  - suspect etiology is his bed bound status  - continue cautious IVF  - repeat CK in AM  - holding statin    # elevated LFT's   - ?r/t rhabdo  - check abd US  - check Hep panel    # chronic systolic CHF EF 50%  - continue GDMT (lasix, coreg, ACE-I)  - s/p PPM    # CAD  - asa, statin    # ? hx of afib  - can not see Cardiology notes in EMR (sees Dr. Fifi Mitchell)  - continue amiodarone, coreg .  - not on [address], cont asa    # Schizophrenia  - continue home meds cogentin and benzotropine    # CKD stage 3  - Cr at baseline  - repeat bmp in AM Dispo/Discharge Planning:      Pending clinical improvement    Diet: ADULT DIET; Regular  VTE ppx: lovenox  Code status: Full Code    Hospital Problems:  Principal Problem:    Rhabdomyolysis  Active Problems:    Schizophrenia ([facility]    Systolic congestive heart failure (HCC)    Type II diabetes mellitus ([facility]  Resolved Problems:    * No resolved hospital problems. *       Past History:     Past Medical History:   Diagnosis Date    Diabetes ([facility]     [facility]     Hypertension     Psychiatric disorder     paranoid schziphrenia    Systolic CHF, acute on chronic ([facility] 2016     Past Surgical History:   Procedure Laterality Date    APPENDECTOMY        Social History     Tobacco Use    Smoking status: Former Smoker     Packs/day: 0.50     Quit date: 2015     Years since quittin.5    Smokeless tobacco: Never Used    Tobacco comment: Quit smoking: quit around    Substance Use Topics    Alcohol use: No      Social History     Substance and Sexual Activity   Drug Use No     Family History   Problem Relation Age of Onset    Heart Failure Mother     Heart Attack Brother     Lung Cancer Brother     No Known Problems Sister     No Known Problems Sister     Heart Attack Father     Diabetes Sister     Hypertension Sister     Heart Disease Sister         valvular    No Known Problems Brother     No Known Problems Brother     Diabetes Sister     Diabetes Sister     Diabetes Sister       Family history reviewed and negative except as noted above.       Immunization History   Administered Date(s) Administered    Influenza Trivalent 10/27/2011    Influenza Virus Vaccine 2018    Influenza, Quadv, IM, PF (6 mo and older Fluzone, Flulaval, Fluarix, and 3 yrs and older Afluria) 2016    PPD Test 2018    Pneumococcal Polysaccharide ([id number]) 2016     Allergies   Allergen Reactions    Loratadine Other (See Comments)     Interaction with other medication     Prior to Admit Medications:  Current Outpatient Medications   Medication Instructions    aspirin 81 mg, Oral, DAILY    carvedilol (COREG) 12.5 mg, Oral, 2 TIMES DAILY WITH MEALS    cloZAPine (CLOZARIL) 500 mg, Oral    furosemide (LASIX) 20 MG tablet Oral, DAILY    lisinopril (PRINIVIL;ZESTRIL) 10 mg, Oral, DAILY    polyethylene glycol (GLYCOLAX) 17 g, Oral, PRN    potassium chloride (KLOR-CON M) 20 MEQ extended release tablet 20 mEq, Oral, DAILY    simvastatin (ZOCOR) 40 mg, Oral         Objective:     Patient Vitals for the past 24 hrs:   Temp Pulse Resp BP SpO2   07/10/22 1915 97.7 °F (36.5 °C) 79 16 107/68 98 %   07/10/22 1910 -- 79 -- 107/68 --   07/10/22 1815 -- 78 -- 102/78 99 %   07/10/22 1745 -- 80 -- 107/83 99 %   07/10/22 1730 -- 79 -- 104/78 100 %   07/10/22 1700 -- 83 16 108/81 99 %   07/10/22 1615 -- 80 16 100/77 97 %   07/10/22 1605 -- 81 14 109/80 97 %   07/10/22 1600 -- 81 19 -- 100 %   07/10/22 1530 -- 80 14 -- 99 %   07/10/22 1500 -- 81 14 -- 97 %   07/10/22 1445 -- -- -- 115/84 --   07/10/22 1315 97.5 °F (36.4 °C) 87 16 107/88 96 %       Oxygen Therapy  SpO2: 98 %  O2 Device: None (Room air)    Estimated body mass index is 20.07 kg/m² as calculated from the following:    Height as of this encounter: 5' 10\" (1.778 m). Weight as of this encounter: 139 lb 14.4 oz (63.5 kg). Intake/Output Summary (Last 24 hours) at 7/10/2022 2013  Last data filed at 7/10/2022 1600  Gross per 24 hour   Intake --   Output 350 ml   Net -350 ml         Physical Exam:    Blood pressure 107/68, pulse 79, temperature 97.7 °F (36.5 °C), temperature source Oral, resp. rate 16, height 5' 10\" (1.778 m), weight 139 lb 14.4 oz (63.5 kg), SpO2 98 %. General:    Thin appearing, no distress  Head:  Normocephalic, atraumatic  Eyes:  Sclerae appear normal.  Pupils equally round. ENT:  Nares appear normal, no drainage. Moist oral mucosa  Neck:  No restricted ROM. Trachea midline   CV:   RRR. No m/r/g.   No jugular venous distension. Lungs:   Diminished, No wheezing, rhonchi, or rales. Symmetric expansion. Abdomen: Bowel sounds present. Soft, nontender, nondistended. Extremities: No cyanosis or clubbing. No edema  Skin:     No rashes and normal coloration. Warm and dry. Neuro:  CN II-XII grossly intact. Sensation intact. A&Ox3  Psych:  Normal mood and affect.       I have reviewed ordered lab tests and independently visualized imaging below:    Last 24hr Labs:  Recent Results (from the past 24 hour(s))   CBC    Collection Time: 07/10/22  1:32 PM   Result Value Ref Range    WBC 6.0 4.3 - 11.1 K/uL    RBC 5.42 4.23 - 5.6 M/uL    Hemoglobin 12.5 (L) 13.6 - 17.2 g/dL    Hematocrit 39.8 (L) 41.1 - 50.3 %    MCV 73.4 (L) 79.6 - 97.8 FL    MCH 23.1 (L) 26.1 - 32.9 PG    MCHC 31.4 31.4 - 35.0 g/dL    RDW 15.7 (H) 11.9 - 14.6 %    Platelets 926 (L) 647 - 450 K/uL    MPV 11.8 9.4 - 12.3 FL    nRBC 0.00 0.0 - 0.2 K/uL   Comprehensive Metabolic Panel    Collection Time: 07/10/22  1:32 PM   Result Value Ref Range    Sodium 141 138 - 145 mmol/L    Potassium 4.0 3.5 - 5.1 mmol/L    Chloride 110 (H) 98 - 107 mmol/L    CO2 28 21 - 32 mmol/L    Anion Gap 3 (L) 7 - 16 mmol/L    Glucose 79 65 - 100 mg/dL    BUN 26 (H) 8 - 23 MG/DL    CREATININE 1.60 (H) 0.8 - 1.5 MG/DL    GFR  57 (L) >60 ml/min/1.73m2    GFR Non- 47 (L) >60 ml/min/1.73m2    Calcium 9.4 8.3 - 10.4 MG/DL    Total Bilirubin 0.5 0.2 - 1.1 MG/DL     (H) 12 - 65 U/L     (H) 15 - 37 U/L    Alk Phosphatase 143 (H) 50 - 136 U/L    Total Protein 7.5 6.3 - 8.2 g/dL    Albumin 3.2 3.2 - 4.6 g/dL    Globulin 4.3 (H) 2.3 - 3.5 g/dL    Albumin/Globulin Ratio 0.7 (L) 1.2 - 3.5     Troponin    Collection Time: 07/10/22  1:32 PM   Result Value Ref Range    Troponin, High Sensitivity 18.8 (H) 0 - 14 pg/mL   proBNP, N-TERMINAL    Collection Time: 07/10/22  1:32 PM   Result Value Ref Range    NT Pro-BNP 1,583 (H) 5 - 125 PG/ML   TSH    Collection Time: 07/10/22  1:32 PM   Result Value Ref Range    TSH, 3RD GENERATION 3.140 0.358 - 3.740 uIU/mL   CK    Collection Time: 07/10/22  1:32 PM   Result Value Ref Range    Total CK 3,268 (H) 21 - 215 U/L   POCT Urinalysis no Micro    Collection Time: 07/10/22  4:03 PM   Result Value Ref Range    Specific Gravity, Urine, POC 1.015 1.001 - 1.023      pH, Urine, POC 5.0 5.0 - 9.0      Protein, Urine, POC Negative NEG mg/dL    Glucose, UA POC Negative NEG mg/dL    KETONES, Urine, POC Negative NEG mg/dL    Bilirubin, Urine, POC Negative NEG      Blood, UA POC Negative NEG      URINE UROBILINOGEN POC 0.2 0.2 - 1.0 EU/dL    Nitrate, Urine, POC Negative NEG      Leukocyte Est, UA POC Negative NEG      Performed by: Ling Espinoza    Troponin    Collection Time: 07/10/22  5:06 PM   Result Value Ref Range    Troponin, High Sensitivity 23.2 (H) 0 - 14 pg/mL   Urine Drug Screen    Collection Time: 07/10/22  6:53 PM   Result Value Ref Range    PCP, Urine Negative      Benzodiazepines, Urine Negative      Cocaine, Urine Negative      Amphetamine, Urine Negative      Methadone, Urine Negative      THC, TH-Cannabinol, Urine Negative      Opiates, Urine Negative      Barbiturates, Urine Negative         Other Studies:  XR CHEST PORTABLE    Result Date: 7/10/2022  EXAMINATION: CHEST RADIOGRAPH 7/10/2022 2:02 PM ACCESSION NUMBER: [id number] INDICATION: cough COMPARISON: Chest x-ray 5/3/2020, 11/30/2018 TECHNIQUE: A single view of the chest was obtained. FINDINGS: Support Lines and Tubes: Unchanged cardiac pacemaker defibrillator positioning. Cardiac Silhouette: Within normal limits in size. Lungs: No focal airspace disease. Pleura: No pleural effusion. No pneumothorax. Osseous Structures: Unremarkable. Upper Abdomen: Unremarkable. No evidence of acute cardiopulmonary disease. Echocardiogram:  No results found for this or any previous visit.       Meds previously ordered:  Orders Placed This Encounter   Medications    0.9 % sodium chloride bolus    aspirin chewable tablet 81 mg    DISCONTD: carvedilol (COREG) tablet 12.5 mg    DISCONTD: cloZAPine (CLOZARIL) tablet 500 mg    DISCONTD: furosemide (LASIX) tablet 20 mg    lisinopril (PRINIVIL;ZESTRIL) tablet 10 mg    potassium chloride (KLOR-CON M) extended release tablet 20 mEq    sodium chloride flush 0.9 % injection 5-40 mL    sodium chloride flush 0.9 % injection 5-40 mL    0.9 % sodium chloride infusion    enoxaparin (LOVENOX) injection 40 mg     Order Specific Question:   Indication of Use     Answer:   Prophylaxis-DVT/PE    OR Linked Order Group     ondansetron (ZOFRAN-ODT) disintegrating tablet 4 mg     ondansetron (ZOFRAN) injection 4 mg    polyethylene glycol (GLYCOLAX) packet 17 g    OR Linked Order Group     acetaminophen (TYLENOL) tablet 650 mg     acetaminophen (TYLENOL) suppository 650 mg    0.9 % sodium chloride infusion    carvedilol (COREG) tablet 6.25 mg    furosemide (LASIX) tablet 40 mg    benztropine (COGENTIN) tablet 0.5 mg    amiodarone (CORDARONE) tablet 100 mg    pravastatin (PRAVACHOL) tablet 20 mg         Signed:  Sebastián Maradiaga DO    Part of this note may have been written by using a voice dictation software. The note has been proof read but may still contain some grammatical/other typographical errors.

## 2022-07-11 NOTE — PROGRESS NOTES
PHYSICAL THERAPY Initial Assessment, Daily Note and AM  (Link to Caseload Tracking: PT Visit Days : 1  Acknowledge Orders  Time In/Out  PT Charge Capture  Rehab Caseload Tracker    Stephani Gonzalez is a 63 y.o. male   PRIMARY DIAGNOSIS: Rhabdomyolysis  Chronic systolic heart failure (HCC) [I50.22]  Rhabdomyolysis [M62.82]  Elevated creatine kinase [R74.8]  Failure to thrive in adult [R62.7]  Fatigue, unspecified type [R53.83]       Reason for Referral: Generalized Muscle Weakness (M62.81)  Difficulty in walking, Not elsewhere classified (R26.2)  Inpatient: Payor: Wanda Chow / Plan: Eual Distad / Product Type: *No Product type* /     ASSESSMENT:     REHAB RECOMMENDATIONS:   Recommendation to date pending progress:  [address] [place] [address] Therapy    Equipment:     To Be Determined     ASSESSMENT:  Mr. Ene Solares is a 63year old AAM with an admitting diagnosis of Rhabdomyolysis, Chronic Systolic heart failure and failure to thrive. His PMH includes diabetes, congestive heart failure, hypertension, and schizophrenia. PT/OT co-treated secondary to his behavioral/cognitive state. He presents today sitting up in the bed with his sister and family at the bedside. He is very soft spoken and followed commands. He functions at a SBA level and ambulated on the unit for 150' with his cane. His gait is functional with a narrow base and path deviations but no loss of balance. He returned to his room and worked at the sink with OT standing and washing his face. He returned to his bed and positioned himself for comfort. Mr. Ene Solares would benefit from continued skilled PT while in the hospital to maximize his functional abilities. He appears to be close to his baseline.        [facility] [address] [address] AM-PAC 6 Clicks Basic Mobility Inpatient Short Form  AM-PAC Mobility Inpatient   How much difficulty turning over in bed?: None  How much difficulty sitting down on / standing up from a chair with arms?: A Little  How much difficulty moving from lying on back to sitting on side of bed?: A Little  How much help from another person moving to and from a bed to a chair?: A Little  How much help from another person needed to walk in hospital room?: A Little  How much help from another person for climbing 3-5 steps with a railing?: A Little  AM-PAC Inpatient Mobility Raw Score : 19  AM-PAC Inpatient T-Scale Score : 45.44  Mobility Inpatient CMS 0-100% Score: 41.77  Mobility Inpatient CMS G-Code Modifier : CK    SUBJECTIVE:   Mr. Bjorn Hair states, \"I can walk with my cane when I want to\"    Social/Functional      OBJECTIVE:     PAIN: Erroll Abbot / O2: Zannie Mantle / Finn Hamming / Minetta Leonardo:   Pre Treatment: 0/10         Post Treatment: 0/10 Vitals BP 93/63   Pulse 79   Temp 97.9 °F (36.6 °C) (Oral)   Resp 16   Ht 5' 10\" (1.778 m)   Wt 139 lb 14.4 oz (63.5 kg)   SpO2 98%   BMI 20.07 kg/m²        Oxygen - glenny, air          RESTRICTIONS/PRECAUTIONS: fall precations                    GROSS EVALUATION: Intact Impaired (Comments):   AROM [x]     PROM [x]    Strength [x]     Balance [x]     Posture [x]    Sensation [x]     Coordination [x]      Tone [x]     Edema [x]    Activity Tolerance [x]      []      COGNITION/  PERCEPTION: Intact Impaired (Comments):   Orientation [x]     Vision [x]     Hearing [x]     Cognition  [x]       MOBILITY: I Mod I S SBA CGA Min Mod Max Total  NT x2 Comments:   Bed Mobility    Rolling [] [x] [] [] [] [] [] [] [] [] []    Supine to Sit [] [] [] [x] [] [] [] [] [] [] []    Scooting [] [] [] [x] [] [] [] [] [] [] []    Sit to Supine [] [] [] [x] [] [] [] [] [] [] []    Transfers    Sit to Stand [] [] [] [] [x] [] [] [] [] [] []    Bed to Chair [] [] [] [] [x] [] [] [] [] [] []    Stand to Sit [] [] [] [] [x] [] [] [] [] [] []     [] [] [] [] [] [] [] [] [] [] []    I=Independent, Mod I=Modified Independent, S=Supervision, SBA=Standby Assistance, CGA=Contact Guard Assistance,   Min=Minimal Assistance, Mod=Moderate Assistance, Max=Maximal Assistance, Total=Total Assistance, NT=Not Tested    GAIT: I Mod I S SBA CGA Min Mod Max Total  NT x2 Comments:   Level of Assistance [] [] [] [] [x] [] [] [] [] [] []    Distance  150 feet    DME SPC    Gait Quality Narrow base of support and Path deviations     Weightbearing Status  N/A    Stairs  NT    I=Independent, Mod I=Modified Independent, S=Supervision, SBA=Standby Assistance, CGA=Contact Guard Assistance,   Min=Minimal Assistance, Mod=Moderate Assistance, Max=Maximal Assistance, Total=Total Assistance, NT=Not Tested    PLAN:   ACUTE PHYSICAL THERAPY GOALS:   (Developed with and agreed upon by patient and/or caregiver. )  LTG:  (1.)Mr. Mendez will move from supine to sit and sit to supine , scoot up and down and roll side to side in bed with INDEPENDENT within 3 treatment day(s). (2.)Mr. Mendez will transfer from bed to chair and chair to bed with INDEPENDENT using the least restrictive device within 3 treatment day(s). (3.)Mr. Mendez will ambulate with MODIFIED INDEPENDENCE/SUPERVISION for 250 feet with the least restrictive device within 3 treatment day(s).   ________________________________________________________________________________________________    FREQUENCY AND DURATION:   for duration of hospital stay or until stated goals are met, whichever comes first.    THERAPY PROGNOSIS: Good    PROBLEM LIST:   (Skilled intervention is medically necessary to address:)  Decreased ADL/Functional Activities  Decreased Activity Tolerance  Decreased Cognition  Decreased Strength  Decreased Transfer Abilities INTERVENTIONS PLANNED:   (Benefits and precautions of physical therapy have been discussed with the patient.)  Therapeutic Activity  Therapeutic Exercise/HEP  Gait Training       TREATMENT:   EVALUATION: LOW COMPLEXITY: (Untimed Charge)    TREATMENT:   Co-Treatment PT/OT necessary due to patient's decreased overall endurance/tolerance levels, as well as need for high level skilled assistance to complete functional transfers/mobility and functional tasks  Gait Training (15 Minutes): Gait training for 150 feet utilizing SPC. Patient required Verbal cueing to improve Gait Mechanics.      AFTER TREATMENT PRECAUTIONS: Bed, Bed/Chair Locked, Call light within reach, Needs within reach, RN notified and Visitors at bedside    INTERDISCIPLINARY COLLABORATION:  RN/ PCT and OT/ RODRIGUEZ    TIME IN/OUT:  [address] [place], [place]

## 2022-07-11 NOTE — PROGRESS NOTES
Hospitalist Progress Note   Admit Date:  7/10/2022  2:21 PM   Name:  Gary Hughes   Age:  63 y.o. Sex:  male  :  1960   MRN:  [id number]   Room:      Presenting Complaint: Fatigue    Reason(s) for Admission: Chronic systolic heart failure (HCC) [I50.22]  Rhabdomyolysis [M62.82]  Elevated creatine kinase [R74.8]  Failure to thrive in adult [R62.7]  Fatigue, unspecified type [R53.83]     Hospital Course & Interval History:   Gary Hughes is a 63 y.o. male with medical history of Chronic systolic CHF EF 41%, s/p PPM, HTN, CKD stage 3, ?afib on amioadarone, schizophrenia presents to ER with report of increasing lethargy and weakness x 2 weeks. Patient lives with his sister Lawrence Chau who is his caretaker. She reports patient is usually able to ambulate to make meals and do ADLs. For last 2 weeks has barely been out of bed. Denies headache, fever, chills, nausea, vomiting, dysuria. Does endorse constipation. Denies numbness or tingling of extremities. Was able to walk from house to the car this morning.      Patient denies falls, overheating, over-exertion, new medications, or seizure activity.      ER workup notable for benign BMP, Cr 1.6 (baseline),   CK 3268, Trop 18-->23( likely stress induced)  Alk phos 143, ,   UDS negative    Subjective/24hr Events (22): Denies abdominal pain, muscle aches. Reports he feels better today. Denies SALOMON or fatigue with walking     ROS:  10 systems reviewed and negative except as noted above.      Assessment & Plan:   Rhabdomyolysis  22  -CK 1,892 fr 3,268  - continue cautious IVF  - repeat CK in AM  - holding statin    Weakness  22  -X 2 weeks  -Likely 2/2 dehydration  -continue IVF x 24 hours  -therapy with recs for [place]     Elevated LFT's   22  - ?r/t rhabdo vs Clozaril/statin  - Negative for gallstones or biliary obstructions  - Hep panel negative  -Somewhat improved since admit  -Hold statin     Chronic systolic CHF EF 10%  22  -Patient appear hypovolemic  - continue GDMT ( coreg, ACE-I); hold lasix for now  - s/p PPM     CAD  22  -Patient denies CP  - asa, statin     ?hx of afib  - can not see Cardiology notes in EMR (sees Dr. Diamond Damon)  - continue amiodarone, coreg .  - not on [address], cont asa     Schizophrenia  - continue home meds cogentin and benzotropine     CKD stage 3  - Cr improved with fluids  -Continue gentle hydration x 24 hours  - CMP in a.m. Discharge Plannin-2 days    Diet:  ADULT DIET; Regular  DVT PPx: Lovenox SQ   Code status: FULL CODE      Objective:   Patient Vitals for the past 24 hrs:   Temp Pulse Resp BP SpO2   22 1132 97.4 °F (36.3 °C) 79 16 (!) 86/58 98 %   22 0703 97.9 °F (36.6 °C) 79 16 93/63 98 %   22 0610 -- 66 -- 96/63 --   22 0258 97.5 °F (36.4 °C) 79 18 94/70 98 %   07/10/22 2245 97.5 °F (36.4 °C) 82 16 100/71 98 %   07/10/22 1915 97.7 °F (36.5 °C) 79 16 107/68 98 %   07/10/22 1910 -- 79 -- 107/68 --   07/10/22 1815 -- 78 -- 102/78 99 %   07/10/22 1745 -- 80 -- 107/83 99 %   07/10/22 1730 -- 79 -- 104/78 100 %   07/10/22 1700 -- 83 16 108/81 99 %   07/10/22 1615 -- 80 16 100/77 97 %   07/10/22 1605 -- 81 14 109/80 97 %   07/10/22 1600 -- 81 19 -- 100 %   07/10/22 1530 -- 80 14 -- 99 %   07/10/22 1500 -- 81 14 -- 97 %   07/10/22 1445 -- -- -- 115/84 --         Estimated body mass index is 20.07 kg/m² as calculated from the following:    Height as of this encounter: 5' 10\" (1.778 m). Weight as of this encounter: 139 lb 14.4 oz (63.5 kg). Intake/Output Summary (Last 24 hours) at 2022 1418  Last data filed at 2022 0610  Gross per 24 hour   Intake 757 ml   Output 1250 ml   Net -493 ml         Physical Exam:     Blood pressure (!) 86/58, pulse 79, temperature 97.4 °F (36.3 °C), temperature source Oral, resp. rate 16, height 5' 10\" (1.778 m), weight 139 lb 14.4 oz (63.5 kg), SpO2 98 %. General:    Appear hypovolemic;  No overt distress  Head:  Normocephalic, atraumatic  Eyes:  Sclerae appear normal.  Pupils equally round. ENT:  Nares appear normal, no drainage. Moist oral mucosa  Neck:  No restricted ROM. Trachea midline   CV:   RRR. No m/r/g. No jugular venous distension. Lungs:   CTAB. No wheezing, rhonchi, or rales. Respirations even, unlabored  Abdomen: Bowel sounds present. Soft, nontender, nondistended. Extremities: No cyanosis or clubbing. No edema  Skin:     No rashes and normal coloration. Warm and dry. Neuro:  CN II-XII grossly intact. A&Ox3  Psych:  Normal mood and affect.       I have reviewed ordered lab tests and independently visualized imaging below:    Recent Labs:  Recent Results (from the past 48 hour(s))   CBC    Collection Time: 07/10/22  1:32 PM   Result Value Ref Range    WBC 6.0 4.3 - 11.1 K/uL    RBC 5.42 4.23 - 5.6 M/uL    Hemoglobin 12.5 (L) 13.6 - 17.2 g/dL    Hematocrit 39.8 (L) 41.1 - 50.3 %    MCV 73.4 (L) 79.6 - 97.8 FL    MCH 23.1 (L) 26.1 - 32.9 PG    MCHC 31.4 31.4 - 35.0 g/dL    RDW 15.7 (H) 11.9 - 14.6 %    Platelets 023 (L) 409 - 450 K/uL    MPV 11.8 9.4 - 12.3 FL    nRBC 0.00 0.0 - 0.2 K/uL   Comprehensive Metabolic Panel    Collection Time: 07/10/22  1:32 PM   Result Value Ref Range    Sodium 141 138 - 145 mmol/L    Potassium 4.0 3.5 - 5.1 mmol/L    Chloride 110 (H) 98 - 107 mmol/L    CO2 28 21 - 32 mmol/L    Anion Gap 3 (L) 7 - 16 mmol/L    Glucose 79 65 - 100 mg/dL    BUN 26 (H) 8 - 23 MG/DL    CREATININE 1.60 (H) 0.8 - 1.5 MG/DL    GFR  57 (L) >60 ml/min/1.73m2    GFR Non- 47 (L) >60 ml/min/1.73m2    Calcium 9.4 8.3 - 10.4 MG/DL    Total Bilirubin 0.5 0.2 - 1.1 MG/DL     (H) 12 - 65 U/L     (H) 15 - 37 U/L    Alk Phosphatase 143 (H) 50 - 136 U/L    Total Protein 7.5 6.3 - 8.2 g/dL    Albumin 3.2 3.2 - 4.6 g/dL    Globulin 4.3 (H) 2.3 - 3.5 g/dL    Albumin/Globulin Ratio 0.7 (L) 1.2 - 3.5     Troponin    Collection Time: 07/10/22 1:32 PM   Result Value Ref Range    Troponin, High Sensitivity 18.8 (H) 0 - 14 pg/mL   proBNP, N-TERMINAL    Collection Time: 07/10/22  1:32 PM   Result Value Ref Range    NT Pro-BNP 1,583 (H) 5 - 125 PG/ML   TSH    Collection Time: 07/10/22  1:32 PM   Result Value Ref Range    TSH, 3RD GENERATION 3.140 0.358 - 3.740 uIU/mL   CK    Collection Time: 07/10/22  1:32 PM   Result Value Ref Range    Total CK 3,268 (H) 21 - 215 U/L   EKG 12 Lead    Collection Time: 07/10/22  2:49 PM   Result Value Ref Range    Ventricular Rate 81 BPM    Atrial Rate 81 BPM    P-R Interval 206 ms    QRS Duration 189 ms    Q-T Interval 478 ms    QTc Calculation (Bazett) 555 ms    P Axis 76 degrees    R Axis -69 degrees    T Axis 79 degrees    Diagnosis Sinus rhythm    POCT Urinalysis no Micro    Collection Time: 07/10/22  4:03 PM   Result Value Ref Range    Specific Gravity, Urine, POC 1.015 1.001 - 1.023      pH, Urine, POC 5.0 5.0 - 9.0      Protein, Urine, POC Negative NEG mg/dL    Glucose, UA POC Negative NEG mg/dL    KETONES, Urine, POC Negative NEG mg/dL    Bilirubin, Urine, POC Negative NEG      Blood, UA POC Negative NEG      URINE UROBILINOGEN POC 0.2 0.2 - 1.0 EU/dL    Nitrate, Urine, POC Negative NEG      Leukocyte Est, UA POC Negative NEG      Performed by: Georgianna Landau    Troponin    Collection Time: 07/10/22  5:06 PM   Result Value Ref Range    Troponin, High Sensitivity 23.2 (H) 0 - 14 pg/mL   Urine Drug Screen    Collection Time: 07/10/22  6:53 PM   Result Value Ref Range    PCP, Urine Negative      Benzodiazepines, Urine Negative      Cocaine, Urine Negative      Amphetamine, Urine Negative      Methadone, Urine Negative      THC, TH-Cannabinol, Urine Negative      Opiates, Urine Negative      Barbiturates, Urine Negative     Respiratory Panel, Molecular, with COVID-19 (Restricted: peds pts or suitable admitted adults)    Collection Time: 07/10/22  8:41 PM    Specimen: Nasopharyngeal   Result Value Ref Range    Adenovirus by PCR NOT DETECTED NOTDET      Coronavirus 229E by PCR NOT DETECTED NOTDET      Coronavirus HKU1 by PCR NOT DETECTED NOTDET      Coronavirus NL63 by PCR NOT DETECTED NOTDET      Coronavirus OC43 by PCR NOT DETECTED NOTDET      SARS-CoV-2, PCR NOT DETECTED NOTDET      Human Metapneumovirus by PCR NOT DETECTED NOTDET      Rhinovirus Enterovirus PCR NOT DETECTED NOTDET      Influenza A by PCR NOT DETECTED NOTDET      INFLUENZA B PCR NOT DETECTED NOTDET      Parainfluenza 1 PCR NOT DETECTED NOTDET      Parainfluenza 2 PCR NOT DETECTED NOTDET      Parainfluenza 3 PCR NOT DETECTED NOTDET      Parainfluenza 4 PCR NOT DETECTED NOTDET      Respiratory Syncytial Virus by PCR NOT DETECTED NOTDET      Bordetella parapertussis by PCR NOT DETECTED NOTDET      Bordetella pertussis by PCR NOT DETECTED NOTDET      Chlamydophila Pneumonia PCR NOT DETECTED NOTDET      Mycoplasma pneumo by PCR NOT DETECTED NOTDET     Hepatitis Panel, Acute    Collection Time: 07/10/22  9:00 PM   Result Value Ref Range    Hep A IgM NONREACTIVE NR      Hep B Core Ab, IgM NONREACTIVE NR      Hepatitis B Surface Ag NONREACTIVE NR      Hepatitis C Ab NONREACTIVE NR     EKG 12 Lead    Collection Time: 07/10/22  9:24 PM   Result Value Ref Range    Ventricular Rate 84 BPM    Atrial Rate 84 BPM    P-R Interval 198 ms    QRS Duration 188 ms    Q-T Interval 488 ms    QTc Calculation (Bazett) 576 ms    P Axis 56 degrees    R Axis 255 degrees    T Axis 58 degrees    Diagnosis Atrial-sensed ventricular-paced rhythm    Comprehensive Metabolic Panel w/ Reflex to MG    Collection Time: 07/11/22  8:36 AM   Result Value Ref Range    Sodium 140 138 - 145 mmol/L    Potassium 3.7 3.5 - 5.1 mmol/L    Chloride 109 (H) 98 - 107 mmol/L    CO2 30 21 - 32 mmol/L    Anion Gap 1 (L) 7 - 16 mmol/L    Glucose 90 65 - 100 mg/dL    BUN 23 8 - 23 MG/DL    CREATININE 1.30 0.8 - 1.5 MG/DL    GFR African American >60 >60 ml/min/1.73m2    GFR Non- 60 (L) >60 ml/min/1.73m2 Calcium 9.0 8.3 - 10.4 MG/DL    Total Bilirubin 0.4 0.2 - 1.1 MG/DL     (H) 12 - 65 U/L     (H) 15 - 37 U/L    Alk Phosphatase 132 50 - 136 U/L    Total Protein 6.8 6.3 - 8.2 g/dL    Albumin 2.7 (L) 3.2 - 4.6 g/dL    Globulin 4.1 (H) 2.3 - 3.5 g/dL    Albumin/Globulin Ratio 0.7 (L) 1.2 - 3.5     CBC with Auto Differential    Collection Time: 07/11/22  8:36 AM   Result Value Ref Range    WBC 9.8 4.3 - 11.1 K/uL    RBC 5.17 4.23 - 5.6 M/uL    Hemoglobin 12.0 (L) 13.6 - 17.2 g/dL    Hematocrit 38.2 (L) 41.1 - 50.3 %    MCV 73.9 (L) 79.6 - 97.8 FL    MCH 23.2 (L) 26.1 - 32.9 PG    MCHC 31.4 31.4 - 35.0 g/dL    RDW 15.6 (H) 11.9 - 14.6 %    Platelets 912 (L) 516 - 450 K/uL    MPV 11.9 9.4 - 12.3 FL    nRBC 0.00 0.0 - 0.2 K/uL    Differential Type AUTOMATED      Seg Neutrophils 73 43 - 78 %    Lymphocytes 17 13 - 44 %    Monocytes 10 4.0 - 12.0 %    Eosinophils % 0 (L) 0.5 - 7.8 %    Basophils 0 0.0 - 2.0 %    Immature Granulocytes 0 0.0 - 5.0 %    Segs Absolute 7.2 1.7 - 8.2 K/UL    Absolute Lymph # 1.6 0.5 - 4.6 K/UL    Absolute Mono # 1.0 0.1 - 1.3 K/UL    Absolute Eos # 0.0 0.0 - 0.8 K/UL    Basophils Absolute 0.0 0.0 - 0.2 K/UL    Absolute Immature Granulocyte 0.0 0.0 - 0.5 K/UL   CK    Collection Time: 07/11/22  8:36 AM   Result Value Ref Range    Total CK 1,892 (H) 21 - 215 U/L           Other Studies:      Current Meds:  Current Facility-Administered Medications   Medication Dose Route Frequency    cloZAPine (CLOZARIL) tablet 100 mg - patient supplied (Patient Supplied)  200 mg Oral Daily    [START ON 7/12/2022] cloZAPine (CLOZARIL) tablet 100 mg - patient supplied (Patient Supplied)  500 mg Oral Nightly    0.9 % sodium chloride infusion   IntraVENous Continuous    aspirin chewable tablet 81 mg  81 mg Oral Daily    lisinopril (PRINIVIL;ZESTRIL) tablet 10 mg  10 mg Oral Daily    potassium chloride (KLOR-CON M) extended release tablet 20 mEq  20 mEq Oral Daily    sodium chloride flush 0.9 % injection 5-40 mL  5-40 mL IntraVENous 2 times per day    sodium chloride flush 0.9 % injection 5-40 mL  5-40 mL IntraVENous PRN    0.9 % sodium chloride infusion   IntraVENous PRN    enoxaparin (LOVENOX) injection 40 mg  40 mg SubCUTAneous Daily    ondansetron (ZOFRAN-ODT) disintegrating tablet 4 mg  4 mg Oral Q8H PRN    Or    ondansetron (ZOFRAN) injection 4 mg  4 mg IntraVENous Q6H PRN    polyethylene glycol (GLYCOLAX) packet 17 g  17 g Oral Daily PRN    acetaminophen (TYLENOL) tablet 650 mg  650 mg Oral Q6H PRN    Or    acetaminophen (TYLENOL) suppository 650 mg  650 mg Rectal Q6H PRN    carvedilol (COREG) tablet 6.25 mg  6.25 mg Oral BID WC    [Held by provider] furosemide (LASIX) tablet 40 mg  40 mg Oral Daily    benztropine (COGENTIN) tablet 0.5 mg  0.5 mg Oral BID    amiodarone (CORDARONE) tablet 100 mg  100 mg Oral BID    pravastatin (PRAVACHOL) tablet 20 mg  20 mg Oral Nightly    polyethylene glycol (GLYCOLAX) packet 17 g  17 g Oral Daily    tuberculin injection 5 Units  5 Units IntraDERmal Once       Signed:  Rosa Ax, APRN - CNP    Part of this note may have been written by using a voice dictation software. The note has been proof read but may still contain some grammatical/other typographical errors.

## 2022-07-11 NOTE — FLOWSHEET NOTE
07/10/22 1915   Dual Clinician Skin Assessment   Dual Skin Assessment (4 Eyes) WDL   Second Clinical  (First and Last Name) Diamond Burleson RN   Skin Integumentary    Skin Integumentary (WDL) WDL   Care Plan - Skin/Tissue Integrity Goals   Skin Integrity Remains Intact Monitor for areas of redness and/or skin breakdown   Musculoskeletal   Musculoskeletal (WDL) X   RUE Weakness   LUE Weakness   RL Extremity Weakness   LL Extremity Weakness

## 2022-07-12 VITALS
DIASTOLIC BLOOD PRESSURE: 68 MMHG | WEIGHT: 139.9 LBS | TEMPERATURE: 97.5 F | RESPIRATION RATE: 17 BRPM | BODY MASS INDEX: 20.03 KG/M2 | OXYGEN SATURATION: 98 % | HEIGHT: 70 IN | SYSTOLIC BLOOD PRESSURE: 92 MMHG | HEART RATE: 77 BPM

## 2022-07-12 LAB
ALBUMIN SERPL-MCNC: 2.5 G/DL (ref 3.2–4.6)
ALBUMIN/GLOB SERPL: 0.7 {RATIO} (ref 1.2–3.5)
ALP SERPL-CCNC: 118 U/L (ref 50–136)
ALT SERPL-CCNC: 104 U/L (ref 12–65)
ANION GAP SERPL CALC-SCNC: 2 MMOL/L (ref 7–16)
AST SERPL-CCNC: 72 U/L (ref 15–37)
BILIRUB SERPL-MCNC: 0.3 MG/DL (ref 0.2–1.1)
BUN SERPL-MCNC: 16 MG/DL (ref 8–23)
CALCIUM SERPL-MCNC: 8.2 MG/DL (ref 8.3–10.4)
CHLORIDE SERPL-SCNC: 110 MMOL/L (ref 98–107)
CK SERPL-CCNC: 1268 U/L (ref 21–215)
CO2 SERPL-SCNC: 28 MMOL/L (ref 21–32)
CREAT SERPL-MCNC: 1.2 MG/DL (ref 0.8–1.5)
GLOBULIN SER CALC-MCNC: 3.5 G/DL (ref 2.3–3.5)
GLUCOSE SERPL-MCNC: 93 MG/DL (ref 65–100)
POTASSIUM SERPL-SCNC: 3.7 MMOL/L (ref 3.5–5.1)
PROT SERPL-MCNC: 6 G/DL (ref 6.3–8.2)
SODIUM SERPL-SCNC: 140 MMOL/L (ref 138–145)

## 2022-07-12 PROCEDURE — 80053 COMPREHEN METABOLIC PANEL: CPT

## 2022-07-12 PROCEDURE — 82550 ASSAY OF CK (CPK): CPT

## 2022-07-12 PROCEDURE — 6370000000 HC RX 637 (ALT 250 FOR IP): Performed by: NURSE PRACTITIONER

## 2022-07-12 PROCEDURE — 2580000003 HC RX 258: Performed by: NURSE PRACTITIONER

## 2022-07-12 PROCEDURE — 97530 THERAPEUTIC ACTIVITIES: CPT

## 2022-07-12 PROCEDURE — 51798 US URINE CAPACITY MEASURE: CPT

## 2022-07-12 PROCEDURE — 36415 COLL VENOUS BLD VENIPUNCTURE: CPT

## 2022-07-12 PROCEDURE — 6370000000 HC RX 637 (ALT 250 FOR IP): Performed by: INTERNAL MEDICINE

## 2022-07-12 PROCEDURE — 6360000002 HC RX W HCPCS: Performed by: INTERNAL MEDICINE

## 2022-07-12 PROCEDURE — 2580000003 HC RX 258: Performed by: INTERNAL MEDICINE

## 2022-07-12 PROCEDURE — 6370000000 HC RX 637 (ALT 250 FOR IP): Performed by: FAMILY MEDICINE

## 2022-07-12 RX ORDER — AMIODARONE HYDROCHLORIDE 100 MG/1
100 TABLET ORAL 2 TIMES DAILY
Qty: 60 TABLET | Refills: 0 | Status: SHIPPED | OUTPATIENT
Start: 2022-07-12 | End: 2022-08-11

## 2022-07-12 RX ORDER — BISACODYL 10 MG
10 SUPPOSITORY, RECTAL RECTAL ONCE
Status: COMPLETED | OUTPATIENT
Start: 2022-07-12 | End: 2022-07-12

## 2022-07-12 RX ORDER — FUROSEMIDE 20 MG/1
20 TABLET ORAL DAILY
Qty: 60 TABLET | Refills: 0 | Status: SHIPPED | OUTPATIENT
Start: 2022-07-12

## 2022-07-12 RX ORDER — MIDODRINE HYDROCHLORIDE 5 MG/1
5 TABLET ORAL
Qty: 90 TABLET | Refills: 3 | Status: SHIPPED | OUTPATIENT
Start: 2022-07-12

## 2022-07-12 RX ADMIN — BISACODYL 10 MG: 10 SUPPOSITORY RECTAL at 12:52

## 2022-07-12 RX ADMIN — ASPIRIN 81 MG: 81 TABLET, CHEWABLE ORAL at 08:47

## 2022-07-12 RX ADMIN — ENOXAPARIN SODIUM 40 MG: 40 INJECTION SUBCUTANEOUS at 08:48

## 2022-07-12 RX ADMIN — POTASSIUM CHLORIDE 20 MEQ: 20 TABLET, EXTENDED RELEASE ORAL at 08:48

## 2022-07-12 RX ADMIN — SODIUM CHLORIDE: 9 INJECTION, SOLUTION INTRAVENOUS at 02:00

## 2022-07-12 RX ADMIN — AMIODARONE HYDROCHLORIDE 100 MG: 200 TABLET ORAL at 08:47

## 2022-07-12 RX ADMIN — POLYETHYLENE GLYCOL 3350 17 G: 17 POWDER, FOR SOLUTION ORAL at 08:47

## 2022-07-12 RX ADMIN — MIDODRINE HYDROCHLORIDE 5 MG: 5 TABLET ORAL at 12:52

## 2022-07-12 RX ADMIN — BENZTROPINE MESYLATE 0.5 MG: 1 TABLET ORAL at 08:48

## 2022-07-12 RX ADMIN — MIDODRINE HYDROCHLORIDE 5 MG: 5 TABLET ORAL at 08:47

## 2022-07-12 RX ADMIN — CLOZAPINE 200 MG: 200 TABLET ORAL at 08:49

## 2022-07-12 RX ADMIN — SODIUM CHLORIDE, PRESERVATIVE FREE 10 ML: 5 INJECTION INTRAVENOUS at 08:58

## 2022-07-12 NOTE — PROGRESS NOTES
END OF SHIFT NOTE:    INTAKE/OUTPUT  07/11 0701 - 07/12 0700  In: 683.6 [P.O.:360; I.V.:323.6]  Out: 1500 [Urine:1500]  Voiding: Yes  Catheter: No  Drain:              Flatus: Patient does have flatus present. Stool:  occurrences. Characteristics:           Stool Assessment  Last BM (including prior to admit): 06/27/22    Emesis:  occurrences. Characteristics:        VITAL SIGNS  Patient Vitals for the past 12 hrs:   Temp Pulse Resp BP SpO2   07/11/22 2308 98.1 °F (36.7 °C) 68 17 92/73 99 %   07/11/22 1929 97.3 °F (36.3 °C) 73 18 (!) 77/54 97 %   07/11/22 1549 97.6 °F (36.4 °C) 73 16 88/60 98 %       Pain Assessment  @BSHSIFLOW(13)@          Ambulating  No    Shift report given to oncoming nurse at the bedside.     Nan Cardona RN

## 2022-07-12 NOTE — PLAN OF CARE
Problem: Discharge Planning  Goal: Discharge to home or other facility with appropriate resources  Outcome: Progressing     Problem: Safety - Adult  Goal: Free from fall injury  Outcome: Progressing     Problem: ABCDS Injury Assessment  Goal: Absence of physical injury  Outcome: Progressing     Problem: Pain  Goal: Verbalizes/displays adequate comfort level or baseline comfort level  Outcome: Progressing     Problem: Skin/Tissue Integrity  Goal: Absence of new skin breakdown  Description: 1. Monitor for areas of redness and/or skin breakdown  2. Assess vascular access sites hourly  3. Every 4-6 hours minimum:  Change oxygen saturation probe site  4. Every 4-6 hours:  If on nasal continuous positive airway pressure, respiratory therapy assess nares and determine need for appliance change or resting period.   Outcome: Progressing     Problem: Chronic Conditions and Co-morbidities  Goal: Patient's chronic conditions and co-morbidity symptoms are monitored and maintained or improved  Outcome: Progressing

## 2022-07-12 NOTE — DISCHARGE SUMMARY
Hospitalist Discharge Summary   Admit Date:  7/10/2022  2:21 PM   DC Note date: 2022  Name:  Mylene Hill   Age:  63 y.o. Sex:  male  :  1960   MRN:  [id number]   Room:  [facility]  PCP:  Jonathon Marie MD    Presenting Complaint: Fatigue    Initial Admission Diagnosis: Chronic systolic heart failure (HCC) [I50.22]  Rhabdomyolysis [M62.82]  Elevated creatine kinase [R74.8]  Failure to thrive in adult [R62.7]  Fatigue, unspecified type [R53.83]     Problem List for this Hospitalization:    Did Patient have Sepsis (YES OR NO): No     Hospital Course:  Denise Eddy a 63 y. o. male with medical history of Chronic systolic CHF EF 60%, s/p PPM, HTN, CKD stage 3, ?afib on amioadarone, schizophrenia presents to ER with report of increasing lethargy and weakness x 2 weeks. Patient lives with his sister Cristie Goltz who is his caretaker. She reports patient is usually able to ambulate to make meals and do ADLs. For last 2 weeks has barely been out of bed. Denies headache, fever, chills, nausea, vomiting, dysuria. Does endorse constipation. Denies numbness or tingling of extremities. Was able to walk from house to the car this morning.      Patient denies falls, overheating, over-exertion, new medications, or seizure activity.      ER workup notable for benign BMP, Cr 1.6 (baseline),   CK 3268, Trop 18-->23( likely stress induced)  Alk phos 143, ,   UDS negative    Patient admitted and started on IVF for Rhabdo. Gently hydration due to HF. ECHO 2018 with EF 10-20%. Patient endorsed feeling better since getting fluids. CK improved and creatinine normalized. Coreg and Lisinopril held due to soft BP and YANIV. Liver enzymes improved. Mentation improved to baseline. Spoke with sister who is patient's caregiver and states patient has an appointment with Dr. Adela Webber, cardiology on Thursday and she will have him review patient's current medications. Therapy with recs for [place].  Patient/labs stable for discharge and patient discharged home with orders for PCP, cardiology follow-up, and HH    Disposition: Home with HH  Diet: ADULT DIET; Regular; 1800 ml  Code Status: Full Code     Follow Up Orders:  No follow-ups on file. Follow up labs/diagnostics (ultimately defer to outpatient provider):  PCP for [facility]  Cardiology for management of HF medication    Time spent in patient discharge and coordination 41 minutes. Plan was discussed with patient/caregiver. All questions answered. Patient was stable at time of discharge. Instructions given to call a physician or return if any concerns.     Discharge Info:      Medication List      START taking these medications    midodrine 5 MG tablet  Commonly known as: PROAMATINE  Take 1 tablet by mouth 3 times daily (with meals)        CHANGE how you take these medications    amiodarone 100 MG tablet  Commonly known as: PACERONE  Take 1 tablet by mouth 2 times daily  What changed: when to take this     furosemide 20 MG tablet  Commonly known as: LASIX  Take 1 tablet by mouth daily  What changed: how much to take        CONTINUE taking these medications    aspirin 81 MG chewable tablet     benztropine 0.5 MG tablet  Commonly known as: COGENTIN     cloZAPine 100 MG tablet  Commonly known as: CLOZARIL     polyethylene glycol 17 GM/SCOOP powder  Commonly known as: GLYCOLAX     potassium chloride 20 MEQ extended release tablet  Commonly known as: KLOR-CON M     pravastatin 20 MG tablet  Commonly known as: PRAVACHOL        STOP taking these medications    carvedilol 12.5 MG tablet  Commonly known as: COREG     lisinopril 10 MG tablet  Commonly known as: PRINIVIL;ZESTRIL     simvastatin 40 MG tablet  Commonly known as: ZOCOR           Where to Get Your Medications      These medications were sent to [facility]/pharmacy #[id number] Robins, [address] [place] [address] [phone]  [address] [address]    Phone: [phone]   · amiodarone 100 MG tablet  · furosemide 20 MG tablet  · midodrine 5 MG tablet         Procedures done this admission:  * No surgery found *    Consults this admission:  [address] results:       No results found for this or any previous visit (from the past 4464 hour(s)). Diagnostic Imaging/Tests:   US ABDOMEN COMPLETE    Result Date: 7/11/2022  ABDOMINAL ULTRASOUND. INDICATION: Elevated liver function tests. COMPARISON: None. FINDINGS: Pancreas is grossly unremarkable although the tail is partially obscured by overlying bowel gas. Aorta is normal caliber, 2.0 cm. The IVC is patent. The normal sized spleen has a homogenous echotexture and measures 8.0 cm. Left kidney is unremarkable without hydronephrosis and measures 9.3 cm. Right kidney contains a 15 mm cyst, is without hydronephrosis and measures 9.6 cm. Renal echogenicity is normal, the right kidney is minimally hypoechoic to the liver. The intrahepatic biliary tree is not dilated. There is hepatopetal flow in the portal vein. No gross focal parenchymal abnormality identified within the liver. Liver echotexture: Uniform. The gallbladder wall is not thickened. No gallstones. The common bile duct is not dilated, for mm. Negative for gallstones or biliary tree obstruction. XR CHEST PORTABLE    Result Date: 7/10/2022  EXAMINATION: CHEST RADIOGRAPH 7/10/2022 2:02 PM ACCESSION NUMBER: [id number] INDICATION: cough COMPARISON: Chest x-ray 5/3/2020, 11/30/2018 TECHNIQUE: A single view of the chest was obtained. FINDINGS: Support Lines and Tubes: Unchanged cardiac pacemaker defibrillator positioning. Cardiac Silhouette: Within normal limits in size. Lungs: No focal airspace disease. Pleura: No pleural effusion. No pneumothorax. Osseous Structures: Unremarkable. Upper Abdomen: Unremarkable. No evidence of acute cardiopulmonary disease.            Labs: Results:       BMP, Mg, Phos Recent Labs     07/10/22  1332 07/11/22  0836 07/12/22  9980  140 140   K 4.0 3.7 3.7   * 109* 110*   CO2 28 30 28   BUN 26* 23 16      CBC Recent Labs     07/10/22  1332 07/11/22  0836   WBC 6.0 9.8   RBC 5.42 5.17   HGB 12.5* 12.0*   HCT 39.8* 38.2*   * 142*      LFT Recent Labs     07/10/22  1332 07/11/22  0836 07/12/22  0652   * 155* 104*   GLOB 4.3* 4.1* 3.5      Cardiac Testing Lab Results   Component Value Date/Time    BNP 2,979 05/03/2020 12:51 AM      Coagulation Tests No results found for: INR, APTT   A1c No results found for: HBA1C   Lipid Panel No results found for: CHOL, CHOLPOCT, CHOLX, CHLST, CHOLV, 984246, HDL, HDLC, LDL, LDLC, 117630, VLDLC, VLDL, TGLX, TRIGL   Thyroid Panel No results found for: TSH, T4, FT4     Most Recent UA No results found for: MUCUS, UCOM       All Labs from Last 24 Hrs:  Recent Results (from the past 24 hour(s))   Comprehensive Metabolic Panel    Collection Time: 07/12/22  6:52 AM   Result Value Ref Range    Sodium 140 138 - 145 mmol/L    Potassium 3.7 3.5 - 5.1 mmol/L    Chloride 110 (H) 98 - 107 mmol/L    CO2 28 21 - 32 mmol/L    Anion Gap 2 (L) 7 - 16 mmol/L    Glucose 93 65 - 100 mg/dL    BUN 16 8 - 23 MG/DL    CREATININE 1.20 0.8 - 1.5 MG/DL    GFR African American >60 >60 ml/min/1.73m2    GFR Non- >60 >60 ml/min/1.73m2    Calcium 8.2 (L) 8.3 - 10.4 MG/DL    Total Bilirubin 0.3 0.2 - 1.1 MG/DL     (H) 12 - 65 U/L    AST 72 (H) 15 - 37 U/L    Alk Phosphatase 118 50 - 136 U/L    Total Protein 6.0 (L) 6.3 - 8.2 g/dL    Albumin 2.5 (L) 3.2 - 4.6 g/dL    Globulin 3.5 2.3 - 3.5 g/dL    Albumin/Globulin Ratio 0.7 (L) 1.2 - 3.5     CK    Collection Time: 07/12/22  6:52 AM   Result Value Ref Range    Total CK 1,268 (H) 21 - 215 U/L       Current Med List in Hospital:   Current Facility-Administered Medications   Medication Dose Route Frequency    cloZAPine (CLOZARIL) tablet 100 mg - patient supplied (Patient Supplied)  200 mg Oral Daily    cloZAPine (CLOZARIL) tablet 100 mg - patient supplied (Patient Supplied)  500 mg Oral Nightly    0.9 % sodium chloride infusion   IntraVENous Continuous    midodrine (PROAMATINE) tablet 5 mg  5 mg Oral TID WC    aspirin chewable tablet 81 mg  81 mg Oral Daily    [Held by provider] lisinopril (PRINIVIL;ZESTRIL) tablet 10 mg  10 mg Oral Daily    potassium chloride (KLOR-CON M) extended release tablet 20 mEq  20 mEq Oral Daily    sodium chloride flush 0.9 % injection 5-40 mL  5-40 mL IntraVENous 2 times per day    sodium chloride flush 0.9 % injection 5-40 mL  5-40 mL IntraVENous PRN    0.9 % sodium chloride infusion   IntraVENous PRN    enoxaparin (LOVENOX) injection 40 mg  40 mg SubCUTAneous Daily    ondansetron (ZOFRAN-ODT) disintegrating tablet 4 mg  4 mg Oral Q8H PRN    Or    ondansetron (ZOFRAN) injection 4 mg  4 mg IntraVENous Q6H PRN    polyethylene glycol (GLYCOLAX) packet 17 g  17 g Oral Daily PRN    acetaminophen (TYLENOL) tablet 650 mg  650 mg Oral Q6H PRN    Or    acetaminophen (TYLENOL) suppository 650 mg  650 mg Rectal Q6H PRN    [Held by provider] carvedilol (COREG) tablet 6.25 mg  6.25 mg Oral BID WC    [Held by provider] furosemide (LASIX) tablet 40 mg  40 mg Oral Daily    benztropine (COGENTIN) tablet 0.5 mg  0.5 mg Oral BID    amiodarone (CORDARONE) tablet 100 mg  100 mg Oral BID    pravastatin (PRAVACHOL) tablet 20 mg  20 mg Oral Nightly    polyethylene glycol (GLYCOLAX) packet 17 g  17 g Oral Daily       Allergies   Allergen Reactions    Loratadine Other (See Comments)     Interaction with other medication     Immunization History   Administered Date(s) Administered    Influenza Trivalent 10/27/2011    Influenza Virus Vaccine 09/01/2018    Influenza, Quadv, IM, PF (6 mo and older Fluzone, Flulaval, Fluarix, and 3 yrs and older Afluria) 02/09/2016    PPD Test 11/18/2018, 07/10/2022    Pneumococcal Polysaccharide ([id number]) 02/09/2016       Recent Vital Data:  Patient Vitals for the past 24 hrs:   Temp Pulse Resp BP SpO2   07/12/22 1151 97.5 °F (36.4 °C) 77 17 92/68 98 %   07/12/22 0723 97.9 °F (36.6 °C) 68 17 98/74 98 %   07/12/22 0351 -- 70 17 87/69 97 %   07/11/22 2308 98.1 °F (36.7 °C) 68 17 92/73 99 %   07/11/22 1929 97.3 °F (36.3 °C) 73 18 (!) 77/54 97 %   07/11/22 1549 97.6 °F (36.4 °C) 73 16 88/60 98 %         Estimated body mass index is 20.07 kg/m² as calculated from the following:    Height as of this encounter: 5' 10\" (1.778 m). Weight as of this encounter: 139 lb 14.4 oz (63.5 kg). Intake/Output Summary (Last 24 hours) at 7/12/2022 1215  Last data filed at 7/12/2022 0945  Gross per 24 hour   Intake 1813.7 ml   Output 1300 ml   Net 513.7 ml         Physical Exam:    General:    No overt distress  Head:  Normocephalic, atraumatic  Eyes:  Sclerae appear normal.  Pupils equally round. HENT:  Nares appear normal, no drainage. Moist mucous membranes  Neck:  No restricted ROM. Trachea midline  CV:   RRR. No m/r/g. No JVD  Lungs:   CTAB. No wheezing, rhonchi, or rales. Even, unlabored  Abdomen:   Soft, nontender, nondistended. Extremities: Warm and dry. No cyanosis or clubbing. No edema. Skin:     No rashes. Normal coloration  Neuro:  CN II-XII grossly intact. Psych:  Normal mood and affect. Signed:  ELIZABETH Montes - CNP    Part of this note may have been written by using a voice dictation software. The note has been proof read but may still contain some grammatical/other typographical errors.

## 2022-07-12 NOTE — PROGRESS NOTES
PHYSICAL THERAPY Daily Note and AM  (Link to Caseload Tracking: PT Visit Days : 2  Time In/Out PT Charge Capture  Rehab Caseload Tracker  Orders      Gary Hughes is a 63 y.o. male   PRIMARY DIAGNOSIS: Rhabdomyolysis  Chronic systolic heart failure (HCC) [I50.22]  Rhabdomyolysis [M62.82]  Elevated creatine kinase [R74.8]  Failure to thrive in adult [R62.7]  Fatigue, unspecified type [R53.83]       Inpatient: Payor: Laurence Lucia / Plan: [facility] [employer] / Product Type: *No Product type* /     ASSESSMENT:     REHAB RECOMMENDATIONS:   Recommendation to date pending progress:  [address] [place] [address] Therapy    Equipment:     To Be Determined     ASSESSMENT:  Mr. Sindi Deal is making good progress towards PT goals as noted by increased gait distance, activity tolerance and overall mobility. Will continue PT efforts. SUBJECTIVE:   Mr. Sindi Deal states, \"Am I going home today? \"     Social/Functional Home Layout: One level  Home Access:  (1 Step to enter)  Home Equipment: Dulce Crea Help From: Family  OBJECTIVE:     PAIN: Maria R Teena / O2: PRECAUTION / Severa Broom / Vitaly Dikes:   Pre Treatment: 0         Post Treatment: 0 Vitals        Oxygen    None    RESTRICTIONS/PRECAUTIONS:        MOBILITY: I Mod I S SBA CGA Min Mod Max Total  NT x2 Comments:   Bed Mobility    Rolling [] [] [] [] [] [] [] [] [] [] []    Supine to Sit [] [] [] [x] [] [] [] [] [] [] []    Scooting [] [] [] [] [] [] [] [] [] [] []    Sit to Supine [] [] [] [x] [] [] [] [] [] [] []    Transfers    Sit to Stand [] [] [] [] [x] [] [] [] [] [] []    Bed to Chair [] [] [] [] [x] [] [] [] [] [] []    Stand to Sit [] [] [] [] [x] [] [] [] [] [] []     [] [] [] [] [] [] [] [] [] [] []    I=Independent, Mod I=Modified Independent, S=Supervision, SBA=Standby Assistance, CGA=Contact Guard Assistance,   Min=Minimal Assistance, Mod=Moderate Assistance, Max=Maximal Assistance, Total=Total Assistance, NT=Not Tested    BALANCE: Good Fair+ Fair Fair- Poor NT Comments   Sitting Static [x] [] [] [] [] []    Sitting Dynamic [] [x] [] [] [] []              Standing Static [] [x] [] [] [] []    Standing Dynamic [] [] [x] [] [] []      GAIT: I Mod I S SBA CGA Min Mod Max Total  NT x2 Comments:   Level of Assistance [] [] [] [] [x] [] [] [] [] [] []    Distance 250 feet    DME Rolling Walker    Gait Quality Decreased andrea , Decreased step clearance and Decreased step length    Weightbearing Status      Stairs      I=Independent, Mod I=Modified Independent, S=Supervision, SBA=Standby Assistance, CGA=Contact Guard Assistance,   Min=Minimal Assistance, Mod=Moderate Assistance, Max=Maximal Assistance, Total=Total Assistance, NT=Not Tested    PLAN:   ACUTE PHYSICAL THERAPY GOALS:   (Developed with and agreed upon by patient and/or caregiver. )  LTG:  (1.)Mr. Mendez will move from supine to sit and sit to supine , scoot up and down and roll side to side in bed with INDEPENDENT within 3 treatment day(s). (2.)Mr. Mendez will transfer from bed to chair and chair to bed with INDEPENDENT using the least restrictive device within 3 treatment day(s). (3.)Mr. Mendez will ambulate with MODIFIED INDEPENDENCE/SUPERVISION for 250 feet with the least restrictive device within 3 treatment day(s). FREQUENCY AND DURATION:   for duration of hospital stay or until stated goals are met, whichever comes first.    TREATMENT:   TREATMENT:   Therapeutic Activity (24 Minutes): Therapeutic activity included Supine to Sit, Sit to Supine, Scooting, Transfer Training, Ambulation on level ground, Sitting balance , Standing balance and LE exercises to improve functional Activity tolerance, Balance, Mobility and Strength.     TREATMENT GRID:  N/A    AFTER TREATMENT PRECAUTIONS: Alarm Activated, Bed, Bed/Chair Locked, Call light within reach, Needs within reach, RN notified and Side rails x3    INTERDISCIPLINARY COLLABORATION:  RN/ PCT and PT/ PTA    EDUCATION:      TIME IN/OUT:  Time In: 1112  Time Out: Andre Garcia: Kenyd 15  [place], [facility]

## 2022-07-13 NOTE — PROGRESS NOTES
Physician Progress Note      PATIENTCarney Halsted  CSN #:                  [id number]  :                       1960  ADMIT DATE:       7/10/2022 2:21 PM  [address] [place] DATE:        2022 3:45 PM  RESPONDING  PROVIDER #:        Ronald Tracy DO          QUERY TEXT:    Patient admitted with Rhabdomyolysis , noted to have Platelets 549---519. If   possible, please document in progress notes and discharge summary if you are   evaluating and/or treating any of the following: The medical record reflects the following:  Risk Factors: 62 YOM, Rhabdomyolysis, bed bound, CKD3  Clinical Indicators: Platelets 167---552  Treatment: Monitoring    Thank you,  Mecca Gomez RN,C BSN  Clinical   [email or web address]  Options provided:  -- Thrombocytopenia  -- Thrombocytopenia not clinically significant  -- Other - I will add my own diagnosis  -- Disagree - Not applicable / Not valid  -- Disagree - Clinically unable to determine / Unknown  -- Refer to Clinical Documentation Reviewer    PROVIDER RESPONSE TEXT:    Thrombocytopenia is not clinically significant. Query created by:  Phillip Aviles on 2022 5:00 PM      Electronically signed by:  Ronald Tracy DO 2022 6:59 AM

## 2022-11-01 PROCEDURE — 93295 DEV INTERROG REMOTE 1/2/MLT: CPT | Performed by: INTERNAL MEDICINE

## 2022-11-01 PROCEDURE — 93296 REM INTERROG EVL PM/IDS: CPT | Performed by: INTERNAL MEDICINE

## 2022-12-05 DIAGNOSIS — I46.9 CARDIAC ARREST (HCC): ICD-10-CM

## 2022-12-05 DIAGNOSIS — Z95.810 AICD (AUTOMATIC CARDIOVERTER/DEFIBRILLATOR) PRESENT: ICD-10-CM

## 2022-12-05 DIAGNOSIS — I50.20 SYSTOLIC CONGESTIVE HEART FAILURE (HCC): Primary | ICD-10-CM

## 2022-12-05 DIAGNOSIS — I50.20 SYSTOLIC CONGESTIVE HEART FAILURE (HCC): ICD-10-CM

## 2023-03-09 DIAGNOSIS — I46.9 CARDIAC ARREST (HCC): ICD-10-CM

## 2023-03-09 DIAGNOSIS — I50.20 SYSTOLIC CONGESTIVE HEART FAILURE (HCC): ICD-10-CM

## 2023-03-09 DIAGNOSIS — Z95.810 AICD (AUTOMATIC CARDIOVERTER/DEFIBRILLATOR) PRESENT: ICD-10-CM

## 2023-05-05 PROCEDURE — 93296 REM INTERROG EVL PM/IDS: CPT | Performed by: INTERNAL MEDICINE

## 2023-05-05 PROCEDURE — 93295 DEV INTERROG REMOTE 1/2/MLT: CPT | Performed by: INTERNAL MEDICINE

## 2023-07-05 NOTE — PROGRESS NOTES
Verbal bedside report given to [facility], Marina Campuscarmenza RN. Patient's situation, background, assessment and recommendations provided. Opportunity for questions provided. Oncoming RN assumed care of patient. Rinvoq Pregnancy And Lactation Text: Based on animal studies, Rinvoq may cause embryo-fetal harm when administered to pregnant women.  The medication should not be used in pregnancy.  Breastfeeding is not recommended during treatment and for 6 days after the last dose.

## 2023-07-26 DIAGNOSIS — I46.9 CARDIAC ARREST (HCC): ICD-10-CM

## 2023-07-26 DIAGNOSIS — I50.20 SYSTOLIC CONGESTIVE HEART FAILURE (HCC): ICD-10-CM

## 2023-07-26 DIAGNOSIS — Z95.810 AICD (AUTOMATIC CARDIOVERTER/DEFIBRILLATOR) PRESENT: ICD-10-CM

## 2023-08-17 ENCOUNTER — HOSPITAL ENCOUNTER (INPATIENT)
Age: 63
LOS: 11 days | Discharge: HOME HEALTH | DRG: 682 | End: 2023-08-28
Attending: EMERGENCY MEDICINE | Admitting: FAMILY MEDICINE
Payer: MEDICARE

## 2023-08-17 ENCOUNTER — APPOINTMENT (OUTPATIENT)
Dept: GENERAL RADIOLOGY | Age: 63
DRG: 682 | End: 2023-08-17
Payer: MEDICARE

## 2023-08-17 DIAGNOSIS — K22.89 DILATION OF ESOPHAGUS: ICD-10-CM

## 2023-08-17 DIAGNOSIS — I42.9 CARDIOMYOPATHY, UNSPECIFIED TYPE (HCC): ICD-10-CM

## 2023-08-17 DIAGNOSIS — R53.1 GENERALIZED WEAKNESS: Primary | ICD-10-CM

## 2023-08-17 DIAGNOSIS — N28.9 RENAL INSUFFICIENCY: ICD-10-CM

## 2023-08-17 DIAGNOSIS — I95.9 HYPOTENSION, UNSPECIFIED HYPOTENSION TYPE: ICD-10-CM

## 2023-08-17 DIAGNOSIS — I50.20 SYSTOLIC CONGESTIVE HEART FAILURE, UNSPECIFIED HF CHRONICITY (HCC): ICD-10-CM

## 2023-08-17 PROBLEM — J96.01 ACUTE RESPIRATORY FAILURE WITH HYPOXIA (HCC): Status: RESOLVED | Noted: 2018-11-13 | Resolved: 2023-08-17

## 2023-08-17 PROBLEM — M62.82 RHABDOMYOLYSIS: Status: RESOLVED | Noted: 2022-07-10 | Resolved: 2023-08-17

## 2023-08-17 PROBLEM — R79.89 ELEVATED LFTS: Status: RESOLVED | Noted: 2022-07-10 | Resolved: 2023-08-17

## 2023-08-17 PROBLEM — D50.9 MICROCYTIC ANEMIA: Status: ACTIVE | Noted: 2018-11-22

## 2023-08-17 PROBLEM — J13 PNEUMONIA DUE TO STREPTOCOCCUS PNEUMONIAE (HCC): Status: RESOLVED | Noted: 2018-11-21 | Resolved: 2023-08-17

## 2023-08-17 PROBLEM — I46.9 CARDIAC ARREST (HCC): Status: RESOLVED | Noted: 2018-11-13 | Resolved: 2023-08-17

## 2023-08-17 PROBLEM — N17.9 AKI (ACUTE KIDNEY INJURY) (HCC): Status: ACTIVE | Noted: 2023-08-17

## 2023-08-17 PROBLEM — N17.9 ACUTE RENAL FAILURE (HCC): Status: RESOLVED | Noted: 2018-11-13 | Resolved: 2023-08-17

## 2023-08-17 LAB
ABO + RH BLD: NORMAL
ALBUMIN SERPL-MCNC: 3.2 G/DL (ref 3.2–4.6)
ALBUMIN/GLOB SERPL: 0.7 (ref 0.4–1.6)
ALP SERPL-CCNC: 139 U/L (ref 50–136)
ALT SERPL-CCNC: 47 U/L (ref 12–65)
ANION GAP SERPL CALC-SCNC: 9 MMOL/L (ref 2–11)
APPEARANCE UR: CLEAR
AST SERPL-CCNC: 27 U/L (ref 15–37)
BACTERIA URNS QL MICRO: ABNORMAL /HPF
BASOPHILS # BLD: 0 K/UL (ref 0–0.2)
BASOPHILS NFR BLD: 0 % (ref 0–2)
BILIRUB SERPL-MCNC: 0.8 MG/DL (ref 0.2–1.1)
BILIRUB UR QL: NEGATIVE
BLOOD GROUP ANTIBODIES SERPL: NORMAL
BUN SERPL-MCNC: 75 MG/DL (ref 8–23)
CALCIUM SERPL-MCNC: 9 MG/DL (ref 8.3–10.4)
CASTS URNS QL MICRO: ABNORMAL /LPF
CHLORIDE SERPL-SCNC: 106 MMOL/L (ref 101–110)
CO2 SERPL-SCNC: 23 MMOL/L (ref 21–32)
COLOR UR: ABNORMAL
CREAT SERPL-MCNC: 3.8 MG/DL (ref 0.8–1.5)
DIFFERENTIAL METHOD BLD: ABNORMAL
EKG ATRIAL RATE: 80 BPM
EKG DIAGNOSIS: ABNORMAL
EKG P AXIS: 86 DEGREES
EKG P-R INTERVAL: 193 MS
EKG Q-T INTERVAL: 517 MS
EKG QRS DURATION: 190 MS
EKG QTC CALCULATION (BAZETT): 597 MS
EKG R AXIS: -75 DEGREES
EKG T AXIS: 80 DEGREES
EKG VENTRICULAR RATE: 80 BPM
EOSINOPHIL # BLD: 0 K/UL (ref 0–0.8)
EOSINOPHIL NFR BLD: 0 % (ref 0.5–7.8)
EPI CELLS #/AREA URNS HPF: ABNORMAL /HPF
ERYTHROCYTE [DISTWIDTH] IN BLOOD BY AUTOMATED COUNT: 15.7 % (ref 11.9–14.6)
FERRITIN SERPL-MCNC: 468 NG/ML (ref 8–388)
GLOBULIN SER CALC-MCNC: 4.6 G/DL (ref 2.8–4.5)
GLUCOSE SERPL-MCNC: 125 MG/DL (ref 65–100)
GLUCOSE UR STRIP.AUTO-MCNC: NEGATIVE MG/DL
HCT VFR BLD AUTO: 39.2 % (ref 41.1–50.3)
HGB BLD-MCNC: 12.7 G/DL (ref 13.6–17.2)
HGB UR QL STRIP: NEGATIVE
IMM GRANULOCYTES # BLD AUTO: 0 K/UL (ref 0–0.5)
IMM GRANULOCYTES NFR BLD AUTO: 0 % (ref 0–5)
INR PPP: 1
IRON SATN MFR SERPL: 19 %
IRON SERPL-MCNC: 52 UG/DL (ref 35–150)
KETONES UR QL STRIP.AUTO: ABNORMAL MG/DL
LACTATE SERPL-SCNC: 1.4 MMOL/L (ref 0.4–2)
LEUKOCYTE ESTERASE UR QL STRIP.AUTO: ABNORMAL
LYMPHOCYTES # BLD: 1.9 K/UL (ref 0.5–4.6)
LYMPHOCYTES NFR BLD: 25 % (ref 13–44)
MCH RBC QN AUTO: 23.6 PG (ref 26.1–32.9)
MCHC RBC AUTO-ENTMCNC: 32.4 G/DL (ref 31.4–35)
MCV RBC AUTO: 72.7 FL (ref 82–102)
MONOCYTES # BLD: 1.2 K/UL (ref 0.1–1.3)
MONOCYTES NFR BLD: 16 % (ref 4–12)
NEUTS SEG # BLD: 4.6 K/UL (ref 1.7–8.2)
NEUTS SEG NFR BLD: 59 % (ref 43–78)
NITRITE UR QL STRIP.AUTO: NEGATIVE
NRBC # BLD: 0 K/UL (ref 0–0.2)
NT PRO BNP: 798 PG/ML (ref 5–125)
OTHER OBSERVATIONS: ABNORMAL
PH UR STRIP: 5.5 (ref 5–9)
PLATELET # BLD AUTO: 157 K/UL (ref 150–450)
PMV BLD AUTO: 13 FL (ref 9.4–12.3)
POTASSIUM SERPL-SCNC: 3.6 MMOL/L (ref 3.5–5.1)
PROCALCITONIN SERPL-MCNC: 0.28 NG/ML (ref 0–0.49)
PROT SERPL-MCNC: 7.8 G/DL (ref 6.3–8.2)
PROT UR STRIP-MCNC: 30 MG/DL
PROTHROMBIN TIME: 13.9 SEC (ref 12.6–14.3)
RBC # BLD AUTO: 5.39 M/UL (ref 4.23–5.6)
RBC #/AREA URNS HPF: ABNORMAL /HPF
SODIUM SERPL-SCNC: 138 MMOL/L (ref 133–143)
SP GR UR REFRACTOMETRY: 1.02 (ref 1–1.02)
SPECIMEN EXP DATE BLD: NORMAL
T4 FREE SERPL-MCNC: 1.2 NG/DL (ref 0.78–1.46)
TIBC SERPL-MCNC: 271 UG/DL (ref 250–450)
TSH W FREE THYROID IF ABNORMAL: 4.13 UIU/ML (ref 0.36–3.74)
UROBILINOGEN UR QL STRIP.AUTO: 1 EU/DL (ref 0.2–1)
WBC # BLD AUTO: 7.8 K/UL (ref 4.3–11.1)
WBC URNS QL MICRO: ABNORMAL /HPF

## 2023-08-17 PROCEDURE — 6370000000 HC RX 637 (ALT 250 FOR IP): Performed by: FAMILY MEDICINE

## 2023-08-17 PROCEDURE — 84439 ASSAY OF FREE THYROXINE: CPT

## 2023-08-17 PROCEDURE — 87088 URINE BACTERIA CULTURE: CPT

## 2023-08-17 PROCEDURE — 93010 ELECTROCARDIOGRAM REPORT: CPT | Performed by: INTERNAL MEDICINE

## 2023-08-17 PROCEDURE — 85610 PROTHROMBIN TIME: CPT

## 2023-08-17 PROCEDURE — 86850 RBC ANTIBODY SCREEN: CPT

## 2023-08-17 PROCEDURE — 84443 ASSAY THYROID STIM HORMONE: CPT

## 2023-08-17 PROCEDURE — 71045 X-RAY EXAM CHEST 1 VIEW: CPT

## 2023-08-17 PROCEDURE — 84145 PROCALCITONIN (PCT): CPT

## 2023-08-17 PROCEDURE — 83605 ASSAY OF LACTIC ACID: CPT

## 2023-08-17 PROCEDURE — 87186 SC STD MICRODIL/AGAR DIL: CPT

## 2023-08-17 PROCEDURE — 86900 BLOOD TYPING SEROLOGIC ABO: CPT

## 2023-08-17 PROCEDURE — 93005 ELECTROCARDIOGRAM TRACING: CPT | Performed by: EMERGENCY MEDICINE

## 2023-08-17 PROCEDURE — 85025 COMPLETE CBC W/AUTO DIFF WBC: CPT

## 2023-08-17 PROCEDURE — 2580000003 HC RX 258: Performed by: EMERGENCY MEDICINE

## 2023-08-17 PROCEDURE — 82728 ASSAY OF FERRITIN: CPT

## 2023-08-17 PROCEDURE — 83540 ASSAY OF IRON: CPT

## 2023-08-17 PROCEDURE — 81001 URINALYSIS AUTO W/SCOPE: CPT

## 2023-08-17 PROCEDURE — 2580000003 HC RX 258: Performed by: FAMILY MEDICINE

## 2023-08-17 PROCEDURE — 87086 URINE CULTURE/COLONY COUNT: CPT

## 2023-08-17 PROCEDURE — 1100000003 HC PRIVATE W/ TELEMETRY

## 2023-08-17 PROCEDURE — 86901 BLOOD TYPING SEROLOGIC RH(D): CPT

## 2023-08-17 PROCEDURE — 87040 BLOOD CULTURE FOR BACTERIA: CPT

## 2023-08-17 PROCEDURE — 80053 COMPREHEN METABOLIC PANEL: CPT

## 2023-08-17 PROCEDURE — 83550 IRON BINDING TEST: CPT

## 2023-08-17 PROCEDURE — 83880 ASSAY OF NATRIURETIC PEPTIDE: CPT

## 2023-08-17 PROCEDURE — 99285 EMERGENCY DEPT VISIT HI MDM: CPT

## 2023-08-17 RX ORDER — 0.9 % SODIUM CHLORIDE 0.9 %
250 INTRAVENOUS SOLUTION INTRAVENOUS ONCE
Status: COMPLETED | OUTPATIENT
Start: 2023-08-17 | End: 2023-08-17

## 2023-08-17 RX ORDER — ACETAMINOPHEN 325 MG/1
650 TABLET ORAL EVERY 6 HOURS PRN
Status: DISCONTINUED | OUTPATIENT
Start: 2023-08-17 | End: 2023-08-28 | Stop reason: HOSPADM

## 2023-08-17 RX ORDER — SODIUM CHLORIDE 9 MG/ML
INJECTION, SOLUTION INTRAVENOUS CONTINUOUS
Status: ACTIVE | OUTPATIENT
Start: 2023-08-17 | End: 2023-08-18

## 2023-08-17 RX ORDER — 0.9 % SODIUM CHLORIDE 0.9 %
1000 INTRAVENOUS SOLUTION INTRAVENOUS ONCE
Status: COMPLETED | OUTPATIENT
Start: 2023-08-17 | End: 2023-08-17

## 2023-08-17 RX ORDER — CLOZAPINE 100 MG/1
200 TABLET ORAL DAILY
Status: DISCONTINUED | OUTPATIENT
Start: 2023-08-18 | End: 2023-08-28 | Stop reason: HOSPADM

## 2023-08-17 RX ORDER — POLYETHYLENE GLYCOL 3350 17 G/17G
17 POWDER, FOR SOLUTION ORAL DAILY PRN
Status: DISCONTINUED | OUTPATIENT
Start: 2023-08-17 | End: 2023-08-28 | Stop reason: HOSPADM

## 2023-08-17 RX ORDER — ONDANSETRON 2 MG/ML
4 INJECTION INTRAMUSCULAR; INTRAVENOUS EVERY 6 HOURS PRN
Status: DISCONTINUED | OUTPATIENT
Start: 2023-08-17 | End: 2023-08-28 | Stop reason: HOSPADM

## 2023-08-17 RX ORDER — OMEPRAZOLE 20 MG/1
CAPSULE, DELAYED RELEASE ORAL
Status: ON HOLD | COMMUNITY
Start: 2023-07-27 | End: 2023-08-28 | Stop reason: HOSPADM

## 2023-08-17 RX ORDER — ASPIRIN 81 MG/1
81 TABLET, CHEWABLE ORAL DAILY
Status: DISCONTINUED | OUTPATIENT
Start: 2023-08-18 | End: 2023-08-28 | Stop reason: HOSPADM

## 2023-08-17 RX ORDER — POTASSIUM CHLORIDE 1500 MG/1
20 TABLET, EXTENDED RELEASE ORAL
COMMUNITY
Start: 2023-07-20 | End: 2023-08-17

## 2023-08-17 RX ORDER — ONDANSETRON 4 MG/1
4 TABLET, ORALLY DISINTEGRATING ORAL EVERY 8 HOURS PRN
Status: DISCONTINUED | OUTPATIENT
Start: 2023-08-17 | End: 2023-08-28 | Stop reason: HOSPADM

## 2023-08-17 RX ORDER — HEPARIN SODIUM 5000 [USP'U]/ML
5000 INJECTION, SOLUTION INTRAVENOUS; SUBCUTANEOUS EVERY 8 HOURS SCHEDULED
Status: DISCONTINUED | OUTPATIENT
Start: 2023-08-18 | End: 2023-08-18

## 2023-08-17 RX ORDER — SODIUM CHLORIDE 9 MG/ML
INJECTION, SOLUTION INTRAVENOUS PRN
Status: DISCONTINUED | OUTPATIENT
Start: 2023-08-17 | End: 2023-08-28 | Stop reason: HOSPADM

## 2023-08-17 RX ORDER — SODIUM CHLORIDE 0.9 % (FLUSH) 0.9 %
5-40 SYRINGE (ML) INJECTION EVERY 12 HOURS SCHEDULED
Status: DISCONTINUED | OUTPATIENT
Start: 2023-08-17 | End: 2023-08-28 | Stop reason: HOSPADM

## 2023-08-17 RX ORDER — PANTOPRAZOLE SODIUM 40 MG/1
40 TABLET, DELAYED RELEASE ORAL
Status: DISCONTINUED | OUTPATIENT
Start: 2023-08-18 | End: 2023-08-28 | Stop reason: HOSPADM

## 2023-08-17 RX ORDER — SODIUM CHLORIDE 0.9 % (FLUSH) 0.9 %
5-40 SYRINGE (ML) INJECTION PRN
Status: DISCONTINUED | OUTPATIENT
Start: 2023-08-17 | End: 2023-08-28 | Stop reason: HOSPADM

## 2023-08-17 RX ORDER — CARVEDILOL 6.25 MG/1
TABLET ORAL
Status: ON HOLD | COMMUNITY
Start: 2023-05-13 | End: 2023-08-28 | Stop reason: SDUPTHER

## 2023-08-17 RX ORDER — ACETAMINOPHEN 650 MG/1
650 SUPPOSITORY RECTAL EVERY 6 HOURS PRN
Status: DISCONTINUED | OUTPATIENT
Start: 2023-08-17 | End: 2023-08-28 | Stop reason: HOSPADM

## 2023-08-17 RX ORDER — FUROSEMIDE 40 MG/1
40 TABLET ORAL
Status: ON HOLD | COMMUNITY
Start: 2023-07-27 | End: 2023-08-28 | Stop reason: HOSPADM

## 2023-08-17 RX ORDER — LISINOPRIL 10 MG/1
10 TABLET ORAL
Status: ON HOLD | COMMUNITY
Start: 2023-07-20 | End: 2023-08-28 | Stop reason: SDUPTHER

## 2023-08-17 RX ORDER — PRAVASTATIN SODIUM 20 MG
20 TABLET ORAL NIGHTLY
Status: DISCONTINUED | OUTPATIENT
Start: 2023-08-17 | End: 2023-08-28 | Stop reason: HOSPADM

## 2023-08-17 RX ORDER — CLOZAPINE 100 MG/1
500 TABLET ORAL NIGHTLY
Status: DISCONTINUED | OUTPATIENT
Start: 2023-08-17 | End: 2023-08-28 | Stop reason: HOSPADM

## 2023-08-17 RX ADMIN — SODIUM CHLORIDE 250 ML: 9 INJECTION, SOLUTION INTRAVENOUS at 20:05

## 2023-08-17 RX ADMIN — SODIUM CHLORIDE 1000 ML: 9 INJECTION, SOLUTION INTRAVENOUS at 15:20

## 2023-08-17 RX ADMIN — PRAVASTATIN SODIUM 20 MG: 20 TABLET ORAL at 21:40

## 2023-08-17 RX ADMIN — CLOZAPINE 500 MG: 100 TABLET ORAL at 21:43

## 2023-08-17 ASSESSMENT — LIFESTYLE VARIABLES
HOW OFTEN DO YOU HAVE A DRINK CONTAINING ALCOHOL: NEVER
HOW MANY STANDARD DRINKS CONTAINING ALCOHOL DO YOU HAVE ON A TYPICAL DAY: PATIENT DOES NOT DRINK

## 2023-08-17 ASSESSMENT — PAIN - FUNCTIONAL ASSESSMENT: PAIN_FUNCTIONAL_ASSESSMENT: NONE - DENIES PAIN

## 2023-08-17 NOTE — ED TRIAGE NOTES
Pt presents to triage via wheelchair c/o fatigue, lack of appetite, and shoulder pain. Hx of CHF, afib, and pacemaker placement. Pt hypotensive in triage, provider alerted, pt moved to a room.

## 2023-08-18 ENCOUNTER — APPOINTMENT (OUTPATIENT)
Dept: ULTRASOUND IMAGING | Age: 63
DRG: 682 | End: 2023-08-18
Payer: MEDICARE

## 2023-08-18 ENCOUNTER — APPOINTMENT (OUTPATIENT)
Dept: NON INVASIVE DIAGNOSTICS | Age: 63
DRG: 682 | End: 2023-08-18
Attending: FAMILY MEDICINE
Payer: MEDICARE

## 2023-08-18 PROBLEM — E43 SEVERE PROTEIN-CALORIE MALNUTRITION (HCC): Status: ACTIVE | Noted: 2023-08-18

## 2023-08-18 LAB
ALBUMIN SERPL-MCNC: 2.9 G/DL (ref 3.2–4.6)
ALBUMIN/GLOB SERPL: 0.7 (ref 0.4–1.6)
ALP SERPL-CCNC: 125 U/L (ref 50–136)
ALT SERPL-CCNC: 43 U/L (ref 12–65)
ANION GAP SERPL CALC-SCNC: 8 MMOL/L (ref 2–11)
APTT PPP: 36.8 SEC (ref 24.5–34.2)
AST SERPL-CCNC: 32 U/L (ref 15–37)
BASOPHILS # BLD: 0 K/UL (ref 0–0.2)
BASOPHILS NFR BLD: 0 % (ref 0–2)
BILIRUB SERPL-MCNC: 0.5 MG/DL (ref 0.2–1.1)
BUN SERPL-MCNC: 61 MG/DL (ref 8–23)
CALCIUM SERPL-MCNC: 8.7 MG/DL (ref 8.3–10.4)
CHLORIDE SERPL-SCNC: 113 MMOL/L (ref 101–110)
CO2 SERPL-SCNC: 22 MMOL/L (ref 21–32)
CORTIS AM PEAK SERPL-MCNC: 26.1 UG/DL (ref 7–25)
CREAT SERPL-MCNC: 1.9 MG/DL (ref 0.8–1.5)
DIFFERENTIAL METHOD BLD: ABNORMAL
ECHO AO ASC DIAM: 2.8 CM
ECHO AO ASCENDING AORTA INDEX: 1.5 CM/M2
ECHO AO ROOT DIAM: 3 CM
ECHO AO ROOT INDEX: 1.6 CM/M2
ECHO AV AREA PEAK VELOCITY: 1.3 CM2
ECHO AV AREA VTI: 1.2 CM2
ECHO AV AREA/BSA PEAK VELOCITY: 0.7 CM2/M2
ECHO AV AREA/BSA VTI: 0.6 CM2/M2
ECHO AV MEAN GRADIENT: 8 MMHG
ECHO AV MEAN VELOCITY: 1.3 M/S
ECHO AV PEAK GRADIENT: 14 MMHG
ECHO AV PEAK VELOCITY: 1.9 M/S
ECHO AV VELOCITY RATIO: 0.42
ECHO AV VTI: 31.1 CM
ECHO BSA: 1.8 M2
ECHO LA AREA 2C: 17.7 CM2
ECHO LA AREA 4C: 17 CM2
ECHO LA DIAMETER INDEX: 1.87 CM/M2
ECHO LA DIAMETER: 3.5 CM
ECHO LA MAJOR AXIS: 5.1 CM
ECHO LA MINOR AXIS: 5.1 CM
ECHO LA TO AORTIC ROOT RATIO: 1.17
ECHO LA VOL 2C: 49 ML (ref 18–58)
ECHO LA VOL 4C: 46 ML (ref 18–58)
ECHO LA VOL BP: 47 ML (ref 18–58)
ECHO LA VOL/BSA BIPLANE: 25 ML/M2 (ref 16–34)
ECHO LA VOLUME INDEX A2C: 26 ML/M2 (ref 16–34)
ECHO LA VOLUME INDEX A4C: 25 ML/M2 (ref 16–34)
ECHO LV E' LATERAL VELOCITY: 11 CM/S
ECHO LV EDV A2C: 277 ML
ECHO LV EDV A4C: 250 ML
ECHO LV EDV INDEX A4C: 134 ML/M2
ECHO LV EDV NDEX A2C: 148 ML/M2
ECHO LV EJECTION FRACTION A2C: 14 %
ECHO LV EJECTION FRACTION A4C: 16 %
ECHO LV EJECTION FRACTION BIPLANE: 13 % (ref 55–100)
ECHO LV ESV A2C: 240 ML
ECHO LV ESV A4C: 210 ML
ECHO LV ESV INDEX A2C: 128 ML/M2
ECHO LV ESV INDEX A4C: 112 ML/M2
ECHO LV FRACTIONAL SHORTENING: 13 % (ref 28–44)
ECHO LV INTERNAL DIMENSION DIASTOLE INDEX: 2.89 CM/M2
ECHO LV INTERNAL DIMENSION DIASTOLIC: 5.4 CM (ref 4.2–5.9)
ECHO LV INTERNAL DIMENSION SYSTOLIC INDEX: 2.51 CM/M2
ECHO LV INTERNAL DIMENSION SYSTOLIC: 4.7 CM
ECHO LV IVSD: 1.4 CM (ref 0.6–1)
ECHO LV MASS 2D: 328.3 G (ref 88–224)
ECHO LV MASS INDEX 2D: 175.6 G/M2 (ref 49–115)
ECHO LV POSTERIOR WALL DIASTOLIC: 1.4 CM (ref 0.6–1)
ECHO LV RELATIVE WALL THICKNESS RATIO: 0.52
ECHO LVOT AREA: 3.1 CM2
ECHO LVOT AV VTI INDEX: 0.37
ECHO LVOT DIAM: 2 CM
ECHO LVOT MEAN GRADIENT: 1 MMHG
ECHO LVOT PEAK GRADIENT: 3 MMHG
ECHO LVOT PEAK VELOCITY: 0.8 M/S
ECHO LVOT STROKE VOLUME INDEX: 19.5 ML/M2
ECHO LVOT SV: 36.4 ML
ECHO LVOT VTI: 11.6 CM
ECHO PV ACCELERATION TIME (AT): 90 MS
ECHO PV MAX VELOCITY: 0.9 M/S
ECHO PV PEAK GRADIENT: 3 MMHG
ECHO RV BASAL DIMENSION: 2.9 CM
ECHO RV FREE WALL PEAK S': 10 CM/S
ECHO RV TAPSE: 1.7 CM (ref 1.7–?)
EOSINOPHIL # BLD: 0 K/UL (ref 0–0.8)
EOSINOPHIL NFR BLD: 0 % (ref 0.5–7.8)
ERYTHROCYTE [DISTWIDTH] IN BLOOD BY AUTOMATED COUNT: 16 % (ref 11.9–14.6)
GLOBULIN SER CALC-MCNC: 4 G/DL (ref 2.8–4.5)
GLUCOSE SERPL-MCNC: 68 MG/DL (ref 65–100)
HCT VFR BLD AUTO: 36.2 % (ref 41.1–50.3)
HGB BLD-MCNC: 11.4 G/DL (ref 13.6–17.2)
IMM GRANULOCYTES # BLD AUTO: 0 K/UL (ref 0–0.5)
IMM GRANULOCYTES NFR BLD AUTO: 0 % (ref 0–5)
LYMPHOCYTES # BLD: 1.9 K/UL (ref 0.5–4.6)
LYMPHOCYTES NFR BLD: 27 % (ref 13–44)
MAGNESIUM SERPL-MCNC: 2.7 MG/DL (ref 1.8–2.4)
MCH RBC QN AUTO: 23.3 PG (ref 26.1–32.9)
MCHC RBC AUTO-ENTMCNC: 31.5 G/DL (ref 31.4–35)
MCV RBC AUTO: 74 FL (ref 82–102)
MM INDURATION, POC: NORMAL MM (ref 0–5)
MONOCYTES # BLD: 1.1 K/UL (ref 0.1–1.3)
MONOCYTES NFR BLD: 15 % (ref 4–12)
NEUTS SEG # BLD: 4.2 K/UL (ref 1.7–8.2)
NEUTS SEG NFR BLD: 58 % (ref 43–78)
NRBC # BLD: 0 K/UL (ref 0–0.2)
PLATELET # BLD AUTO: 131 K/UL (ref 150–450)
PMV BLD AUTO: 12.5 FL (ref 9.4–12.3)
POTASSIUM SERPL-SCNC: 3.8 MMOL/L (ref 3.5–5.1)
PPD, POC: NEGATIVE
PROT SERPL-MCNC: 6.9 G/DL (ref 6.3–8.2)
RBC # BLD AUTO: 4.89 M/UL (ref 4.23–5.6)
SODIUM SERPL-SCNC: 143 MMOL/L (ref 133–143)
UFH PPP CHRO-ACNC: <0.1 IU/ML (ref 0.3–0.7)
UFH PPP CHRO-ACNC: >1.1 IU/ML (ref 0.3–0.7)
WBC # BLD AUTO: 7.2 K/UL (ref 4.3–11.1)

## 2023-08-18 PROCEDURE — 6360000004 HC RX CONTRAST MEDICATION: Performed by: INTERNAL MEDICINE

## 2023-08-18 PROCEDURE — 85730 THROMBOPLASTIN TIME PARTIAL: CPT

## 2023-08-18 PROCEDURE — 80053 COMPREHEN METABOLIC PANEL: CPT

## 2023-08-18 PROCEDURE — 2580000003 HC RX 258: Performed by: FAMILY MEDICINE

## 2023-08-18 PROCEDURE — 83735 ASSAY OF MAGNESIUM: CPT

## 2023-08-18 PROCEDURE — 6370000000 HC RX 637 (ALT 250 FOR IP): Performed by: FAMILY MEDICINE

## 2023-08-18 PROCEDURE — 76770 US EXAM ABDO BACK WALL COMP: CPT

## 2023-08-18 PROCEDURE — 85025 COMPLETE CBC W/AUTO DIFF WBC: CPT

## 2023-08-18 PROCEDURE — 97535 SELF CARE MNGMENT TRAINING: CPT

## 2023-08-18 PROCEDURE — 6360000002 HC RX W HCPCS: Performed by: FAMILY MEDICINE

## 2023-08-18 PROCEDURE — C8929 TTE W OR WO FOL WCON,DOPPLER: HCPCS

## 2023-08-18 PROCEDURE — 6360000002 HC RX W HCPCS: Performed by: INTERNAL MEDICINE

## 2023-08-18 PROCEDURE — 82533 TOTAL CORTISOL: CPT

## 2023-08-18 PROCEDURE — 97165 OT EVAL LOW COMPLEX 30 MIN: CPT

## 2023-08-18 PROCEDURE — 97530 THERAPEUTIC ACTIVITIES: CPT

## 2023-08-18 PROCEDURE — 85520 HEPARIN ASSAY: CPT

## 2023-08-18 PROCEDURE — 36415 COLL VENOUS BLD VENIPUNCTURE: CPT

## 2023-08-18 PROCEDURE — 93306 TTE W/DOPPLER COMPLETE: CPT | Performed by: INTERNAL MEDICINE

## 2023-08-18 PROCEDURE — 2580000003 HC RX 258: Performed by: INTERNAL MEDICINE

## 2023-08-18 PROCEDURE — 1100000003 HC PRIVATE W/ TELEMETRY

## 2023-08-18 PROCEDURE — 2500000003 HC RX 250 WO HCPCS: Performed by: INTERNAL MEDICINE

## 2023-08-18 PROCEDURE — 97161 PT EVAL LOW COMPLEX 20 MIN: CPT

## 2023-08-18 PROCEDURE — 2500000003 HC RX 250 WO HCPCS: Performed by: FAMILY MEDICINE

## 2023-08-18 RX ORDER — HEPARIN SODIUM 1000 [USP'U]/ML
40 INJECTION, SOLUTION INTRAVENOUS; SUBCUTANEOUS PRN
Status: DISCONTINUED | OUTPATIENT
Start: 2023-08-18 | End: 2023-08-25

## 2023-08-18 RX ORDER — HEPARIN SODIUM 1000 [USP'U]/ML
80 INJECTION, SOLUTION INTRAVENOUS; SUBCUTANEOUS ONCE
Status: COMPLETED | OUTPATIENT
Start: 2023-08-18 | End: 2023-08-18

## 2023-08-18 RX ORDER — HEPARIN SODIUM 1000 [USP'U]/ML
80 INJECTION, SOLUTION INTRAVENOUS; SUBCUTANEOUS PRN
Status: DISCONTINUED | OUTPATIENT
Start: 2023-08-18 | End: 2023-08-25

## 2023-08-18 RX ORDER — HEPARIN SODIUM 10000 [USP'U]/100ML
5-30 INJECTION, SOLUTION INTRAVENOUS CONTINUOUS
Status: DISCONTINUED | OUTPATIENT
Start: 2023-08-18 | End: 2023-08-25

## 2023-08-18 RX ADMIN — PANTOPRAZOLE SODIUM 40 MG: 40 TABLET, DELAYED RELEASE ORAL at 06:46

## 2023-08-18 RX ADMIN — HEPARIN SODIUM 18 UNITS/KG/HR: 10000 INJECTION, SOLUTION INTRAVENOUS at 14:41

## 2023-08-18 RX ADMIN — SODIUM CHLORIDE 75 ML/HR: 9 INJECTION, SOLUTION INTRAVENOUS at 00:08

## 2023-08-18 RX ADMIN — HEPARIN SODIUM 5000 UNITS: 5000 INJECTION INTRAVENOUS; SUBCUTANEOUS at 06:46

## 2023-08-18 RX ADMIN — CEFTRIAXONE 1000 MG: 1 INJECTION, POWDER, FOR SOLUTION INTRAMUSCULAR; INTRAVENOUS at 22:16

## 2023-08-18 RX ADMIN — SODIUM CHLORIDE, PRESERVATIVE FREE 10 ML: 5 INJECTION INTRAVENOUS at 20:54

## 2023-08-18 RX ADMIN — SODIUM CHLORIDE, PRESERVATIVE FREE 10 ML: 5 INJECTION INTRAVENOUS at 10:03

## 2023-08-18 RX ADMIN — CEFTRIAXONE 1000 MG: 1 INJECTION, POWDER, FOR SOLUTION INTRAMUSCULAR; INTRAVENOUS at 00:07

## 2023-08-18 RX ADMIN — HEPARIN SODIUM 5590 UNITS: 1000 INJECTION INTRAVENOUS; SUBCUTANEOUS at 14:39

## 2023-08-18 RX ADMIN — TUBERCULIN PURIFIED PROTEIN DERIVATIVE 5 UNITS: 5 INJECTION, SOLUTION INTRADERMAL at 00:06

## 2023-08-18 RX ADMIN — CLOZAPINE 200 MG: 100 TABLET ORAL at 10:00

## 2023-08-18 RX ADMIN — CLOZAPINE 500 MG: 100 TABLET ORAL at 20:55

## 2023-08-18 RX ADMIN — ASPIRIN 81 MG 81 MG: 81 TABLET ORAL at 10:01

## 2023-08-18 RX ADMIN — PRAVASTATIN SODIUM 20 MG: 20 TABLET ORAL at 20:54

## 2023-08-18 RX ADMIN — SODIUM CHLORIDE, PRESERVATIVE FREE 0.45 ML: 5 INJECTION INTRAVENOUS at 09:30

## 2023-08-18 NOTE — CARE COORDINATION
This CM met with pt and family member at bedside this day to complete assessment. Verified pt's PCP, insurance, emergency contact, and home address. They report no difficulty obtaining pt's medications in the community. Pt lives at home with 2 steps to enter. They confirm that at baseline prior to admission pt is mostly independent with his ADLs but pt's family member assists with ADLs as needed. We discussed discharge planning this day. Pt plans on returning home with family support when medically stable for discharge. PT/OT evals ordered - will await any post acute recommendations. No additional CM needs at this time. Will continue to monitor and update as needed. 08/17/23 2200   Service Assessment   Patient Orientation Alert and Oriented   Cognition Alert   History Provided By Significant Other;Patient   Primary Caregiver Self   Support Systems Spouse/Significant Other   Patient's Healthcare Decision Maker is: Patient Declined (Legal Next of Kin Remains as Decision Maker)   PCP Verified by CM Yes   Last Visit to PCP Within last 3 months   Prior Functional Level Assistance with the following:;Other (see comment)  (assistance as needed from s/o)   Current Functional Level Other (see comment)  (TBD by clinical team)   Can patient return to prior living arrangement Yes   Ability to make needs known: Good   Family able to assist with home care needs: Yes   Would you like for me to discuss the discharge plan with any other family members/significant others, and if so, who?  No   Condition of Participation: Discharge Planning   The Plan for Transition of Care is related to the following treatment goals: Home

## 2023-08-18 NOTE — ED NOTES
TRANSFER - OUT REPORT:    Verbal report given to [facility] RN on Stanislav Lamar  being transferred to [address] [place] [address] for routine progression of patient care       Report consisted of patient's Situation, Background, Assessment and   Recommendations(SBAR). Information from the following report(s) ED Encounter Summary was reviewed with the receiving nurse. [place] Fall Assessment:    Presents to emergency department  because of falls (Syncope, seizure, or loss of consciousness): No  Age > 70: No  Altered Mental Status, Intoxication with alcohol or substance confusion (Disorientation, impaired judgment, poor safety awaremess, or inability to follow instructions): No  Impaired Mobility: Ambulates or transfers with assistive devices or assistance; Unable to ambulate or transer.: Yes  Nursing Judgement: Yes          Lines:   Peripheral IV 08/17/23 Distal;Right; Anterior Cephalic (Active)       Peripheral IV 08/17/23 Right; Anterior Forearm (Active)       Peripheral IV 08/17/23 Left Antecubital (Active)        Opportunity for questions and clarification was provided.       Patient transported with:  Registered Nurse          Mikki Lane RN  08/17/23 2018

## 2023-08-18 NOTE — THERAPY EVALUATION
ACUTE PHYSICAL THERAPY GOALS:   (Developed with and agreed upon by patient and/or caregiver.)    (1.)Mr. Ap Castro will move from supine to sit and sit to supine  in bed with MODIFIED INDEPENDENCE within 7 treatment day(s). (2.)Mr. Mendez will transfer from bed to chair and chair to bed with MODIFIED INDEPENDENCE using the least restrictive device within 7 treatment day(s). (3.)Mr. Mendez will ambulate with MODIFIED INDEPENDENCE for 250 feet with the least restrictive device within 7 treatment day(s). ________________________________________________________________________________________________     PHYSICAL THERAPY Initial Assessment  (Link to Caseload Tracking: PT Visit Days : 1  Acknowledge Orders  Time In/Out  PT Charge Capture  Rehab Caseload Tracker    Bishop Hallman is a 66 y.o. male   PRIMARY DIAGNOSIS: YANIV (acute kidney injury) ([address])  Renal insufficiency [N28.9]  Generalized weakness [R53.1]  YANIV (acute kidney injury) ([address]) [N17.9]  Hypotension, unspecified hypotension type [I95.9]  Cardiomyopathy, unspecified type ([address]) [G83.2]  Systolic congestive heart failure, unspecified HF chronicity ([address]) [I50.20]       Reason for Referral: Generalized Muscle Weakness (M62.81)  Difficulty in walking, Not elsewhere classified (R26.2)  Other abnormalities of gait and mobility (R26.89)  Inpatient: Payor: Loni Santamaria / Plan: Lila Grover / Product Type: *No Product type* /     ASSESSMENT:     REHAB RECOMMENDATIONS:   Recommendation to date pending progress:  Setting:  Home Health Therapy    Equipment:    To Be Determined     ASSESSMENT:  Mr. Ap Castro is a 66year old male who presents with above diagnosis. This date pt performs mobility including bed mobility, transfers, and short distance ambulation with hand held assist.  Further evaluation limited as transport came to take pt for ultrasound.  Pt presents as functioning below his baseline, with deficits in mobility including transfers, gait,

## 2023-08-19 PROBLEM — R53.1 GENERALIZED WEAKNESS: Status: ACTIVE | Noted: 2023-08-19

## 2023-08-19 LAB
ALBUMIN SERPL-MCNC: 2.7 G/DL (ref 3.2–4.6)
ALBUMIN/GLOB SERPL: 0.8 (ref 0.4–1.6)
ALP SERPL-CCNC: 114 U/L (ref 50–136)
ALT SERPL-CCNC: 53 U/L (ref 12–65)
ANION GAP SERPL CALC-SCNC: 2 MMOL/L (ref 2–11)
AST SERPL-CCNC: 43 U/L (ref 15–37)
BASOPHILS # BLD: 0 K/UL (ref 0–0.2)
BASOPHILS NFR BLD: 0 % (ref 0–2)
BILIRUB SERPL-MCNC: 0.4 MG/DL (ref 0.2–1.1)
BUN SERPL-MCNC: 35 MG/DL (ref 8–23)
CALCIUM SERPL-MCNC: 8.4 MG/DL (ref 8.3–10.4)
CHLORIDE SERPL-SCNC: 113 MMOL/L (ref 101–110)
CO2 SERPL-SCNC: 29 MMOL/L (ref 21–32)
CREAT SERPL-MCNC: 1.2 MG/DL (ref 0.8–1.5)
DIFFERENTIAL METHOD BLD: ABNORMAL
EOSINOPHIL # BLD: 0 K/UL (ref 0–0.8)
EOSINOPHIL NFR BLD: 0 % (ref 0.5–7.8)
ERYTHROCYTE [DISTWIDTH] IN BLOOD BY AUTOMATED COUNT: 15.9 % (ref 11.9–14.6)
GLOBULIN SER CALC-MCNC: 3.6 G/DL (ref 2.8–4.5)
GLUCOSE SERPL-MCNC: 102 MG/DL (ref 65–100)
HCT VFR BLD AUTO: 34.1 % (ref 41.1–50.3)
HGB BLD-MCNC: 10.8 G/DL (ref 13.6–17.2)
IMM GRANULOCYTES # BLD AUTO: 0 K/UL (ref 0–0.5)
IMM GRANULOCYTES NFR BLD AUTO: 0 % (ref 0–5)
LYMPHOCYTES # BLD: 2 K/UL (ref 0.5–4.6)
LYMPHOCYTES NFR BLD: 29 % (ref 13–44)
MAGNESIUM SERPL-MCNC: 1.9 MG/DL (ref 1.8–2.4)
MCH RBC QN AUTO: 23.5 PG (ref 26.1–32.9)
MCHC RBC AUTO-ENTMCNC: 31.7 G/DL (ref 31.4–35)
MCV RBC AUTO: 74.1 FL (ref 82–102)
MM INDURATION, POC: 1 MM (ref 0–5)
MONOCYTES # BLD: 1 K/UL (ref 0.1–1.3)
MONOCYTES NFR BLD: 15 % (ref 4–12)
NEUTS SEG # BLD: 3.8 K/UL (ref 1.7–8.2)
NEUTS SEG NFR BLD: 56 % (ref 43–78)
NRBC # BLD: 0 K/UL (ref 0–0.2)
PLATELET # BLD AUTO: 130 K/UL (ref 150–450)
PMV BLD AUTO: 12.9 FL (ref 9.4–12.3)
POTASSIUM SERPL-SCNC: 3.8 MMOL/L (ref 3.5–5.1)
PPD, POC: NEGATIVE
PROT SERPL-MCNC: 6.3 G/DL (ref 6.3–8.2)
RBC # BLD AUTO: 4.6 M/UL (ref 4.23–5.6)
SODIUM SERPL-SCNC: 144 MMOL/L (ref 133–143)
UFH PPP CHRO-ACNC: 0.35 IU/ML (ref 0.3–0.7)
UFH PPP CHRO-ACNC: 0.49 IU/ML (ref 0.3–0.7)
UFH PPP CHRO-ACNC: 0.69 IU/ML (ref 0.3–0.7)
UFH PPP CHRO-ACNC: 0.83 IU/ML (ref 0.3–0.7)
WBC # BLD AUTO: 6.8 K/UL (ref 4.3–11.1)

## 2023-08-19 PROCEDURE — 85025 COMPLETE CBC W/AUTO DIFF WBC: CPT

## 2023-08-19 PROCEDURE — 36415 COLL VENOUS BLD VENIPUNCTURE: CPT

## 2023-08-19 PROCEDURE — 1100000003 HC PRIVATE W/ TELEMETRY

## 2023-08-19 PROCEDURE — 2500000003 HC RX 250 WO HCPCS: Performed by: INTERNAL MEDICINE

## 2023-08-19 PROCEDURE — 99222 1ST HOSP IP/OBS MODERATE 55: CPT | Performed by: INTERNAL MEDICINE

## 2023-08-19 PROCEDURE — 2580000003 HC RX 258: Performed by: FAMILY MEDICINE

## 2023-08-19 PROCEDURE — 6370000000 HC RX 637 (ALT 250 FOR IP): Performed by: FAMILY MEDICINE

## 2023-08-19 PROCEDURE — 83735 ASSAY OF MAGNESIUM: CPT

## 2023-08-19 PROCEDURE — 6360000002 HC RX W HCPCS: Performed by: FAMILY MEDICINE

## 2023-08-19 PROCEDURE — 80053 COMPREHEN METABOLIC PANEL: CPT

## 2023-08-19 PROCEDURE — 85520 HEPARIN ASSAY: CPT

## 2023-08-19 RX ORDER — AMIODARONE HYDROCHLORIDE 200 MG/1
100 TABLET ORAL 2 TIMES DAILY
Status: DISCONTINUED | OUTPATIENT
Start: 2023-08-19 | End: 2023-08-28 | Stop reason: HOSPADM

## 2023-08-19 RX ADMIN — PRAVASTATIN SODIUM 20 MG: 20 TABLET ORAL at 23:02

## 2023-08-19 RX ADMIN — SODIUM CHLORIDE, PRESERVATIVE FREE 10 ML: 5 INJECTION INTRAVENOUS at 23:01

## 2023-08-19 RX ADMIN — CLOZAPINE 500 MG: 100 TABLET ORAL at 23:05

## 2023-08-19 RX ADMIN — CLOZAPINE 200 MG: 100 TABLET ORAL at 09:21

## 2023-08-19 RX ADMIN — SODIUM CHLORIDE, PRESERVATIVE FREE 10 ML: 5 INJECTION INTRAVENOUS at 09:28

## 2023-08-19 RX ADMIN — HEPARIN SODIUM 13 UNITS/KG/HR: 10000 INJECTION, SOLUTION INTRAVENOUS at 07:08

## 2023-08-19 RX ADMIN — CEFTRIAXONE 1000 MG: 1 INJECTION, POWDER, FOR SOLUTION INTRAMUSCULAR; INTRAVENOUS at 23:02

## 2023-08-19 RX ADMIN — PANTOPRAZOLE SODIUM 40 MG: 40 TABLET, DELAYED RELEASE ORAL at 05:24

## 2023-08-19 RX ADMIN — ASPIRIN 81 MG 81 MG: 81 TABLET ORAL at 09:22

## 2023-08-19 RX ADMIN — HEPARIN SODIUM 13 UNITS/KG/HR: 10000 INJECTION, SOLUTION INTRAVENOUS at 14:50

## 2023-08-19 ASSESSMENT — ENCOUNTER SYMPTOMS
COLOR CHANGE: 0
EYE DISCHARGE: 0
APNEA: 0
ABDOMINAL DISTENTION: 0

## 2023-08-19 NOTE — H&P
History of present illness:63 y.o. male history schizophrenia Biotronik CRT-D cardiomyopathy previous ejection fraction 13% with global hypokinesis with multiple other wall motion abnormalities most notable for apical akinesis. Patient normally followed by Dr. Simona Ybarra. Consult placed to Dr. Joel Mendoza 8/18/2023, according to nursing staff Dr. Joel Mendoza unavailable to see patient. Patient was admitted for fatigue decreased oral intake. Cardiology was consulted for possible left ventricular thrombus noted on echocardiogram.      Review of Systems   Constitutional:  Negative for activity change. HENT:  Negative for congestion. Eyes:  Negative for discharge. Respiratory:  Negative for apnea. Cardiovascular:  Negative for chest pain. Gastrointestinal:  Negative for abdominal distention. Endocrine: Negative for cold intolerance. Genitourinary:  Negative for difficulty urinating. Musculoskeletal:  Negative for arthralgias. Skin:  Negative for color change. Allergic/Immunologic: Negative for environmental allergies. Neurological:  Negative for dizziness. Hematological:  Negative for adenopathy. Psychiatric/Behavioral:  Negative for agitation.       Past Medical History:   Diagnosis Date    Cardiac arrest ([address]) 11/13/2018    Diabetes ([address])     Heart failure ([address])     Hypertension     Pneumonia due to Streptococcus pneumoniae ([address]) 11/21/2018    Psychiatric disorder     paranoid schziphrenia    Rhabdomyolysis 8/95/6380    Systolic CHF, acute on chronic ([address]) 2/4/2016     Past Surgical History:   Procedure Laterality Date    APPENDECTOMY       Family History   Problem Relation Age of Onset    Heart Failure Mother     Heart Attack Brother     Lung Cancer Brother     No Known Problems Sister     No Known Problems Sister     Heart Attack Father     Diabetes Sister     Hypertension Sister     Heart Disease Sister         valvular    No Known Problems Brother     No Known Problems Brother

## 2023-08-20 LAB
ALBUMIN SERPL-MCNC: 2.5 G/DL (ref 3.2–4.6)
ALBUMIN/GLOB SERPL: 0.7 (ref 0.4–1.6)
ALP SERPL-CCNC: 108 U/L (ref 50–136)
ALT SERPL-CCNC: 43 U/L (ref 12–65)
ANION GAP SERPL CALC-SCNC: 2 MMOL/L (ref 2–11)
AST SERPL-CCNC: 25 U/L (ref 15–37)
BASOPHILS # BLD: 0 K/UL (ref 0–0.2)
BASOPHILS NFR BLD: 1 % (ref 0–2)
BILIRUB SERPL-MCNC: 0.2 MG/DL (ref 0.2–1.1)
BUN SERPL-MCNC: 17 MG/DL (ref 8–23)
CALCIUM SERPL-MCNC: 8.3 MG/DL (ref 8.3–10.4)
CHLORIDE SERPL-SCNC: 115 MMOL/L (ref 101–110)
CO2 SERPL-SCNC: 28 MMOL/L (ref 21–32)
CREAT SERPL-MCNC: 1 MG/DL (ref 0.8–1.5)
DIFFERENTIAL METHOD BLD: ABNORMAL
EOSINOPHIL # BLD: 0 K/UL (ref 0–0.8)
EOSINOPHIL NFR BLD: 0 % (ref 0.5–7.8)
ERYTHROCYTE [DISTWIDTH] IN BLOOD BY AUTOMATED COUNT: 15.9 % (ref 11.9–14.6)
GLOBULIN SER CALC-MCNC: 3.5 G/DL (ref 2.8–4.5)
GLUCOSE SERPL-MCNC: 84 MG/DL (ref 65–100)
HCT VFR BLD AUTO: 32.7 % (ref 41.1–50.3)
HGB BLD-MCNC: 10.4 G/DL (ref 13.6–17.2)
IMM GRANULOCYTES # BLD AUTO: 0 K/UL (ref 0–0.5)
IMM GRANULOCYTES NFR BLD AUTO: 0 % (ref 0–5)
LYMPHOCYTES # BLD: 1.9 K/UL (ref 0.5–4.6)
LYMPHOCYTES NFR BLD: 28 % (ref 13–44)
MAGNESIUM SERPL-MCNC: 1.7 MG/DL (ref 1.8–2.4)
MCH RBC QN AUTO: 23.5 PG (ref 26.1–32.9)
MCHC RBC AUTO-ENTMCNC: 31.8 G/DL (ref 31.4–35)
MCV RBC AUTO: 73.8 FL (ref 82–102)
MM INDURATION, POC: 0 MM (ref 0–5)
MONOCYTES # BLD: 0.8 K/UL (ref 0.1–1.3)
MONOCYTES NFR BLD: 12 % (ref 4–12)
NEUTS SEG # BLD: 3.9 K/UL (ref 1.7–8.2)
NEUTS SEG NFR BLD: 59 % (ref 43–78)
NRBC # BLD: 0 K/UL (ref 0–0.2)
PLATELET # BLD AUTO: 116 K/UL (ref 150–450)
PMV BLD AUTO: 12 FL (ref 9.4–12.3)
POTASSIUM SERPL-SCNC: 4.2 MMOL/L (ref 3.5–5.1)
PPD, POC: NEGATIVE
PROT SERPL-MCNC: 6 G/DL (ref 6.3–8.2)
RBC # BLD AUTO: 4.43 M/UL (ref 4.23–5.6)
SODIUM SERPL-SCNC: 145 MMOL/L (ref 133–143)
UFH PPP CHRO-ACNC: 0.37 IU/ML (ref 0.3–0.7)
WBC # BLD AUTO: 6.6 K/UL (ref 4.3–11.1)

## 2023-08-20 PROCEDURE — 80053 COMPREHEN METABOLIC PANEL: CPT

## 2023-08-20 PROCEDURE — 1100000003 HC PRIVATE W/ TELEMETRY

## 2023-08-20 PROCEDURE — 85025 COMPLETE CBC W/AUTO DIFF WBC: CPT

## 2023-08-20 PROCEDURE — 6370000000 HC RX 637 (ALT 250 FOR IP): Performed by: FAMILY MEDICINE

## 2023-08-20 PROCEDURE — 83735 ASSAY OF MAGNESIUM: CPT

## 2023-08-20 PROCEDURE — 2500000003 HC RX 250 WO HCPCS: Performed by: INTERNAL MEDICINE

## 2023-08-20 PROCEDURE — 85520 HEPARIN ASSAY: CPT

## 2023-08-20 PROCEDURE — 6360000002 HC RX W HCPCS: Performed by: FAMILY MEDICINE

## 2023-08-20 PROCEDURE — 2580000003 HC RX 258: Performed by: FAMILY MEDICINE

## 2023-08-20 PROCEDURE — 36415 COLL VENOUS BLD VENIPUNCTURE: CPT

## 2023-08-20 RX ADMIN — PRAVASTATIN SODIUM 20 MG: 20 TABLET ORAL at 20:15

## 2023-08-20 RX ADMIN — ASPIRIN 81 MG 81 MG: 81 TABLET ORAL at 08:21

## 2023-08-20 RX ADMIN — CEFTRIAXONE 1000 MG: 1 INJECTION, POWDER, FOR SOLUTION INTRAMUSCULAR; INTRAVENOUS at 21:59

## 2023-08-20 RX ADMIN — CLOZAPINE 500 MG: 100 TABLET ORAL at 20:15

## 2023-08-20 RX ADMIN — SODIUM CHLORIDE, PRESERVATIVE FREE 10 ML: 5 INJECTION INTRAVENOUS at 08:27

## 2023-08-20 RX ADMIN — ACETAMINOPHEN 650 MG: 325 TABLET ORAL at 20:14

## 2023-08-20 RX ADMIN — CLOZAPINE 200 MG: 100 TABLET ORAL at 08:21

## 2023-08-20 RX ADMIN — HEPARIN SODIUM 13 UNITS/KG/HR: 10000 INJECTION, SOLUTION INTRAVENOUS at 20:28

## 2023-08-20 RX ADMIN — PANTOPRAZOLE SODIUM 40 MG: 40 TABLET, DELAYED RELEASE ORAL at 08:21

## 2023-08-20 ASSESSMENT — ENCOUNTER SYMPTOMS
DIARRHEA: 0
BACK PAIN: 0
COUGH: 0
FACIAL SWELLING: 0
SHORTNESS OF BREATH: 0
RHINORRHEA: 0
EYE REDNESS: 0
EYE DISCHARGE: 0
COLOR CHANGE: 0
ABDOMINAL PAIN: 0
VOMITING: 0
NAUSEA: 0

## 2023-08-20 ASSESSMENT — PAIN - FUNCTIONAL ASSESSMENT: PAIN_FUNCTIONAL_ASSESSMENT: ACTIVITIES ARE NOT PREVENTED

## 2023-08-20 ASSESSMENT — PAIN SCALES - GENERAL
PAINLEVEL_OUTOF10: 1
PAINLEVEL_OUTOF10: 0

## 2023-08-20 ASSESSMENT — PAIN DESCRIPTION - ORIENTATION: ORIENTATION: LEFT;RIGHT

## 2023-08-20 ASSESSMENT — PAIN DESCRIPTION - LOCATION: LOCATION: BACK;HIP

## 2023-08-20 ASSESSMENT — PAIN DESCRIPTION - DESCRIPTORS: DESCRIPTORS: SORE

## 2023-08-20 NOTE — ED PROVIDER NOTES
Emergency Department Provider Note       PCP: Kristi Ochoa MD   Age: 66 y.o. Sex: male     DISPOSITION Admitted 08/17/2023 06:44:01 PM       ICD-10-CM    1. Generalized weakness  R53.1       2. Hypotension, unspecified hypotension type  I95.9       3. Cardiomyopathy, unspecified type ([address])  I42.9       4. Renal insufficiency  N28.9       5. Systolic congestive heart failure, unspecified HF chronicity (HCC)  I50.20 Transthoracic echocardiogram (TTE) complete with contrast, bubble, strain, and 3D PRN     Transthoracic echocardiogram (TTE) complete with contrast, bubble, strain, and 3D PRN          Medical Decision Making     Complexity of Problems Addressed:  1 or more acute illnesses that pose a threat to life or bodily function. Data Reviewed and Analyzed:   I independently ordered and reviewed each unique test.  I reviewed external records: ED visit note from an outside group. I reviewed external records: provider visit note from PCP. I reviewed external records: provider visit note from outside specialist.  I reviewed external records: previous EKG including cardiologist interpretation. I reviewed external records: previous lab results from outside ED. I reviewed external records: previous imaging study including radiologist interpretation. The patients assessment required an independent historian: Sister at bedside. The reason they were needed is important historical information not provided by the patient. I independently ordered and interpreted the ED EKG in the absence of a Cardiologist.    Rate: 80  EKG Interpretation: EKG Interpretation: no acute changes and paced rhythm  ST Segments: Nonspecific ST segments - NO STEMI  I interpreted the X-rays chest x-ray shows no acute infiltrate or effusion. Discussion of management or test interpretation.   79-year-old gentleman with lethargy, acute on chronic kidney injury, hypertension, pressures better after some fluids, low ejection

## 2023-08-21 LAB
BACTERIA SPEC CULT: ABNORMAL
SERVICE CMNT-IMP: ABNORMAL
UFH PPP CHRO-ACNC: 0.28 IU/ML (ref 0.3–0.7)
UFH PPP CHRO-ACNC: 0.41 IU/ML (ref 0.3–0.7)
UFH PPP CHRO-ACNC: 0.59 IU/ML (ref 0.3–0.7)

## 2023-08-21 PROCEDURE — 85520 HEPARIN ASSAY: CPT

## 2023-08-21 PROCEDURE — 6360000002 HC RX W HCPCS: Performed by: FAMILY MEDICINE

## 2023-08-21 PROCEDURE — 6370000000 HC RX 637 (ALT 250 FOR IP): Performed by: FAMILY MEDICINE

## 2023-08-21 PROCEDURE — 97116 GAIT TRAINING THERAPY: CPT

## 2023-08-21 PROCEDURE — 36415 COLL VENOUS BLD VENIPUNCTURE: CPT

## 2023-08-21 PROCEDURE — 2580000003 HC RX 258: Performed by: FAMILY MEDICINE

## 2023-08-21 PROCEDURE — 2500000003 HC RX 250 WO HCPCS: Performed by: INTERNAL MEDICINE

## 2023-08-21 PROCEDURE — 6360000002 HC RX W HCPCS: Performed by: INTERNAL MEDICINE

## 2023-08-21 PROCEDURE — 1100000003 HC PRIVATE W/ TELEMETRY

## 2023-08-21 RX ADMIN — CLOZAPINE 200 MG: 100 TABLET ORAL at 08:48

## 2023-08-21 RX ADMIN — HEPARIN SODIUM 2800 UNITS: 1000 INJECTION INTRAVENOUS; SUBCUTANEOUS at 08:40

## 2023-08-21 RX ADMIN — ASPIRIN 81 MG 81 MG: 81 TABLET ORAL at 08:41

## 2023-08-21 RX ADMIN — PANTOPRAZOLE SODIUM 40 MG: 40 TABLET, DELAYED RELEASE ORAL at 08:50

## 2023-08-21 RX ADMIN — CEFTRIAXONE 1000 MG: 1 INJECTION, POWDER, FOR SOLUTION INTRAMUSCULAR; INTRAVENOUS at 21:47

## 2023-08-21 RX ADMIN — CLOZAPINE 500 MG: 100 TABLET ORAL at 21:48

## 2023-08-21 RX ADMIN — SODIUM CHLORIDE, PRESERVATIVE FREE 10 ML: 5 INJECTION INTRAVENOUS at 08:50

## 2023-08-21 RX ADMIN — SODIUM CHLORIDE, PRESERVATIVE FREE 10 ML: 5 INJECTION INTRAVENOUS at 21:47

## 2023-08-21 RX ADMIN — HEPARIN SODIUM 15 UNITS/KG/HR: 10000 INJECTION, SOLUTION INTRAVENOUS at 21:46

## 2023-08-21 RX ADMIN — PRAVASTATIN SODIUM 20 MG: 20 TABLET ORAL at 21:46

## 2023-08-21 ASSESSMENT — PAIN SCALES - GENERAL: PAINLEVEL_OUTOF10: 0

## 2023-08-21 NOTE — CARE COORDINATION
CM continues to follow for discharge planning and/or CM needs. Discharge plan remains that pt will return home with family when medically stable. This CM met with pt at bedside to discuss the role and recommendation of home health at discharge - pt is agreeable to referral.  Reviewed agencies - [facility] home health is in network with pt's insurance. Referral made this day. No additional CM needs at this time. Will continue to monitor and update as needed.

## 2023-08-22 ENCOUNTER — APPOINTMENT (OUTPATIENT)
Dept: CT IMAGING | Age: 63
DRG: 682 | End: 2023-08-22
Payer: MEDICARE

## 2023-08-22 LAB
ANION GAP SERPL CALC-SCNC: 4 MMOL/L (ref 2–11)
BACTERIA SPEC CULT: NORMAL
BACTERIA SPEC CULT: NORMAL
BUN SERPL-MCNC: 20 MG/DL (ref 8–23)
CALCIUM SERPL-MCNC: 8.5 MG/DL (ref 8.3–10.4)
CHLORIDE SERPL-SCNC: 114 MMOL/L (ref 101–110)
CO2 SERPL-SCNC: 26 MMOL/L (ref 21–32)
CREAT SERPL-MCNC: 0.9 MG/DL (ref 0.8–1.5)
GLUCOSE SERPL-MCNC: 104 MG/DL (ref 65–100)
POTASSIUM SERPL-SCNC: 4.4 MMOL/L (ref 3.5–5.1)
SERVICE CMNT-IMP: NORMAL
SERVICE CMNT-IMP: NORMAL
SODIUM SERPL-SCNC: 144 MMOL/L (ref 133–143)
UFH PPP CHRO-ACNC: 0.49 IU/ML (ref 0.3–0.7)

## 2023-08-22 PROCEDURE — 2580000003 HC RX 258: Performed by: FAMILY MEDICINE

## 2023-08-22 PROCEDURE — 36415 COLL VENOUS BLD VENIPUNCTURE: CPT

## 2023-08-22 PROCEDURE — 80048 BASIC METABOLIC PNL TOTAL CA: CPT

## 2023-08-22 PROCEDURE — 6370000000 HC RX 637 (ALT 250 FOR IP): Performed by: FAMILY MEDICINE

## 2023-08-22 PROCEDURE — 1100000003 HC PRIVATE W/ TELEMETRY

## 2023-08-22 PROCEDURE — 6360000004 HC RX CONTRAST MEDICATION: Performed by: INTERNAL MEDICINE

## 2023-08-22 PROCEDURE — 2500000003 HC RX 250 WO HCPCS: Performed by: INTERNAL MEDICINE

## 2023-08-22 PROCEDURE — 85520 HEPARIN ASSAY: CPT

## 2023-08-22 PROCEDURE — 71275 CT ANGIOGRAPHY CHEST: CPT

## 2023-08-22 PROCEDURE — 97530 THERAPEUTIC ACTIVITIES: CPT

## 2023-08-22 RX ADMIN — SODIUM CHLORIDE, PRESERVATIVE FREE 10 ML: 5 INJECTION INTRAVENOUS at 09:14

## 2023-08-22 RX ADMIN — CLOZAPINE 500 MG: 100 TABLET ORAL at 20:20

## 2023-08-22 RX ADMIN — CLOZAPINE 200 MG: 100 TABLET ORAL at 09:15

## 2023-08-22 RX ADMIN — ASPIRIN 81 MG 81 MG: 81 TABLET ORAL at 09:14

## 2023-08-22 RX ADMIN — HEPARIN SODIUM 15 UNITS/KG/HR: 10000 INJECTION, SOLUTION INTRAVENOUS at 23:48

## 2023-08-22 RX ADMIN — PRAVASTATIN SODIUM 20 MG: 20 TABLET ORAL at 20:19

## 2023-08-22 RX ADMIN — PANTOPRAZOLE SODIUM 40 MG: 40 TABLET, DELAYED RELEASE ORAL at 05:48

## 2023-08-22 RX ADMIN — IOPAMIDOL 75 ML: 755 INJECTION, SOLUTION INTRAVENOUS at 16:39

## 2023-08-22 RX ADMIN — SODIUM CHLORIDE, PRESERVATIVE FREE 10 ML: 5 INJECTION INTRAVENOUS at 20:21

## 2023-08-22 ASSESSMENT — PAIN SCALES - GENERAL: PAINLEVEL_OUTOF10: 0

## 2023-08-23 PROBLEM — K22.89 DILATION OF ESOPHAGUS: Status: ACTIVE | Noted: 2023-08-23

## 2023-08-23 LAB — UFH PPP CHRO-ACNC: 0.33 IU/ML (ref 0.3–0.7)

## 2023-08-23 PROCEDURE — 2580000003 HC RX 258: Performed by: FAMILY MEDICINE

## 2023-08-23 PROCEDURE — 97535 SELF CARE MNGMENT TRAINING: CPT

## 2023-08-23 PROCEDURE — 85520 HEPARIN ASSAY: CPT

## 2023-08-23 PROCEDURE — 1100000003 HC PRIVATE W/ TELEMETRY

## 2023-08-23 PROCEDURE — 97110 THERAPEUTIC EXERCISES: CPT

## 2023-08-23 PROCEDURE — 99221 1ST HOSP IP/OBS SF/LOW 40: CPT | Performed by: INTERNAL MEDICINE

## 2023-08-23 PROCEDURE — 6370000000 HC RX 637 (ALT 250 FOR IP): Performed by: FAMILY MEDICINE

## 2023-08-23 PROCEDURE — 36415 COLL VENOUS BLD VENIPUNCTURE: CPT

## 2023-08-23 RX ADMIN — PANTOPRAZOLE SODIUM 40 MG: 40 TABLET, DELAYED RELEASE ORAL at 06:01

## 2023-08-23 RX ADMIN — CLOZAPINE 500 MG: 100 TABLET ORAL at 19:44

## 2023-08-23 RX ADMIN — PRAVASTATIN SODIUM 20 MG: 20 TABLET ORAL at 19:44

## 2023-08-23 RX ADMIN — SODIUM CHLORIDE, PRESERVATIVE FREE 10 ML: 5 INJECTION INTRAVENOUS at 09:00

## 2023-08-23 RX ADMIN — SODIUM CHLORIDE, PRESERVATIVE FREE 10 ML: 5 INJECTION INTRAVENOUS at 19:45

## 2023-08-23 RX ADMIN — ASPIRIN 81 MG 81 MG: 81 TABLET ORAL at 11:16

## 2023-08-23 RX ADMIN — CLOZAPINE 200 MG: 100 TABLET ORAL at 11:15

## 2023-08-24 ENCOUNTER — APPOINTMENT (OUTPATIENT)
Dept: GENERAL RADIOLOGY | Age: 63
DRG: 682 | End: 2023-08-24
Payer: MEDICARE

## 2023-08-24 LAB — UFH PPP CHRO-ACNC: 0.34 IU/ML (ref 0.3–0.7)

## 2023-08-24 PROCEDURE — 6370000000 HC RX 637 (ALT 250 FOR IP): Performed by: FAMILY MEDICINE

## 2023-08-24 PROCEDURE — 74220 X-RAY XM ESOPHAGUS 1CNTRST: CPT

## 2023-08-24 PROCEDURE — 1100000003 HC PRIVATE W/ TELEMETRY

## 2023-08-24 PROCEDURE — 2500000003 HC RX 250 WO HCPCS: Performed by: INTERNAL MEDICINE

## 2023-08-24 PROCEDURE — 2580000003 HC RX 258: Performed by: FAMILY MEDICINE

## 2023-08-24 PROCEDURE — 36415 COLL VENOUS BLD VENIPUNCTURE: CPT

## 2023-08-24 PROCEDURE — 85520 HEPARIN ASSAY: CPT

## 2023-08-24 RX ORDER — CARVEDILOL 3.12 MG/1
3.12 TABLET ORAL 2 TIMES DAILY WITH MEALS
Status: DISCONTINUED | OUTPATIENT
Start: 2023-08-24 | End: 2023-08-28 | Stop reason: HOSPADM

## 2023-08-24 RX ADMIN — SODIUM CHLORIDE, PRESERVATIVE FREE 10 ML: 5 INJECTION INTRAVENOUS at 08:51

## 2023-08-24 RX ADMIN — CLOZAPINE 500 MG: 100 TABLET ORAL at 20:06

## 2023-08-24 RX ADMIN — PRAVASTATIN SODIUM 20 MG: 20 TABLET ORAL at 20:06

## 2023-08-24 RX ADMIN — BARIUM SULFATE 100 ML: 0.6 SUSPENSION ORAL at 11:07

## 2023-08-24 RX ADMIN — ASPIRIN 81 MG 81 MG: 81 TABLET ORAL at 08:49

## 2023-08-24 RX ADMIN — CARVEDILOL 3.12 MG: 3.12 TABLET, FILM COATED ORAL at 18:00

## 2023-08-24 RX ADMIN — SODIUM CHLORIDE, PRESERVATIVE FREE 10 ML: 5 INJECTION INTRAVENOUS at 20:06

## 2023-08-24 RX ADMIN — PANTOPRAZOLE SODIUM 40 MG: 40 TABLET, DELAYED RELEASE ORAL at 05:40

## 2023-08-24 RX ADMIN — CLOZAPINE 200 MG: 100 TABLET ORAL at 08:49

## 2023-08-24 RX ADMIN — HEPARIN SODIUM 15 UNITS/KG/HR: 10000 INJECTION, SOLUTION INTRAVENOUS at 02:28

## 2023-08-24 ASSESSMENT — PAIN SCALES - GENERAL: PAINLEVEL_OUTOF10: 0

## 2023-08-24 NOTE — CONSULTS
[facility]    Name:  Joanne Grewal  MR#:  [id number]  :  1960  ACCOUNT #:  [de-identified]  DATE OF SERVICE:  2023    CARDIOLOGY CONSULTATION    HISTORY OF PRESENT ILLNESS:  The patient is a 81-year-old  gentleman who is well known to me with known history of dilated cardiomyopathy with global ejection fraction of 12%. He is status post defibrillator for ventricular tachycardia. He was being treated at home with amiodarone 100 mg daily, Coreg 3.125 twice a day, lisinopril 2.5 once a day, aspirin 81 mg daily. He presented to [facility] with generalized weakness, severe acute kidney failure, dehydration, severe protein-calorie malnutrition, and microcytic anemia. He was very hypotensive when he came to the hospital.  He was found to be in stage IIIa kidney disease. The patient was treated with IV fluids and IV antibiotics for possible sepsis. He was getting Rocephin 1 g daily. He denies any chest pain, palpitations, or shortness of breath. PHYSICAL EXAMINATION:  VITAL SIGNS:  At the time of examination in stepdown unit, his blood pressure is 100/70. CARDIAC:  Heart sounds normal, soft systolic murmur. CHEST:  Clear. ABDOMEN:  Soft. NEUROLOGIC:  No focal deficit. The patient also has known history of chronic mental disease, schizophrenia. He is on meds for that. All his home meds were stopped because of hypotension and acute renal failure. He is being treated with IV fluids and IV heparin because of left ventricular thrombus on 2D echo. I would switched over to Eliquis 2.5 twice a day if he has renal failure on subsequent repeat renal function test.  If his renal function test returns to normal, then he can have Eliquis 5 mg twice a day. I will see him back a week after he gets discharged and I will adjust all his post discharge meds depending on how he feels. I have discussed his prognosis at length with his family.     CLINICAL
Comprehensive Nutrition Assessment    Type and Reason for Visit: Initial, Positive Nutrition Screen, Consult  Malnutrition Screening Tool: Malnutrition Screen  Have you recently lost weight without trying?: Unsure of amount of weight loss (2 points)  Have you been eating poorly because of a decreased appetite?: Yes (1 point)  Malnutrition Screening Tool Score: 3 and General Nutrition Management/other reason (Hospitalists)    Nutrition Recommendations/Plan:   Meals and Snacks:  Diet: Continue current order  Nutrition Supplement Therapy:  Medical food supplement therapy:  Initiate Ensure Enlive three times per day (this provides 350 kcal and 20 grams protein per bottle)       Malnutrition Assessment:  Malnutrition Status:    Context: Chronic Illness  Findings of clinical characteristics of malnutrition:   Energy Intake:  Mild decrease in energy intake (Comment) (patient reports poor intake for several days and normal intake of only 2 daily meals)  Weight Loss:  Unable to assess     Body Fat Loss:  Severe body fat loss Buccal region   Muscle Mass Loss:  Severe muscle mass loss Temples (temporalis), Clavicles (pectoralis & deltoids)  Fluid Accumulation:  No significant fluid accumulation     Strength:  Not Performed     Nutrition Assessment:  Nutrition History: Patient reports that about 5 days ago had sudden decline in appetite and po. Patient reports normal intake of daily breakfast and dinner. He reports he also thinks he has lost weight but unable to verify. Per EMR weight hx unclear as patient has seemingly gained weight since this summer but potential had lost it during the spring. Do You Have Any Cultural, Yazdanism, or Ethnic Food Preferences?: No   Nutrition Background:       PMH: CKD3, schizophrenia, HFeEF s/p BIV PPM. Patient presented with increased fatigue and poor po. Nutrition Interval:  Patient laying in bed at RD visit. He gave above reports along with stating he has no N/V.  Patient reports
chamber severely dilated, LV systolic fxn severely reduced. There is severe global hypokinesis. The estimated LVEF is 15-20%. No intracardiac mass or thrombus identified. 2018 Echo:  Left ventricle: The ventricle was markedly dilated. Systolic function was  markedly reduced. Ejection fraction was estimated in the range of 10 % to 20   %. Joshua was akinetic. Hyperechoic structures noted in apex in 2D images,   Shadowing noted in Definity images, can not rule out the presence of thrombus. There was severe diffuse hypokinesis with regional variation. Wall thickness was at the upper limits of normal.  Left atrium: The atrium was moderately dilated. Inferior vena cava, hepatic veins: The inferior vena cava was dilated. The  respirophasic change in diameter was less than 50%. -Mitral valve: There was moderate regurgitation.     Past Medical History:   Diagnosis Date    Cardiac arrest ([address] [facility]) 2018    Diabetes ([address] [facility])     Heart failure ([address] [facility])     Hypertension     Pneumonia due to Streptococcus pneumoniae ([address] [facility]) 2018    Psychiatric disorder     paranoid schziphrenia    Rhabdomyolysis     Systolic CHF, acute on chronic ([address] [facility]) 2016      Past Surgical History:   Procedure Laterality Date    APPENDECTOMY         Allergies   Allergen Reactions    Loratadine Other (See Comments)     Interaction with other medication      Social History     Socioeconomic History    Marital status: Single     Spouse name: Not on file    Number of children: Not on file    Years of education: Not on file    Highest education level: Not on file   Occupational History    Not on file   Tobacco Use    Smoking status: Former     Packs/day: 0.50     Types: Cigarettes     Quit date: 2015     Years since quittin.6    Smokeless tobacco: Never    Tobacco comments:     Quit smoking: quit around    Substance and Sexual Activity    Alcohol use: No    Drug use: No    Sexual activity: Not on file   Other Topics Concern    Not
follow up as an outpatient when he is discharged. If you have any questions, call me on my cell, [phone].       MD AMIE Crane/[email or web address]  D:  08/24/2023 11:38  T:  08/24/2023 13:43  JOB #:  [id number]
lymphadenopathy. Swallows liquids at bedside without issue.    - LUNGS: Clear to auscultation bilaterally. No accessory muscle use. - CARDIOVASCULAR: Regular rate and rhythm. No murmur. No JVD.    - ABDOMEN: Soft, non-tender and non-distended. No palpable masses. - EXTREMITIES: No edema. Non-tender. ?SKIN: No rashes or lesions. Warm. - NEUROLOGIC: No focal neurological deficits. CN II-XII grossly intact, but not individually tested. - PSYCHIATRIC: Cooperative. Appropriate mood and affect. - SKIN: No rashes, sores, or lesions.     - RECTAL: Deferred. Labs    CBC:  No results for input(s): WBC, RBC, HGB, HCT, MCV, RDW, PLT in the last 72 hours. CHEMISTRIES:  Recent Labs     08/22/23  1407   *   K 4.4   *   CO2 26   BUN 20   CREATININE 0.90   GLUCOSE 104*     PT/INR:No results for input(s): PROTIME, INR in the last 72 hours. APTT:No results for input(s): APTT in the last 72 hours. LIVER PROFILE:  No results for input(s): AST, ALT, BILIDIR, BILITOT, ALKPHOS in the last 72 hours. Imaging/Diagnostics   Pertinent studies mentioned above. Assessment & Plan: This is a 56-year-old male with a history of schizophrenia and significant cardiac conditions including: dilated cardiomyopathy with global ejection fraction of 12% and status post defibrillator for ventricular tachycardia. Who was admitted with hypotension and was found to have acute kidney injury. At some point a CT scan was performed and incidentally found a dilated esophagus with concern for tapering at the GE junction. We have been asked to evaluate. Abnormal CT scan imaging with incidental dilated esophagus with concern for tapering at the GE junction. The symptoms can be consistent with an achalasia diagnosis. However it would be odd for the patient to have this problem given his lack of solid or liquid food dysphagia.   Given his severe comorbid cardiac conditions, anesthesia and endoscopic intervention should be

## 2023-08-25 LAB
ANION GAP SERPL CALC-SCNC: 5 MMOL/L (ref 2–11)
BASOPHILS # BLD: 0 K/UL (ref 0–0.2)
BASOPHILS NFR BLD: 0 % (ref 0–2)
BUN SERPL-MCNC: 22 MG/DL (ref 8–23)
CALCIUM SERPL-MCNC: 9.4 MG/DL (ref 8.3–10.4)
CHLORIDE SERPL-SCNC: 109 MMOL/L (ref 101–110)
CO2 SERPL-SCNC: 30 MMOL/L (ref 21–32)
CREAT SERPL-MCNC: 1.2 MG/DL (ref 0.8–1.5)
DIFFERENTIAL METHOD BLD: ABNORMAL
EOSINOPHIL # BLD: 0 K/UL (ref 0–0.8)
EOSINOPHIL NFR BLD: 0 % (ref 0.5–7.8)
ERYTHROCYTE [DISTWIDTH] IN BLOOD BY AUTOMATED COUNT: 17.7 % (ref 11.9–14.6)
GLUCOSE SERPL-MCNC: 92 MG/DL (ref 65–100)
HCT VFR BLD AUTO: 35.2 % (ref 41.1–50.3)
HGB BLD-MCNC: 11.2 G/DL (ref 13.6–17.2)
IMM GRANULOCYTES # BLD AUTO: 0 K/UL (ref 0–0.5)
IMM GRANULOCYTES NFR BLD AUTO: 0 % (ref 0–5)
LYMPHOCYTES # BLD: 2 K/UL (ref 0.5–4.6)
LYMPHOCYTES NFR BLD: 22 % (ref 13–44)
MCH RBC QN AUTO: 24.1 PG (ref 26.1–32.9)
MCHC RBC AUTO-ENTMCNC: 31.8 G/DL (ref 31.4–35)
MCV RBC AUTO: 75.7 FL (ref 82–102)
MONOCYTES # BLD: 1.1 K/UL (ref 0.1–1.3)
MONOCYTES NFR BLD: 12 % (ref 4–12)
NEUTS SEG # BLD: 5.8 K/UL (ref 1.7–8.2)
NEUTS SEG NFR BLD: 65 % (ref 43–78)
NRBC # BLD: 0 K/UL (ref 0–0.2)
PLATELET # BLD AUTO: 156 K/UL (ref 150–450)
PMV BLD AUTO: 13 FL (ref 9.4–12.3)
POTASSIUM SERPL-SCNC: 4.7 MMOL/L (ref 3.5–5.1)
RBC # BLD AUTO: 4.65 M/UL (ref 4.23–5.6)
SODIUM SERPL-SCNC: 144 MMOL/L (ref 133–143)
UFH PPP CHRO-ACNC: 0.36 IU/ML (ref 0.3–0.7)
WBC # BLD AUTO: 9 K/UL (ref 4.3–11.1)

## 2023-08-25 PROCEDURE — 6370000000 HC RX 637 (ALT 250 FOR IP): Performed by: FAMILY MEDICINE

## 2023-08-25 PROCEDURE — 1100000003 HC PRIVATE W/ TELEMETRY

## 2023-08-25 PROCEDURE — 2580000003 HC RX 258: Performed by: FAMILY MEDICINE

## 2023-08-25 PROCEDURE — 80048 BASIC METABOLIC PNL TOTAL CA: CPT

## 2023-08-25 PROCEDURE — 85025 COMPLETE CBC W/AUTO DIFF WBC: CPT

## 2023-08-25 PROCEDURE — 36415 COLL VENOUS BLD VENIPUNCTURE: CPT

## 2023-08-25 PROCEDURE — 2500000003 HC RX 250 WO HCPCS: Performed by: INTERNAL MEDICINE

## 2023-08-25 PROCEDURE — 85520 HEPARIN ASSAY: CPT

## 2023-08-25 RX ADMIN — ASPIRIN 81 MG 81 MG: 81 TABLET ORAL at 08:53

## 2023-08-25 RX ADMIN — CLOZAPINE 500 MG: 100 TABLET ORAL at 20:27

## 2023-08-25 RX ADMIN — CARVEDILOL 3.12 MG: 3.12 TABLET, FILM COATED ORAL at 08:53

## 2023-08-25 RX ADMIN — HEPARIN SODIUM 15 UNITS/KG/HR: 10000 INJECTION, SOLUTION INTRAVENOUS at 04:34

## 2023-08-25 RX ADMIN — CARVEDILOL 3.12 MG: 3.12 TABLET, FILM COATED ORAL at 17:48

## 2023-08-25 RX ADMIN — APIXABAN 5 MG: 5 TABLET, FILM COATED ORAL at 18:12

## 2023-08-25 RX ADMIN — PANTOPRAZOLE SODIUM 40 MG: 40 TABLET, DELAYED RELEASE ORAL at 06:00

## 2023-08-25 RX ADMIN — SODIUM CHLORIDE, PRESERVATIVE FREE 10 ML: 5 INJECTION INTRAVENOUS at 08:55

## 2023-08-25 RX ADMIN — CLOZAPINE 200 MG: 100 TABLET ORAL at 08:54

## 2023-08-25 RX ADMIN — PRAVASTATIN SODIUM 20 MG: 20 TABLET ORAL at 20:26

## 2023-08-25 RX ADMIN — SODIUM CHLORIDE, PRESERVATIVE FREE 10 ML: 5 INJECTION INTRAVENOUS at 20:29

## 2023-08-25 ASSESSMENT — PAIN SCALES - GENERAL: PAINLEVEL_OUTOF10: 0

## 2023-08-25 NOTE — CARE COORDINATION
CM continues to follow for discharge planning and/or CM needs. Discharge plan remains that pt will return home with sister when medically stable. Home health referral already made and liaison has followed with pt/family. No additional CM needs at this time. Will continue to monitor and update as needed.

## 2023-08-26 LAB
ANION GAP SERPL CALC-SCNC: 4 MMOL/L (ref 2–11)
BASOPHILS # BLD: 0 K/UL (ref 0–0.2)
BASOPHILS NFR BLD: 0 % (ref 0–2)
BUN SERPL-MCNC: 20 MG/DL (ref 8–23)
CALCIUM SERPL-MCNC: 9.4 MG/DL (ref 8.3–10.4)
CHLORIDE SERPL-SCNC: 110 MMOL/L (ref 101–110)
CO2 SERPL-SCNC: 28 MMOL/L (ref 21–32)
CREAT SERPL-MCNC: 1 MG/DL (ref 0.8–1.5)
DIFFERENTIAL METHOD BLD: ABNORMAL
EOSINOPHIL # BLD: 0 K/UL (ref 0–0.8)
EOSINOPHIL NFR BLD: 0 % (ref 0.5–7.8)
ERYTHROCYTE [DISTWIDTH] IN BLOOD BY AUTOMATED COUNT: 17.5 % (ref 11.9–14.6)
GLUCOSE SERPL-MCNC: 89 MG/DL (ref 65–100)
HCT VFR BLD AUTO: 35.1 % (ref 41.1–50.3)
HGB BLD-MCNC: 11.1 G/DL (ref 13.6–17.2)
IMM GRANULOCYTES # BLD AUTO: 0 K/UL (ref 0–0.5)
IMM GRANULOCYTES NFR BLD AUTO: 0 % (ref 0–5)
LYMPHOCYTES # BLD: 1.7 K/UL (ref 0.5–4.6)
LYMPHOCYTES NFR BLD: 25 % (ref 13–44)
MCH RBC QN AUTO: 23.7 PG (ref 26.1–32.9)
MCHC RBC AUTO-ENTMCNC: 31.6 G/DL (ref 31.4–35)
MCV RBC AUTO: 75 FL (ref 82–102)
MONOCYTES # BLD: 0.9 K/UL (ref 0.1–1.3)
MONOCYTES NFR BLD: 13 % (ref 4–12)
NEUTS SEG # BLD: 4.3 K/UL (ref 1.7–8.2)
NEUTS SEG NFR BLD: 62 % (ref 43–78)
NRBC # BLD: 0 K/UL (ref 0–0.2)
PLATELET # BLD AUTO: 170 K/UL (ref 150–450)
PMV BLD AUTO: 12.3 FL (ref 9.4–12.3)
POTASSIUM SERPL-SCNC: 4.4 MMOL/L (ref 3.5–5.1)
RBC # BLD AUTO: 4.68 M/UL (ref 4.23–5.6)
SODIUM SERPL-SCNC: 142 MMOL/L (ref 133–143)
WBC # BLD AUTO: 6.9 K/UL (ref 4.3–11.1)

## 2023-08-26 PROCEDURE — 2580000003 HC RX 258: Performed by: FAMILY MEDICINE

## 2023-08-26 PROCEDURE — 80048 BASIC METABOLIC PNL TOTAL CA: CPT

## 2023-08-26 PROCEDURE — 85025 COMPLETE CBC W/AUTO DIFF WBC: CPT

## 2023-08-26 PROCEDURE — 1100000003 HC PRIVATE W/ TELEMETRY

## 2023-08-26 PROCEDURE — 6370000000 HC RX 637 (ALT 250 FOR IP): Performed by: FAMILY MEDICINE

## 2023-08-26 PROCEDURE — 36415 COLL VENOUS BLD VENIPUNCTURE: CPT

## 2023-08-26 RX ORDER — BISACODYL 5 MG/1
10 TABLET, DELAYED RELEASE ORAL DAILY PRN
Status: DISCONTINUED | OUTPATIENT
Start: 2023-08-26 | End: 2023-08-28 | Stop reason: HOSPADM

## 2023-08-26 RX ADMIN — ASPIRIN 81 MG 81 MG: 81 TABLET ORAL at 08:42

## 2023-08-26 RX ADMIN — BISACODYL 10 MG: 5 TABLET, COATED ORAL at 08:42

## 2023-08-26 RX ADMIN — CLOZAPINE 500 MG: 100 TABLET ORAL at 20:21

## 2023-08-26 RX ADMIN — SODIUM CHLORIDE, PRESERVATIVE FREE 10 ML: 5 INJECTION INTRAVENOUS at 20:21

## 2023-08-26 RX ADMIN — APIXABAN 5 MG: 5 TABLET, FILM COATED ORAL at 08:42

## 2023-08-26 RX ADMIN — CARVEDILOL 3.12 MG: 3.12 TABLET, FILM COATED ORAL at 08:42

## 2023-08-26 RX ADMIN — PANTOPRAZOLE SODIUM 40 MG: 40 TABLET, DELAYED RELEASE ORAL at 05:59

## 2023-08-26 RX ADMIN — POLYETHYLENE GLYCOL 3350 17 G: 17 POWDER, FOR SOLUTION ORAL at 08:43

## 2023-08-26 RX ADMIN — PRAVASTATIN SODIUM 20 MG: 20 TABLET ORAL at 20:20

## 2023-08-26 RX ADMIN — SODIUM CHLORIDE, PRESERVATIVE FREE 10 ML: 5 INJECTION INTRAVENOUS at 08:44

## 2023-08-26 RX ADMIN — APIXABAN 5 MG: 5 TABLET, FILM COATED ORAL at 20:20

## 2023-08-26 RX ADMIN — CLOZAPINE 200 MG: 100 TABLET ORAL at 08:42

## 2023-08-27 LAB
ANION GAP SERPL CALC-SCNC: 6 MMOL/L (ref 2–11)
BASOPHILS # BLD: 0 K/UL (ref 0–0.2)
BASOPHILS NFR BLD: 0 % (ref 0–2)
BUN SERPL-MCNC: 27 MG/DL (ref 8–23)
CALCIUM SERPL-MCNC: 8.9 MG/DL (ref 8.3–10.4)
CHLORIDE SERPL-SCNC: 108 MMOL/L (ref 101–110)
CO2 SERPL-SCNC: 25 MMOL/L (ref 21–32)
CREAT SERPL-MCNC: 1 MG/DL (ref 0.8–1.5)
DIFFERENTIAL METHOD BLD: ABNORMAL
EOSINOPHIL # BLD: 0 K/UL (ref 0–0.8)
EOSINOPHIL NFR BLD: 0 % (ref 0.5–7.8)
ERYTHROCYTE [DISTWIDTH] IN BLOOD BY AUTOMATED COUNT: 17.3 % (ref 11.9–14.6)
GLUCOSE SERPL-MCNC: 74 MG/DL (ref 65–100)
HCT VFR BLD AUTO: 34.6 % (ref 41.1–50.3)
HGB BLD-MCNC: 11 G/DL (ref 13.6–17.2)
IMM GRANULOCYTES # BLD AUTO: 0 K/UL (ref 0–0.5)
IMM GRANULOCYTES NFR BLD AUTO: 1 % (ref 0–5)
LYMPHOCYTES # BLD: 2.1 K/UL (ref 0.5–4.6)
LYMPHOCYTES NFR BLD: 27 % (ref 13–44)
MCH RBC QN AUTO: 23.6 PG (ref 26.1–32.9)
MCHC RBC AUTO-ENTMCNC: 31.8 G/DL (ref 31.4–35)
MCV RBC AUTO: 74.1 FL (ref 82–102)
MONOCYTES # BLD: 1.1 K/UL (ref 0.1–1.3)
MONOCYTES NFR BLD: 14 % (ref 4–12)
NEUTS SEG # BLD: 4.3 K/UL (ref 1.7–8.2)
NEUTS SEG NFR BLD: 58 % (ref 43–78)
NRBC # BLD: 0 K/UL (ref 0–0.2)
PLATELET # BLD AUTO: 181 K/UL (ref 150–450)
PMV BLD AUTO: 12.9 FL (ref 9.4–12.3)
POTASSIUM SERPL-SCNC: 4.7 MMOL/L (ref 3.5–5.1)
RBC # BLD AUTO: 4.67 M/UL (ref 4.23–5.6)
SODIUM SERPL-SCNC: 139 MMOL/L (ref 133–143)
WBC # BLD AUTO: 7.5 K/UL (ref 4.3–11.1)

## 2023-08-27 PROCEDURE — 6370000000 HC RX 637 (ALT 250 FOR IP): Performed by: FAMILY MEDICINE

## 2023-08-27 PROCEDURE — 80048 BASIC METABOLIC PNL TOTAL CA: CPT

## 2023-08-27 PROCEDURE — 85025 COMPLETE CBC W/AUTO DIFF WBC: CPT

## 2023-08-27 PROCEDURE — 2580000003 HC RX 258: Performed by: FAMILY MEDICINE

## 2023-08-27 PROCEDURE — 36415 COLL VENOUS BLD VENIPUNCTURE: CPT

## 2023-08-27 PROCEDURE — 1100000003 HC PRIVATE W/ TELEMETRY

## 2023-08-27 RX ADMIN — APIXABAN 5 MG: 5 TABLET, FILM COATED ORAL at 08:30

## 2023-08-27 RX ADMIN — SODIUM CHLORIDE, PRESERVATIVE FREE 10 ML: 5 INJECTION INTRAVENOUS at 20:42

## 2023-08-27 RX ADMIN — PRAVASTATIN SODIUM 20 MG: 20 TABLET ORAL at 20:42

## 2023-08-27 RX ADMIN — APIXABAN 5 MG: 5 TABLET, FILM COATED ORAL at 20:42

## 2023-08-27 RX ADMIN — CARVEDILOL 3.12 MG: 3.12 TABLET, FILM COATED ORAL at 17:04

## 2023-08-27 RX ADMIN — PANTOPRAZOLE SODIUM 40 MG: 40 TABLET, DELAYED RELEASE ORAL at 06:02

## 2023-08-27 RX ADMIN — ASPIRIN 81 MG 81 MG: 81 TABLET ORAL at 08:30

## 2023-08-27 RX ADMIN — SODIUM CHLORIDE, PRESERVATIVE FREE 10 ML: 5 INJECTION INTRAVENOUS at 08:32

## 2023-08-27 RX ADMIN — CLOZAPINE 200 MG: 100 TABLET ORAL at 08:30

## 2023-08-27 RX ADMIN — CLOZAPINE 500 MG: 100 TABLET ORAL at 20:42

## 2023-08-28 VITALS
RESPIRATION RATE: 16 BRPM | WEIGHT: 146 LBS | SYSTOLIC BLOOD PRESSURE: 100 MMHG | OXYGEN SATURATION: 100 % | DIASTOLIC BLOOD PRESSURE: 76 MMHG | HEART RATE: 89 BPM | BODY MASS INDEX: 20.9 KG/M2 | TEMPERATURE: 98.1 F | HEIGHT: 70 IN

## 2023-08-28 PROBLEM — N17.9 AKI (ACUTE KIDNEY INJURY) (HCC): Status: RESOLVED | Noted: 2023-08-17 | Resolved: 2023-08-28

## 2023-08-28 PROBLEM — I24.0 LV (LEFT VENTRICULAR) MURAL THROMBUS WITHOUT MI (HCC): Status: ACTIVE | Noted: 2023-08-28

## 2023-08-28 PROBLEM — I50.20 HFREF (HEART FAILURE WITH REDUCED EJECTION FRACTION) (HCC): Status: ACTIVE | Noted: 2023-08-28

## 2023-08-28 PROBLEM — I95.9 HYPOTENSION: Status: RESOLVED | Noted: 2018-11-13 | Resolved: 2023-08-28

## 2023-08-28 PROCEDURE — 6370000000 HC RX 637 (ALT 250 FOR IP): Performed by: FAMILY MEDICINE

## 2023-08-28 PROCEDURE — 97530 THERAPEUTIC ACTIVITIES: CPT

## 2023-08-28 PROCEDURE — 2580000003 HC RX 258: Performed by: FAMILY MEDICINE

## 2023-08-28 RX ORDER — LISINOPRIL 2.5 MG/1
2.5 TABLET ORAL DAILY
Qty: 30 TABLET | Refills: 0 | Status: SHIPPED | OUTPATIENT
Start: 2023-08-28 | End: 2023-09-27

## 2023-08-28 RX ORDER — PANTOPRAZOLE SODIUM 40 MG/1
40 TABLET, DELAYED RELEASE ORAL
Qty: 30 TABLET | Refills: 0 | Status: SHIPPED | OUTPATIENT
Start: 2023-08-29 | End: 2023-09-28

## 2023-08-28 RX ORDER — FUROSEMIDE 20 MG/1
20 TABLET ORAL DAILY PRN
Qty: 60 TABLET | Refills: 0 | Status: SHIPPED | OUTPATIENT
Start: 2023-08-28

## 2023-08-28 RX ORDER — CARVEDILOL 6.25 MG/1
3.12 TABLET ORAL 2 TIMES DAILY WITH MEALS
Qty: 30 TABLET | Refills: 0 | Status: SHIPPED | OUTPATIENT
Start: 2023-08-28 | End: 2023-09-27

## 2023-08-28 RX ADMIN — APIXABAN 5 MG: 5 TABLET, FILM COATED ORAL at 08:59

## 2023-08-28 RX ADMIN — ASPIRIN 81 MG 81 MG: 81 TABLET ORAL at 08:59

## 2023-08-28 RX ADMIN — PANTOPRAZOLE SODIUM 40 MG: 40 TABLET, DELAYED RELEASE ORAL at 05:57

## 2023-08-28 RX ADMIN — CARVEDILOL 3.12 MG: 3.12 TABLET, FILM COATED ORAL at 09:00

## 2023-08-28 RX ADMIN — SODIUM CHLORIDE, PRESERVATIVE FREE 10 ML: 5 INJECTION INTRAVENOUS at 09:00

## 2023-08-28 RX ADMIN — CLOZAPINE 200 MG: 100 TABLET ORAL at 09:01

## 2023-08-28 NOTE — PLAN OF CARE
Problem: Discharge Planning  Goal: Discharge to home or other facility with appropriate resources  Outcome: Progressing  Flowsheets (Taken 8/18/2023 4556)  Discharge to home or other facility with appropriate resources: Identify barriers to discharge with patient and caregiver     Problem: Chronic Conditions and Co-morbidities  Goal: Patient's chronic conditions and co-morbidity symptoms are monitored and maintained or improved  Outcome: Progressing     Problem: Neurosensory - Adult  Goal: Achieves stable or improved neurological status  Outcome: Progressing  Goal: Achieves maximal functionality and self care  Outcome: Progressing     Problem: Respiratory - Adult  Goal: Achieves optimal ventilation and oxygenation  Outcome: Progressing     Problem: Cardiovascular - Adult  Goal: Maintains optimal cardiac output and hemodynamic stability  Outcome: Progressing  Goal: Absence of cardiac dysrhythmias or at baseline  Outcome: Progressing     Problem: Skin/Tissue Integrity - Adult  Goal: Skin integrity remains intact  Outcome: Progressing  Goal: Incisions, wounds, or drain sites healing without S/S of infection  Outcome: Progressing  Goal: Oral mucous membranes remain intact  Outcome: Progressing     Problem: Musculoskeletal - Adult  Goal: Return mobility to safest level of function  Outcome: Progressing  Goal: Maintain proper alignment of affected body part  Outcome: Progressing     Problem: Gastrointestinal - Adult  Goal: Minimal or absence of nausea and vomiting  Outcome: Progressing  Goal: Maintains or returns to baseline bowel function  Outcome: Progressing  Goal: Maintains adequate nutritional intake  Outcome: Progressing     Problem: Genitourinary - Adult  Goal: Absence of urinary retention  Outcome: Progressing  Goal: Urinary catheter remains patent  Outcome: Progressing     Problem: Infection - Adult  Goal: Absence of infection at discharge  Outcome: Progressing  Goal: Absence of infection during
Problem: Genitourinary - Adult  Goal: Absence of urinary retention  Outcome: Progressing  Goal: Urinary catheter remains patent  Outcome: Progressing     Problem: Infection - Adult  Goal: Absence of infection at discharge  Outcome: Progressing  Flowsheets (Taken 8/25/2023 1930)  Absence of infection at discharge:   Assess and monitor for signs and symptoms of infection   Monitor lab/diagnostic results  Goal: Absence of infection during hospitalization  Outcome: Progressing  Flowsheets (Taken 8/25/2023 1930)  Absence of infection during hospitalization:   Assess and monitor for signs and symptoms of infection   Monitor lab/diagnostic results  Goal: Absence of fever/infection during anticipated neutropenic period  Outcome: Progressing  Flowsheets (Taken 8/25/2023 1930)  Absence of fever/infection during anticipated neutropenic period: Monitor white blood cell count     Problem: Metabolic/Fluid and Electrolytes - Adult  Goal: Electrolytes maintained within normal limits  Outcome: Progressing  Goal: Hemodynamic stability and optimal renal function maintained  Outcome: Progressing  Goal: Glucose maintained within prescribed range  Outcome: Progressing     Problem: Hematologic - Adult  Goal: Maintains hematologic stability  Outcome: Progressing     Problem: Safety - Adult  Goal: Free from fall injury  Outcome: Progressing     Problem: Skin/Tissue Integrity  Goal: Absence of new skin breakdown  Description: 1. Monitor for areas of redness and/or skin breakdown  2. Assess vascular access sites hourly  3. Every 4-6 hours minimum:  Change oxygen saturation probe site  4. Every 4-6 hours:  If on nasal continuous positive airway pressure, respiratory therapy assess nares and determine need for appliance change or resting period.   Outcome: Progressing     Problem: Pain  Goal: Verbalizes/displays adequate comfort level or baseline comfort level  Outcome: Progressing
airway pressure, respiratory therapy assess nares and determine need for appliance change or resting period.   Outcome: Progressing     Problem: Pain  Goal: Verbalizes/displays adequate comfort level or baseline comfort level  Outcome: Progressing  Flowsheets  Taken 8/26/2023 1915 by Syed Lopez RN  Verbalizes/displays adequate comfort level or baseline comfort level:   Assess pain using appropriate pain scale   Administer analgesics based on type and severity of pain and evaluate response  Taken 8/26/2023 0725 by Brooke Ling RN  Verbalizes/displays adequate comfort level or baseline comfort level:   Encourage patient to monitor pain and request assistance   Assess pain using appropriate pain scale
comfort level or baseline comfort level: Encourage patient to monitor pain and request assistance

## 2023-08-28 NOTE — DISCHARGE SUMMARY
distension. Lungs:             Decreased breath sound at bases bilaterally. No wheezing, rhonchi, or rales. Symmetric expansion. Abdomen:        Soft, nontender, nondistended. Extremities:     No cyanosis or clubbing. No edema  Skin:                No rashes and normal coloration. Warm and dry. Neuro:             CN II-XII grossly intact. Psych:             Normal mood and affect. Signed: Floretta Ganser, DO    Part of this note may have been written by using a voice dictation software. The note has been proof read but may still contain some grammatical/other typographical errors.

## 2023-08-28 NOTE — CARE COORDINATION
Pt with discharge orders this day. Pt to return home with sister. Home health referral already made and updated clinicals are known. No additional CM needs at discharge. 08/17/23 2200   Service Assessment   Patient Orientation Alert and Oriented   Cognition Alert   History Provided By Significant Other;Patient   Primary Caregiver Self   Support Systems Family Members   Patient's Healthcare Decision Maker is: Patient Declined (Legal Next of Kin Remains as Decision Maker)   PCP Verified by CM Yes   Last Visit to PCP Within last 3 months   Prior Functional Level Assistance with the following:;Other (see comment)  (assistance as needed from s/o)   Current Functional Level Other (see comment)  (TBD by clinical team)   Can patient return to prior living arrangement Yes   Ability to make needs known: Good   Family able to assist with home care needs: Yes   Would you like for me to discuss the discharge plan with any other family members/significant others, and if so, who? No   Condition of Participation: Discharge Planning   The Plan for Transition of Care is related to the following treatment goals: Home with family   The Patient and/or Patient Representative was provided with a Choice of Provider? Patient Representative; Patient   Name of the Patient Representative who was provided with the Choice of Provider and agrees with the Discharge Plan? Grace Gannon   The Patient and/Or Patient Representative agree with the Discharge Plan? Yes   Freedom of Choice list was provided with basic dialogue that supports the patient's individualized plan of care/goals, treatment preferences, and shares the quality data associated with the providers?   Yes

## 2023-08-28 NOTE — PROGRESS NOTES
ACUTE OCCUPATIONAL THERAPY GOALS:   (Developed with and agreed upon by patient and/or caregiver.)  (1.) Adama Calix  will complete lower body bathing and dressing with INDEPENDENCE and adaptive equipment as needed. (2.) Adama Calix will complete toileting with INDEPENDENCE. (3.) Adama Calix will tolerate 25 minutes of OT treatment with 1-2 rest breaks to increase activity tolerance for ADLs. (4.) Adama Calix will complete functional transfers with INDEPENDENCE and adaptive equipment as needed. (5.) Adama Calix will complete functional ADL task standing at sink INDEPENDENCE and adaptive equipment as needed. Timeframe: 7 visits       OCCUPATIONAL THERAPY Initial Assessment and Daily Note       OT Visit Days: 1  Acknowledge Orders  Time  OT Charge Capture  Rehab Caseload Tracker      Adama Calix is a 66 y.o. male   PRIMARY DIAGNOSIS: YANIV (acute kidney injury) ([address])  Renal insufficiency [N28.9]  Generalized weakness [R53.1]  YANIV (acute kidney injury) ([address]) [N17.9]  Hypotension, unspecified hypotension type [I95.9]  Cardiomyopathy, unspecified type ([address]) [X73.7]  Systolic congestive heart failure, unspecified HF chronicity ([address]) [I50.20]       Reason for Referral: Generalized Muscle Weakness (M62.81)  Inpatient: Payor: Bárbara Hernández / Plan: Cony Evans / Product Type: *No Product type* /     ASSESSMENT:     REHAB RECOMMENDATIONS:   Recommendation to date pending progress:  Setting:  Home Health Therapy    Equipment:    To Be Determined     ASSESSMENT:  Mr. Georgina Laureano is a 65 y/o male who presents with YANIV and hypotension. Pt with PMHx of schizophrenia. At baseline pt lives with his sister, uses a [facility], and is typically independent with ADLs. Pt states his sister helps him with transferring into/out of tub shower and can help with ADLs as needed. This date, pt overall SBA/CGA for functional transfers and mobility of community distance with RW.  Completed simple grooming task
ACUTE OCCUPATIONAL THERAPY GOALS:   (Developed with and agreed upon by patient and/or caregiver.)  (1.) Natividad Ramirez  will complete lower body bathing and dressing with INDEPENDENCE and adaptive equipment as needed. (2.) Natividad Ramirez will complete toileting with INDEPENDENCE. (3.) Natividad Ramirez will tolerate 25 minutes of OT treatment with 1-2 rest breaks to increase activity tolerance for ADLs. (4.) Natividad Ramirez will complete functional transfers with INDEPENDENCE and adaptive equipment as needed. (5.) Natividad Ramirez will complete functional ADL task standing at sink INDEPENDENCE and adaptive equipment as needed. Timeframe: 7 visits      OCCUPATIONAL THERAPY: Daily Note AM   OT Visit Days: 2   Time In/Out  OT Charge Capture  Rehab Caseload Tracker  OT Orders    Natividad Ramirez is a 66 y.o. male   PRIMARY DIAGNOSIS: YANIV (acute kidney injury) ([address])  Renal insufficiency [N28.9]  Generalized weakness [R53.1]  YANIV (acute kidney injury) ([address]) [N17.9]  Hypotension, unspecified hypotension type [I95.9]  Cardiomyopathy, unspecified type ([address]) [M97.7]  Systolic congestive heart failure, unspecified HF chronicity ([address]) [I50.20]       Inpatient: Payor: [facility] / Plan: Rosalind Serna / Product Type: *No Product type* /     ASSESSMENT:     REHAB RECOMMENDATIONS: CURRENT LEVEL OF FUNCTION:  (Most Recently Demonstrated)   Recommendation to date pending progress:  Setting:  No further skilled occupational therapy after discharge from hospital    Equipment:    None Bathing:  Independent  Dressing:  Independent  Feeding/Grooming:  Independent  Toileting:  Independent  Functional Mobility:  Independent     ASSESSMENT:  Mr. Raegan Morejon demonstrated independence with ADLs. Pt tolerated session well without complaint of fatigue. Pt has met goals and does not require further skilled OT services at this time, no d/c needs.         SUBJECTIVE:     Mr. Raegan Morejon states, \"I can do all that\"
ACUTE PHYSICAL THERAPY GOALS:   (Developed with and agreed upon by patient and/or caregiver.)  (1.)Mr. Hector Currie will move from supine to sit and sit to supine  in bed with MODIFIED INDEPENDENCE within 7 treatment day(s). (2.)Mr. Mendez will transfer from bed to chair and chair to bed with MODIFIED INDEPENDENCE using the least restrictive device within 7 treatment day(s). (3.)Mr. Mendez will ambulate with MODIFIED INDEPENDENCE for 250 feet with the least restrictive device within 7 treatment day(s). ________________________________________________________________________________________________     PHYSICAL THERAPY: Daily Note PM   (Link to Caseload Tracking: PT Visit Days : 2  Time In/Out PT Charge Capture  Rehab Caseload Tracker  Orders    Javan Phelps is a 66 y.o. male   PRIMARY DIAGNOSIS: YANIV (acute kidney injury) ([address])  Renal insufficiency [N28.9]  Generalized weakness [R53.1]  YANIV (acute kidney injury) ([address]) [N17.9]  Hypotension, unspecified hypotension type [I95.9]  Cardiomyopathy, unspecified type ([address]) [V42.8]  Systolic congestive heart failure, unspecified HF chronicity ([address]) [I50.20]       Inpatient: Payor: Sunny Mascorro / Plan: Chidi López / Product Type: *No Product type* /     ASSESSMENT:     REHAB RECOMMENDATIONS:   Recommendation to date pending progress:  Setting:  Home Health Therapy    Equipment:    To Be Determined     ASSESSMENT:  Mr. Hector Currie was sitting in the chair on contact and wanting to get back in the bed. He agreed to work with therapy first. He stood to iconDial and was able to amb 250' slowly with CGA and cues for proper use of RW. He wants to pick it up at times. He returned to room and sat EOB. EOB to supine with SBA. Progressing toward goals. SUBJECTIVE:   Mr. Hector Currie states \"Do you want me to keep going? \"     Social/Functional Lives With:  (sister)  Type of Home: House  Home Layout: One level  Home Access: Stairs to enter with rails  Entrance Stairs -
All goals ongoing 8/22/2023  ACUTE PHYSICAL THERAPY GOALS:   (Developed with and agreed upon by patient and/or caregiver.)  (1.)Mr. Bel Davila will move from supine to sit and sit to supine  in bed with MODIFIED INDEPENDENCE within 7 treatment day(s). (2.)Mr. Mendez will transfer from bed to chair and chair to bed with MODIFIED INDEPENDENCE using the least restrictive device within 7 treatment day(s). (3.)Mr. Mendez will ambulate with MODIFIED INDEPENDENCE for 250 feet with the least restrictive device within 7 treatment day(s). ________________________________________________________________________________________________     PHYSICAL THERAPY: Daily Note PM   (Link to Caseload Tracking: PT Visit Days : 2  Time In/Out PT Charge Capture  Rehab Caseload Tracker  Orders    Maira Pizano is a 66 y.o. male   PRIMARY DIAGNOSIS: YANIV (acute kidney injury) ([address])  Renal insufficiency [N28.9]  Generalized weakness [R53.1]  YANIV (acute kidney injury) ([address]) [N17.9]  Hypotension, unspecified hypotension type [I95.9]  Cardiomyopathy, unspecified type ([address]) [Y33.4]  Systolic congestive heart failure, unspecified HF chronicity ([address]) [I50.20]       Inpatient: Payor: Stephanie Pulse / Plan: Sadie Rashid / Product Type: *No Product type* /     ASSESSMENT:     REHAB RECOMMENDATIONS:   Recommendation to date pending progress:  Setting:  Home Health Therapy    Equipment:    To Be Determined     ASSESSMENT:  Mr. Bel Davila was supine on contact when PT arrived with sister present. He was sleepy but agreeable to PT. All transfers are CGA x 1 to Min A x 1. He stood to RW and was able to amb 250' slowly with CGA and cues for proper use of RW. He returned to room and sat EOB. EOB to supine with CGA x 1 . Progressing toward goals. SUBJECTIVE:   Mr. Euel Section states \"Do you want me to keep going? \"     Social/Functional Lives With:  (sister)  Type of Home: House  Home Layout: One level  Home Access: Stairs to enter with
All goals ongoing 8/23/2023  ACUTE PHYSICAL THERAPY GOALS:   (Developed with and agreed upon by patient and/or caregiver.)  (1.)Mr. Darryle Riff will move from supine to sit and sit to supine  in bed with MODIFIED INDEPENDENCE within 7 treatment day(s). (2.)Mr. Mendez will transfer from bed to chair and chair to bed with MODIFIED INDEPENDENCE using the least restrictive device within 7 treatment day(s). (3.)Mr. Mendez will ambulate with MODIFIED INDEPENDENCE for 250 feet with the least restrictive device within 7 treatment day(s). ________________________________________________________________________________________________     PHYSICAL THERAPY: Daily Note AM   (Link to Caseload Tracking: PT Visit Days : 4  Time In/Out PT Charge Capture  Rehab Caseload Tracker  Orders    David Lomeli is a 66 y.o. male   PRIMARY DIAGNOSIS: YANIV (acute kidney injury) ([address])  Renal insufficiency [N28.9]  Generalized weakness [R53.1]  YANIV (acute kidney injury) ([address]) [N17.9]  Hypotension, unspecified hypotension type [I95.9]  Cardiomyopathy, unspecified type ([address]) [A44.9]  Systolic congestive heart failure, unspecified HF chronicity ([address]) [I50.20]       Inpatient: Payor: Emy Fernandes / Plan: Makeda Torres / Product Type: *No Product type* /     ASSESSMENT:     REHAB RECOMMENDATIONS:   Recommendation to date pending progress:  Setting:  Home Health Therapy    Equipment:    To Be Determined     ASSESSMENT:  Mr. Darryle Riff was in recliner and agreeable to PT. All transfers are CGA x 1. He stood to RW and was able to amb 250' slowly with CGA and cues for proper use of RW. He returned to room with LOB x1 with assist to correct. Patient became hostile and sat in recliner. Patient left in comfortable position with safety equipment within reach. Progressing toward goals. SUBJECTIVE:   Mr. Darryle Riff states \"Do you want me to keep going? \"     Social/Functional Lives With:  (sister)  Type of Home: House  Home Layout: One
All goals ongoing 8/28/2023  ACUTE PHYSICAL THERAPY GOALS:   (Developed with and agreed upon by patient and/or caregiver.)  (1.)Mr. Julianne French will move from supine to sit and sit to supine  in bed with MODIFIED INDEPENDENCE within 7 treatment day(s). (2.)Mr. Mendez will transfer from bed to chair and chair to bed with MODIFIED INDEPENDENCE using the least restrictive device within 7 treatment day(s). (3.)Mr. Mendez will ambulate with MODIFIED INDEPENDENCE for 250 feet with the least restrictive device within 7 treatment day(s). ________________________________________________________________________________________________     PHYSICAL THERAPY: Daily Note AM   (Link to Caseload Tracking: PT Visit Days : 5  Time In/Out PT Charge Capture  Rehab Caseload Tracker  Orders    Penny Funez is a 66 y.o. male   PRIMARY DIAGNOSIS: YANVI (acute kidney injury) ([address])  Renal insufficiency [N28.9]  Generalized weakness [R53.1]  YANIV (acute kidney injury) ([address]) [N17.9]  Hypotension, unspecified hypotension type [I95.9]  Cardiomyopathy, unspecified type ([address]) [A85.0]  Systolic congestive heart failure, unspecified HF chronicity ([address]) [I50.20]       Inpatient: Payor: Maria T Esquivel / Plan: Case Thomason / Product Type: *No Product type* /     ASSESSMENT:     REHAB RECOMMENDATIONS:   Recommendation to date pending progress:  Setting:  Home Health Therapy    Equipment:    To Be Determined     ASSESSMENT:  Mr. Julianne French was in recliner and agreeable to PT. He stood to RW and was able to amb 250' slowly with SBA and cues for proper use of RW. He returned to room and rested, then participated in standing exercises with cueing. Patient required less assistance with transfers/gait today. Progressing toward goals.       SUBJECTIVE:   Mr. Julianne Lusty states \"I'm mentally ill\"     Social/Functional Lives With:  (sister)  Type of Home: House  Home Layout: One level  Home Access: Stairs to enter with rails  Entrance Stairs -
Ba Esophagram read today from 8/24. Results were sent to Dr. Yin Borges (Hospitalist) and Dr. Yanick Estrada (GI).
Called Dr. Faith Jung office again. Staff said he has received the message. I asked them to send another message because pt has still not been seen. Pt's sister reports that she called Dr. Faith david.  She says she was told that he was working in the office today and may not be coming to the hospital.
Called Dr. Faith Jung office to request consult. Support staff told me they would send the message with an escalated alert.
Cardiology consult dictated by Dr Shital Jerry for second time .
Chart reviewed, patient followed by Dr. Stefanie Smiley.
Comprehensive Nutrition Assessment    Type and Reason for Visit: Reassess  Malnutrition Screening Tool: Malnutrition Screen  Have you recently lost weight without trying?: Unsure of amount of weight loss (2 points)  Have you been eating poorly because of a decreased appetite?: Yes (1 point)  Malnutrition Screening Tool Score: 3 and General Nutrition Management/other reason (Hospitalists)    Nutrition Recommendations/Plan:   Meals and Snacks:  Diet: Continue current order - easy to chew  Nutrition Supplement Therapy:  Medical food supplement therapy:  Continue Ensure Enlive three times per day (this provides 350 kcal and 20 grams protein per bottle)     Malnutrition Assessment:  Malnutrition Status:    Context: Chronic Illness  Findings of clinical characteristics of malnutrition:   Energy Intake:  Mild decrease in energy intake (Comment) (patient reports poor intake for several days and normal intake of only 2 daily meals)  Weight Loss:  Unable to assess     Body Fat Loss:  Severe body fat loss Buccal region   Muscle Mass Loss:  Severe muscle mass loss Temples (temporalis), Clavicles (pectoralis & deltoids)  Fluid Accumulation:  No significant fluid accumulation     Strength:  Not Performed     Nutrition Assessment:  Nutrition History: Patient reports that about 5 days ago had sudden decline in appetite and po. Patient reports normal intake of daily breakfast and dinner. He reports he also thinks he has lost weight but unable to verify. Per EMR weight hx unclear as patient has seemingly gained weight since this summer but potential had lost it during the spring. Do You Have Any Cultural, Temple, or Ethnic Food Preferences?: No   Nutrition Background:       PMH: CKD3, schizophrenia, HFeEF s/p BIV PPM. Patient presented with increased fatigue and poor po. Nutrition Interval:  Pt lying in bed, sleeping at time of RD visit. Family present in room answer RD questions.  Family reports pt has been eating much
Discharge instructions, follow up appointments and prescriptions reviewed with patient and family. Both verbalize understanding. All personal belongings taken with patient. Family member will drive patient home. Patient escorted to discharge area via wheelchair. Patient is stable at discharge. PIV and monitor removed.
GI consulted via Perfect serve. Read at 1101.
Hospitalist Progress Note   Admit Date:  2023  2:53 PM   Name:  Bishop Hallman   Age:  66 y.o. Sex:  male  :  1960   MRN:  [id number]   Room:      Presenting/Chief Complaint: Hypotension     Reason(s) for Admission: Renal insufficiency [N28.9]  Generalized weakness [R53.1]  YANIV (acute kidney injury) ([address]) [N17.9]  Hypotension, unspecified hypotension type [I95.9]  Cardiomyopathy, unspecified type ([address]) [O88.6]  Systolic congestive heart failure, unspecified HF chronicity ([address]) [I50.20]     Hospital Course:   Bishop Hallman is a 66 y.o. male with medical history of   HFrEF s/p BIV PPM   CKD stage III   Schizophrenia    who presented with worsening fatigue and poor oral intake for the past 5 days. Family provides history that patient has been taking medications including aspirin, Coreg, pravastatin, lisinopril, Lasix, clozapine and benztropine. In the emergency room patient's blood pressure was on the low side. Admission from July 10 to 2022 due to rhabdomyolysis, with creatinine 1.2 at the discharge time. This admission, he is found to have low blood pressure initially with BUN 75 and creatinine of 3.8. Is admitted for YANIV, due to volume depletion. 23   Patient reports feeling better. He offers little information. Afebrile. No chest pain. No shortness of breath. 23   Patient's ultrasound was read as possibility of LV thrombus. IV heparin was started yesterday. Patient has no history of recent bleeding. I have explained to the patient regarding the risks and benefits of systemic anticoagulation. He voiced understanding and would like to take the risks of having IV heparin due to the potential benefits. No chest pain. No shortness of breath.      23   I am seeing patient at bedside. Sister, Kailee Shaikh, is present and provides information. Patient denies chest pain.    He denies shortness of breath.     23   Patient is
Hospitalist Progress Note   Admit Date:  2023  2:53 PM   Name:  Génesis Farias   Age:  66 y.o. Sex:  male  :  1960   MRN:  [id number]   Room:      Presenting/Chief Complaint: Hypotension     Reason(s) for Admission: Renal insufficiency [N28.9]  Generalized weakness [R53.1]  YANIV (acute kidney injury) ([address]) [N17.9]  Hypotension, unspecified hypotension type [I95.9]  Cardiomyopathy, unspecified type ([address]) [W41.5]  Systolic congestive heart failure, unspecified HF chronicity ([address]) [I50.20]     Hospital Course:   Génesis Farias is a 66 y.o. male with medical history of   HFrEF s/p BIV PPM   CKD stage III   Schizophrenia     who presented with worsening fatigue and poor oral intake for the past 5 days. Family provides history that patient has been taking medications including aspirin, Coreg, pravastatin, lisinopril, Lasix, clozapine and benztropine. In the emergency room patient's blood pressure was on the low side. Admission from July 10 to 2022 due to rhabdomyolysis, with creatinine 1.2 at the discharge time. This admission, he is found to have low blood pressure initially with BUN 75 and creatinine of 3.8. Is admitted for YANIV, due to volume depletion. Subjective & 24hr Events:     23   Patient reports feeling better. He offers little information. Afebrile. No chest pain. No shortness of breath. 23   Patient's ultrasound was read as possibility of LV thrombus. IV heparin was started yesterday. Patient has no history of recent bleeding. I have explained to the patient regarding the risks and benefits of systemic anticoagulation. He voiced understanding and would like to take the risks of having IV heparin due to the potential benefits. No chest pain. No shortness of breath.      23   I am seeing patient at bedside. Sister, Isrrael Francis, is present and provides information. Patient denies chest pain.    He denies shortness of
Hospitalist Progress Note   Admit Date:  2023  2:53 PM   Name:  Jd Ann   Age:  66 y.o. Sex:  male  :  1960   MRN:  [id number]   Room:      Presenting/Chief Complaint: Hypotension     Reason(s) for Admission: Renal insufficiency [N28.9]  Generalized weakness [R53.1]  YANIV (acute kidney injury) ([address]) [N17.9]  Hypotension, unspecified hypotension type [I95.9]  Cardiomyopathy, unspecified type ([address]) [J93.0]  Systolic congestive heart failure, unspecified HF chronicity ([address]) [I50.20]     Hospital Course:   Jd Ann is a 66 y.o. male with medical history of   HFrEF s/p BIV PPM   CKD stage III   Schizophrenia     who presented with worsening fatigue and poor oral intake for the past 5 days. Family provides history that patient has been taking medications including aspirin, Coreg, pravastatin, lisinopril, Lasix, clozapine and benztropine. In the emergency room patient's blood pressure was on the low side. Admission from July 10 to 2022 due to rhabdomyolysis, with creatinine 1.2 at the discharge time. This admission, he is found to have low blood pressure initially with BUN 75 and creatinine of 3.8. Is admitted for YANIV, due to volume depletion. Subjective & 24hr Events:     23   Patient reports feeling better. He offers little information. Afebrile. No chest pain. No shortness of breath. 23   Patient's ultrasound was read as possibility of LV thrombus. IV heparin was started yesterday. Patient has no history of recent bleeding. I have explained to the patient regarding the risks and benefits of systemic anticoagulation. He voiced understanding and would like to take the risks of having IV heparin due to the potential benefits. No chest pain. No shortness of breath.      23   I am seeing patient at bedside. Sister, Maricel Cheek, is present and provides information. Patient denies chest pain.    He denies shortness of
Hospitalist Progress Note   Admit Date:  2023  2:53 PM   Name:  Natividad Ramirez   Age:  66 y.o. Sex:  male  :  1960   MRN:  [id number]   Room:      Presenting/Chief Complaint: Hypotension     Reason(s) for Admission: Renal insufficiency [N28.9]  Generalized weakness [R53.1]  YANIV (acute kidney injury) ([address]) [N17.9]  Hypotension, unspecified hypotension type [I95.9]  Cardiomyopathy, unspecified type ([address]) [U48.1]  Systolic congestive heart failure, unspecified HF chronicity ([address]) [I50.20]     Hospital Course:   Natividad Ramirez is a 66 y.o. male with medical history of   HFrEF s/p BIV PPM   CKD stage III   Schizophrenia    who presented with worsening fatigue and poor oral intake for the past 5 days. Family provides history that patient has been taking medications including aspirin, Coreg, pravastatin, lisinopril, Lasix, clozapine and benztropine. In the emergency room patient's blood pressure was on the low side. Admission from July 10 to 2022 due to rhabdomyolysis, with creatinine 1.2 at the discharge time. This admission, he is found to have low blood pressure initially with BUN 75 and creatinine of 3.8. Is admitted for YANIV, due to volume depletion. 23   Patient reports feeling better. He offers little information. Afebrile. No chest pain. No shortness of breath. 23   Patient's ultrasound was read as possibility of LV thrombus. IV heparin was started yesterday. Patient has no history of recent bleeding. I have explained to the patient regarding the risks and benefits of systemic anticoagulation. He voiced understanding and would like to take the risks of having IV heparin due to the potential benefits. No chest pain. No shortness of breath.      23   I am seeing patient at bedside. Sister, Nathaniel Reyes, is present and provides information. Patient denies chest pain.    He denies shortness of breath.     23   Patient is
Hospitalist Progress Note   Admit Date:  2023  2:53 PM   Name:  Penny Funez   Age:  66 y.o. Sex:  male  :  1960   MRN:  [id number]   Room:      Presenting/Chief Complaint: Hypotension     Reason(s) for Admission: Renal insufficiency [N28.9]  Generalized weakness [R53.1]  YANIV (acute kidney injury) ([address]) [N17.9]  Hypotension, unspecified hypotension type [I95.9]  Cardiomyopathy, unspecified type ([address]) [T26.8]  Systolic congestive heart failure, unspecified HF chronicity ([address]) [I50.20]     Hospital Course:   Penny Funez is a 66 y.o. male with medical history of   HFrEF s/p BIV PPM   CKD stage III   Schizophrenia    who presented with worsening fatigue and poor oral intake for the past 5 days. Family provides history that patient has been taking medications including aspirin, Coreg, pravastatin, lisinopril, Lasix, clozapine and benztropine. In the emergency room patient's blood pressure was on the low side. Admission from July 10 to 2022 due to rhabdomyolysis, with creatinine 1.2 at the discharge time. This admission, he is found to have low blood pressure initially with BUN 75 and creatinine of 3.8. Is admitted for YANIV, due to volume depletion. 23   Patient reports feeling better. He offers little information. Afebrile. No chest pain. No shortness of breath. 23   Patient's ultrasound was read as possibility of LV thrombus. IV heparin was started yesterday. Patient has no history of recent bleeding. I have explained to the patient regarding the risks and benefits of systemic anticoagulation. He voiced understanding and would like to take the risks of having IV heparin due to the potential benefits. No chest pain. No shortness of breath.      23   I am seeing patient at bedside. Sister, Virgilio Wang, is present and provides information. Patient denies chest pain.    He denies shortness of breath.     23   Patient is
Hospitalist Progress Note   Admit Date:  2023  2:53 PM   Name:  Stanislav Lamar   Age:  66 y.o. Sex:  male  :  1960   MRN:  [id number]   Room:      Presenting/Chief Complaint: Hypotension     Reason(s) for Admission: Renal insufficiency [N28.9]  Generalized weakness [R53.1]  YANIV (acute kidney injury) ([address]) [N17.9]  Hypotension, unspecified hypotension type [I95.9]  Cardiomyopathy, unspecified type ([address]) [S43.6]  Systolic congestive heart failure, unspecified HF chronicity ([address]) [I50.20]     Hospital Course:   Stanislav Lamar is a 66 y.o. male with medical history of   HFrEF s/p BIV PPM   CKD stage III   Schizophrenia     who presented with worsening fatigue and poor oral intake for the past 5 days. Family provides history that patient has been taking medications including aspirin, Coreg, pravastatin, lisinopril, Lasix, clozapine and benztropine. In the emergency room patient's blood pressure was on the low side. Admission from July 10 to 2022 due to rhabdomyolysis, with creatinine 1.2 at the discharge time. This admission, he is found to have low blood pressure initially with BUN 75 and creatinine of 3.8. Is admitted for YANIV, due to volume depletion. Subjective & 24hr Events:     23   Patient reports feeling better. He offers little information. Afebrile. No chest pain. No shortness of breath. Assessment & Plan:     Principal Problem:    YANIV (acute kidney injury) ([address])    Stage 3a chronic kidney disease ([address])    Hypotension  Plan:   Likely from poor oral intake and volume depletion. Continue IV fluid. Monitor renal function and intake and output. Avoid nephrotoxic agents. Check BMP tomorrow. Active Problems:      Schizophrenia ([address])  Plan:   Continue Clozapine       Microcytic anemia  Plan:   Stable Hb   Monitor. No sign of bleeding       Systolic congestive heart failure ([address])  Plan:   No sign of decompensation.    Patient
I called Dr. Adiel Avitia office at [phone]. Selected the hospital line. I spoke with female and requested to have Dr. Adiel Avitia note from 8/22/23 @ 2:34pm. The female was not sure how to obtain this note and was going to consult another person. I was placed on hold and then sent to another voicemail. I called back at 8:31 am and was placed on hold. Brad Wright came back on the line. I explained what was needed she said she would have to get in touch with him. I left my contact information [phone].
Inpatient consult to cardiology complete by this nurse at [phone]. Cardiology PA seen patient for consult. Cardiology MD noted this is a patient of Dr. Jorge Starkey and should be seen by him. I called Dr. Ange Mcdowell office,  answering service stated they would give him the message for consult to  patient in [facility] on Monday. Asked her how would we contact in an emergency, she said she don't know unless we spoke with the . Sent message to cardiology at [facility], awaiting response.
Occupational Therapy Note:    Attempted to see patient this AM for occupational therapy evaluation session. Patient off the floor for ultrasound. Will follow and re-attempt as schedule permits/patient available.  Thank you,    Mary Kay Perdomo, OT    Rehab Caseload Tracker
Per Dr. Suzanne Marquez, notify cardiology: Please inform cardiologist, that patient's renal function has improved. Patient should be okay to go for CT with contrast to look for cardiac morphology to confirm or rule out LV thrombus, as planned. I am not sure what CT order should be done to look specifically at the cardiac morphology. Please ask cardiologist to help order CT to confirm or exclude LV thrombus. Thank you. This message was sent to cardiology, they want this information sent to Dr. Sharlene Garcia in AM, they are unsure of how to order cardiac CTA for specific images. Will report to oncoming nurse to follow up with Dr. Michael Lucio office in the morning to follow this case.
Physical Therapy Note:    Attempted to see patient this PM for physical therapy treatment  session. Patient refused despite encouragement. Will follow and re-attempt as schedule permits/patient available.  Thank you,    A Stefany Trammell, PT     Rehab Caseload Tracker
Physician Progress Note      Adrienne Pritchard  CSN #:                  [id number]  :                       1960  ADMIT DATE:       2023 2:53 PM  [address] [facility] DATE:  RESPONDING  PROVIDER #:        Dorine Closs MD          QUERY TEXT:    Pt admitted with YANIV. Noted documentation of severe protein calorie   malnutrition by ordered RD consultant. If possible, please document in   progress notes and discharge summary:    The medical record reflects the following:  Risk Factors: poor PO intake  Clinical Indicators: Nutrition Diagnosis:  Severe malnutrition related to   inadequate protein-energy intake as evidenced by Criteria as identified in   malnutrition assessment  Inadequate oral intake related to  (declined appetite) as evidenced by poor   intake prior to admission  Treatment: Continue Current Diet, Start Oral Nutrition Supplement  Options provided:  -- Severe protein calorie malnutrition confirmed present on admission  -- Severe protein calorie malnutrition ruled out  -- Defer to RD consultant documentation regarding Severe protein calorie   malnutrition  -- Other - I will add my own diagnosis  -- Disagree - Not applicable / Not valid  -- Disagree - Clinically unable to determine / Unknown  -- Refer to Clinical Documentation Reviewer    PROVIDER RESPONSE TEXT:    The diagnosis of Severe protein calorie malnutrition was confirmed as present   on admission.     Query created by: Annalise Good on 2023 11:57 AM      Electronically signed by:  Dorine Closs MD 2023 3:30 PM
Pt examined full consult dictated .
Pt ready for d/c awaiting for sister to arrive to review d/c paperwork and transport patient.
Spiritual Care Visit, initial visit. Visited with patient at bedside. Patient seemed alert and oriented in spite of his various medical issues, which include CKI, CHF, and Schizophrenia according to his [facility] Chart. The person in the room with him is believed to be his sister, who was welcoming of 's presence. Prayed for patient's healing and health. Visit by Mart Acevedo, Staff .  M.Ed., Th.B., B.A.
Afebrile. Mildly pale. Not icteric. Head:  Normocephalic, atraumatic  Eyes:  Sclerae appear normal.  Pupils equally round. ENT:  Nares appear normal.  Moist oral mucosa  Neck:  No restricted ROM. Trachea midline   CV:   RRR. No m/r/g. No jugular venous distension. Lungs:   CTAB. No wheezing, rhonchi, or rales. Symmetric expansion. Abdomen:   Soft, nontender, nondistended. Extremities: No cyanosis or clubbing. No edema  Skin:     No rashes and normal coloration. Warm and dry. Neuro:  CN II-XII grossly intact. Psych:  Normal mood and affect.       I have personally reviewed labs and tests:  Recent Labs:  Recent Results (from the past 48 hour(s))   PLEASE READ & DOCUMENT PPD TEST IN 72 HRS    Collection Time: 08/20/23  9:10 PM   Result Value Ref Range    PPD, (POC) Negative Negative    mm Induration 0 0 - 5 mm   Anti-Xa, Unfractionated Heparin    Collection Time: 08/21/23  5:44 AM   Result Value Ref Range    Anti-XA Unfrac Heparin 0.28 (L) 0.3 - 0.7 IU/mL   Anti-Xa, Unfractionated Heparin    Collection Time: 08/21/23  2:49 PM   Result Value Ref Range    Anti-XA Unfrac Heparin 0.59 0.3 - 0.7 IU/mL   Anti-Xa, Unfractionated Heparin    Collection Time: 08/21/23 10:11 PM   Result Value Ref Range    Anti-XA Unfrac Heparin 0.41 0.3 - 0.7 IU/mL   Anti-Xa, Unfractionated Heparin    Collection Time: 08/22/23  4:13 AM   Result Value Ref Range    Anti-XA Unfrac Heparin 0.49 0.3 - 0.7 IU/mL       Current Meds:  Current Facility-Administered Medications   Medication Dose Route Frequency    [Held by provider] amiodarone (CORDARONE) tablet 100 mg  100 mg Oral BID    heparin (porcine) injection 5,590 Units  80 Units/kg IntraVENous PRN    heparin (porcine) injection 2,800 Units  40 Units/kg IntraVENous PRN    heparin 25,000 unit in sodium chloride 0.45% 250 mL (premix) infusion  5-30 Units/kg/hr IntraVENous Continuous    aspirin chewable tablet 81 mg  81 mg Oral Daily    pravastatin (PRAVACHOL) tablet 20 mg  20
PRN    Or    acetaminophen (TYLENOL) suppository 650 mg  650 mg Rectal Q6H PRN    polyethylene glycol (GLYCOLAX) packet 17 g  17 g Oral Daily PRN    pantoprazole (PROTONIX) tablet 40 mg  40 mg Oral QAM AC    cloZAPine (CLOZARIL) tablet 100 mg tablet- patient supplied (Patient Supplied)  200 mg Oral Daily    cloZAPine (CLOZARIL) tablet 100 mg- patient supplied (Patient Supplied)  500 mg Oral Nightly       Signed:  Adam Cuellar MD    Part of this note may have been written by using a voice dictation software. The note has been proof read but may still contain some grammatical/other typographical errors.
(CLOZARIL) tablet 100 mg- patient supplied (Patient Supplied)  500 mg Oral Nightly       Signed: Gladis Guadarrama DO    Part of this note may have been written by using a voice dictation software. The note has been proof read but may still contain some grammatical/other typographical errors.
34 mL/m2    LA Size Index 1.87 cm/m2    LA/AO Root Ratio 1.17     Ao Root Index 1.60 cm/m2    Ascending Aorta Index 1.50 cm/m2    AV Velocity Ratio 0.42     LVOT:AV VTI Index 0.37     BALJIT/BSA VTI 0.6 cm2/m2    BALJIT/BSA Peak Velocity 0.7 cm2/m2   APTT    Collection Time: 08/18/23  1:51 PM   Result Value Ref Range    PTT 36.8 (H) 24.5 - 34.2 SEC   Anti-Xa, Unfractionated Heparin    Collection Time: 08/18/23  1:51 PM   Result Value Ref Range    Anti-XA Unfrac Heparin <0.10 (L) 0.3 - 0.7 IU/mL   Anti-Xa, Unfractionated Heparin    Collection Time: 08/18/23  7:21 PM   Result Value Ref Range    Anti-XA Unfrac Heparin >1.1 (H) 0.3 - 0.7 IU/mL   Anti-Xa, Unfractionated Heparin    Collection Time: 08/19/23  1:35 AM   Result Value Ref Range    Anti-XA Unfrac Heparin 0.83 (H) 0.3 - 0.7 IU/mL   CBC with Auto Differential    Collection Time: 08/19/23  1:35 AM   Result Value Ref Range    WBC 6.8 4.3 - 11.1 K/uL    RBC 4.60 4.23 - 5.6 M/uL    Hemoglobin 10.8 (L) 13.6 - 17.2 g/dL    Hematocrit 34.1 (L) 41.1 - 50.3 %    MCV 74.1 (L) 82 - 102 FL    MCH 23.5 (L) 26.1 - 32.9 PG    MCHC 31.7 31.4 - 35.0 g/dL    RDW 15.9 (H) 11.9 - 14.6 %    Platelets 276 (L) 921 - 450 K/uL    MPV 12.9 (H) 9.4 - 12.3 FL    nRBC 0.00 0.0 - 0.2 K/uL    Differential Type AUTOMATED      Neutrophils % 56 43 - 78 %    Lymphocytes % 29 13 - 44 %    Monocytes % 15 (H) 4.0 - 12.0 %    Eosinophils % 0 (L) 0.5 - 7.8 %    Basophils % 0 0.0 - 2.0 %    Immature Granulocytes 0 0.0 - 5.0 %    Neutrophils Absolute 3.8 1.7 - 8.2 K/UL    Lymphocytes Absolute 2.0 0.5 - 4.6 K/UL    Monocytes Absolute 1.0 0.1 - 1.3 K/UL    Eosinophils Absolute 0.0 0.0 - 0.8 K/UL    Basophils Absolute 0.0 0.0 - 0.2 K/UL    Absolute Immature Granulocyte 0.0 0.0 - 0.5 K/UL   Magnesium    Collection Time: 08/19/23  1:35 AM   Result Value Ref Range    Magnesium 1.9 1.8 - 2.4 mg/dL   Comprehensive Metabolic Panel    Collection Time: 08/19/23  1:35 AM   Result Value Ref Range    Sodium 144 (H) 133 -

## 2023-09-05 PROCEDURE — 93296 REM INTERROG EVL PM/IDS: CPT | Performed by: INTERNAL MEDICINE

## 2023-09-05 PROCEDURE — 93295 DEV INTERROG REMOTE 1/2/MLT: CPT | Performed by: INTERNAL MEDICINE

## 2023-10-10 ENCOUNTER — OFFICE VISIT (OUTPATIENT)
Dept: GASTROENTEROLOGY | Age: 63
End: 2023-10-10
Payer: MEDICARE

## 2023-10-10 ENCOUNTER — TRANSCRIBE ORDERS (OUTPATIENT)
Dept: SCHEDULING | Age: 63
End: 2023-10-10

## 2023-10-10 VITALS
DIASTOLIC BLOOD PRESSURE: 65 MMHG | WEIGHT: 140 LBS | OXYGEN SATURATION: 99 % | HEART RATE: 72 BPM | RESPIRATION RATE: 16 BRPM | BODY MASS INDEX: 20.04 KG/M2 | TEMPERATURE: 97.3 F | SYSTOLIC BLOOD PRESSURE: 110 MMHG | HEIGHT: 70 IN

## 2023-10-10 DIAGNOSIS — K22.0 ACHALASIA: Primary | ICD-10-CM

## 2023-10-10 DIAGNOSIS — I24.0 LEFT VENTRICULAR MURAL THROMBUS WITHOUT MI (HCC): ICD-10-CM

## 2023-10-10 DIAGNOSIS — I24.0 LV (LEFT VENTRICULAR) MURAL THROMBUS WITHOUT MI (HCC): ICD-10-CM

## 2023-10-10 PROCEDURE — 3074F SYST BP LT 130 MM HG: CPT | Performed by: PHYSICIAN ASSISTANT

## 2023-10-10 PROCEDURE — 99214 OFFICE O/P EST MOD 30 MIN: CPT | Performed by: PHYSICIAN ASSISTANT

## 2023-10-10 PROCEDURE — 91010 ESOPHAGUS MOTILITY STUDY: CPT | Performed by: PHYSICIAN ASSISTANT

## 2023-10-10 PROCEDURE — 3078F DIAST BP <80 MM HG: CPT | Performed by: PHYSICIAN ASSISTANT

## 2023-10-10 NOTE — PROGRESS NOTES
Bishop Hallman (:  1960) is a 66 y.o. male new patient referred to our office for evaluation of the following chief complaint(s):  New Patient             ASSESSMENT/PLAN:  1. Achalasia  -     [place] ESOPHAGEAL MOTILITY STUDY W/INTERP&RPT  2. LV (left ventricular) mural thrombus without MI ([address])       CTA  with incidental finding of dilated, gas/fluid filled esophagus tapering to GE junction. Barium esophagram  showed findings concerning for achalasia with delayed emptying and nonpropulsive tertiary contractions with esophageal spasm. No prior EGD evaluation. Remains asymptomatic with no nausea, vomiting, abdominal pain, swallowing issues. I do note a 6 lb weight loss since discharge. D/w Dr. Dee Simms. Unable to come off [facility] with recent diagnosis LV thrombus. Will schedule for motility study, able to perform this without coming of [facility]. Patient high risk for sedation for EGD, POEM, or pyloroplasty with HFrEF and ventricular thrombus requiring [facility]. Advised more frequent, smaller meals. Will need to follow his weight trend. Follow-up after motility studies. Subjective   SUBJECTIVE/OBJECTIVE  Bishop Hallman is a 66y.o. year old male with PMH notable for HTN, CAD, HFrEF 13%, CKD 3a, hx VT, schizophrenia, microcytic anemia, severe protein calorie malnutrition. Surgical history is pertinent for appendectomy, implanted defibrillator. Patient was recently admitted  to  for fatigue, poor oral intake, YANIV secondary to volume depletion. Echo  concerning for possible ventricular thrombus, treated with IV heparin and transition to [facility] . During admission patient was evaluated by Dr. Chuyita Phoenix for incidental finding of dilated, gas/fluid filled esophagus tapering to GE junction on CTA chest . Patient denied any symptoms of dysphagia. Barium esophagram  showed findings concerning for achalasia with delayed emptying and nonpropulsive tertiary contractions with esophageal spasm.

## 2023-10-19 ENCOUNTER — TELEPHONE (OUTPATIENT)
Dept: GASTROENTEROLOGY | Age: 63
End: 2023-10-19

## 2023-10-19 NOTE — TELEPHONE ENCOUNTER
Called an cancelled Barium study per Provider,    Called and scheduled  the Esophageal motility study for 10/31.   L/M for patient to call the office to  explalin the change to patients

## 2023-10-19 NOTE — TELEPHONE ENCOUNTER
2nd attempt to get hold of patient L/M for patient to call about a change in  a procedure our PA  has scheduled.

## 2023-10-20 ENCOUNTER — TELEPHONE (OUTPATIENT)
Dept: GASTROENTEROLOGY | Age: 63
End: 2023-10-20

## 2023-10-20 NOTE — TELEPHONE ENCOUNTER
Called pt  spoke with sister Jayda Choi to confirm new case date 10/31/2023 [place]  esophageal motitlity study

## 2023-10-30 NOTE — PROGRESS NOTES
RN left message with Pt to confirm appointment time of 1230, arrival time 1200, location,  requirement, and instructions for registration at the hospital.

## 2023-10-31 ENCOUNTER — HOSPITAL ENCOUNTER (OUTPATIENT)
Dept: ENDOSCOPY | Age: 63
Discharge: HOME | End: 2023-11-03
Payer: MEDICARE

## 2023-10-31 PROCEDURE — 6370000000 HC RX 637 (ALT 250 FOR IP): Performed by: PHYSICIAN ASSISTANT

## 2023-10-31 PROCEDURE — 3609015500 HC GASTRIC/DUODENAL MOTILITY &/OR MANOMETRY STUDY

## 2023-10-31 RX ORDER — LIDOCAINE HYDROCHLORIDE 20 MG/ML
15 SOLUTION OROPHARYNGEAL
Status: DISCONTINUED | OUTPATIENT
Start: 2023-10-31 | End: 2023-11-04 | Stop reason: HOSPADM

## 2023-10-31 RX ADMIN — LIDOCAINE HYDROCHLORIDE 15 ML: 20 SOLUTION ORAL at 12:35

## 2023-11-13 ENCOUNTER — TELEPHONE (OUTPATIENT)
Dept: GASTROENTEROLOGY | Age: 63
End: 2023-11-13

## 2023-11-13 NOTE — TELEPHONE ENCOUNTER
----- Message from Melissa R Grinnell, PA-C sent at 11/13/2023 12:38 PM EST -----  Hi will you please schedule patient a follow-up visit at earliest possible convenience? 15 minute visit okay. Please cc me when he has appointment. You can tell him we need to review his motility studies and check on his symptoms.

## 2023-12-04 ENCOUNTER — TELEPHONE (OUTPATIENT)
Age: 63
End: 2023-12-04

## 2024-03-02 ENCOUNTER — APPOINTMENT (OUTPATIENT)
Dept: GENERAL RADIOLOGY | Age: 64
DRG: 871 | End: 2024-03-02
Payer: MEDICARE

## 2024-03-02 ENCOUNTER — APPOINTMENT (OUTPATIENT)
Dept: CT IMAGING | Age: 64
DRG: 871 | End: 2024-03-02
Payer: MEDICARE

## 2024-03-02 ENCOUNTER — HOSPITAL ENCOUNTER (INPATIENT)
Age: 64
LOS: 5 days | Discharge: HOME HEALTH | DRG: 871 | End: 2024-03-07
Attending: EMERGENCY MEDICINE | Admitting: FAMILY MEDICINE
Payer: MEDICARE

## 2024-03-02 DIAGNOSIS — J85.1 ABSCESS OF LEFT LUNG WITH PNEUMONIA, UNSPECIFIED PART OF LUNG (HCC): ICD-10-CM

## 2024-03-02 DIAGNOSIS — J85.0 NECROTIZING PNEUMONIA (HCC): Primary | ICD-10-CM

## 2024-03-02 DIAGNOSIS — K59.00 CONSTIPATION, UNSPECIFIED CONSTIPATION TYPE: ICD-10-CM

## 2024-03-02 PROBLEM — R65.20 SEVERE SEPSIS (HCC): Status: ACTIVE | Noted: 2024-03-02

## 2024-03-02 PROBLEM — A41.9 SEVERE SEPSIS (HCC): Status: ACTIVE | Noted: 2024-03-02

## 2024-03-02 LAB
ALBUMIN SERPL-MCNC: 2.7 G/DL (ref 3.2–4.6)
ALBUMIN/GLOB SERPL: 0.5 (ref 0.4–1.6)
ALP SERPL-CCNC: 121 U/L (ref 50–136)
ALT SERPL-CCNC: 14 U/L (ref 12–65)
ANION GAP SERPL CALC-SCNC: 7 MMOL/L (ref 2–11)
APPEARANCE UR: CLEAR
AST SERPL-CCNC: 19 U/L (ref 15–37)
BACTERIA URNS QL MICRO: 0 /HPF
BASOPHILS # BLD: 0 K/UL (ref 0–0.2)
BASOPHILS NFR BLD: 0 % (ref 0–2)
BILIRUB SERPL-MCNC: 0.7 MG/DL (ref 0.2–1.1)
BILIRUB UR QL: NEGATIVE
BUN SERPL-MCNC: 23 MG/DL (ref 8–23)
CALCIUM SERPL-MCNC: 9.5 MG/DL (ref 8.3–10.4)
CASTS URNS QL MICRO: ABNORMAL /LPF
CHLORIDE SERPL-SCNC: 107 MMOL/L (ref 103–113)
CO2 SERPL-SCNC: 25 MMOL/L (ref 21–32)
COLOR UR: ABNORMAL
CREAT SERPL-MCNC: 1.5 MG/DL (ref 0.8–1.5)
CRYSTALS URNS QL MICRO: 0 /LPF
DIFFERENTIAL METHOD BLD: ABNORMAL
EKG ATRIAL RATE: 93 BPM
EKG DIAGNOSIS: NORMAL
EKG P AXIS: 52 DEGREES
EKG P-R INTERVAL: 190 MS
EKG Q-T INTERVAL: 420 MS
EKG QRS DURATION: 162 MS
EKG QTC CALCULATION (BAZETT): 522 MS
EKG R AXIS: 186 DEGREES
EKG T AXIS: 61 DEGREES
EKG VENTRICULAR RATE: 93 BPM
EOSINOPHIL # BLD: 0 K/UL (ref 0–0.8)
EOSINOPHIL NFR BLD: 0 % (ref 0.5–7.8)
EPI CELLS #/AREA URNS HPF: ABNORMAL /HPF
ERYTHROCYTE [DISTWIDTH] IN BLOOD BY AUTOMATED COUNT: 15.9 % (ref 11.9–14.6)
GLOBULIN SER CALC-MCNC: 5.4 G/DL (ref 2.8–4.5)
GLUCOSE SERPL-MCNC: 141 MG/DL (ref 65–100)
GLUCOSE UR STRIP.AUTO-MCNC: NEGATIVE MG/DL
HCT VFR BLD AUTO: 39.8 % (ref 41.1–50.3)
HGB BLD-MCNC: 12.5 G/DL (ref 13.6–17.2)
HGB UR QL STRIP: ABNORMAL
IMM GRANULOCYTES # BLD AUTO: 0.1 K/UL (ref 0–0.5)
IMM GRANULOCYTES NFR BLD AUTO: 0 % (ref 0–5)
KETONES UR QL STRIP.AUTO: NEGATIVE MG/DL
LACTATE SERPL-SCNC: 0.8 MMOL/L (ref 0.4–2)
LACTATE SERPL-SCNC: 3.3 MMOL/L (ref 0.4–2)
LEUKOCYTE ESTERASE UR QL STRIP.AUTO: ABNORMAL
LIPASE SERPL-CCNC: 75 U/L (ref 73–393)
LYMPHOCYTES # BLD: 1.4 K/UL (ref 0.5–4.6)
LYMPHOCYTES NFR BLD: 10 % (ref 13–44)
MCH RBC QN AUTO: 22.9 PG (ref 26.1–32.9)
MCHC RBC AUTO-ENTMCNC: 31.4 G/DL (ref 31.4–35)
MCV RBC AUTO: 72.8 FL (ref 82–102)
MONOCYTES # BLD: 1.5 K/UL (ref 0.1–1.3)
MONOCYTES NFR BLD: 11 % (ref 4–12)
MUCOUS THREADS URNS QL MICRO: 0 /LPF
NEUTS SEG # BLD: 11 K/UL (ref 1.7–8.2)
NEUTS SEG NFR BLD: 79 % (ref 43–78)
NITRITE UR QL STRIP.AUTO: NEGATIVE
NRBC # BLD: 0 K/UL (ref 0–0.2)
NT PRO BNP: 3048 PG/ML (ref 5–125)
OTHER OBSERVATIONS: ABNORMAL
PH UR STRIP: 5 (ref 5–9)
PLATELET # BLD AUTO: 316 K/UL (ref 150–450)
PMV BLD AUTO: 10.3 FL (ref 9.4–12.3)
POTASSIUM SERPL-SCNC: 4.2 MMOL/L (ref 3.5–5.1)
PROCALCITONIN SERPL-MCNC: 0.15 NG/ML (ref 0–0.49)
PROT SERPL-MCNC: 8.1 G/DL (ref 6.3–8.2)
PROT UR STRIP-MCNC: ABNORMAL MG/DL
RBC # BLD AUTO: 5.47 M/UL (ref 4.23–5.6)
RBC #/AREA URNS HPF: ABNORMAL /HPF
SODIUM SERPL-SCNC: 139 MMOL/L (ref 136–146)
SP GR UR REFRACTOMETRY: >1.035 (ref 1–1.02)
URINE CULTURE IF INDICATED: ABNORMAL
UROBILINOGEN UR QL STRIP.AUTO: 1 EU/DL (ref 0.2–1)
WBC # BLD AUTO: 14 K/UL (ref 4.3–11.1)
WBC URNS QL MICRO: ABNORMAL /HPF

## 2024-03-02 PROCEDURE — 83605 ASSAY OF LACTIC ACID: CPT

## 2024-03-02 PROCEDURE — 96367 TX/PROPH/DG ADDL SEQ IV INF: CPT

## 2024-03-02 PROCEDURE — 74177 CT ABD & PELVIS W/CONTRAST: CPT

## 2024-03-02 PROCEDURE — 6360000004 HC RX CONTRAST MEDICATION: Performed by: EMERGENCY MEDICINE

## 2024-03-02 PROCEDURE — 81001 URINALYSIS AUTO W/SCOPE: CPT

## 2024-03-02 PROCEDURE — 83880 ASSAY OF NATRIURETIC PEPTIDE: CPT

## 2024-03-02 PROCEDURE — 99285 EMERGENCY DEPT VISIT HI MDM: CPT

## 2024-03-02 PROCEDURE — 2580000003 HC RX 258: Performed by: FAMILY MEDICINE

## 2024-03-02 PROCEDURE — 93010 ELECTROCARDIOGRAM REPORT: CPT | Performed by: INTERNAL MEDICINE

## 2024-03-02 PROCEDURE — 1100000000 HC RM PRIVATE

## 2024-03-02 PROCEDURE — 80053 COMPREHEN METABOLIC PANEL: CPT

## 2024-03-02 PROCEDURE — 85025 COMPLETE CBC W/AUTO DIFF WBC: CPT

## 2024-03-02 PROCEDURE — 2580000003 HC RX 258: Performed by: EMERGENCY MEDICINE

## 2024-03-02 PROCEDURE — 83690 ASSAY OF LIPASE: CPT

## 2024-03-02 PROCEDURE — 96366 THER/PROPH/DIAG IV INF ADDON: CPT

## 2024-03-02 PROCEDURE — 71045 X-RAY EXAM CHEST 1 VIEW: CPT

## 2024-03-02 PROCEDURE — 6370000000 HC RX 637 (ALT 250 FOR IP): Performed by: FAMILY MEDICINE

## 2024-03-02 PROCEDURE — 96365 THER/PROPH/DIAG IV INF INIT: CPT

## 2024-03-02 PROCEDURE — 93005 ELECTROCARDIOGRAM TRACING: CPT | Performed by: EMERGENCY MEDICINE

## 2024-03-02 PROCEDURE — 84145 PROCALCITONIN (PCT): CPT

## 2024-03-02 PROCEDURE — 6360000002 HC RX W HCPCS: Performed by: EMERGENCY MEDICINE

## 2024-03-02 PROCEDURE — 96375 TX/PRO/DX INJ NEW DRUG ADDON: CPT

## 2024-03-02 PROCEDURE — 2500000003 HC RX 250 WO HCPCS: Performed by: FAMILY MEDICINE

## 2024-03-02 PROCEDURE — 96361 HYDRATE IV INFUSION ADD-ON: CPT

## 2024-03-02 RX ORDER — SODIUM CHLORIDE 9 MG/ML
INJECTION, SOLUTION INTRAVENOUS PRN
Status: DISCONTINUED | OUTPATIENT
Start: 2024-03-02 | End: 2024-03-07 | Stop reason: HOSPADM

## 2024-03-02 RX ORDER — SODIUM CHLORIDE 0.9 % (FLUSH) 0.9 %
5-40 SYRINGE (ML) INJECTION PRN
Status: DISCONTINUED | OUTPATIENT
Start: 2024-03-02 | End: 2024-03-07 | Stop reason: HOSPADM

## 2024-03-02 RX ORDER — FAMOTIDINE 20 MG/1
10 TABLET, FILM COATED ORAL DAILY PRN
Status: DISCONTINUED | OUTPATIENT
Start: 2024-03-02 | End: 2024-03-07 | Stop reason: HOSPADM

## 2024-03-02 RX ORDER — PRAVASTATIN SODIUM 20 MG
20 TABLET ORAL NIGHTLY
Status: DISCONTINUED | OUTPATIENT
Start: 2024-03-02 | End: 2024-03-07 | Stop reason: HOSPADM

## 2024-03-02 RX ORDER — CLOZAPINE 100 MG/1
500 TABLET ORAL NIGHTLY
Status: DISCONTINUED | OUTPATIENT
Start: 2024-03-02 | End: 2024-03-07 | Stop reason: HOSPADM

## 2024-03-02 RX ORDER — CARVEDILOL 3.12 MG/1
3.12 TABLET ORAL 2 TIMES DAILY WITH MEALS
Status: DISCONTINUED | OUTPATIENT
Start: 2024-03-02 | End: 2024-03-07 | Stop reason: HOSPADM

## 2024-03-02 RX ORDER — CLOZAPINE 100 MG/1
500 TABLET ORAL 2 TIMES DAILY
Status: DISCONTINUED | OUTPATIENT
Start: 2024-03-02 | End: 2024-03-02

## 2024-03-02 RX ORDER — BENZONATATE 100 MG/1
100 CAPSULE ORAL 3 TIMES DAILY PRN
Status: DISCONTINUED | OUTPATIENT
Start: 2024-03-02 | End: 2024-03-07 | Stop reason: HOSPADM

## 2024-03-02 RX ORDER — BISACODYL 10 MG
10 SUPPOSITORY, RECTAL RECTAL DAILY PRN
Status: DISCONTINUED | OUTPATIENT
Start: 2024-03-02 | End: 2024-03-07 | Stop reason: HOSPADM

## 2024-03-02 RX ORDER — LISINOPRIL 2.5 MG/1
2.5 TABLET ORAL DAILY
Status: DISCONTINUED | OUTPATIENT
Start: 2024-03-03 | End: 2024-03-07 | Stop reason: HOSPADM

## 2024-03-02 RX ORDER — CLOZAPINE 25 MG/1
200 TABLET ORAL EVERY MORNING
Status: DISCONTINUED | OUTPATIENT
Start: 2024-03-03 | End: 2024-03-07 | Stop reason: HOSPADM

## 2024-03-02 RX ORDER — SODIUM CHLORIDE, SODIUM LACTATE, POTASSIUM CHLORIDE, AND CALCIUM CHLORIDE .6; .31; .03; .02 G/100ML; G/100ML; G/100ML; G/100ML
1000 INJECTION, SOLUTION INTRAVENOUS
Status: DISCONTINUED | OUTPATIENT
Start: 2024-03-02 | End: 2024-03-02

## 2024-03-02 RX ORDER — POTASSIUM CHLORIDE 20 MEQ/1
40 TABLET, EXTENDED RELEASE ORAL PRN
Status: DISCONTINUED | OUTPATIENT
Start: 2024-03-02 | End: 2024-03-07 | Stop reason: HOSPADM

## 2024-03-02 RX ORDER — ASPIRIN 81 MG/1
81 TABLET, CHEWABLE ORAL DAILY
Status: DISCONTINUED | OUTPATIENT
Start: 2024-03-03 | End: 2024-03-07 | Stop reason: HOSPADM

## 2024-03-02 RX ORDER — ACETAMINOPHEN 650 MG/1
650 SUPPOSITORY RECTAL EVERY 6 HOURS PRN
Status: DISCONTINUED | OUTPATIENT
Start: 2024-03-02 | End: 2024-03-07 | Stop reason: HOSPADM

## 2024-03-02 RX ORDER — SODIUM CHLORIDE, SODIUM LACTATE, POTASSIUM CHLORIDE, AND CALCIUM CHLORIDE .6; .31; .03; .02 G/100ML; G/100ML; G/100ML; G/100ML
1000 INJECTION, SOLUTION INTRAVENOUS
Status: COMPLETED | OUTPATIENT
Start: 2024-03-02 | End: 2024-03-02

## 2024-03-02 RX ORDER — GUAIFENESIN 600 MG/1
600 TABLET, EXTENDED RELEASE ORAL 2 TIMES DAILY
Status: DISCONTINUED | OUTPATIENT
Start: 2024-03-02 | End: 2024-03-07 | Stop reason: HOSPADM

## 2024-03-02 RX ORDER — MAGNESIUM SULFATE IN WATER 40 MG/ML
2000 INJECTION, SOLUTION INTRAVENOUS PRN
Status: DISCONTINUED | OUTPATIENT
Start: 2024-03-02 | End: 2024-03-07 | Stop reason: HOSPADM

## 2024-03-02 RX ORDER — POLYETHYLENE GLYCOL 3350 17 G/17G
17 POWDER, FOR SOLUTION ORAL DAILY PRN
Status: DISCONTINUED | OUTPATIENT
Start: 2024-03-02 | End: 2024-03-03

## 2024-03-02 RX ORDER — MORPHINE SULFATE 4 MG/ML
4 INJECTION, SOLUTION INTRAMUSCULAR; INTRAVENOUS
Status: COMPLETED | OUTPATIENT
Start: 2024-03-02 | End: 2024-03-02

## 2024-03-02 RX ORDER — SODIUM CHLORIDE 0.9 % (FLUSH) 0.9 %
5-40 SYRINGE (ML) INJECTION EVERY 12 HOURS SCHEDULED
Status: DISCONTINUED | OUTPATIENT
Start: 2024-03-02 | End: 2024-03-07 | Stop reason: HOSPADM

## 2024-03-02 RX ORDER — SODIUM CHLORIDE, SODIUM LACTATE, POTASSIUM CHLORIDE, AND CALCIUM CHLORIDE .6; .31; .03; .02 G/100ML; G/100ML; G/100ML; G/100ML
1000 INJECTION, SOLUTION INTRAVENOUS ONCE
Status: COMPLETED | OUTPATIENT
Start: 2024-03-02 | End: 2024-03-02

## 2024-03-02 RX ORDER — POTASSIUM CHLORIDE 7.45 MG/ML
10 INJECTION INTRAVENOUS PRN
Status: DISCONTINUED | OUTPATIENT
Start: 2024-03-02 | End: 2024-03-07 | Stop reason: HOSPADM

## 2024-03-02 RX ORDER — MAGNESIUM HYDROXIDE/ALUMINUM HYDROXICE/SIMETHICONE 120; 1200; 1200 MG/30ML; MG/30ML; MG/30ML
30 SUSPENSION ORAL EVERY 6 HOURS PRN
Status: DISCONTINUED | OUTPATIENT
Start: 2024-03-02 | End: 2024-03-07 | Stop reason: HOSPADM

## 2024-03-02 RX ORDER — ACETAMINOPHEN 325 MG/1
650 TABLET ORAL EVERY 6 HOURS PRN
Status: DISCONTINUED | OUTPATIENT
Start: 2024-03-02 | End: 2024-03-07 | Stop reason: HOSPADM

## 2024-03-02 RX ADMIN — GUAIFENESIN 600 MG: 600 TABLET ORAL at 20:45

## 2024-03-02 RX ADMIN — CARVEDILOL 3.12 MG: 3.12 TABLET, FILM COATED ORAL at 18:28

## 2024-03-02 RX ADMIN — IOPAMIDOL 100 ML: 755 INJECTION, SOLUTION INTRAVENOUS at 12:27

## 2024-03-02 RX ADMIN — VANCOMYCIN HYDROCHLORIDE 1750 MG: 10 INJECTION, POWDER, LYOPHILIZED, FOR SOLUTION INTRAVENOUS at 14:53

## 2024-03-02 RX ADMIN — CEFEPIME 2000 MG: 2 INJECTION, POWDER, FOR SOLUTION INTRAVENOUS at 13:45

## 2024-03-02 RX ADMIN — TUBERCULIN PURIFIED PROTEIN DERIVATIVE 5 UNITS: 5 INJECTION, SOLUTION INTRADERMAL at 18:28

## 2024-03-02 RX ADMIN — SODIUM CHLORIDE, POTASSIUM CHLORIDE, SODIUM LACTATE AND CALCIUM CHLORIDE 1000 ML: 600; 310; 30; 20 INJECTION, SOLUTION INTRAVENOUS at 16:57

## 2024-03-02 RX ADMIN — SODIUM CHLORIDE, POTASSIUM CHLORIDE, SODIUM LACTATE AND CALCIUM CHLORIDE 1000 ML: 600; 310; 30; 20 INJECTION, SOLUTION INTRAVENOUS at 11:47

## 2024-03-02 RX ADMIN — SODIUM CHLORIDE, PRESERVATIVE FREE 10 ML: 5 INJECTION INTRAVENOUS at 20:45

## 2024-03-02 RX ADMIN — MORPHINE SULFATE 4 MG: 4 INJECTION INTRAVENOUS at 11:47

## 2024-03-02 RX ADMIN — PRAVASTATIN SODIUM 20 MG: 20 TABLET ORAL at 20:45

## 2024-03-02 RX ADMIN — APIXABAN 5 MG: 5 TABLET, FILM COATED ORAL at 20:45

## 2024-03-02 ASSESSMENT — PAIN SCALES - GENERAL
PAINLEVEL_OUTOF10: 7
PAINLEVEL_OUTOF10: 8

## 2024-03-02 ASSESSMENT — PAIN DESCRIPTION - LOCATION: LOCATION: FLANK

## 2024-03-02 ASSESSMENT — PAIN - FUNCTIONAL ASSESSMENT: PAIN_FUNCTIONAL_ASSESSMENT: 0-10

## 2024-03-02 ASSESSMENT — PAIN DESCRIPTION - DESCRIPTORS: DESCRIPTORS: DULL

## 2024-03-02 ASSESSMENT — PAIN DESCRIPTION - ORIENTATION: ORIENTATION: LEFT

## 2024-03-02 NOTE — ACP (ADVANCE CARE PLANNING)
[facility] Hospitalist Service  At the heart of better care     Advance Care Planning   Admit Date:  3/2/2024 11:13 AM   Name:  Wilmar Mendez   Age:  63 y.o.  Sex:  male  :  1960   MRN:  [id number]   Room:  [place] [address]    Wilmar Mendez has capacity to make his own decisions:   No    If pt unable to make decisions, POA/surrogate decision maker:  Sister, Valarie    Other people present:   Sister, Valarie    Patient / surrogate decision-maker directed code status:  Full Code    Patient or surrogate consented to discussion of the current conditions, workup, management plans, prognosis, and the risk for further deterioration.  Time spent: 17 minutes in direct discussion.      Signed:  BOBO SINGER DO

## 2024-03-02 NOTE — ED PROVIDER NOTES
Emergency Department Provider Note       PCP: Daniel Roach MD   Age: 63 y.o.   Sex: male     DISPOSITION Decision To Admit 03/02/2024 02:30:56 PM       ICD-10-CM    1. Necrotizing pneumonia (HCC)  J85.0       2. Abscess of left lung with pneumonia, unspecified part of lung (HCC)  J85.1       3. Constipation, unspecified constipation type  K59.00           Medical Decision Making     Level 5 17700  63-year-old male presents the emergency department via EMS for acute left flank pain.  Vital signs here are reviewed.  Patient is hypotensive.  Otherwise patient is in no acute distress.  External records were reviewed.  Shared decision-making was utilized in the patient's visit.  Sister provides history as she is his primary caretaker as well as EMS for prehospital report  CBC showed a 14,000 white count with a left shift  CMP is fairly unremarkable  Lactic acid is elevated at 3.3  Elevated BNP of 3000  CT of the abdomen pelvis here was obtained to the left flank pain with elevation white blood cell count.  This accidentally shows his constipation but was able to  a left lower lobe pneumonia concerning for necrotizing pneumonia with lung abscess.  Due to this patient should be admitted to the hospital of consult with the pharmacist who recommended vancomycin and cefepime.  I reached out to the hospitalist who is agreeable for admission.  Patient will be admitted in stable condition not hypoxic for continued valuation of the necrotizing pneumonia and lung abscess.       1 acute complicated illness or injury.  Shared medical decision making was utilized in creating the patients health plan today.    I independently ordered and reviewed each unique test.  I reviewed external records: ED visit note from an outside group.  I reviewed external records: provider visit note from PCP.  I reviewed external records: provider visit note from outside specialist.   The patients assessment required an independent

## 2024-03-02 NOTE — ED NOTES
TRANSFER - OUT REPORT:    Verbal report given to constance boggs on Wilmar Mendez  being transferred to [facility] for routine progression of patient care       Report consisted of patient's Situation, Background, Assessment and   Recommendations(SBAR).     Information from the following report(s) Nurse Handoff Report, Index, ED Encounter Summary, ED SBAR, and Event Log was reviewed with the receiving nurse.    Kinder Fall Assessment:                           Lines:   Peripheral IV 03/02/24 Posterior;Right Forearm (Active)   Site Assessment Clean, dry & intact 03/02/24 1122   Line Status Blood return noted 03/02/24 1122   Phlebitis Assessment No symptoms 03/02/24 1122   Infiltration Assessment 0 03/02/24 1122        Opportunity for questions and clarification was provided.      Patient transported with:  Chiquis Patel RN  03/02/24 3225

## 2024-03-02 NOTE — H&P
Hospitalist History and Physical   Admit Date:  3/2/2024 11:13 AM   Name:  Wilmar Mendez   Age:  63 y.o.  Sex:  male  :  1960   MRN:  [id number]   Room:  ER14/    Presenting/Chief Complaint: Flank Pain and Constipation     Reason(s) for Admission: Severe sepsis (HCC) [A41.9, R65.20]     History of Present Illness:   Wilmar Mendez is a 63 y.o. male who presented to the ED for cc left flank pain over the past two days. BP low on arrival but now has improved to normal limits. Hx limited due to patient being very quiet while in room. Sister is present who admits he has been having a non productive cough with poor oral intake over the past month.     Hx of suspected LV thrombus now on Eliquis, esophageal dilation (tolerates regular diet per sister), sHFrEF EF 13% s/p ICD, schizophrenia, bilateral lower lobe scars,     Lactic acid 3.3. WBC 14, creatine 1.5 from baseline about 1    CT abdomen pelvis with contrast - 1. Necrotizing pneumonia in the left lower lobe with 2 small intrapulmonary  abscesses, the larger 18 mm.  2. No acute findings in the abdomen or pelvis. Large colonic stool burden  indicating constipation.  Assessment & Plan:     Active Problems:      Severe sepsis met by YANIV, RR and WBC due to left sided necrotizing pneumonia with lung abscess - Vanc, Cefepime. Have ID seen in AM. Already received IV contrast today and has YANIV. May need CT chest with contrast in the next 24-48 hours. PPD.     YANIV - tx as above.     sCHF- FR. Strict Is and Os. BB, ACE    Schizophrenia - Hold Clozapine for now due to prolonged QTC, may consider lower dose if family brings medication from home.    Prolonged QTC - remote tele. Hold Clozapine for now due to prolonged QTC, may consider lower dose if family brings medication from home.     LV thrombus - Eliquis     HLD - statin       PT/OT evals and PPD ordered?  Therapy and PPD  Diet: ADULT DIET; Easy to Chew; Low Fat/Low Chol/High Fiber/2 gm Na; 2000 ml  VTE

## 2024-03-02 NOTE — ED TRIAGE NOTES
Pt to ED in wheelchair with c/o left sided flank pain / side pain that started about 3 days ago. Pt FM states medications he is on makes him constipated and pt has not had a BM in 3 days. Pt denies nvd/fever/chills.

## 2024-03-03 LAB
ALBUMIN SERPL-MCNC: 2.2 G/DL (ref 3.2–4.6)
ALBUMIN/GLOB SERPL: 0.4 (ref 0.4–1.6)
ALP SERPL-CCNC: 111 U/L (ref 50–136)
ALT SERPL-CCNC: 16 U/L (ref 12–65)
ANION GAP SERPL CALC-SCNC: 5 MMOL/L (ref 2–11)
AST SERPL-CCNC: 29 U/L (ref 15–37)
BASOPHILS # BLD: 0 K/UL (ref 0–0.2)
BASOPHILS NFR BLD: 0 % (ref 0–2)
BILIRUB DIRECT SERPL-MCNC: 0.1 MG/DL
BILIRUB SERPL-MCNC: 0.4 MG/DL (ref 0.2–1.1)
BUN SERPL-MCNC: 17 MG/DL (ref 8–23)
CALCIUM SERPL-MCNC: 9.1 MG/DL (ref 8.3–10.4)
CHLORIDE SERPL-SCNC: 108 MMOL/L (ref 103–113)
CO2 SERPL-SCNC: 28 MMOL/L (ref 21–32)
CREAT SERPL-MCNC: 1.3 MG/DL (ref 0.8–1.5)
DIFFERENTIAL METHOD BLD: ABNORMAL
EOSINOPHIL # BLD: 0 K/UL (ref 0–0.8)
EOSINOPHIL NFR BLD: 0 % (ref 0.5–7.8)
ERYTHROCYTE [DISTWIDTH] IN BLOOD BY AUTOMATED COUNT: 16.1 % (ref 11.9–14.6)
GLOBULIN SER CALC-MCNC: 5.1 G/DL (ref 2.8–4.5)
GLUCOSE SERPL-MCNC: 88 MG/DL (ref 65–100)
HCT VFR BLD AUTO: 36.7 % (ref 41.1–50.3)
HGB BLD-MCNC: 11.5 G/DL (ref 13.6–17.2)
IMM GRANULOCYTES # BLD AUTO: 0 K/UL (ref 0–0.5)
IMM GRANULOCYTES NFR BLD AUTO: 0 % (ref 0–5)
LYMPHOCYTES # BLD: 1.7 K/UL (ref 0.5–4.6)
LYMPHOCYTES NFR BLD: 16 % (ref 13–44)
MCH RBC QN AUTO: 23.3 PG (ref 26.1–32.9)
MCHC RBC AUTO-ENTMCNC: 31.3 G/DL (ref 31.4–35)
MCV RBC AUTO: 74.3 FL (ref 82–102)
MM INDURATION, POC: 10 MM (ref 0–5)
MONOCYTES # BLD: 1.3 K/UL (ref 0.1–1.3)
MONOCYTES NFR BLD: 12 % (ref 4–12)
NEUTS SEG # BLD: 7.5 K/UL (ref 1.7–8.2)
NEUTS SEG NFR BLD: 72 % (ref 43–78)
NRBC # BLD: 0 K/UL (ref 0–0.2)
PLATELET # BLD AUTO: 202 K/UL (ref 150–450)
PMV BLD AUTO: 10.9 FL (ref 9.4–12.3)
POTASSIUM SERPL-SCNC: 4.2 MMOL/L (ref 3.5–5.1)
PPD, POC: POSITIVE
PROT SERPL-MCNC: 7.3 G/DL (ref 6.3–8.2)
RBC # BLD AUTO: 4.94 M/UL (ref 4.23–5.6)
SODIUM SERPL-SCNC: 141 MMOL/L (ref 136–146)
WBC # BLD AUTO: 10.6 K/UL (ref 4.3–11.1)

## 2024-03-03 PROCEDURE — 36415 COLL VENOUS BLD VENIPUNCTURE: CPT

## 2024-03-03 PROCEDURE — 1100000000 HC RM PRIVATE

## 2024-03-03 PROCEDURE — 6370000000 HC RX 637 (ALT 250 FOR IP): Performed by: INTERNAL MEDICINE

## 2024-03-03 PROCEDURE — 6370000000 HC RX 637 (ALT 250 FOR IP): Performed by: FAMILY MEDICINE

## 2024-03-03 PROCEDURE — 6360000002 HC RX W HCPCS: Performed by: INTERNAL MEDICINE

## 2024-03-03 PROCEDURE — 2580000003 HC RX 258: Performed by: INTERNAL MEDICINE

## 2024-03-03 PROCEDURE — 85025 COMPLETE CBC W/AUTO DIFF WBC: CPT

## 2024-03-03 PROCEDURE — 6360000002 HC RX W HCPCS: Performed by: FAMILY MEDICINE

## 2024-03-03 PROCEDURE — 80048 BASIC METABOLIC PNL TOTAL CA: CPT

## 2024-03-03 PROCEDURE — 80076 HEPATIC FUNCTION PANEL: CPT

## 2024-03-03 PROCEDURE — 2580000003 HC RX 258: Performed by: FAMILY MEDICINE

## 2024-03-03 RX ORDER — POLYETHYLENE GLYCOL 3350 17 G/17G
17 POWDER, FOR SOLUTION ORAL DAILY
Status: DISCONTINUED | OUTPATIENT
Start: 2024-03-03 | End: 2024-03-07 | Stop reason: HOSPADM

## 2024-03-03 RX ORDER — SODIUM CHLORIDE 9 MG/ML
INJECTION, SOLUTION INTRAVENOUS CONTINUOUS
Status: DISCONTINUED | OUTPATIENT
Start: 2024-03-03 | End: 2024-03-04

## 2024-03-03 RX ADMIN — APIXABAN 5 MG: 5 TABLET, FILM COATED ORAL at 10:21

## 2024-03-03 RX ADMIN — GUAIFENESIN 600 MG: 600 TABLET ORAL at 10:12

## 2024-03-03 RX ADMIN — CARVEDILOL 3.12 MG: 3.12 TABLET, FILM COATED ORAL at 16:44

## 2024-03-03 RX ADMIN — CARVEDILOL 3.12 MG: 3.12 TABLET, FILM COATED ORAL at 10:12

## 2024-03-03 RX ADMIN — CEFEPIME 2000 MG: 2 INJECTION, POWDER, FOR SOLUTION INTRAVENOUS at 01:17

## 2024-03-03 RX ADMIN — PIPERACILLIN AND TAZOBACTAM 3375 MG: 3; .375 INJECTION, POWDER, FOR SOLUTION INTRAVENOUS at 21:53

## 2024-03-03 RX ADMIN — SODIUM CHLORIDE: 9 INJECTION, SOLUTION INTRAVENOUS at 01:16

## 2024-03-03 RX ADMIN — LISINOPRIL 2.5 MG: 2.5 TABLET ORAL at 10:12

## 2024-03-03 RX ADMIN — SODIUM CHLORIDE: 9 INJECTION, SOLUTION INTRAVENOUS at 12:33

## 2024-03-03 RX ADMIN — POLYETHYLENE GLYCOL 3350 17 G: 17 POWDER, FOR SOLUTION ORAL at 12:30

## 2024-03-03 RX ADMIN — VANCOMYCIN HYDROCHLORIDE 1250 MG: 10 INJECTION, POWDER, LYOPHILIZED, FOR SOLUTION INTRAVENOUS at 14:43

## 2024-03-03 RX ADMIN — ASPIRIN 81 MG 81 MG: 81 TABLET ORAL at 10:12

## 2024-03-03 RX ADMIN — GUAIFENESIN 600 MG: 600 TABLET ORAL at 21:20

## 2024-03-03 RX ADMIN — APIXABAN 5 MG: 5 TABLET, FILM COATED ORAL at 21:20

## 2024-03-03 RX ADMIN — SODIUM CHLORIDE, PRESERVATIVE FREE 10 ML: 5 INJECTION INTRAVENOUS at 10:21

## 2024-03-03 RX ADMIN — CEFEPIME 2000 MG: 2 INJECTION, POWDER, FOR SOLUTION INTRAVENOUS at 10:15

## 2024-03-03 RX ADMIN — PRAVASTATIN SODIUM 20 MG: 20 TABLET ORAL at 21:20

## 2024-03-03 RX ADMIN — PIPERACILLIN AND TAZOBACTAM 4500 MG: 4; .5 INJECTION, POWDER, FOR SOLUTION INTRAVENOUS at 16:40

## 2024-03-03 RX ADMIN — SODIUM CHLORIDE, PRESERVATIVE FREE 10 ML: 5 INJECTION INTRAVENOUS at 21:54

## 2024-03-03 NOTE — PLAN OF CARE
Problem: Discharge Planning  Goal: Discharge to home or other facility with appropriate resources  3/3/2024 0320 by Carlitos Salcedo RN  Outcome: Progressing  3/2/2024 1808 by Jose Juan Krishna, RN  Outcome: Progressing     Problem: Pain  Goal: Verbalizes/displays adequate comfort level or baseline comfort level  3/3/2024 0320 by Carlitos Salcedo RN  Outcome: Progressing  3/2/2024 1808 by Jose Juan Krishna, RN  Outcome: Progressing     Problem: Safety - Adult  Goal: Free from fall injury  3/3/2024 0320 by Carlitos Salcedo RN  Outcome: Progressing  3/2/2024 1808 by Jose Juan Krishna, RN  Outcome: Progressing

## 2024-03-03 NOTE — CONSULTS
Infectious Disease Consult    Today's Date: 3/3/2024   Admit Date: 3/2/2024    Patient YOB: 1960    Impression:   Necrotizing left lower lobe pneumonia  Lung abscess  SIRS / sepsis due to above  Achalasia with h/o esophageal dilation in 8/2023  Schizophrenia  HFrEF with EF 13%  S/p ICD    Plan:   I suspect his pneumonia and lung abscess are related to achalasia.    Would plan to consult GI tomorrow as their most recent office plan in 11/2023 anticipated EGD with dilation in the near future.  Would stop vanc/cefepime and transition to IV zosyn.  Await MRSA nasal screen and resp culture.  Assuming he improves, would anticipate transition to oral Augmentin with repeat imaging.  Often lung abscess requires 3-4 weeks of treatment.  Screen for HIV.    Anti-infectives:   IV vanc  IV cefepime    Subjective:   Date of Consultation:  March 3, 2024  Referring Physician: James Cote MD for: assistance in management of necrotizing pneumonia    Patient is a 63 y.o. male who presented to the ER with left flank pain.  He has schizophrenia and per family he has been complaining of left flank pain over the past few days.  He had leukocytosis on presentation and CT abd showed a necrotizing pneumonia with lung abscess on the left lower lobe.  He was started on vanc/cefepime on admission and WBC is improved.  Per review of notes he had esophageal stricture seen on CT in 8/2023 and had esophageal dilation at that time.  He then had GI follow up which confirmed a diagnosis of achalasia.  He was planned for another EGD at an undetermined time.  Today he is surrounded by family and in no distress.  He says the pain is still happening but not as frequently.  ID is consulted for further recs.    Patient Active Problem List   Diagnosis    HTN (hypertension)    Schizophrenia (HCC)    Microcytic anemia    Systolic congestive heart failure (HCC)    Type II diabetes mellitus (HCC)    CHF (congestive heart failure) (HCC)    Stage

## 2024-03-04 LAB
ANION GAP SERPL CALC-SCNC: 7 MMOL/L (ref 2–11)
BASOPHILS # BLD: 0.1 K/UL (ref 0–0.2)
BASOPHILS NFR BLD: 1 % (ref 0–2)
BUN SERPL-MCNC: 13 MG/DL (ref 8–23)
CALCIUM SERPL-MCNC: 9 MG/DL (ref 8.3–10.4)
CHLORIDE SERPL-SCNC: 112 MMOL/L (ref 103–113)
CO2 SERPL-SCNC: 24 MMOL/L (ref 21–32)
CREAT SERPL-MCNC: 1.1 MG/DL (ref 0.8–1.5)
DIFFERENTIAL METHOD BLD: ABNORMAL
EOSINOPHIL # BLD: 0.1 K/UL (ref 0–0.8)
EOSINOPHIL NFR BLD: 1 % (ref 0.5–7.8)
ERYTHROCYTE [DISTWIDTH] IN BLOOD BY AUTOMATED COUNT: 15.9 % (ref 11.9–14.6)
GLUCOSE SERPL-MCNC: 87 MG/DL (ref 65–100)
HCT VFR BLD AUTO: 35.6 % (ref 41.1–50.3)
HGB BLD-MCNC: 11.3 G/DL (ref 13.6–17.2)
HIV 1+2 AB+HIV1 P24 AG SERPL QL IA: NONREACTIVE
HIV 1/2 RESULT COMMENT: NORMAL
IMM GRANULOCYTES # BLD AUTO: 0 K/UL (ref 0–0.5)
IMM GRANULOCYTES NFR BLD AUTO: 0 % (ref 0–5)
LYMPHOCYTES # BLD: 1.8 K/UL (ref 0.5–4.6)
LYMPHOCYTES NFR BLD: 18 % (ref 13–44)
MCH RBC QN AUTO: 23.5 PG (ref 26.1–32.9)
MCHC RBC AUTO-ENTMCNC: 31.7 G/DL (ref 31.4–35)
MCV RBC AUTO: 74.2 FL (ref 82–102)
MM INDURATION, POC: 3 MM (ref 0–5)
MONOCYTES # BLD: 1.2 K/UL (ref 0.1–1.3)
MONOCYTES NFR BLD: 12 % (ref 4–12)
MRSA DNA SPEC QL NAA+PROBE: NOT DETECTED
NEUTS SEG # BLD: 6.5 K/UL (ref 1.7–8.2)
NEUTS SEG NFR BLD: 68 % (ref 43–78)
NRBC # BLD: 0 K/UL (ref 0–0.2)
PLATELET # BLD AUTO: 305 K/UL (ref 150–450)
PMV BLD AUTO: 11 FL (ref 9.4–12.3)
POTASSIUM SERPL-SCNC: 5 MMOL/L (ref 3.5–5.1)
PPD, POC: POSITIVE
RBC # BLD AUTO: 4.8 M/UL (ref 4.23–5.6)
S AUREUS CPE NOSE QL NAA+PROBE: NOT DETECTED
SODIUM SERPL-SCNC: 143 MMOL/L (ref 136–146)
VANCOMYCIN SERPL-MCNC: 9.9 UG/ML
WBC # BLD AUTO: 9.6 K/UL (ref 4.3–11.1)

## 2024-03-04 PROCEDURE — 80202 ASSAY OF VANCOMYCIN: CPT

## 2024-03-04 PROCEDURE — 2580000003 HC RX 258: Performed by: INTERNAL MEDICINE

## 2024-03-04 PROCEDURE — 97535 SELF CARE MNGMENT TRAINING: CPT

## 2024-03-04 PROCEDURE — 2580000003 HC RX 258: Performed by: FAMILY MEDICINE

## 2024-03-04 PROCEDURE — 36415 COLL VENOUS BLD VENIPUNCTURE: CPT

## 2024-03-04 PROCEDURE — 6360000002 HC RX W HCPCS: Performed by: INTERNAL MEDICINE

## 2024-03-04 PROCEDURE — 6370000000 HC RX 637 (ALT 250 FOR IP): Performed by: FAMILY MEDICINE

## 2024-03-04 PROCEDURE — 87641 MR-STAPH DNA AMP PROBE: CPT

## 2024-03-04 PROCEDURE — 97530 THERAPEUTIC ACTIVITIES: CPT

## 2024-03-04 PROCEDURE — 97112 NEUROMUSCULAR REEDUCATION: CPT

## 2024-03-04 PROCEDURE — 1100000003 HC PRIVATE W/ TELEMETRY

## 2024-03-04 PROCEDURE — 97165 OT EVAL LOW COMPLEX 30 MIN: CPT

## 2024-03-04 PROCEDURE — 87389 HIV-1 AG W/HIV-1&-2 AB AG IA: CPT

## 2024-03-04 PROCEDURE — 97161 PT EVAL LOW COMPLEX 20 MIN: CPT

## 2024-03-04 PROCEDURE — 80048 BASIC METABOLIC PNL TOTAL CA: CPT

## 2024-03-04 PROCEDURE — 86480 TB TEST CELL IMMUN MEASURE: CPT

## 2024-03-04 PROCEDURE — 85025 COMPLETE CBC W/AUTO DIFF WBC: CPT

## 2024-03-04 PROCEDURE — 99222 1ST HOSP IP/OBS MODERATE 55: CPT | Performed by: STUDENT IN AN ORGANIZED HEALTH CARE EDUCATION/TRAINING PROGRAM

## 2024-03-04 RX ADMIN — SODIUM CHLORIDE, PRESERVATIVE FREE 10 ML: 5 INJECTION INTRAVENOUS at 20:49

## 2024-03-04 RX ADMIN — CARVEDILOL 3.12 MG: 3.12 TABLET, FILM COATED ORAL at 16:03

## 2024-03-04 RX ADMIN — ASPIRIN 81 MG 81 MG: 81 TABLET ORAL at 09:39

## 2024-03-04 RX ADMIN — GUAIFENESIN 600 MG: 600 TABLET ORAL at 20:50

## 2024-03-04 RX ADMIN — CARVEDILOL 3.12 MG: 3.12 TABLET, FILM COATED ORAL at 09:47

## 2024-03-04 RX ADMIN — PIPERACILLIN AND TAZOBACTAM 3375 MG: 3; .375 INJECTION, POWDER, FOR SOLUTION INTRAVENOUS at 06:00

## 2024-03-04 RX ADMIN — PIPERACILLIN AND TAZOBACTAM 3375 MG: 3; .375 INJECTION, POWDER, FOR SOLUTION INTRAVENOUS at 13:00

## 2024-03-04 RX ADMIN — SODIUM CHLORIDE: 9 INJECTION, SOLUTION INTRAVENOUS at 06:28

## 2024-03-04 RX ADMIN — PRAVASTATIN SODIUM 20 MG: 20 TABLET ORAL at 20:50

## 2024-03-04 RX ADMIN — PIPERACILLIN AND TAZOBACTAM 3375 MG: 3; .375 INJECTION, POWDER, FOR SOLUTION INTRAVENOUS at 20:48

## 2024-03-04 RX ADMIN — APIXABAN 5 MG: 5 TABLET, FILM COATED ORAL at 09:40

## 2024-03-04 RX ADMIN — LISINOPRIL 2.5 MG: 2.5 TABLET ORAL at 09:40

## 2024-03-04 RX ADMIN — APIXABAN 5 MG: 5 TABLET, FILM COATED ORAL at 20:50

## 2024-03-04 RX ADMIN — SODIUM CHLORIDE, PRESERVATIVE FREE 10 ML: 5 INJECTION INTRAVENOUS at 09:40

## 2024-03-04 RX ADMIN — GUAIFENESIN 600 MG: 600 TABLET ORAL at 09:40

## 2024-03-04 ASSESSMENT — ENCOUNTER SYMPTOMS
CHOKING: 0
TROUBLE SWALLOWING: 0
VOMITING: 0
ABDOMINAL PAIN: 0
COUGH: 0
NAUSEA: 0

## 2024-03-04 NOTE — CARE COORDINATION
Pt chart reviewed. CM spoke with pt at bedside regarding recommendations for home health. Pt would like for CM to call sister ( caregiver) to discuss options. CM called Valarie Hudson [phone] and spoke with her about recommendations. Sister has no preference for home health provider. CM sent referral [facility]. No needs expressed at this time for case management. Discharge to be determined. CM will follow patient for care.

## 2024-03-04 NOTE — CARE COORDINATION
Pt chart reviewed for discharge planning. CM met with pt and sister at bedside, verified demographic information/ health insurance. Pt lives with sister in one level home, states independent with ADLs, uses a cane and does not drive . PCP was confirmed, last seen through televisit 2023, has upcoming appointment in March 2024. Pt sister reports she is his caregiver in the home. CM will follow pt plan of care and assist with supportive care referrals pending pt clinical progress.  Please consult case management if specific needs arise.        03/02/24 1804   Service Assessment   Patient Orientation Alert and Oriented   Cognition Alert   History Provided By Patient   Primary Caregiver Family  (Sister)   Support Systems Family Members   Patient's Healthcare Decision Maker is: Legal Next of Kin   PCP Verified by CM Yes   Last Visit to PCP Within last 6 months   Prior Functional Level Independent in ADLs/IADLs   Current Functional Level Independent in ADLs/IADLs   Can patient return to prior living arrangement Yes   Ability to make needs known: Good   Family able to assist with home care needs: Yes   Would you like for me to discuss the discharge plan with any other family members/significant others, and if so, who? Yes  (Sister)   Financial Resources Medicare   Community Resources None   Social/Functional History   Lives With Family   Type of Home House   Home Layout One level   Bathroom Shower/Tub Tub/Shower unit   Bathroom Toilet Standard   Bathroom Equipment Grab bars in shower   Bathroom Accessibility Accessible   Home Equipment Cane   Receives Help From Family   ADL Assistance Independent   Ambulation Assistance Independent   Active  No   Occupation Unemployed   Discharge Planning   Living Arrangements Family Members   Current Services Prior To Admission None   Potential Assistance Needed N/A   DME Ordered? No   Potential Assistance Purchasing Medications No   Patient expects to be discharged to: [facility]

## 2024-03-04 NOTE — CONSULTS
Consult Note            Date:3/4/2024        Patient Name:Wilmar Mendez     YOB: 1960     Age:63 y.o.    Inpatient consult to GI  Consult performed by: Arya Alonzo PA  Consult ordered by: James Cote MD          Chief Complaint     Chief Complaint   Patient presents with    Flank Pain    Constipation      History Obtained From   patient    History of Present Illness   Patient is a 63 year old male, with a hx of LV thrombus on Eliquis, CHF with EF of 12%, achalasia, DM, who was admitted for sepsis and found to have L sided necrotizing PNA. GI consulted as patient with hx of achalasia with concern that this has led to his necrotizing PNA. Achalasia was diagnosed incidentally on CTA during admission for new LV thrombus, YANIV in August 2023. At the time, patient was not having any dysphagia and recommended holding off on EGD as patient recently started on Eliquis for LV thrombus, as well as multiple other cardiac conditions. He did have an esophageal motility study in November that showed type 1 achalasia and recommended EGD with botox instead of POEM as patient high risk since he is on Eliquis. Patient has not followed up in the office to discuss this. At this time, both patient and wife denies every having dysphagia, regurgitation, nausea, vomiting, abdominal pain. He was eating food, such as chicken and rice at home, and tolerating this. Had coffee, eggs, oatmeal this AM and tolerated it. States that he may have had an EGD over the past year, but unsure what they found. No prior dilations. He is a non smoker.    He is on Eliquis for LV thrombus and his last dose was this AM. He is on a diet.    Labs: Hgb 11.3; pending respiratory cultures    Imaging:   XR CHEST PORTABLE    Result Date: 3/2/2024  Left airspace opacity noted. For better details, please refer to the CT performed the same day.     CT ABDOMEN PELVIS W IV CONTRAST Additional Contrast? None    Result Date: 3/2/2024  1. Necrotizing

## 2024-03-05 ENCOUNTER — APPOINTMENT (OUTPATIENT)
Dept: GENERAL RADIOLOGY | Age: 64
DRG: 871 | End: 2024-03-05
Payer: MEDICARE

## 2024-03-05 LAB
ANION GAP SERPL CALC-SCNC: 6 MMOL/L (ref 2–11)
BASOPHILS # BLD: 0.1 K/UL (ref 0–0.2)
BASOPHILS NFR BLD: 1 % (ref 0–2)
BUN SERPL-MCNC: 11 MG/DL (ref 8–23)
CALCIUM SERPL-MCNC: 9.2 MG/DL (ref 8.3–10.4)
CHLORIDE SERPL-SCNC: 112 MMOL/L (ref 103–113)
CO2 SERPL-SCNC: 24 MMOL/L (ref 21–32)
CREAT SERPL-MCNC: 1.1 MG/DL (ref 0.8–1.5)
DIFFERENTIAL METHOD BLD: ABNORMAL
EOSINOPHIL # BLD: 0 K/UL (ref 0–0.8)
EOSINOPHIL NFR BLD: 0 % (ref 0.5–7.8)
ERYTHROCYTE [DISTWIDTH] IN BLOOD BY AUTOMATED COUNT: 15.8 % (ref 11.9–14.6)
GLUCOSE SERPL-MCNC: 94 MG/DL (ref 65–100)
HCT VFR BLD AUTO: 34.2 % (ref 41.1–50.3)
HGB BLD-MCNC: 10.8 G/DL (ref 13.6–17.2)
IMM GRANULOCYTES # BLD AUTO: 0 K/UL (ref 0–0.5)
IMM GRANULOCYTES NFR BLD AUTO: 0 % (ref 0–5)
LYMPHOCYTES # BLD: 1.6 K/UL (ref 0.5–4.6)
LYMPHOCYTES NFR BLD: 20 % (ref 13–44)
MCH RBC QN AUTO: 23 PG (ref 26.1–32.9)
MCHC RBC AUTO-ENTMCNC: 31.6 G/DL (ref 31.4–35)
MCV RBC AUTO: 72.9 FL (ref 82–102)
MM INDURATION, POC: 5 MM (ref 0–5)
MONOCYTES # BLD: 1.1 K/UL (ref 0.1–1.3)
MONOCYTES NFR BLD: 14 % (ref 4–12)
NEUTS SEG # BLD: 4.9 K/UL (ref 1.7–8.2)
NEUTS SEG NFR BLD: 65 % (ref 43–78)
NRBC # BLD: 0 K/UL (ref 0–0.2)
PLATELET # BLD AUTO: 307 K/UL (ref 150–450)
PMV BLD AUTO: 9.9 FL (ref 9.4–12.3)
POTASSIUM SERPL-SCNC: 4.1 MMOL/L (ref 3.5–5.1)
PPD, POC: POSITIVE
RBC # BLD AUTO: 4.69 M/UL (ref 4.23–5.6)
SODIUM SERPL-SCNC: 142 MMOL/L (ref 136–146)
WBC # BLD AUTO: 7.7 K/UL (ref 4.3–11.1)

## 2024-03-05 PROCEDURE — 92611 MOTION FLUOROSCOPY/SWALLOW: CPT

## 2024-03-05 PROCEDURE — 2580000003 HC RX 258: Performed by: FAMILY MEDICINE

## 2024-03-05 PROCEDURE — 36415 COLL VENOUS BLD VENIPUNCTURE: CPT

## 2024-03-05 PROCEDURE — 74230 X-RAY XM SWLNG FUNCJ C+: CPT

## 2024-03-05 PROCEDURE — 99231 SBSQ HOSP IP/OBS SF/LOW 25: CPT | Performed by: STUDENT IN AN ORGANIZED HEALTH CARE EDUCATION/TRAINING PROGRAM

## 2024-03-05 PROCEDURE — 2500000003 HC RX 250 WO HCPCS

## 2024-03-05 PROCEDURE — 1100000000 HC RM PRIVATE

## 2024-03-05 PROCEDURE — 6370000000 HC RX 637 (ALT 250 FOR IP): Performed by: FAMILY MEDICINE

## 2024-03-05 PROCEDURE — 85025 COMPLETE CBC W/AUTO DIFF WBC: CPT

## 2024-03-05 PROCEDURE — 80048 BASIC METABOLIC PNL TOTAL CA: CPT

## 2024-03-05 PROCEDURE — 6370000000 HC RX 637 (ALT 250 FOR IP): Performed by: INTERNAL MEDICINE

## 2024-03-05 PROCEDURE — 6360000002 HC RX W HCPCS: Performed by: INTERNAL MEDICINE

## 2024-03-05 PROCEDURE — 2580000003 HC RX 258: Performed by: INTERNAL MEDICINE

## 2024-03-05 RX ADMIN — PIPERACILLIN AND TAZOBACTAM 3375 MG: 3; .375 INJECTION, POWDER, FOR SOLUTION INTRAVENOUS at 14:35

## 2024-03-05 RX ADMIN — LISINOPRIL 2.5 MG: 2.5 TABLET ORAL at 11:58

## 2024-03-05 RX ADMIN — PIPERACILLIN AND TAZOBACTAM 3375 MG: 3; .375 INJECTION, POWDER, FOR SOLUTION INTRAVENOUS at 05:04

## 2024-03-05 RX ADMIN — SODIUM CHLORIDE, PRESERVATIVE FREE 10 ML: 5 INJECTION INTRAVENOUS at 21:16

## 2024-03-05 RX ADMIN — CARVEDILOL 3.12 MG: 3.12 TABLET, FILM COATED ORAL at 17:05

## 2024-03-05 RX ADMIN — ASPIRIN 81 MG 81 MG: 81 TABLET ORAL at 11:58

## 2024-03-05 RX ADMIN — POLYETHYLENE GLYCOL 3350 17 G: 17 POWDER, FOR SOLUTION ORAL at 12:05

## 2024-03-05 RX ADMIN — GUAIFENESIN 600 MG: 600 TABLET ORAL at 11:58

## 2024-03-05 RX ADMIN — BARIUM SULFATE 30 ML: 400 PASTE ORAL at 10:15

## 2024-03-05 RX ADMIN — PRAVASTATIN SODIUM 20 MG: 20 TABLET ORAL at 21:19

## 2024-03-05 RX ADMIN — PIPERACILLIN AND TAZOBACTAM 3375 MG: 3; .375 INJECTION, POWDER, FOR SOLUTION INTRAVENOUS at 21:17

## 2024-03-05 RX ADMIN — BARIUM SULFATE 30 ML: 0.81 POWDER, FOR SUSPENSION ORAL at 10:15

## 2024-03-05 RX ADMIN — SODIUM CHLORIDE, PRESERVATIVE FREE 10 ML: 5 INJECTION INTRAVENOUS at 11:58

## 2024-03-05 RX ADMIN — GUAIFENESIN 600 MG: 600 TABLET ORAL at 21:19

## 2024-03-05 RX ADMIN — APIXABAN 5 MG: 5 TABLET, FILM COATED ORAL at 11:59

## 2024-03-05 RX ADMIN — APIXABAN 5 MG: 5 TABLET, FILM COATED ORAL at 21:19

## 2024-03-05 RX ADMIN — CARVEDILOL 3.12 MG: 3.12 TABLET, FILM COATED ORAL at 11:59

## 2024-03-06 LAB
ANION GAP SERPL CALC-SCNC: 4 MMOL/L (ref 2–11)
BASOPHILS # BLD: 0.1 K/UL (ref 0–0.2)
BASOPHILS NFR BLD: 1 % (ref 0–2)
BUN SERPL-MCNC: 9 MG/DL (ref 8–23)
CALCIUM SERPL-MCNC: 9.5 MG/DL (ref 8.3–10.4)
CHLORIDE SERPL-SCNC: 109 MMOL/L (ref 103–113)
CO2 SERPL-SCNC: 28 MMOL/L (ref 21–32)
CREAT SERPL-MCNC: 1.2 MG/DL (ref 0.8–1.5)
DIFFERENTIAL METHOD BLD: ABNORMAL
EOSINOPHIL # BLD: 0 K/UL (ref 0–0.8)
EOSINOPHIL NFR BLD: 0 % (ref 0.5–7.8)
ERYTHROCYTE [DISTWIDTH] IN BLOOD BY AUTOMATED COUNT: 15.7 % (ref 11.9–14.6)
GLUCOSE SERPL-MCNC: 88 MG/DL (ref 65–100)
HCT VFR BLD AUTO: 35.4 % (ref 41.1–50.3)
HGB BLD-MCNC: 11.4 G/DL (ref 13.6–17.2)
IMM GRANULOCYTES # BLD AUTO: 0 K/UL (ref 0–0.5)
IMM GRANULOCYTES NFR BLD AUTO: 0 % (ref 0–5)
LYMPHOCYTES # BLD: 1.9 K/UL (ref 0.5–4.6)
LYMPHOCYTES NFR BLD: 24 % (ref 13–44)
MCH RBC QN AUTO: 23.5 PG (ref 26.1–32.9)
MCHC RBC AUTO-ENTMCNC: 32.2 G/DL (ref 31.4–35)
MCV RBC AUTO: 73 FL (ref 82–102)
MONOCYTES # BLD: 1.1 K/UL (ref 0.1–1.3)
MONOCYTES NFR BLD: 14 % (ref 4–12)
NEUTS SEG # BLD: 5.1 K/UL (ref 1.7–8.2)
NEUTS SEG NFR BLD: 61 % (ref 43–78)
NRBC # BLD: 0 K/UL (ref 0–0.2)
PLATELET # BLD AUTO: 341 K/UL (ref 150–450)
PMV BLD AUTO: 10.4 FL (ref 9.4–12.3)
POTASSIUM SERPL-SCNC: 4.1 MMOL/L (ref 3.5–5.1)
RBC # BLD AUTO: 4.85 M/UL (ref 4.23–5.6)
SODIUM SERPL-SCNC: 141 MMOL/L (ref 136–146)
WBC # BLD AUTO: 8.2 K/UL (ref 4.3–11.1)

## 2024-03-06 PROCEDURE — 80048 BASIC METABOLIC PNL TOTAL CA: CPT

## 2024-03-06 PROCEDURE — 6360000002 HC RX W HCPCS: Performed by: INTERNAL MEDICINE

## 2024-03-06 PROCEDURE — 6370000000 HC RX 637 (ALT 250 FOR IP): Performed by: FAMILY MEDICINE

## 2024-03-06 PROCEDURE — 2580000003 HC RX 258: Performed by: FAMILY MEDICINE

## 2024-03-06 PROCEDURE — 36415 COLL VENOUS BLD VENIPUNCTURE: CPT

## 2024-03-06 PROCEDURE — 85025 COMPLETE CBC W/AUTO DIFF WBC: CPT

## 2024-03-06 PROCEDURE — 6370000000 HC RX 637 (ALT 250 FOR IP): Performed by: NURSE PRACTITIONER

## 2024-03-06 PROCEDURE — 1100000000 HC RM PRIVATE

## 2024-03-06 PROCEDURE — 6370000000 HC RX 637 (ALT 250 FOR IP): Performed by: INTERNAL MEDICINE

## 2024-03-06 PROCEDURE — 2580000003 HC RX 258: Performed by: INTERNAL MEDICINE

## 2024-03-06 RX ORDER — AMOXICILLIN AND CLAVULANATE POTASSIUM 875; 125 MG/1; MG/1
1 TABLET, FILM COATED ORAL 2 TIMES DAILY WITH MEALS
Status: DISCONTINUED | OUTPATIENT
Start: 2024-03-06 | End: 2024-03-07 | Stop reason: HOSPADM

## 2024-03-06 RX ORDER — AMOXICILLIN AND CLAVULANATE POTASSIUM 875; 125 MG/1; MG/1
1 TABLET, FILM COATED ORAL EVERY 12 HOURS SCHEDULED
Status: DISCONTINUED | OUTPATIENT
Start: 2024-03-06 | End: 2024-03-06

## 2024-03-06 RX ADMIN — GUAIFENESIN 600 MG: 600 TABLET ORAL at 20:38

## 2024-03-06 RX ADMIN — PRAVASTATIN SODIUM 20 MG: 20 TABLET ORAL at 20:38

## 2024-03-06 RX ADMIN — SODIUM CHLORIDE, PRESERVATIVE FREE 10 ML: 5 INJECTION INTRAVENOUS at 20:18

## 2024-03-06 RX ADMIN — POLYETHYLENE GLYCOL 3350 17 G: 17 POWDER, FOR SOLUTION ORAL at 08:21

## 2024-03-06 RX ADMIN — APIXABAN 5 MG: 5 TABLET, FILM COATED ORAL at 08:20

## 2024-03-06 RX ADMIN — AMOXICILLIN AND CLAVULANATE POTASSIUM 1 TABLET: 875; 125 TABLET, FILM COATED ORAL at 17:17

## 2024-03-06 RX ADMIN — ASPIRIN 81 MG 81 MG: 81 TABLET ORAL at 08:20

## 2024-03-06 RX ADMIN — PIPERACILLIN AND TAZOBACTAM 3375 MG: 3; .375 INJECTION, POWDER, FOR SOLUTION INTRAVENOUS at 05:19

## 2024-03-06 RX ADMIN — GUAIFENESIN 600 MG: 600 TABLET ORAL at 08:20

## 2024-03-06 RX ADMIN — APIXABAN 5 MG: 5 TABLET, FILM COATED ORAL at 20:38

## 2024-03-06 RX ADMIN — CARVEDILOL 3.12 MG: 3.12 TABLET, FILM COATED ORAL at 17:18

## 2024-03-06 RX ADMIN — CARVEDILOL 3.12 MG: 3.12 TABLET, FILM COATED ORAL at 08:20

## 2024-03-06 RX ADMIN — LISINOPRIL 2.5 MG: 2.5 TABLET ORAL at 08:20

## 2024-03-06 NOTE — CARE COORDINATION
LOS 4d  Chart reviewed for transitions of care.  Per MD note patient to need IV abx till 3/24   Referral put in  for [facility] to review for possible need for IV Abx at discharge.      Transitions of Care plan is ongoing, no further concerns as of present.   Please consult  if any new issues arise.  Will continue to follow.

## 2024-03-07 ENCOUNTER — TRANSCRIBE ORDERS (OUTPATIENT)
Dept: SCHEDULING | Age: 64
End: 2024-03-07

## 2024-03-07 VITALS
SYSTOLIC BLOOD PRESSURE: 96 MMHG | HEART RATE: 81 BPM | OXYGEN SATURATION: 94 % | DIASTOLIC BLOOD PRESSURE: 68 MMHG | RESPIRATION RATE: 20 BRPM | HEIGHT: 70 IN | WEIGHT: 150 LBS | TEMPERATURE: 98 F | BODY MASS INDEX: 21.47 KG/M2

## 2024-03-07 DIAGNOSIS — J85.1 ABSCESS OF LUNG WITH PNEUMONIA, UNSPECIFIED LATERALITY (HCC): Primary | ICD-10-CM

## 2024-03-07 LAB
ANION GAP SERPL CALC-SCNC: 5 MMOL/L (ref 2–11)
BASOPHILS # BLD: 0.1 K/UL (ref 0–0.2)
BASOPHILS NFR BLD: 1 % (ref 0–2)
BUN SERPL-MCNC: 14 MG/DL (ref 8–23)
CALCIUM SERPL-MCNC: 9.3 MG/DL (ref 8.3–10.4)
CHLORIDE SERPL-SCNC: 110 MMOL/L (ref 103–113)
CO2 SERPL-SCNC: 26 MMOL/L (ref 21–32)
CREAT SERPL-MCNC: 1 MG/DL (ref 0.8–1.5)
DIFFERENTIAL METHOD BLD: ABNORMAL
EOSINOPHIL # BLD: 0 K/UL (ref 0–0.8)
EOSINOPHIL NFR BLD: 0 % (ref 0.5–7.8)
ERYTHROCYTE [DISTWIDTH] IN BLOOD BY AUTOMATED COUNT: 15.9 % (ref 11.9–14.6)
GLUCOSE SERPL-MCNC: 88 MG/DL (ref 65–100)
HCT VFR BLD AUTO: 35.1 % (ref 41.1–50.3)
HGB BLD-MCNC: 11.2 G/DL (ref 13.6–17.2)
IMM GRANULOCYTES # BLD AUTO: 0 K/UL (ref 0–0.5)
IMM GRANULOCYTES NFR BLD AUTO: 1 % (ref 0–5)
LYMPHOCYTES # BLD: 2 K/UL (ref 0.5–4.6)
LYMPHOCYTES NFR BLD: 25 % (ref 13–44)
MCH RBC QN AUTO: 23.2 PG (ref 26.1–32.9)
MCHC RBC AUTO-ENTMCNC: 31.9 G/DL (ref 31.4–35)
MCV RBC AUTO: 72.8 FL (ref 82–102)
MONOCYTES # BLD: 1 K/UL (ref 0.1–1.3)
MONOCYTES NFR BLD: 12 % (ref 4–12)
NEUTS SEG # BLD: 5.1 K/UL (ref 1.7–8.2)
NEUTS SEG NFR BLD: 61 % (ref 43–78)
NRBC # BLD: 0 K/UL (ref 0–0.2)
PLATELET # BLD AUTO: 376 K/UL (ref 150–450)
PMV BLD AUTO: 10.2 FL (ref 9.4–12.3)
POTASSIUM SERPL-SCNC: 5 MMOL/L (ref 3.5–5.1)
RBC # BLD AUTO: 4.82 M/UL (ref 4.23–5.6)
SODIUM SERPL-SCNC: 141 MMOL/L (ref 136–146)
WBC # BLD AUTO: 8.2 K/UL (ref 4.3–11.1)

## 2024-03-07 PROCEDURE — 6370000000 HC RX 637 (ALT 250 FOR IP): Performed by: INTERNAL MEDICINE

## 2024-03-07 PROCEDURE — 6370000000 HC RX 637 (ALT 250 FOR IP): Performed by: NURSE PRACTITIONER

## 2024-03-07 PROCEDURE — 2580000003 HC RX 258: Performed by: FAMILY MEDICINE

## 2024-03-07 PROCEDURE — 85025 COMPLETE CBC W/AUTO DIFF WBC: CPT

## 2024-03-07 PROCEDURE — 80048 BASIC METABOLIC PNL TOTAL CA: CPT

## 2024-03-07 PROCEDURE — 36415 COLL VENOUS BLD VENIPUNCTURE: CPT

## 2024-03-07 PROCEDURE — 6370000000 HC RX 637 (ALT 250 FOR IP): Performed by: FAMILY MEDICINE

## 2024-03-07 RX ORDER — AMOXICILLIN AND CLAVULANATE POTASSIUM 875; 125 MG/1; MG/1
1 TABLET, FILM COATED ORAL 2 TIMES DAILY WITH MEALS
Qty: 35 TABLET | Refills: 0 | Status: SHIPPED | OUTPATIENT
Start: 2024-03-07 | End: 2024-03-25

## 2024-03-07 RX ADMIN — LISINOPRIL 2.5 MG: 2.5 TABLET ORAL at 09:01

## 2024-03-07 RX ADMIN — SODIUM CHLORIDE, PRESERVATIVE FREE 10 ML: 5 INJECTION INTRAVENOUS at 09:06

## 2024-03-07 RX ADMIN — POLYETHYLENE GLYCOL 3350 17 G: 17 POWDER, FOR SOLUTION ORAL at 09:00

## 2024-03-07 RX ADMIN — GUAIFENESIN 600 MG: 600 TABLET ORAL at 09:01

## 2024-03-07 RX ADMIN — CARVEDILOL 3.12 MG: 3.12 TABLET, FILM COATED ORAL at 09:02

## 2024-03-07 RX ADMIN — APIXABAN 5 MG: 5 TABLET, FILM COATED ORAL at 09:02

## 2024-03-07 RX ADMIN — AMOXICILLIN AND CLAVULANATE POTASSIUM 1 TABLET: 875; 125 TABLET, FILM COATED ORAL at 09:01

## 2024-03-07 RX ADMIN — ASPIRIN 81 MG 81 MG: 81 TABLET ORAL at 09:01

## 2024-03-07 NOTE — DISCHARGE SUMMARY
Hospitalist Discharge Summary   Admit Date:  3/2/2024 11:13 AM   DC Note date: 3/7/2024  Name:  Wilmar Mendez   Age:  63 y.o.  Sex:  male  :  1960   MRN:  [id number]   Room:  [facility]  PCP:  Daniel Roach MD    Presenting Complaint: Flank Pain and Constipation     Initial Admission Diagnosis: Necrotizing pneumonia (HCC) [J85.0]  Severe sepsis (HCC) [A41.9, R65.20]  Constipation, unspecified constipation type [K59.00]  Abscess of left lung with pneumonia, unspecified part of lung (HCC) [J85.1]     Problem List for this Hospitalization (present on admission):    Principal Problem:    Severe sepsis (HCC)  Active Problems:    HTN (hypertension)    Schizophrenia (HCC)    Systolic congestive heart failure (HCC)    YANIV (acute kidney injury) (HCC)    Necrotizing pneumonia (HCC)    Abscess of left lung with pneumonia (HCC)    Constipation  Resolved Problems:    * No resolved hospital problems. *      Hospital Course:    Wilmar Mendez is a 63 y.o. male with medical history of LV thrombus on eliquis, HFrEF s/p ICD, schizophrenia, esophageal dilation, admitted with sepsis due to necrotizing pneumonia.     CT  abdomen  shows LLL 2 small intrapulmonary abscesses     He has been on a course of zosyn  Seen by ID  Changed to augmentin, EOT 3-24-24  Plans for repeat images at EOT  On room air  HIV negative  PPD positive  Quantiferon gold pending - per ID he does not need to remain admitted for final result      He has been seen by GI  MBS negative  No role for current EGD  He is diet tolerant       He has a mild anemia with PCP followup for repeat CBC     Discharge plans to home   Lives with sister Valarie              Disposition: Home  Diet: ADULT DIET; Easy to Chew; Low Fat/Low Chol/High Fiber/2 gm Na; 2000 ml  ADULT ORAL NUTRITION SUPPLEMENT; Breakfast, Lunch, Dinner; Low Calorie/High Protein Oral Supplement  Code Status: Full Code    Follow Ups:  Follow-up Information       Follow up With Specialties Details Why

## 2024-03-07 NOTE — PROGRESS NOTES
Hospitalist Progress Note   Admit Date:  3/2/2024 11:13 AM   Name:  Wilmar Mendez   Age:  63 y.o.  Sex:  male  :  1960   MRN:  [id number]   Room:  [facility]/    Presenting/Chief Complaint: Flank Pain and Constipation     Reason(s) for Admission: Necrotizing pneumonia (HCC) [J85.0]  Severe sepsis (HCC) [A41.9, R65.20]  Constipation, unspecified constipation type [K59.00]  Abscess of left lung with pneumonia, unspecified part of lung (HCC) [J85.1]     Hospital Course:       Wilamr Mendez is a 63 y.o. male with medical history of LV thrombus on eliquis, HFrEF s/p ICD, schizophrenia, esophageal dilation, admitted with sepsis due to necrotizing pneumonia.    CT  abdomen  shows LLL 2 small intrapulmonary abscesses    He has been on a course of zosyn  Seen by ID  Changed to augmentin, EOT 3-24-24  Plans for repeat images at EOT  On room air  HIV negative  PPD positive  Quantiferon gold pending     He has been seen by GI  MBS negative  No role for current EGD    Discharge plans pending to home   Lives with sister        Subjective & 24hr Events:     Ate  No dyspnea or cough        Assessment & Plan:     Principal Problem:    Severe sepsis (HCC)  Plan:   Necrotizing pneumonia (HCC)  Plan:     Abscess of left lung with pneumonia (HCC)  Plan:   324  Per ID, changed to augmentin for EOT 3-24-24  Needs followup images at end of antibiotics  On room air  Pending quantiferon gold, but does not need to stay for result            Systolic congestive heart failure (HCC)  Plan:   Active Problems:    HTN (hypertension)  Plan:   Coreg, lisinopril              Schizophrenia (HCC)  Plan:   Clozaril held              YANIV (acute kidney injury) (HCC)  Plan:   Resolved       LV thrombus:  Eliquis        Anemia:  HGB 11.4        Anticipated Discharge Arrangements:   Home    PT/OT evals and PPD ordered?  Therapy   Diet:  ADULT DIET; Easy to Chew; Low Fat/Low Chol/High Fiber/2 gm Na; 2000 ml  ADULT ORAL NUTRITION SUPPLEMENT; 
       Hospitalist Progress Note   Admit Date:  3/2/2024 11:13 AM   Name:  Wilmar Mendez   Age:  63 y.o.  Sex:  male  :  1960   MRN:  [id number]   Room:  [facility]/    Presenting/Chief Complaint: Flank Pain and Constipation     Reason(s) for Admission: Necrotizing pneumonia (HCC) [J85.0]  Severe sepsis (HCC) [A41.9, R65.20]  Constipation, unspecified constipation type [K59.00]  Abscess of left lung with pneumonia, unspecified part of lung (HCC) [J85.1]     Hospital Course:     Please refer to the admission H&P for details of presentation.      In summary, Wilmar Mendez is a 63 y.o. male with medical history significant for suspected LV thrombus now on Eliquis, esophageal dilation (tolerates regular diet per sister), sHFrEF EF 13% s/p ICD, schizophrenia who presents emergency room with left leg pain over the past 2 days.  Per sister, patient has been having nonproductive cough with poor p.o. intake over the past month.    Noted to have low blood pressure on admission which responded to IV fluids.  Workup revealed lactic acid of 3.3, WBC of 14, creatinine of 1.5 with baseline of 1.0.  See Problem pelvis showed necrotizing pneumonia in the left lower lobe with 2 small intrapulmonary abscesses, no acute findings in the abdomen except for large colonic stool burden consistent with constipation.    Subjective/24 hr Events (24) :  Patient is seen and examined at bedside.  No acute events reported overnight by nursing staff.  Patient is sitting in his bed without any acute distress.  On room air.  Intermittent coughing noted.  No sputum production.  Patient is pleasant.  No family at bedside.  Reports that he is doing well.  Wanted me to call his sister to update her.  Patient sisters/caretaker is at bedside.        Assessment & Plan:     Severe sepsis with acute kidney injury due to necrotizing pneumonia  Meets criteria for sepsis with tach tachypnea and leukocytosis.  Independent review of CT scan showed 
       Hospitalist Progress Note   Admit Date:  3/2/2024 11:13 AM   Name:  Wilmar Mendez   Age:  63 y.o.  Sex:  male  :  1960   MRN:  [id number]   Room:  2/    Presenting/Chief Complaint: Flank Pain and Constipation     Reason(s) for Admission: Necrotizing pneumonia (HCC) [J85.0]  Severe sepsis (HCC) [A41.9, R65.20]  Constipation, unspecified constipation type [K59.00]  Abscess of left lung with pneumonia, unspecified part of lung (HCC) [J85.1]     Hospital Course:     Please refer to the admission H&P for details of presentation.      In summary, Wilmar Mendez is a 63 y.o. male with medical history significant for suspected LV thrombus now on Eliquis, esophageal dilation (tolerates regular diet per sister), sHFrEF EF 13% s/p ICD, schizophrenia who presents emergency room with left leg pain over the past 2 days.  Per sister, patient has been having nonproductive cough with poor p.o. intake over the past month.    Noted to have low blood pressure on admission which responded to IV fluids.  Workup revealed lactic acid of 3.3, WBC of 14, creatinine of 1.5 with baseline of 1.0.  See Problem pelvis showed necrotizing pneumonia in the left lower lobe with 2 small intrapulmonary abscesses, no acute findings in the abdomen except for large colonic stool burden consistent with constipation.    Subjective/24 hr Events (24) :  Patient is seen and examined at bedside.  No acute events reported overnight by nursing staff.  Patient sitting up in his bed.  On room air.  Denies any acute complaints.  Continues to have cough.  Patient has flat affect and is quiet.  Does not like to interact much.    Patient denies fever, chills, chest pains, shortness of breath, n/v, abdominal pain.    Assessment & Plan:     Severe sepsis with acute kidney injury due to necrotizing pneumonia  Meets criteria for sepsis with tach tachypnea and leukocytosis.  Independent review of CT scan showed pneumonia with 2 abscesses in the left 
       Hospitalist Progress Note   Admit Date:  3/2/2024 11:13 AM   Name:  Wilmar Mendez   Age:  63 y.o.  Sex:  male  :  1960   MRN:  [id number]   Room:  [facility]/    Presenting/Chief Complaint: Flank Pain and Constipation     Reason(s) for Admission: Necrotizing pneumonia (HCC) [J85.0]  Severe sepsis (HCC) [A41.9, R65.20]  Constipation, unspecified constipation type [K59.00]  Abscess of left lung with pneumonia, unspecified part of lung (HCC) [J85.1]     Hospital Course:     Please refer to the admission H&P for details of presentation.      In summary, Wilmar Mendez is a 63 y.o. male with medical history significant for suspected LV thrombus now on Eliquis, esophageal dilation (tolerates regular diet per sister), sHFrEF EF 13% s/p ICD, schizophrenia who presents emergency room with left leg pain over the past 2 days.  Per sister, patient has been having nonproductive cough with poor p.o. intake over the past month.    Noted to have low blood pressure on admission which responded to IV fluids.  Workup revealed lactic acid of 3.3, WBC of 14, creatinine of 1.5 with baseline of 1.0.  See Problem pelvis showed necrotizing pneumonia in the left lower lobe with 2 small intrapulmonary abscesses, no acute findings in the abdomen except for large colonic stool burden consistent with constipation.    Subjective/24 hr Events (24) :  Patient is seen and examined at bedside.  No acute events reported overnight by nursing staff.  Patient sisters/caretaker is at bedside.  Patient sitting in bed without any acute distress.  More pleasant today and interactive.  Smiling and joking around.  Patient does not have any acute complaints.  Her sister, patient really have complaints even when he was having appendicitis.  Patient remains on room air.  Denies any cough or chest pain shortness of breath, nausea, vomiting, fever, chills.      Assessment & Plan:     Severe sepsis with acute kidney injury due to necrotizing 
  Physician Progress Note      PATIENT:               RODERICK BILLS  CSN #:                  [id number]  :                       1960  ADMIT DATE:       3/2/2024 11:13 AM  DISCH DATE:  RESPONDING  PROVIDER #:        James Cote MD          QUERY TEXT:    Patient admitted with sepsis. Noted to have Malnutrition Status: Severe   malnutrition in Dietitian progress note no  . If possible, please   document in progress notes and discharge summary if you are evaluating and /or   treating any of the following:    The medical record reflects the following:  Risk Factors: Type II diabetes mellitus, Severe sepsis, 63 yr old male,  Clinical Indicators: Dietitian progress note no  noted as Malnutrition   Status: Severe malnutrition,  Body Fat Loss:  Severe body fat loss Buccal region  Muscle Mass Loss:  Severe muscle mass loss Temples (temporalis), Clavicles   (pectoralis & deltoids)  BMI: 21.5,  Treatment: Dietitian consult, Nutrition Supplement Therapy,    ASPEN Criteria:    https://aspenjournals.onlinelibrary.asencio.com/doi/full/10.1177/254953892891477  5  Options provided:  -- Protein calorie malnutrition severe  -- Other - I will add my own diagnosis  -- Disagree - Not applicable / Not valid  -- Disagree - Clinically unable to determine / Unknown  -- Refer to Clinical Documentation Reviewer    PROVIDER RESPONSE TEXT:    This patient has severe protein calorie malnutrition.    Query created by: Herminia Riggins on 3/5/2024 11:13 AM      Electronically signed by:  James Cote MD 3/5/2024 11:35 AM          
  SPEECH LANGUAGE PATHOLOGY: Modified Barium Swallow     Acknowledge Order  I  Therapy Time  I   Charges     I  Rehab Caseload Tracker  NAME: Wilmar Mendez  : 1960  MRN: [id number]    ADMISSION DATE: 3/2/2024  PRIMARY DIAGNOSIS: Severe sepsis (HCC)    ICD-10: Treatment Diagnosis: R13.12 Dysphagia, Oropharyngeal Phase    RECOMMENDATIONS   Diet:    Soft and Bite-Sized  Thin Liquids    Medication: as tolerated   Compensatory Swallowing Strategies:   Fully awake/alert  Upright for all PO  Small bites and sips  Slow rate of PO intake  Remain upright for 20-30 min after any PO  Periodically dry swallow   Therapeutic Intervention:   No additional speech therapy intervention indicated at this time.    Patient continues to require skilled intervention:  No. Please re-consult if new concerns arise.      Anticipated Discharge Needs: No additional speech therapy is indicated.      ASSESSMENT    Patient presents with oropharyngeal swallow within functional limits considering limited dentition. Timely swallow of all consistencies with adequate hyolaryngeal elevation and excursion. No laryngeal penetration or aspiration observed. Occasional mild pharyngeal residue clears with cued double swallow. Signs of     Recommend continue soft and bite sized textures and thin liquids.     GENERAL    Subjective: Patient pleasant and cooperative.    Recent Information/Background: 63-year-old male with past medical history of LV thrombus on Eliquis, CHF presented with reports of necrotizing pneumonia. GI was consulted for concerns of achalasia that could have led to aspiration/necrotizing pneumonia. At this time he does not report classic symptoms of esophageal dysmotility even though he had motility study that was consistent with type I achalasia. With his CHF and LV thrombus on Eliquis overall he is at high risk for EGD unless it is emergent case. I would recommend obtaining modified barium swallow or barium esophagram depending on 
ACUTE OCCUPATIONAL THERAPY GOALS:   (Developed with and agreed upon by patient and/or caregiver.)  1. Pt will complete LB ADL set up only with AE as needed.   2. Pt will complete toileting supervision with AE as needed.   3. Pt will complete UB ADL set up only.  4. Pt will tolerate 25 minutes of OT treatment requiring 1-2 breaks as needed.   5. Pt will complete grooming tasks while standing at sink I.  6. Pt will complete functional mobility via SPC Mod I.   7. Pt will tolerate BUE exercises to increase strength for safe, functional transfers and/or functional mobility, and ADL participation.     Timeframe: 7 days      OCCUPATIONAL THERAPY Initial Assessment, Daily Note, and AM       OT Visit Days: 1  Acknowledge Orders  Time  OT Charge Capture  Rehab Caseload Tracker      Wilmar Mendez is a 63 y.o. male   PRIMARY DIAGNOSIS: Severe sepsis (HCC)  Necrotizing pneumonia (HCC) [J85.0]  Severe sepsis (HCC) [A41.9, R65.20]  Constipation, unspecified constipation type [K59.00]  Abscess of left lung with pneumonia, unspecified part of lung (HCC) [J85.1]       Reason for Referral: Generalized Muscle Weakness (M62.81)  Inpatient: Payor: [facility] MEDICARE / Plan: [place] DUAL COMPLETE / Product Type: *No Product type* /     ASSESSMENT:     REHAB RECOMMENDATIONS:   Recommendation to date pending progress:  Setting:  No further skilled occupational therapy after discharge from hospital    Equipment:    None     ASSESSMENT:  Mr. Mendez is a 63 y M who was admitted for acute L flank pain, hypotension, pneumonia, lung abscess. PT WITH POSITIVE PPD/TB TEST. Pt was received supine in bed with sister present on RA and on airborne precautions. Pt required assistance for functional mobility and ADLs today due to slight unsteadiness, poor initiation. Pt has deficits in balance, activity tolerance, and performing ADLs. Pt requires continued skilled OT services due to performing functionally below baseline. OT will treat 1-2 more 
ACUTE PHYSICAL THERAPY GOALS:   (Developed with and agreed upon by patient and/or caregiver.)    (1.) Wilmar Mendez  will move from supine to sit and sit to supine , scoot up and down, and roll side to side with INDEPENDENT within 7 treatment day(s).    (2.) Wilmar Mendez will transfer from bed to chair and chair to bed with INDEPENDENT using the least restrictive device within 7 treatment day(s).    (3.) Wilmar Mendez will ambulate with INDEPENDENT for 250 feet with the least restrictive device within 7 treatment day(s).   (4.) Wilmar Mendez will perform standing static and dynamic balance activities x 12 minutes with SUPERVISION to improve safety within 7 treatment day(s).  (5.) Wilmar Mendez will ascend and descend 1 stair using no hand rail(s) with SUPERVISION to improve functional mobility and safety within 7 treatment day(s).  (6.) Wilmar Mendez will perform therapeutic exercises x 12 min for HEP with SUPERVISION to improve strength, endurance, and functional mobility within 7 treatment day(s).      PHYSICAL THERAPY Initial Assessment, Daily Note, and AM  (Link to Caseload Tracking: PT Visit Days : 1  Acknowledge Orders  Time In/Out  PT Charge Capture  Rehab Caseload Tracker    Wilmar Mendez is a 63 y.o. male   PRIMARY DIAGNOSIS: Severe sepsis (HCC)  Necrotizing pneumonia (HCC) [J85.0]  Severe sepsis (HCC) [A41.9, R65.20]  Constipation, unspecified constipation type [K59.00]  Abscess of left lung with pneumonia, unspecified part of lung (HCC) [J85.1]       Reason for Referral: Generalized Muscle Weakness (M62.81)  Inpatient: Payor: [facility] MEDICARE / Plan: [place] DUAL COMPLETE / Product Type: *No Product type* /     ASSESSMENT:     REHAB RECOMMENDATIONS:   Recommendation to date pending progress:  Setting:  Home Health Therapy    Equipment:    To Be Determined     ASSESSMENT:   Mr. Mendez is a 63 year old male who presents to hospital 2/2 severe sepsis. Pt reports that he lives with sister in 1 
Comprehensive Nutrition Assessment    Type and Reason for Visit: Initial, Positive Nutrition Screen  Malnutrition Screening Tool: Malnutrition Screen  Have you recently lost weight without trying?: 2 to 13 pounds (1 point)  Have you been eating poorly because of a decreased appetite?: Yes (1 point)  Malnutrition Screening Tool Score: 2    Nutrition Recommendations/Plan:   Meals and Snacks:  Diet: Continue current order  Nutrition Supplement Therapy:  Medical food supplement therapy:  Initiate Ensure High Protein three times per day (this provides 160 kcal and 16 grams protein per bottle)     Malnutrition Assessment:  Malnutrition Status: Severe malnutrition  Context: Chronic Illness  Findings of clinical characteristics of malnutrition:   Energy Intake:  Unable to assess (reported decline for one month per sister at admission)  Weight Loss:  No significant weight loss     Body Fat Loss:  Severe body fat loss Buccal region   Muscle Mass Loss:  Severe muscle mass loss Temples (temporalis), Clavicles (pectoralis & deltoids)     Nutrition Assessment:  Nutrition History: Sister reported poor po for the past month at admission, not present at RD encounter.  Pt reports he doesn't eat every day, states he cooks for himself eating every few days.  He reports his sister cooks at times.  On admission in August 2023, \"Patient reports that about 5 days ago had sudden decline in appetite and po.\"       Do You Have Any Cultural, Restoration, or Ethnic Food Preferences?: No   Weight History:   Wt hx per EMR review: 140# 10/10/23 noting 6# wt loss at that encounter  Pt reports -145#  stating the last time he was at the doctor his wt was 150# which leads me to believe current wt is stated.  Bed scale not working at RD encounter.    Nutrition Background:       PMH remarkable for suspected LV thrombus on Eliquis, esophageal dilation, sHFrEF EF 13% s/p ICD, schizophrenia.  Presented to emergency room with left leg pain.  Admitted 
Hourly rounding completed on this shift. No new complaints at this time. All needs met. Pt is currently resting in bed. Will continue to monitor and give report to oncoming nurse.    
Hourly rounds complete this shift, no new complaints at this time, PPD Read, appears to be red, raised and 10 mm induration, Dr. Eliza Long notified as well as the house supervisor, patient has been moved to a positive pressure room per orders : bed in low, locked position, call light and bedside table within reach,  all needs met. Report to day shift nurse.    
Infectious Disease Progress    Today's Date: 3/4/2024   Admit Date: 3/2/2024    Patient YOB: 1960    Impression:   Necrotizing left lower lobe pneumonia: suspect his pneumonia and lung abscess are related to achalasia.    Lung abscess  SIRS / sepsis due to above  Achalasia with h/o esophageal dilation in 8/2023  Schizophrenia  HFrEF with EF 13%  S/p ICD  Positive PPD 3/3/24    Plan:   Pending GI consult to evaluate for EGD with dilation.   Continue IV Zosyn.  Await results of MRSA nasal screen and resp culture.  Assuming he improves, would anticipate transition to oral Augmentin with repeat imaging.  Often lung abscess requires 3-4 weeks of treatment.  Noted PPD skin test positive-on airborne precaution. Will order quant gold -TB  ID will follow    Anti-infectives:   IV vanc  IV cefepime    Subjective:   Interval Events:    WBC normalized. Afebrile. Pt reports pain in his left sided lung. No cough or SOB at this time. No hypoxic, not on any O2 currently.     Consult note:   Patient is a 63 y.o. male who presented to the ER with left flank pain.  He has schizophrenia and per family he has been complaining of left flank pain over the past few days.  He had leukocytosis on presentation and CT abd showed a necrotizing pneumonia with lung abscess on the left lower lobe.  He was started on vanc/cefepime on admission and WBC is improved.  Per review of notes he had esophageal stricture seen on CT in 8/2023 and had esophageal dilation at that time.  He then had GI follow up which confirmed a diagnosis of achalasia.  He was planned for another EGD at an undetermined time.  Today he is surrounded by family and in no distress.  He says the pain is still happening but not as frequently.  ID is consulted for further recs.    Patient Active Problem List   Diagnosis    HTN (hypertension)    Schizophrenia (HCC)    Microcytic anemia    Systolic congestive heart failure (HCC)    Type II diabetes mellitus (HCC)    CHF 
PPD Read, appears to be red, raised and 10 mm induration, Dr. Eliza Long notified as well as the house supervisor, patient will be moved to a positive pressure room per orders.   
Physical Therapy Note:    Attempted to see patient this PM for physical therapy treatment  session. Patient declined therapy this afternoon. Will follow and re-attempt as schedule permits/patient available. Thank you,    Davidson Wei, PT     Rehab Caseload Tracker   
Pt is in bed without complaints. Hourly rounds completed and all needs met. MRSA swab done today. Bed is low, locked, and call light is in reach. Pt encouraged to call for assistance.     
Pt resting in bed at present time without complaints. Hourly rounds completed, all needs met this shift. Bed locked and low, call light within reach. Will give report to oncoming day shift nurse.   
Pt resting in bed at present time without complaints. IV abx infusing. Hourly rounds completed, all needs met this shift. Bed locked and low, call light within reach. Will give report to oncoming day shift nurse.   
Pt resting in bed at present time without complaints. IV abx infusing. Hourly rounds completed, all needs met this shift. Bed locked and low, call light within reach. Will give report to oncoming day shift nurse.   
Pt went for barium swallow test today. No complaints from pt today during shift. He states he is feeling better. GI signed off.    Rounds performed throughout shift. Pt denies needs at this time. Bed in low position, locked and call light/personal items within reach.   
TRANSFER - IN REPORT:    Verbal report received from Chiquis wilburn Wilmar Mendez  being received from ED for routine progression of patient care      Report consisted of patient's Situation, Background, Assessment and   Recommendations(SBAR).     Information from the following report(s) Nurse Handoff Report was reviewed with the receiving nurse.    Opportunity for questions and clarification was provided.      Assessment completed upon patient's arrival to unit and care assumed.    
VANCO DAILY FOLLOW UP NOTE  [facility]   Pharmacy Pharmacokinetic Monitoring Service - Vancomycin    Consulting Provider: Yoel   Indication: HAP  Target Concentration: Goal AUC/JESSIE 400-600 mg*hr/L  Day of Therapy: 1  Additional Antimicrobials: cefepime    Pertinent Laboratory Values:   Wt Readings from Last 1 Encounters:   03/02/24 68 kg (150 lb)     Temp Readings from Last 1 Encounters:   03/02/24 97.7 °F (36.5 °C)     Recent Labs     03/02/24  1123 03/02/24  1322 03/02/24  1617   BUN 23  --   --    CREATININE 1.50  --   --    WBC 14.0*  --   --    PROCAL 0.15  --   --    LACACIDPL  --  3.3* 0.8     Estimated Creatinine Clearance: 50 mL/min (based on SCr of 1.5 mg/dL).    No results found for: \"VANCOTROUGH\", \"VANCORANDOM\"    MRSA Nasal Swab: not ordered. Order placed by pharmacy    Assessment:  Date/Time Dose Concentration AUC         Note: Serum concentrations collected for AUC dosing may appear elevated if collected in close proximity to the dose administered, this is not necessarily an indication of toxicity    Plan:  Dosing recommendations based on Bayesian software  Start vancomycin 1250mg q24h.  Anticipated AUC of 524 and trough concentration of 13.5 at steady state  Renal labs as indicated   Vancomycin concentrations will be ordered as clinically appropriate   Pharmacy will continue to monitor patient and adjust therapy as indicated    Thank you for the consult,  Chel Hanson [facility]    
VANCO DAILY FOLLOW UP NOTE  [facility]   Pharmacy Pharmacokinetic Monitoring Service - Vancomycin    Consulting Provider: Yoel   Indication: HAP  Target Concentration: Goal AUC/JESSIE 400-600 mg*hr/L  Day of Therapy: 2  Additional Antimicrobials: cefepime    Pertinent Laboratory Values:   Wt Readings from Last 1 Encounters:   03/02/24 68 kg (150 lb)     Temp Readings from Last 1 Encounters:   03/03/24 98.8 °F (37.1 °C) (Oral)     Recent Labs     03/02/24  1123 03/02/24  1322 03/02/24  1617 03/03/24  0325   BUN 23  --   --  17   CREATININE 1.50  --   --  1.30   WBC 14.0*  --   --  10.6   PROCAL 0.15  --   --   --    LACACIDPL  --  3.3* 0.8  --      Estimated Creatinine Clearance: 57 mL/min (based on SCr of 1.3 mg/dL).    No results found for: \"VANCOTROUGH\", \"VANCORANDOM\"    MRSA Nasal Swab: not ordered. Order placed by pharmacy    Assessment:  Date/Time Dose Concentration AUC         Note: Serum concentrations collected for AUC dosing may appear elevated if collected in close proximity to the dose administered, this is not necessarily an indication of toxicity    Plan:  Dosing recommendations based on Bayesian software  Continue vancomycin 1250mg q24h.  Renal labs as indicated   Vancomycin concentrations will be ordered as clinically appropriate   Pharmacy will continue to monitor patient and adjust therapy as indicated    Thank you for the consult,  Coy Parnell, PharmD, BCOP  Clinical Pharmacist  Contact Via [employer]        
Wilmar Mendez is 63 y.o. y/o male     Initial consult: See consult note from 3/4: Patient, with recent diagnosis of Type 1 achalasia, admitted for necrotizing PNA. Prior OP recommendations for EGD with botox given achalasia and patient on AC, poor candidate for POEM. Patient at this time denies ever having dysphagia, he has been tolerating diet. Consulted cardiology; advised against emergent scopes due to EF <20% and on AC for recent LV thrombus. MBS recommended.    Today: MBS with no evidence of aspiration. Patient still doing well currently. Pending work up for TB for lung lesions as PPD +.     PE:   Vitals:    03/05/24 0707   BP: 109/73   Pulse: 80   Resp:    Temp: 98.2 °F (36.8 °C)   SpO2: 97%      General:  The patient appears well-nourished, and is in no acute distress.    HEENT:  Normocephalic, atraumatic. No sclerae icterus.   Neurologic:  Alert and oriented x3.  Psychiatric: Appropriate mood and affect.    Assessment and Plan:   #Achalasia with concern for aspiration: MBS without evidence of aspiration. Patient tolerating diet without dysphagia. No plans for EGD currently as non emergent. GI will sign off at this time, please reconsult as needed.     Electronically signed by Arya Alonzo PA-C.    
extremities    Data Review:   I have personally reviewed labs, micro, and other relevant tests:    Labs: Results:   BMP, Mg, Phos Recent Labs     03/04/24 0448 03/05/24 0615 03/06/24  0556    142 141   K 5.0 4.1 4.1    112 109   CO2 24 24 28   ANIONGAP 7 6 4   BUN 13 11 9   CREATININE 1.10 1.10 1.20   LABGLOM >60 >60 >60   CALCIUM 9.0 9.2 9.5   GLUCOSE 87 94 88        CBC w/ diff Recent Labs     03/04/24 0448 03/05/24 0615 03/06/24  0556   WBC 9.6 7.7 8.2   RBC 4.80 4.69 4.85   HGB 11.3* 10.8* 11.4*   HCT 35.6* 34.2* 35.4*   MCV 74.2* 72.9* 73.0*   MCH 23.5* 23.0* 23.5*   MCHC 31.7 31.6 32.2   RDW 15.9* 15.8* 15.7*    307 341   MPV 11.0 9.9 10.4   NRBC 0.00 0.00 0.00   LYMPHOPCT 18 20 24   EOSRELPCT 1 0* 0*   MONOPCT 12 14* 14*   BASOPCT 1 1 1   IMMGRAN 0 0 0   LYMPHSABS 1.8 1.6 1.9   EOSABS 0.1 0.0 0.0   MONOSABS 1.2 1.1 1.1   BASOSABS 0.1 0.1 0.1   ABSIMMGRAN 0.0 0.0 0.0        LFT No results for input(s): \"BILITOT\", \"BILIDIR\", \"ALKPHOS\", \"AST\", \"ALT\", \"PROT\", \"LABALBU\", \"GLOB\" in the last 72 hours.     A1c No results found for: \"LABA1C\", \"EAG\"       Microbiology:     Results       Procedure Component Value Units Date/Time    MRSA/Staph Aureas DNA [[id number]] Collected: 03/04/24 0943    Order Status: Completed Specimen: Nasal Swab Updated: 03/04/24 1438     MRSA by PCR Not detected        Comment: A positive test result does not necessarily indicate the presence of a viable organism. A positive result is indicative of the presence of SA or MRSA DNA.  The [employer] StaphSR assay is intended to aid in the prevention and control of MRSA and SA infections in healthcare settings.  It is not intended to diagnose MRSA or SA infections nor guide or monitor treatment for MRSA/SA infections. A negative result does not preclude nasal colonization.          SA by PCR Not detected       Culture, Respiratory [[id number]]     Order Status: Sent Specimen: Sputum Expectorated     Culture, Respiratory

## 2024-03-07 NOTE — CARE COORDINATION
Discharge note:  CM spoke to Mr. Mendez and his sister in room 610.  He is discharging home today on PO abx (not IV) and [facility] home health.  [facility] is aware of change to po abx.  CM called [facility] (Yvonne) and also released chart in Epic (\"accepted and selected\").  No other discharge needs voiced or identified.       03/07/24 1234   Service Assessment   Patient Orientation Alert and Oriented   Cognition Alert   Accompanied By/Relationship sister   Services At/After Discharge   Services At/After Discharge Home Health   Confirm Follow Up Transport Family   Condition of Participation: Discharge Planning   The Plan for Transition of Care is related to the following treatment goals: home with home health   The Patient and/or Patient Representative was provided with a Choice of Provider? Patient

## 2024-03-12 LAB
M TB IFN-G BLD-IMP: NEGATIVE
M TB IFN-G CD4+ T-CELLS BLD-ACNC: 0.11 IU/ML
M TBIFN-G CD4+ CD8+T-CELLS BLD-ACNC: 0.06 IU/ML
QUANTIFERON CRITERIA: NORMAL
QUANTIFERON MITOGEN VALUE: 4.06 IU/ML
QUANTIFERON NIL VALUE: 0 IU/ML
QUANTIFERON, INCUBATION: NORMAL

## 2024-03-19 ENCOUNTER — HOSPITAL ENCOUNTER (EMERGENCY)
Age: 64
Discharge: HOME | End: 2024-03-19
Payer: OTHER MISCELLANEOUS

## 2024-03-19 ENCOUNTER — APPOINTMENT (OUTPATIENT)
Dept: GENERAL RADIOLOGY | Age: 64
End: 2024-03-19
Payer: OTHER MISCELLANEOUS

## 2024-03-19 VITALS
WEIGHT: 160 LBS | HEART RATE: 92 BPM | HEIGHT: 70 IN | SYSTOLIC BLOOD PRESSURE: 110 MMHG | DIASTOLIC BLOOD PRESSURE: 81 MMHG | BODY MASS INDEX: 22.9 KG/M2 | OXYGEN SATURATION: 98 % | RESPIRATION RATE: 17 BRPM | TEMPERATURE: 98.4 F
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
## 2024-03-19 DIAGNOSIS — T14.8XXA MUSCLE STRAIN: ICD-10-CM

## 2024-03-19 PROCEDURE — 99283 EMERGENCY DEPT VISIT LOW MDM: CPT

## 2024-03-19 PROCEDURE — 72040 X-RAY EXAM NECK SPINE 2-3 VW: CPT

## 2024-03-19 RX ORDER — METHOCARBAMOL 750 MG/1
750 TABLET, FILM COATED ORAL 3 TIMES DAILY
Qty: 30 TABLET | Refills: 0 | Status: SHIPPED | OUTPATIENT
Start: 2024-03-19 | End: 2024-03-29

## 2024-03-19 ASSESSMENT — PAIN DESCRIPTION - LOCATION: LOCATION: SHOULDER;NECK

## 2024-03-19 ASSESSMENT — PAIN SCALES - GENERAL: PAINLEVEL_OUTOF10: 6

## 2024-03-19 ASSESSMENT — PAIN DESCRIPTION - DESCRIPTORS: DESCRIPTORS: DISCOMFORT

## 2024-03-19 ASSESSMENT — PAIN DESCRIPTION - ORIENTATION: ORIENTATION: LEFT
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
## 2024-03-19 NOTE — ED NOTES
I have reviewed discharge instructions with the patient.  The patient verbalized understanding.    Patient left ED via Discharge Method: ambulatory to Home with family.    Opportunity for questions and clarification provided.       Patient given 0 scripts.         To continue your aftercare when you leave the hospital, you may receive an automated call from our care team to check in on how you are doing.  This is a free service and part of our promise to provide the best care and service to meet your aftercare needs.” If you have questions, or wish to unsubscribe from this service please call [phone].  Thank you for Choosing our Critical access hospital Emergency Department.        Clara Alonso LPN  03/19/24 6527

## 2024-03-19 NOTE — DISCHARGE INSTRUCTIONS
Ice to all sore areas, use muscle relaxer three times per day if needed for pain along with over the counter tylenol for pain

## 2024-03-19 NOTE — ED PROVIDER NOTES
results found for any visits on 03/19/24.      No orders to display                No results for input(s): \"COVID19\" in the last 72 hours.    Voice dictation software was used during the making of this note.  This software is not perfect and grammatical and other typographical errors may be present.  This note has not been completely proofread for errors.      Joleen Byrd PA  03/19/24 2036

## 2024-03-20 ENCOUNTER — HOSPITAL ENCOUNTER (OUTPATIENT)
Dept: CT IMAGING | Age: 64
Discharge: HOME | End: 2024-03-23
Payer: MEDICARE

## 2024-03-20 DIAGNOSIS — J85.1 ABSCESS OF LUNG WITH PNEUMONIA, UNSPECIFIED LATERALITY (HCC): ICD-10-CM

## 2024-03-20 PROCEDURE — 71250 CT THORAX DX C-: CPT

## 2024-05-28 ENCOUNTER — TELEPHONE (OUTPATIENT)
Age: 64
End: 2024-05-28

## 2024-05-28 DIAGNOSIS — I50.20 SYSTOLIC CONGESTIVE HEART FAILURE (HCC): Primary | ICD-10-CM

## 2024-05-28 NOTE — TELEPHONE ENCOUNTER
BIO BIV iCD at [facility], patient has been LTF for a few years. Family called and and denies any current issues    NP appt scheduled. Orders placed.

## 2024-06-05 ENCOUNTER — OFFICE VISIT (OUTPATIENT)
Age: 64
End: 2024-06-05
Payer: MEDICARE

## 2024-06-05 ENCOUNTER — NURSE ONLY (OUTPATIENT)
Age: 64
End: 2024-06-05

## 2024-06-05 VITALS
DIASTOLIC BLOOD PRESSURE: 64 MMHG | SYSTOLIC BLOOD PRESSURE: 112 MMHG | BODY MASS INDEX: 23.75 KG/M2 | HEART RATE: 93 BPM | HEIGHT: 70 IN | WEIGHT: 165.9 LBS

## 2024-06-05 DIAGNOSIS — Z45.02 ENCOUNTER DUE TO AICD AT END OF BATTERY LIFE: Primary | ICD-10-CM

## 2024-06-05 DIAGNOSIS — I50.20 SYSTOLIC CONGESTIVE HEART FAILURE (HCC): Primary | ICD-10-CM

## 2024-06-05 DIAGNOSIS — I50.22 CHRONIC SYSTOLIC CONGESTIVE HEART FAILURE (HCC): ICD-10-CM

## 2024-06-05 DIAGNOSIS — I46.9 CARDIAC ARREST (HCC): ICD-10-CM

## 2024-06-05 DIAGNOSIS — I50.20 HFREF (HEART FAILURE WITH REDUCED EJECTION FRACTION) (HCC): ICD-10-CM

## 2024-06-05 DIAGNOSIS — I10 HYPERTENSION, UNSPECIFIED TYPE: ICD-10-CM

## 2024-06-05 PROCEDURE — 1036F TOBACCO NON-USER: CPT | Performed by: NURSE PRACTITIONER

## 2024-06-05 PROCEDURE — 3078F DIAST BP <80 MM HG: CPT | Performed by: NURSE PRACTITIONER

## 2024-06-05 PROCEDURE — G8428 CUR MEDS NOT DOCUMENT: HCPCS | Performed by: NURSE PRACTITIONER

## 2024-06-05 PROCEDURE — 3017F COLORECTAL CA SCREEN DOC REV: CPT | Performed by: NURSE PRACTITIONER

## 2024-06-05 PROCEDURE — 3074F SYST BP LT 130 MM HG: CPT | Performed by: NURSE PRACTITIONER

## 2024-06-05 PROCEDURE — 99214 OFFICE O/P EST MOD 30 MIN: CPT | Performed by: NURSE PRACTITIONER

## 2024-06-05 PROCEDURE — G8420 CALC BMI NORM PARAMETERS: HCPCS | Performed by: NURSE PRACTITIONER

## 2024-06-05 RX ORDER — AMOXICILLIN 875 MG/1
TABLET, COATED ORAL
COMMUNITY
Start: 2024-05-30

## 2024-06-05 RX ORDER — POTASSIUM CHLORIDE 20 MEQ/1
20 TABLET, EXTENDED RELEASE ORAL 2 TIMES DAILY
COMMUNITY

## 2024-06-05 RX ORDER — BENZTROPINE MESYLATE 0.5 MG/1
0.5 TABLET ORAL 2 TIMES DAILY PRN
COMMUNITY

## 2024-06-05 NOTE — PROGRESS NOTES
[facility] CARDIOLOGY, [address], [address]  [place] [address]  PHONE: [phone]  1960    SUBJECTIVE:   Wilmar Mendez is a 63 y.o. male seen for a follow up visit regarding the following:     Chief Complaint   Patient presents with    Congestive Heart Failure    Device Check       HPI:    Wilmar Mendez is a very pleasant 63 y.o. male with a past medical and cardiac history significant for paranoid schizophrenia, NICM, HfrEF, and HTN.        Patient presents today to discuss BIV ICD generator change as device is at [facility].  Device is Biotronik BIV ICD located on left.  Leads are functioning properly.  Patient CRT pacing 99 %.   Patient not pacer dependent.       Cardiac PMH: (Old records have been reviewed and summarized below)  TTE 8/18/2023: EF 13%    Reviewed d/c summary Dr. Chang 3/2/2024    Past Medical History, Past Surgical History, Family history, Social History, and Medications were all reviewed with the patient today and updated as necessary.     Current Outpatient Medications   Medication Sig Dispense Refill    amoxicillin (AMOXIL) 875 MG tablet TAKE 1 TABLET EVERY 12 HOURS DAILY      benztropine (COGENTIN) 0.5 MG tablet Take 1 tablet by mouth 2 times daily as needed      potassium chloride (KLOR-CON M) 20 MEQ extended release tablet Take 1 tablet by mouth 2 times daily Indications: when takes lasix      apixaban (ELIQUIS) 5 MG TABS tablet Take 1 tablet by mouth 2 times daily 60 tablet 0    carvedilol (COREG) 6.25 MG tablet Take 0.5 tablets by mouth 2 times daily (with meals) 30 tablet 0    furosemide (LASIX) 20 MG tablet Take 1 tablet by mouth daily as needed (swelling) 60 tablet 0    lisinopril (PRINIVIL;ZESTRIL) 2.5 MG tablet Take 1 tablet by mouth daily 30 tablet 0    pravastatin (PRAVACHOL) 20 MG tablet Take 1 tablet by mouth at bedtime      aspirin 81 MG chewable tablet Take 1 tablet by mouth daily      cloZAPine (CLOZARIL) 100 MG tablet Take 5 tablets by mouth 200mg qam and

## 2024-06-18 DIAGNOSIS — I50.20 SYSTOLIC CONGESTIVE HEART FAILURE (HCC): ICD-10-CM

## 2024-06-18 LAB
ANION GAP SERPL CALC-SCNC: 8 MMOL/L (ref 9–18)
BASOPHILS # BLD: 0 K/UL (ref 0–0.2)
BASOPHILS NFR BLD: 0 % (ref 0–2)
BUN SERPL-MCNC: 23 MG/DL (ref 8–23)
CALCIUM SERPL-MCNC: 9.1 MG/DL (ref 8.8–10.2)
CHLORIDE SERPL-SCNC: 105 MMOL/L (ref 98–107)
CO2 SERPL-SCNC: 28 MMOL/L (ref 20–28)
CREAT SERPL-MCNC: 1.44 MG/DL (ref 0.8–1.3)
DIFFERENTIAL METHOD BLD: ABNORMAL
EOSINOPHIL # BLD: 0 K/UL (ref 0–0.8)
EOSINOPHIL NFR BLD: 0 % (ref 0.5–7.8)
ERYTHROCYTE [DISTWIDTH] IN BLOOD BY AUTOMATED COUNT: 17.1 % (ref 11.9–14.6)
GLUCOSE SERPL-MCNC: 80 MG/DL (ref 70–99)
HCT VFR BLD AUTO: 43 % (ref 41.1–50.3)
HGB BLD-MCNC: 13.3 G/DL (ref 13.6–17.2)
IMM GRANULOCYTES # BLD AUTO: 0 K/UL (ref 0–0.5)
IMM GRANULOCYTES NFR BLD AUTO: 0 % (ref 0–5)
INR PPP: 1.1
LYMPHOCYTES # BLD: 2.7 K/UL (ref 0.5–4.6)
LYMPHOCYTES NFR BLD: 32 % (ref 13–44)
MAGNESIUM SERPL-MCNC: 2.2 MG/DL (ref 1.8–2.4)
MCH RBC QN AUTO: 23.5 PG (ref 26.1–32.9)
MCHC RBC AUTO-ENTMCNC: 30.9 G/DL (ref 31.4–35)
MCV RBC AUTO: 75.8 FL (ref 82–102)
MONOCYTES # BLD: 0.9 K/UL (ref 0.1–1.3)
MONOCYTES NFR BLD: 11 % (ref 4–12)
NEUTS SEG # BLD: 4.7 K/UL (ref 1.7–8.2)
NEUTS SEG NFR BLD: 57 % (ref 43–78)
NRBC # BLD: 0 K/UL (ref 0–0.2)
PLATELET # BLD AUTO: 209 K/UL (ref 150–450)
PMV BLD AUTO: 12.3 FL (ref 9.4–12.3)
POTASSIUM SERPL-SCNC: 4.8 MMOL/L (ref 3.5–5.1)
PROTHROMBIN TIME: 14.3 SEC (ref 11.3–14.9)
RBC # BLD AUTO: 5.67 M/UL (ref 4.23–5.6)
SODIUM SERPL-SCNC: 141 MMOL/L (ref 136–145)
WBC # BLD AUTO: 8.3 K/UL (ref 4.3–11.1)

## 2024-06-19 NOTE — PROGRESS NOTES
Patient pre-assessment complete for BiV ICD change out with Dr. Whitney scheduled for 24 at 12:30pm, arrival time 1030. Patient verified using . Patient instructed to bring a list of home medications on the day of procedure. NPO status reinforced. Patient instructed to follow EP Information Sheet given at appointment. Patient verbalizes understanding of all instructions & denies any questions at this time.

## 2024-06-20 ENCOUNTER — ANESTHESIA (OUTPATIENT)
Dept: CARDIAC CATH/INVASIVE PROCEDURES | Age: 64
End: 2024-06-20
Payer: MEDICARE

## 2024-06-20 ENCOUNTER — HOSPITAL ENCOUNTER (OUTPATIENT)
Age: 64
Setting detail: OUTPATIENT SURGERY
Discharge: HOME | End: 2024-06-20
Attending: INTERNAL MEDICINE | Admitting: INTERNAL MEDICINE
Payer: MEDICARE

## 2024-06-20 ENCOUNTER — ANESTHESIA EVENT (OUTPATIENT)
Dept: CARDIAC CATH/INVASIVE PROCEDURES | Age: 64
End: 2024-06-20
Payer: MEDICARE

## 2024-06-20 VITALS
DIASTOLIC BLOOD PRESSURE: 70 MMHG | RESPIRATION RATE: 16 BRPM | TEMPERATURE: 98 F | SYSTOLIC BLOOD PRESSURE: 118 MMHG | HEIGHT: 70 IN | HEART RATE: 86 BPM | BODY MASS INDEX: 23.62 KG/M2 | WEIGHT: 165 LBS | OXYGEN SATURATION: 98 %

## 2024-06-20 DIAGNOSIS — Z45.02 ENCOUNTER DUE TO AICD AT END OF BATTERY LIFE: ICD-10-CM

## 2024-06-20 LAB
ECHO BSA: 1.92 M2
EKG ATRIAL RATE: 95 BPM
EKG DIAGNOSIS: NORMAL
EKG P AXIS: 45 DEGREES
EKG P-R INTERVAL: 192 MS
EKG Q-T INTERVAL: 436 MS
EKG QRS DURATION: 172 MS
EKG QTC CALCULATION (BAZETT): 547 MS
EKG R AXIS: 203 DEGREES
EKG T AXIS: 57 DEGREES
EKG VENTRICULAR RATE: 95 BPM

## 2024-06-20 PROCEDURE — C1882 AICD, OTHER THAN SING/DUAL: HCPCS | Performed by: INTERNAL MEDICINE

## 2024-06-20 PROCEDURE — 2720000010 HC SURG SUPPLY STERILE: Performed by: INTERNAL MEDICINE

## 2024-06-20 PROCEDURE — 33229 REMV&REPLC PM GEN MULT LEADS: CPT | Performed by: INTERNAL MEDICINE

## 2024-06-20 PROCEDURE — 99152 MOD SED SAME PHYS/QHP 5/>YRS: CPT | Performed by: INTERNAL MEDICINE

## 2024-06-20 PROCEDURE — 2709999900 HC NON-CHARGEABLE SUPPLY: Performed by: INTERNAL MEDICINE

## 2024-06-20 PROCEDURE — 33264 RMVL & RPLCMT DFB GEN MLT LD: CPT | Performed by: INTERNAL MEDICINE

## 2024-06-20 PROCEDURE — 2500000003 HC RX 250 WO HCPCS: Performed by: INTERNAL MEDICINE

## 2024-06-20 PROCEDURE — 93010 ELECTROCARDIOGRAM REPORT: CPT | Performed by: INTERNAL MEDICINE

## 2024-06-20 PROCEDURE — 93005 ELECTROCARDIOGRAM TRACING: CPT | Performed by: INTERNAL MEDICINE

## 2024-06-20 PROCEDURE — 6360000002 HC RX W HCPCS: Performed by: INTERNAL MEDICINE

## 2024-06-20 DEVICE — IMPLANTABLE DEVICE
Type: IMPLANTABLE DEVICE | Status: FUNCTIONAL
Brand: RIVACOR 7 HF-T QP DF4 IS4 PROMRI

## 2024-06-20 RX ORDER — PROCHLORPERAZINE EDISYLATE 5 MG/ML
5 INJECTION INTRAMUSCULAR; INTRAVENOUS
Status: CANCELLED | OUTPATIENT
Start: 2024-06-20 | End: 2024-06-21

## 2024-06-20 RX ORDER — NALOXONE HYDROCHLORIDE 0.4 MG/ML
INJECTION, SOLUTION INTRAMUSCULAR; INTRAVENOUS; SUBCUTANEOUS PRN
Status: CANCELLED | OUTPATIENT
Start: 2024-06-20

## 2024-06-20 RX ORDER — LIDOCAINE HYDROCHLORIDE AND EPINEPHRINE 10; 10 MG/ML; UG/ML
INJECTION, SOLUTION INFILTRATION; PERINEURAL PRN
Status: DISCONTINUED | OUTPATIENT
Start: 2024-06-20 | End: 2024-06-20 | Stop reason: HOSPADM

## 2024-06-20 RX ORDER — SODIUM CHLORIDE 9 MG/ML
INJECTION, SOLUTION INTRAVENOUS PRN
Status: CANCELLED | OUTPATIENT
Start: 2024-06-20

## 2024-06-20 RX ORDER — SODIUM CHLORIDE 0.9 % (FLUSH) 0.9 %
5-40 SYRINGE (ML) INJECTION EVERY 12 HOURS SCHEDULED
Status: CANCELLED | OUTPATIENT
Start: 2024-06-20

## 2024-06-20 RX ORDER — DOXYCYCLINE HYCLATE 100 MG
100 TABLET ORAL 2 TIMES DAILY
Qty: 10 TABLET | Refills: 0 | Status: SHIPPED | OUTPATIENT
Start: 2024-06-20 | End: 2024-06-25

## 2024-06-20 RX ORDER — SODIUM CHLORIDE, SODIUM LACTATE, POTASSIUM CHLORIDE, CALCIUM CHLORIDE 600; 310; 30; 20 MG/100ML; MG/100ML; MG/100ML; MG/100ML
INJECTION, SOLUTION INTRAVENOUS CONTINUOUS
Status: CANCELLED | OUTPATIENT
Start: 2024-06-20

## 2024-06-20 RX ORDER — FENTANYL CITRATE 50 UG/ML
INJECTION, SOLUTION INTRAMUSCULAR; INTRAVENOUS PRN
Status: DISCONTINUED | OUTPATIENT
Start: 2024-06-20 | End: 2024-06-20 | Stop reason: HOSPADM

## 2024-06-20 RX ORDER — MIDAZOLAM HYDROCHLORIDE 2 MG/2ML
2 INJECTION, SOLUTION INTRAMUSCULAR; INTRAVENOUS
Status: CANCELLED | OUTPATIENT
Start: 2024-06-20 | End: 2024-06-21

## 2024-06-20 RX ORDER — SODIUM CHLORIDE 0.9 % (FLUSH) 0.9 %
5-40 SYRINGE (ML) INJECTION PRN
Status: CANCELLED | OUTPATIENT
Start: 2024-06-20

## 2024-06-20 RX ORDER — OXYCODONE HYDROCHLORIDE 5 MG/1
5 TABLET ORAL
Status: CANCELLED | OUTPATIENT
Start: 2024-06-20 | End: 2024-06-21

## 2024-06-20 RX ORDER — HYDROMORPHONE HYDROCHLORIDE 2 MG/ML
0.5 INJECTION, SOLUTION INTRAMUSCULAR; INTRAVENOUS; SUBCUTANEOUS EVERY 5 MIN PRN
Status: CANCELLED | OUTPATIENT
Start: 2024-06-20

## 2024-06-20 RX ORDER — MIDAZOLAM HYDROCHLORIDE 1 MG/ML
INJECTION INTRAMUSCULAR; INTRAVENOUS PRN
Status: DISCONTINUED | OUTPATIENT
Start: 2024-06-20 | End: 2024-06-20 | Stop reason: HOSPADM

## 2024-06-20 ASSESSMENT — ENCOUNTER SYMPTOMS: DYSPNEA ACTIVITY LEVEL: NO INTERVAL CHANGE

## 2024-06-20 NOTE — ANESTHESIA PRE PROCEDURE
Department of Anesthesiology  Preprocedure Note       Name:  Wilmar Mendez   Age:  63 y.o.  :  1960                                          MRN:  [id number]         Date:  2024      Surgeon: Surgeon(s):  Ernst Whitney MD    Procedure: Procedure(s):  Remove & replace ICD biv multi leads    Medications prior to admission:   Prior to Admission medications    Medication Sig Start Date End Date Taking? Authorizing Provider   benztropine (COGENTIN) 0.5 MG tablet Take 1 tablet by mouth 2 times daily as needed    Magdalena Savage MD   potassium chloride (KLOR-CON M) 20 MEQ extended release tablet Take 1 tablet by mouth 2 times daily Indications: when takes lasix    Magdalena Savage MD   apixaban (ELIQUIS) 5 MG TABS tablet Take 1 tablet by mouth 2 times daily 23  Randy Deleon DO   carvedilol (COREG) 6.25 MG tablet Take 0.5 tablets by mouth 2 times daily (with meals) 23  Randy Deleon DO   furosemide (LASIX) 20 MG tablet Take 1 tablet by mouth daily as needed (swelling) 23   Randy Deleon DO   lisinopril (PRINIVIL;ZESTRIL) 2.5 MG tablet Take 1 tablet by mouth daily 23  Randy Deleon DO   pravastatin (PRAVACHOL) 20 MG tablet Take 1 tablet by mouth at bedtime    Magdalena Savage MD   aspirin 81 MG chewable tablet Take 1 tablet by mouth daily    Automatic Reconciliation, Ar   cloZAPine (CLOZARIL) 100 MG tablet Take 5 tablets by mouth 200mg qam and 500mg qhs    Automatic Reconciliation, Ar       Current medications:    No current facility-administered medications for this encounter.       Allergies:    Allergies   Allergen Reactions    Loratadine Other (See Comments)     Interaction with other medication       Problem List:    Patient Active Problem List   Diagnosis Code    HTN (hypertension) I10    Schizophrenia (HCC) F20.9    Microcytic anemia D50.9    Systolic congestive heart failure (HCC) I50.20    Type II diabetes mellitus

## 2024-06-20 NOTE — PROGRESS NOTES
TRANSFER - IN REPORT:    Verbal report received from SILVANA Alexis on Wilmar Mendez being received from [facility] for routine post-op      Report consisted of patient’s Situation, Background, Assessment and Recommendations(SBAR).     Information from the following report(s) Procedure Summary was reviewed with the receiving nurse.    Opportunity for questions and clarification was provided.      Assessment completed upon patient’s arrival to unit and care assumed.

## 2024-06-20 NOTE — PROGRESS NOTES
Patient received to CPRU room # 14.  Ambulatory from [facility]. Patient scheduled for gen change today with Dr Whitney. Procedure reviewed & questions answered, voiced good understanding consent obtained & placed on chart. All medications and medical history reviewed. Will prep patient per orders. Patient & family updated on plan of care.      The patient has a fraility score of 4-VULNERABLE, based on requires assistive device during ambulation.

## 2024-06-20 NOTE — PROGRESS NOTES
Discharge instructions given per orders, voiced good understanding of left chest care, medications & follow up care. Denies any questions

## 2024-06-20 NOTE — PROCEDURES
: Ernst Whitney MD    REFERRING: Self     Procedure: BiV ICD Generator Removal/Replacement without DFTs    Pre-Procedure Diagnosis  1. Chronic systolic heart failure, ejection fraction of 10-20%  2. Nonischemic dilated cardiomyopathy.  3. Class II-III heart failure symptoms.  4. Chronic systolic heart failure for a minimum of 3 months duration on optimal medical therapy.  5. Primary prevention indications for defibrillator  6. Life expectancy greater than 1 year.  7. LBBB with QRS duration of 150 msec.    Procedure Performed  1. Insertion of Biotronik biventricular implantable cardioverter defibrillator.  2. Physician directed moderate sedation.    ASA: IV    Mallampati: II    Anesthesia: Moderate sedation    Estimated Blood Loss: Less than 50 mL     Specimens: * No specimens in log *     Complications: None    Patient Information and Indications: The procedure, indications, risks, benefits, and alternatives were discussed with the patient and family members, who desired to proceed after questions were answered and informed consent was documented.     After informed consent was obtained, the patient was brought to the Electrophysiology Laboratory in a post-absorptive and was prepped and draped in sterile fashion. Prophylactic antibiotic was administered prior to skin incision. Conscious sedation was administered in divided doses of Fentanyl and Versed by Reuben Henry RN. A total of 50 mcg of Fentanyl and 4 mg of Versed was given between 12:16 PM and 12:35 PM. Local anesthetic (1% lidocaine with epinephrine) was delivered to the left pectoral region and an incision was made parallel to the deltopectoral groove directly over the prior surgical scar. The subcutaneous pocket was opened using blunt dissection and Bovie electrocautery using the PlasmaBlade (Vendlytronic), and adequate hemostasis was established. The device was freed from overlying fibrotic tissue and the leads freed to give enough slack for

## 2024-06-20 NOTE — PROGRESS NOTES
TRANSFER - OUT REPORT:    Gen Change of ICD BIV PPM with Dr. Whitney  Settings: DDDCLS  / Therapy @ 194    Incision sutured, dermabond & primaseal  No bleeding or hematoma site soft    MAR  4 mg versed  50 mcg Fentanyl   2 g ancef    Verbal report given to Fifi Mar RN on Wilmar Mendez  being transferred to CPRU for routine progression of patient care       Report consisted of patient's Situation, Background, Assessment and Recommendations(SBAR).     Information from the following report(s) Nurse Handoff Report and MAR was reviewed with the receiving nurse.    Opportunity for questions and clarification was provided.      Patient transported with:  Registered Nurse

## 2024-06-20 NOTE — DISCHARGE INSTRUCTIONS
This usually does not last long.  Feeling tired.     How can you care for yourself at home?  Activity    Don't do anything for 24 hours that requires attention to detail. It takes time for the medicine effects to completely wear off.     Do not make important legal decisions for 24 hours.     Do not sign any legal documents for 24 hours.     Do not drink alcohol today     For your safety, you should not drive or operate heavy machinery for the remainder of the day     Rest when you feel tired. Getting enough sleep will help you recover.      Diet    You can eat your normal diet, unless your doctor gives you other instructions. If your stomach is upset, try clear liquids and bland, low-fat foods like plain toast or rice.     Drink plenty of fluids (unless your doctor tells you not to).     Don't drink alcohol for 24 hours.      Medicines    Be safe with medicines. Read and follow all instructions on the label.  If the doctor gave you a prescription medicine for pain, take it as prescribed.  If you are not taking a prescription pain medicine, ask your doctor if you can take an over-the-counter medicine.     If you think your pain medicine is making you sick to your stomach:  Take your medicine after meals (unless your doctor has told you not to).  Ask your doctor for a different pain medicine.

## 2024-07-03 ENCOUNTER — NURSE ONLY (OUTPATIENT)
Age: 64
End: 2024-07-03

## 2024-07-03 DIAGNOSIS — I50.20 SYSTOLIC CONGESTIVE HEART FAILURE (HCC): Primary | ICD-10-CM

## 2025-07-07 ENCOUNTER — OFFICE VISIT (OUTPATIENT)
Age: 65
End: 2025-07-07
Payer: MEDICARE

## 2025-07-07 ENCOUNTER — CLINICAL SUPPORT (OUTPATIENT)
Age: 65
End: 2025-07-07
Payer: MEDICARE

## 2025-07-07 VITALS
WEIGHT: 171 LBS | SYSTOLIC BLOOD PRESSURE: 120 MMHG | HEART RATE: 84 BPM | DIASTOLIC BLOOD PRESSURE: 74 MMHG | HEIGHT: 70 IN | BODY MASS INDEX: 24.48 KG/M2

## 2025-07-07 DIAGNOSIS — I50.20 HFREF (HEART FAILURE WITH REDUCED EJECTION FRACTION) (HCC): ICD-10-CM

## 2025-07-07 DIAGNOSIS — I50.20 HFREF (HEART FAILURE WITH REDUCED EJECTION FRACTION) (HCC): Primary | ICD-10-CM

## 2025-07-07 DIAGNOSIS — I46.9 CARDIAC ARREST (HCC): Primary | ICD-10-CM

## 2025-07-07 PROCEDURE — 3017F COLORECTAL CA SCREEN DOC REV: CPT | Performed by: INTERNAL MEDICINE

## 2025-07-07 PROCEDURE — 1036F TOBACCO NON-USER: CPT | Performed by: INTERNAL MEDICINE

## 2025-07-07 PROCEDURE — 3078F DIAST BP <80 MM HG: CPT | Performed by: INTERNAL MEDICINE

## 2025-07-07 PROCEDURE — G8420 CALC BMI NORM PARAMETERS: HCPCS | Performed by: INTERNAL MEDICINE

## 2025-07-07 PROCEDURE — 99214 OFFICE O/P EST MOD 30 MIN: CPT | Performed by: INTERNAL MEDICINE

## 2025-07-07 PROCEDURE — 93284 PRGRMG EVAL IMPLANTABLE DFB: CPT | Performed by: INTERNAL MEDICINE

## 2025-07-07 PROCEDURE — G8427 DOCREV CUR MEDS BY ELIG CLIN: HCPCS | Performed by: INTERNAL MEDICINE

## 2025-07-07 PROCEDURE — 3074F SYST BP LT 130 MM HG: CPT | Performed by: INTERNAL MEDICINE

## 2025-07-07 RX ORDER — LEVOTHYROXINE SODIUM 25 UG/1
12.5 TABLET ORAL EVERY OTHER DAY
COMMUNITY
Start: 2025-05-09

## 2025-07-07 RX ORDER — LANOLIN ALCOHOL/MO/W.PET/CERES
1000 CREAM (GRAM) TOPICAL DAILY
COMMUNITY

## 2025-07-07 RX ORDER — FUROSEMIDE 20 MG/1
20 TABLET ORAL DAILY PRN
Qty: 180 TABLET | Refills: 3 | Status: SHIPPED | OUTPATIENT
Start: 2025-07-07

## 2025-07-07 RX ORDER — CARVEDILOL 6.25 MG/1
3.12 TABLET ORAL 2 TIMES DAILY WITH MEALS
Qty: 90 TABLET | Refills: 3 | Status: SHIPPED | OUTPATIENT
Start: 2025-07-07 | End: 2026-07-02

## 2025-07-07 RX ORDER — LISINOPRIL 2.5 MG/1
2.5 TABLET ORAL DAILY
Qty: 90 TABLET | Refills: 3 | Status: SHIPPED | OUTPATIENT
Start: 2025-07-07 | End: 2026-07-02

## 2025-07-07 RX ORDER — POTASSIUM CHLORIDE 1500 MG/1
20 TABLET, EXTENDED RELEASE ORAL 2 TIMES DAILY
Qty: 180 TABLET | Refills: 3 | Status: SHIPPED | OUTPATIENT
Start: 2025-07-07

## 2025-07-07 ASSESSMENT — ENCOUNTER SYMPTOMS
RESPIRATORY NEGATIVE: 1
EYES NEGATIVE: 1
GASTROINTESTINAL NEGATIVE: 1
ALLERGIC/IMMUNOLOGIC NEGATIVE: 1
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
## (undated) DEVICE — SUTURE ABSORBABLE MONOFILAMENT 4-0 CV15 6 IN PUR V-LOC 90 VLOCM1203

## (undated) DEVICE — DRESSING POSTOP AG PRISMASEAL 3.5X6IN

## (undated) DEVICE — LIQUIBAND RAPID ADHESIVE 36/CS 0.8ML: Brand: MEDLINE

## (undated) DEVICE — SUTURE ABSORBABLE MONOFILAMENT 3-0 PS 24 CM 26 MM VIO PDS +

## (undated) DEVICE — PLASMABLADE X PS210-030S-LIGHT 3.0SL: Brand: PLASMABLADE™ X